# Patient Record
Sex: FEMALE | Race: WHITE | NOT HISPANIC OR LATINO | URBAN - METROPOLITAN AREA
[De-identification: names, ages, dates, MRNs, and addresses within clinical notes are randomized per-mention and may not be internally consistent; named-entity substitution may affect disease eponyms.]

---

## 2020-05-09 ENCOUNTER — INPATIENT (INPATIENT)
Facility: HOSPITAL | Age: 75
LOS: 2 days | Discharge: NURSING, REHAB OR LONG-TERM CARE | DRG: 57 | End: 2020-05-12
Attending: HOSPITALIST | Admitting: HOSPITALIST
Payer: MEDICARE

## 2020-05-09 VITALS
WEIGHT: 149.91 LBS | DIASTOLIC BLOOD PRESSURE: 88 MMHG | HEART RATE: 86 BPM | TEMPERATURE: 98 F | OXYGEN SATURATION: 98 % | HEIGHT: 66 IN | SYSTOLIC BLOOD PRESSURE: 164 MMHG | RESPIRATION RATE: 16 BRPM

## 2020-05-09 DIAGNOSIS — S22.39XA FRACTURE OF ONE RIB, UNSPECIFIED SIDE, INITIAL ENCOUNTER FOR CLOSED FRACTURE: ICD-10-CM

## 2020-05-09 DIAGNOSIS — D64.9 ANEMIA, UNSPECIFIED: ICD-10-CM

## 2020-05-09 DIAGNOSIS — Z29.9 ENCOUNTER FOR PROPHYLACTIC MEASURES, UNSPECIFIED: ICD-10-CM

## 2020-05-09 DIAGNOSIS — R53.1 WEAKNESS: ICD-10-CM

## 2020-05-09 DIAGNOSIS — G20 PARKINSON'S DISEASE: ICD-10-CM

## 2020-05-09 DIAGNOSIS — Z96.642 PRESENCE OF LEFT ARTIFICIAL HIP JOINT: Chronic | ICD-10-CM

## 2020-05-09 DIAGNOSIS — H26.9 UNSPECIFIED CATARACT: ICD-10-CM

## 2020-05-09 LAB
ALBUMIN SERPL ELPH-MCNC: 3.6 G/DL — SIGNIFICANT CHANGE UP (ref 3.3–5)
ALP SERPL-CCNC: 168 U/L — HIGH (ref 40–120)
ALT FLD-CCNC: 22 U/L — SIGNIFICANT CHANGE UP (ref 12–78)
ANION GAP SERPL CALC-SCNC: 8 MMOL/L — SIGNIFICANT CHANGE UP (ref 5–17)
APPEARANCE UR: CLEAR — SIGNIFICANT CHANGE UP
AST SERPL-CCNC: 31 U/L — SIGNIFICANT CHANGE UP (ref 15–37)
BACTERIA # UR AUTO: ABNORMAL
BASOPHILS # BLD AUTO: 0.04 K/UL — SIGNIFICANT CHANGE UP (ref 0–0.2)
BASOPHILS NFR BLD AUTO: 0.4 % — SIGNIFICANT CHANGE UP (ref 0–2)
BILIRUB SERPL-MCNC: 0.3 MG/DL — SIGNIFICANT CHANGE UP (ref 0.2–1.2)
BILIRUB UR-MCNC: NEGATIVE — SIGNIFICANT CHANGE UP
BUN SERPL-MCNC: 18 MG/DL — SIGNIFICANT CHANGE UP (ref 7–23)
CALCIUM SERPL-MCNC: 8.6 MG/DL — SIGNIFICANT CHANGE UP (ref 8.5–10.1)
CHLORIDE SERPL-SCNC: 100 MMOL/L — SIGNIFICANT CHANGE UP (ref 96–108)
CO2 SERPL-SCNC: 26 MMOL/L — SIGNIFICANT CHANGE UP (ref 22–31)
COLOR SPEC: YELLOW — SIGNIFICANT CHANGE UP
CREAT SERPL-MCNC: 0.65 MG/DL — SIGNIFICANT CHANGE UP (ref 0.5–1.3)
DIFF PNL FLD: ABNORMAL
EOSINOPHIL # BLD AUTO: 0.08 K/UL — SIGNIFICANT CHANGE UP (ref 0–0.5)
EOSINOPHIL NFR BLD AUTO: 0.9 % — SIGNIFICANT CHANGE UP (ref 0–6)
EPI CELLS # UR: SIGNIFICANT CHANGE UP
GLUCOSE SERPL-MCNC: 104 MG/DL — HIGH (ref 70–99)
GLUCOSE UR QL: NEGATIVE — SIGNIFICANT CHANGE UP
HCT VFR BLD CALC: 34.1 % — LOW (ref 34.5–45)
HGB BLD-MCNC: 11.4 G/DL — LOW (ref 11.5–15.5)
IMM GRANULOCYTES NFR BLD AUTO: 0.2 % — SIGNIFICANT CHANGE UP (ref 0–1.5)
KETONES UR-MCNC: NEGATIVE — SIGNIFICANT CHANGE UP
LEUKOCYTE ESTERASE UR-ACNC: NEGATIVE — SIGNIFICANT CHANGE UP
LYMPHOCYTES # BLD AUTO: 2.64 K/UL — SIGNIFICANT CHANGE UP (ref 1–3.3)
LYMPHOCYTES # BLD AUTO: 28.4 % — SIGNIFICANT CHANGE UP (ref 13–44)
MCHC RBC-ENTMCNC: 28 PG — SIGNIFICANT CHANGE UP (ref 27–34)
MCHC RBC-ENTMCNC: 33.4 GM/DL — SIGNIFICANT CHANGE UP (ref 32–36)
MCV RBC AUTO: 83.8 FL — SIGNIFICANT CHANGE UP (ref 80–100)
MONOCYTES # BLD AUTO: 0.81 K/UL — SIGNIFICANT CHANGE UP (ref 0–0.9)
MONOCYTES NFR BLD AUTO: 8.7 % — SIGNIFICANT CHANGE UP (ref 2–14)
NEUTROPHILS # BLD AUTO: 5.69 K/UL — SIGNIFICANT CHANGE UP (ref 1.8–7.4)
NEUTROPHILS NFR BLD AUTO: 61.4 % — SIGNIFICANT CHANGE UP (ref 43–77)
NITRITE UR-MCNC: NEGATIVE — SIGNIFICANT CHANGE UP
NRBC # BLD: 0 /100 WBCS — SIGNIFICANT CHANGE UP (ref 0–0)
PH UR: 7 — SIGNIFICANT CHANGE UP (ref 5–8)
PLATELET # BLD AUTO: 415 K/UL — HIGH (ref 150–400)
POTASSIUM SERPL-MCNC: 4.4 MMOL/L — SIGNIFICANT CHANGE UP (ref 3.5–5.3)
POTASSIUM SERPL-SCNC: 4.4 MMOL/L — SIGNIFICANT CHANGE UP (ref 3.5–5.3)
PROT SERPL-MCNC: 7.9 G/DL — SIGNIFICANT CHANGE UP (ref 6–8.3)
PROT UR-MCNC: 100
RBC # BLD: 4.07 M/UL — SIGNIFICANT CHANGE UP (ref 3.8–5.2)
RBC # FLD: 12.5 % — SIGNIFICANT CHANGE UP (ref 10.3–14.5)
RBC CASTS # UR COMP ASSIST: ABNORMAL /HPF (ref 0–4)
SODIUM SERPL-SCNC: 134 MMOL/L — LOW (ref 135–145)
SP GR SPEC: 1.01 — SIGNIFICANT CHANGE UP (ref 1.01–1.02)
UROBILINOGEN FLD QL: NEGATIVE — SIGNIFICANT CHANGE UP
WBC # BLD: 9.28 K/UL — SIGNIFICANT CHANGE UP (ref 3.8–10.5)
WBC # FLD AUTO: 9.28 K/UL — SIGNIFICANT CHANGE UP (ref 3.8–10.5)
WBC UR QL: SIGNIFICANT CHANGE UP

## 2020-05-09 PROCEDURE — 99222 1ST HOSP IP/OBS MODERATE 55: CPT | Mod: GC,AI

## 2020-05-09 PROCEDURE — 72125 CT NECK SPINE W/O DYE: CPT | Mod: 26

## 2020-05-09 PROCEDURE — 73522 X-RAY EXAM HIPS BI 3-4 VIEWS: CPT | Mod: 26

## 2020-05-09 PROCEDURE — 99285 EMERGENCY DEPT VISIT HI MDM: CPT

## 2020-05-09 PROCEDURE — 70450 CT HEAD/BRAIN W/O DYE: CPT | Mod: 26

## 2020-05-09 PROCEDURE — 74176 CT ABD & PELVIS W/O CONTRAST: CPT | Mod: 26

## 2020-05-09 PROCEDURE — 72110 X-RAY EXAM L-2 SPINE 4/>VWS: CPT | Mod: 26

## 2020-05-09 PROCEDURE — 71045 X-RAY EXAM CHEST 1 VIEW: CPT | Mod: 26

## 2020-05-09 PROCEDURE — 93010 ELECTROCARDIOGRAM REPORT: CPT

## 2020-05-09 PROCEDURE — 73562 X-RAY EXAM OF KNEE 3: CPT | Mod: 26,50

## 2020-05-09 RX ORDER — TIMOLOL 0.5 %
1 DROPS OPHTHALMIC (EYE)
Refills: 0 | Status: DISCONTINUED | OUTPATIENT
Start: 2020-05-09 | End: 2020-05-10

## 2020-05-09 RX ORDER — PREDNISOLONE SODIUM PHOSPHATE 1 %
1 DROPS OPHTHALMIC (EYE) DAILY
Refills: 0 | Status: DISCONTINUED | OUTPATIENT
Start: 2020-05-09 | End: 2020-05-12

## 2020-05-09 RX ORDER — ASCORBIC ACID 60 MG
500 TABLET,CHEWABLE ORAL DAILY
Refills: 0 | Status: DISCONTINUED | OUTPATIENT
Start: 2020-05-09 | End: 2020-05-12

## 2020-05-09 RX ORDER — CARBIDOPA AND LEVODOPA 25; 100 MG/1; MG/1
1 TABLET ORAL THREE TIMES A DAY
Refills: 0 | Status: DISCONTINUED | OUTPATIENT
Start: 2020-05-09 | End: 2020-05-12

## 2020-05-09 RX ORDER — FERROUS SULFATE 325(65) MG
325 TABLET ORAL DAILY
Refills: 0 | Status: DISCONTINUED | OUTPATIENT
Start: 2020-05-09 | End: 2020-05-12

## 2020-05-09 RX ORDER — OXYCODONE AND ACETAMINOPHEN 5; 325 MG/1; MG/1
1 TABLET ORAL ONCE
Refills: 0 | Status: DISCONTINUED | OUTPATIENT
Start: 2020-05-09 | End: 2020-05-09

## 2020-05-09 RX ORDER — ACETAMINOPHEN 500 MG
650 TABLET ORAL EVERY 4 HOURS
Refills: 0 | Status: DISCONTINUED | OUTPATIENT
Start: 2020-05-09 | End: 2020-05-12

## 2020-05-09 RX ORDER — SENNA PLUS 8.6 MG/1
2 TABLET ORAL AT BEDTIME
Refills: 0 | Status: DISCONTINUED | OUTPATIENT
Start: 2020-05-09 | End: 2020-05-12

## 2020-05-09 RX ORDER — OXYCODONE AND ACETAMINOPHEN 5; 325 MG/1; MG/1
1 TABLET ORAL EVERY 6 HOURS
Refills: 0 | Status: DISCONTINUED | OUTPATIENT
Start: 2020-05-09 | End: 2020-05-12

## 2020-05-09 RX ORDER — ACETAMINOPHEN 500 MG
1000 TABLET ORAL EVERY 6 HOURS
Refills: 0 | Status: DISCONTINUED | OUTPATIENT
Start: 2020-05-09 | End: 2020-05-12

## 2020-05-09 RX ORDER — ENOXAPARIN SODIUM 100 MG/ML
40 INJECTION SUBCUTANEOUS DAILY
Refills: 0 | Status: DISCONTINUED | OUTPATIENT
Start: 2020-05-09 | End: 2020-05-12

## 2020-05-09 RX ORDER — BRIMONIDINE TARTRATE 2 MG/MG
1 SOLUTION/ DROPS OPHTHALMIC
Refills: 0 | Status: DISCONTINUED | OUTPATIENT
Start: 2020-05-09 | End: 2020-05-10

## 2020-05-09 RX ORDER — CARBIDOPA AND LEVODOPA 25; 100 MG/1; MG/1
1 TABLET ORAL ONCE
Refills: 0 | Status: COMPLETED | OUTPATIENT
Start: 2020-05-09 | End: 2020-05-09

## 2020-05-09 RX ORDER — CALCIUM CARBONATE 500(1250)
1 TABLET ORAL DAILY
Refills: 0 | Status: DISCONTINUED | OUTPATIENT
Start: 2020-05-09 | End: 2020-05-12

## 2020-05-09 RX ADMIN — CARBIDOPA AND LEVODOPA 1 TABLET(S): 25; 100 TABLET ORAL at 20:13

## 2020-05-09 RX ADMIN — OXYCODONE AND ACETAMINOPHEN 1 TABLET(S): 5; 325 TABLET ORAL at 17:52

## 2020-05-09 NOTE — ED PROVIDER NOTE - PHYSICAL EXAMINATION
SPINE: c-spine: supple, no spinal tenderness. no midline tenderness. no paraspinal tenderness. no body step off. no deformity. no muscle spasm. FROM   Thoracic spine: no spinal tenderness. no midline tenderness. no paraspinal tenderness. no body step off. no deformity. no muscle spasm.FROM   LS-spine:no spinal tenderness. no midline tenderness. no paraspinal tenderness. no body step off. no deformity.no muscle spasm. FROM neg nexus from x 4.  + left foot drop with brace SPINE: c-spine: supple, no spinal tenderness. no midline tenderness. no paraspinal tenderness. no body step off. no deformity. no muscle spasm. FROM   Thoracic spine: no spinal tenderness. no midline tenderness. no paraspinal tenderness. no body step off. no deformity. no muscle spasm.FROM   LS-spine:no spinal tenderness. no midline tenderness. no paraspinal tenderness. no body step off. no deformity.no muscle spasm. FROM neg nexus from x 4.  + left foot drop with brace    ms r le: nontender from  ms lle:nontender from foot drop

## 2020-05-09 NOTE — H&P ADULT - ASSESSMENT
75 year old F PMH cataracts and Parkinson's BIBEMS for increased weakness and pain in lower back admitted for rib fractures and generalized weakness.

## 2020-05-09 NOTE — ED PROVIDER NOTE - CARE PLAN
Principal Discharge DX:	Rib fractures  Secondary Diagnosis:	Generalized weakness  Secondary Diagnosis:	Parkinsons  Secondary Diagnosis:	Dementia

## 2020-05-09 NOTE — ED PROVIDER NOTE - CLINICAL SUMMARY MEDICAL DECISION MAKING FREE TEXT BOX
pt s/p fall with msk pain. will do labs, ua to r/o uti, ct head to r/o ich, ct c-spine to r/o fx, x rays to r/o fx, pain control and re-eval

## 2020-05-09 NOTE — H&P ADULT - PROBLEM SELECTOR PLAN 5
-patient with history of anemia likely due to chronic disease  -patient on iron at home, will continue

## 2020-05-09 NOTE — H&P ADULT - HISTORY OF PRESENT ILLNESS
75 year old F PMH cataracts and Parkinson's BIBEMS for increased weakness and pain in lower back.  History obtained by patient and son, Roman, over the phone.  Patient had fall on 4/25 where she was trying to get onto commode and fell landing on her buttock, but denies hitting head.  Patient states that she blocked out however son states that she screamed and he heard a thump, went to check patient who was awake and son helped her get up. Patient refused to go to hospital at that time due to current pandemic however since fall, patient has been very weak and complaining of increased pain in back and buttock which prompted son to call ambulance as he was no longer able to take care of her and wanted evaluation.  Patient states she has had 2 more falls since then however son unaware.  Patient ambulates with walker since hip surgery 2 years ago however recently has been more bedbound.  She receives PT twice a week however since pandemic patient has not received PT.  Per son and patient, FULL CODE.    In the ED, T 98F, HR 86, /88, RR 16, SpO2 98% on RA.  Labs significant for H/H 11.4/34.1, Na 134, alk phos 168, CK 97, trop negative x1, UA with RBC but no blood.  Patient received sinemet x1 and percocet x1 in the ED.      EKG: NSR  CXR: no acute pathology (wet read)  CT head: no acute pathology  CT cervical spine: Diffusely heterogeneous thyroid with numerous nodules measuring up to 1.5 cm.  As outpatient thyroid ultrasound may be obtained as clinically warranted  CT renal stone hunt: No hydronephrosis or renal stones.  If there is persistent unexplained hematuria, a follow-up CT urogram may be obtained as an outpatient. Fractures of the posterior left 9th and 10th ribs with surrounding soft tissue thickening may be acute.

## 2020-05-09 NOTE — ED ADULT NURSE NOTE - NSIMPLEMENTINTERV_GEN_ALL_ED
Implemented All Fall with Harm Risk Interventions:  [place] to call system. Call bell, personal items and telephone within reach. Instruct patient to call for assistance. Room bathroom lighting operational. Non-slip footwear when patient is off stretcher. Physically safe environment: no spills, clutter or unnecessary equipment. Stretcher in lowest position, wheels locked, appropriate side rails in place. Provide visual cue, wrist band, yellow gown, etc. Monitor gait and stability. Monitor for mental status changes and reorient to person, place, and time. Review medications for side effects contributing to fall risk. Reinforce activity limits and safety measures with patient and family. Provide visual clues: red socks.

## 2020-05-09 NOTE — ED PROVIDER NOTE - OBJECTIVE STATEMENT
pt is a 76yo female with pmhx of parkinson's bib ems s/p fall. pt reports she was getting on the toilet, lost her balance and fell hitting her head and buttock without loc. pt c/o bl hip and knee pain and neck pain. pt took tylenol which provided no relief. pt with hx of left sided foot drop from hip fx. pt denies fever, vision changes, n/v, abd pain.   pt walks with walker at baseline.

## 2020-05-09 NOTE — ED PROVIDER NOTE - ATTENDING CONTRIBUTION TO CARE
Pt is a 74 yo female who presents to the ED with a cc of generalized pain s/p fall.  PMHx of Parkinson's disease, dementia, anemia, cataract left eye.  Pt is a poor historian so history is obtained from son who is her POA and chart review.  Son reports that pt has been on a progressive decline for some time.  He reports that she use to live in [place] but around 2008 she had a syncopal episode and suffered a hip fracture.  After this episode she moved to [place] to live with her son and she has not followed with neurology since then.  Son reports that he remembers on her last visit the neurologist had told him that she did have mild dementia.  He states that her PMD has just been refilling her Sinemet and that there has been no dose adjustment since.  He reports that 2 Saturdays ago pt who ambulates with a walker was attempting to get to her commode in the room when she suffered a mechanical fall.  He was able to help her up and she refused to go to the hospital at that time.  He reports that he saw no signs of injury and so he kept her at home.  Since then pt has had a rapid decline.  Each day her ambulatory status has worsened and she complains of increased total body pain worse in her lower ext.  He gave her Tylenol yesterday and now pt has become paranoid that the Tylenol has caused her paralysis.  EMS was called today because she was unable to ambulate a few steps even with help today.  Upon there arrival pt reported a second fall late last night early today but would not elaborate and son reports that he knew nothing of this.  On exam pt alert to person aware she is in a hospital but when questioned about the year she began talking about Tylenol and her concerns that it caused her to become paralyzed was unable to redirect pt after this.  NCAT, PERRL, EOMI, heart RRR, lungs CTA, abd soft NT/ND.  No pelvic TTP.  Generalized weakness noted to LE bilaterally with left foot drop noted which is chronic.  +pedal pulses bilaterally.  Pt with significant muscle stiffness noted to lower ext unable to lift off bed.  No acute deformity noted.  Moving upper ext bilaterally with slight muscle stiffness.  Pt with worsening ambulatory status and frequent falls in the setting of Parkinson's disease.  Has been non compliant with follow up with neurology.  Will likely require admission as it appears that pt is an unsafe discharge

## 2020-05-09 NOTE — H&P ADULT - NSHPPHYSICALEXAM_GEN_ALL_CORE
T(C): 36.8 (05-09-20 @ 21:15), Max: 36.8 (05-09-20 @ 21:15)  HR: 81 (05-09-20 @ 21:15) (81 - 86)  BP: 151/79 (05-09-20 @ 21:15) (151/79 - 164/88)  RR: 15 (05-09-20 @ 21:15) (15 - 16)  SpO2: 99% (05-09-20 @ 21:15) (98% - 99%)    GENERAL: patient appears well, no acute distress, appropriate, pleasant  EYES: sclera clear, no exudates  ENMT: oropharynx clear without erythema, no exudates, moist mucous membranes  LUNGS: good air entry bilaterally, clear to auscultation, symmetric breath sounds, no wheezing or rhonchi appreciated  HEART: soft S1/S2, regular rate and rhythm, no murmurs noted, no lower extremity edema  GASTROINTESTINAL: abdomen is soft but slightly firm, nontender, nondistended, normoactive bowel sounds, no palpable masses  MUSCULOSKELETAL: 5/5 muscle strength b/l UE, unable to move b/l LE much 3/5 strength, 5/5 plantar flexion b/l feet, no pain to palpation of anterior chest, no ecchymosis on chest  NEUROLOGIC: awake, alert, oriented x3, no obvious sensory deficits  PSYCHIATRIC: mood is good, affect is congruent, linear and logical thought process

## 2020-05-09 NOTE — H&P ADULT - PROBLEM SELECTOR PLAN 2
-patient with multiple falls in past few weeks, ambulates with walker at baseline however recently mostly bedbound  -falls likely due to Parkinson's  -UA with some RBC but no blood, CK negative, CT abd/pelvis negative for stone or hydronephrosis   -PT consult  -social work consult: son interested having home aide when patient discharged -patient with multiple falls in past few weeks, ambulates with walker at baseline however recently mostly bedbound  -falls likely due to Parkinson's  -UA with some RBC but no blood, CK negative, CT abd/pelvis negative for stone or hydronephrosis   -Xray hip and L-spine negative for acute fractures, follow up xray b/l knees  -PT consult  -social work consult: son interested having home aide when patient discharged

## 2020-05-09 NOTE — ED ADULT NURSE NOTE - OBJECTIVE STATEMENT
pt reports falling twice this week.  pt reports "everything hurts" no obvious injury noted. pt reports falling twice this week.  no obvious injury noted. pt states she lost her balance while getting on the toilet. pt reports "everything hurts" pt c/o bl hip and knee pain and neck pain. pt took tylenol which provided no relief. left foot drop noted with brace on from home.  pt states her foot drop is from a prior hip fx.  pt walks with walker at baseline.  pt lower extremities are extremely stiff.  pt unable to move her legs independently while in bed.  when her legs are moved, pt c/o pain.  skin intact, no ecchymosis noted.

## 2020-05-09 NOTE — H&P ADULT - NSHPREVIEWOFSYSTEMS_GEN_ALL_CORE
CONSTITUTIONAL: denies fever, chills, admits to fatigue and weakness  HEENT: denies blurred vision, sore throat  CARDIOVASCULAR: denies chest pain, chest pressure, palpitations  RESPIRATORY: denies shortness of breath, sputum production  GASTROINTESTINAL: denies nausea, vomiting, diarrhea, abdominal pain  GENITOURINARY: denies dysuria, discharge  NEUROLOGICAL: denies numbness, headache, admits to generalized weakness  MUSCULOSKELETAL: admits to L hip pain and back pain, denies chest pain  HEMATOLOGIC: denies gross bleeding, bruising  PSYCHIATRIC: denies recent changes in anxiety, depression

## 2020-05-09 NOTE — H&P ADULT - NSHPSOCIALHISTORY_GEN_ALL_CORE
Lives with son at home, ambulates with walker at baseline  Receives PT twice a week  Denies hx of smoking, EtOH, or illicit drug use

## 2020-05-09 NOTE — H&P ADULT - PROBLEM SELECTOR PLAN 1
-admit to F  -patient with fractures of the posterior left 9th and 10th ribs with surrounding soft tissue thickening may be acute on CT abd/pelvis  -pain control   -no pneumothorax on CXR, follow up official report  -Continue to monitor respiratory status, saturating well on RA on admission -admit to F  -patient with fractures of the posterior left 9th and 10th ribs with surrounding soft tissue thickening may be acute on CT abd/pelvis  -pain control   -no pneumothorax on CXR  -Continue to monitor respiratory status, saturating well on RA on admission -admit to F  -patient with fractures of the posterior left 9th and 10th ribs with surrounding soft tissue thickening may be acute on CT abd/pelvis  -pain control , tylenol & percocet prn. Lidoderm patch to chest wall  -no pneumothorax on CXR  -Continue to monitor respiratory status, saturating well on RA on admission

## 2020-05-09 NOTE — H&P ADULT - PROBLEM SELECTOR PLAN 6
-Lovenox 40mg QD due to mostly bedbound status while in hospital  -Palliative consult: patient full code at this time, son would like to speak to palliative for more information    IMPROVE VTE Individual Risk Assessment  RISK                                                                Points  [  ] Previous VTE                                                  3  [  ] Thrombophilia                                               2  [  ] Lower limb paralysis                                      2        (unable to hold up >15 seconds)    [  ] Current Cancer                                              2         (within 6 months)  [  ] Immobilization > 24 hrs                                1  [  ] ICU/CCU stay > 24 hours                              1  [X  ] Age > 60                                                      1  IMPROVE VTE Score ____1_____  IMPROVE Score 0-1: Low Risk, No VTE prophylaxis required for most patients, encourage ambulation.   IMPROVE Score 2-3: At risk, pharmacologic VTE prophylaxis is indicated for most patients (in the absence of a contraindication)  IMPROVE Score > or = 4: High Risk, pharmacologic VTE prophylaxis is indicated for most patients (in the absence of a contraindication)

## 2020-05-10 DIAGNOSIS — I10 ESSENTIAL (PRIMARY) HYPERTENSION: ICD-10-CM

## 2020-05-10 DIAGNOSIS — E04.1 NONTOXIC SINGLE THYROID NODULE: ICD-10-CM

## 2020-05-10 LAB
ANION GAP SERPL CALC-SCNC: 7 MMOL/L — SIGNIFICANT CHANGE UP (ref 5–17)
BASOPHILS # BLD AUTO: 0.03 K/UL — SIGNIFICANT CHANGE UP (ref 0–0.2)
BASOPHILS NFR BLD AUTO: 0.4 % — SIGNIFICANT CHANGE UP (ref 0–2)
BUN SERPL-MCNC: 15 MG/DL — SIGNIFICANT CHANGE UP (ref 7–23)
CALCIUM SERPL-MCNC: 8.4 MG/DL — LOW (ref 8.5–10.1)
CHLORIDE SERPL-SCNC: 102 MMOL/L — SIGNIFICANT CHANGE UP (ref 96–108)
CO2 SERPL-SCNC: 28 MMOL/L — SIGNIFICANT CHANGE UP (ref 22–31)
CREAT SERPL-MCNC: 0.64 MG/DL — SIGNIFICANT CHANGE UP (ref 0.5–1.3)
EOSINOPHIL # BLD AUTO: 0.12 K/UL — SIGNIFICANT CHANGE UP (ref 0–0.5)
EOSINOPHIL NFR BLD AUTO: 1.6 % — SIGNIFICANT CHANGE UP (ref 0–6)
GLUCOSE SERPL-MCNC: 87 MG/DL — SIGNIFICANT CHANGE UP (ref 70–99)
HCT VFR BLD CALC: 31.9 % — LOW (ref 34.5–45)
HGB BLD-MCNC: 10.5 G/DL — LOW (ref 11.5–15.5)
IMM GRANULOCYTES NFR BLD AUTO: 0.3 % — SIGNIFICANT CHANGE UP (ref 0–1.5)
LYMPHOCYTES # BLD AUTO: 2.7 K/UL — SIGNIFICANT CHANGE UP (ref 1–3.3)
LYMPHOCYTES # BLD AUTO: 35.3 % — SIGNIFICANT CHANGE UP (ref 13–44)
MCHC RBC-ENTMCNC: 27.9 PG — SIGNIFICANT CHANGE UP (ref 27–34)
MCHC RBC-ENTMCNC: 32.9 GM/DL — SIGNIFICANT CHANGE UP (ref 32–36)
MCV RBC AUTO: 84.8 FL — SIGNIFICANT CHANGE UP (ref 80–100)
MONOCYTES # BLD AUTO: 0.74 K/UL — SIGNIFICANT CHANGE UP (ref 0–0.9)
MONOCYTES NFR BLD AUTO: 9.7 % — SIGNIFICANT CHANGE UP (ref 2–14)
NEUTROPHILS # BLD AUTO: 4.04 K/UL — SIGNIFICANT CHANGE UP (ref 1.8–7.4)
NEUTROPHILS NFR BLD AUTO: 52.7 % — SIGNIFICANT CHANGE UP (ref 43–77)
NRBC # BLD: 0 /100 WBCS — SIGNIFICANT CHANGE UP (ref 0–0)
PLATELET # BLD AUTO: 404 K/UL — HIGH (ref 150–400)
POTASSIUM SERPL-MCNC: 3.9 MMOL/L — SIGNIFICANT CHANGE UP (ref 3.5–5.3)
POTASSIUM SERPL-SCNC: 3.9 MMOL/L — SIGNIFICANT CHANGE UP (ref 3.5–5.3)
RBC # BLD: 3.76 M/UL — LOW (ref 3.8–5.2)
RBC # FLD: 12.5 % — SIGNIFICANT CHANGE UP (ref 10.3–14.5)
SARS-COV-2 RNA SPEC QL NAA+PROBE: SIGNIFICANT CHANGE UP
SODIUM SERPL-SCNC: 137 MMOL/L — SIGNIFICANT CHANGE UP (ref 135–145)
WBC # BLD: 7.65 K/UL — SIGNIFICANT CHANGE UP (ref 3.8–10.5)
WBC # FLD AUTO: 7.65 K/UL — SIGNIFICANT CHANGE UP (ref 3.8–10.5)

## 2020-05-10 PROCEDURE — 99233 SBSQ HOSP IP/OBS HIGH 50: CPT

## 2020-05-10 RX ORDER — LIDOCAINE 4 G/100G
1 CREAM TOPICAL DAILY
Refills: 0 | Status: DISCONTINUED | OUTPATIENT
Start: 2020-05-10 | End: 2020-05-12

## 2020-05-10 RX ORDER — BRIMONIDINE TARTRATE, TIMOLOL MALEATE 2; 5 MG/ML; MG/ML
1 SOLUTION/ DROPS OPHTHALMIC EVERY 12 HOURS
Refills: 0 | Status: DISCONTINUED | OUTPATIENT
Start: 2020-05-10 | End: 2020-05-12

## 2020-05-10 RX ORDER — AMLODIPINE BESYLATE 2.5 MG/1
10 TABLET ORAL DAILY
Refills: 0 | Status: DISCONTINUED | OUTPATIENT
Start: 2020-05-10 | End: 2020-05-12

## 2020-05-10 RX ADMIN — Medication 1 TABLET(S): at 13:19

## 2020-05-10 RX ADMIN — Medication 500 MILLIGRAM(S): at 13:19

## 2020-05-10 RX ADMIN — Medication 1 DROP(S): at 06:24

## 2020-05-10 RX ADMIN — ENOXAPARIN SODIUM 40 MILLIGRAM(S): 100 INJECTION SUBCUTANEOUS at 13:19

## 2020-05-10 RX ADMIN — CARBIDOPA AND LEVODOPA 1 TABLET(S): 25; 100 TABLET ORAL at 13:19

## 2020-05-10 RX ADMIN — BRIMONIDINE TARTRATE 1 DROP(S): 2 SOLUTION/ DROPS OPHTHALMIC at 06:24

## 2020-05-10 RX ADMIN — Medication 1 DROP(S): at 13:19

## 2020-05-10 RX ADMIN — Medication 325 MILLIGRAM(S): at 13:19

## 2020-05-10 RX ADMIN — AMLODIPINE BESYLATE 10 MILLIGRAM(S): 2.5 TABLET ORAL at 13:18

## 2020-05-10 RX ADMIN — CARBIDOPA AND LEVODOPA 1 TABLET(S): 25; 100 TABLET ORAL at 21:45

## 2020-05-10 RX ADMIN — CARBIDOPA AND LEVODOPA 1 TABLET(S): 25; 100 TABLET ORAL at 06:24

## 2020-05-10 NOTE — PROGRESS NOTE ADULT - SUBJECTIVE AND OBJECTIVE BOX
This progress note was completed in part by resident acting as telephonic scribe during COVID-19 crisis. Physical exam was completed by attending physician, and findings below are those of the attending physician, and have been reviewed in detail.    Patient is a 75y old  Female who presents with a chief complaint of rib fractures and generalized weakness (09 May 2020 22:19)      ----  INTERVAL HPI/OVERNIGHT EVENTS: Pt seen and evaluated at the bedside. No acute overnight events occurred.     ----  PAST MEDICAL & SURGICAL HISTORY:  Dementia  Anemia  Cataract  Parkinsons  History of left hip replacement      FAMILY HISTORY:  FH: type 2 diabetes      Allergies    tetracycline (Unknown)    Intolerances        ----  REVIEW OF SYSTEMS:  CONSTITUTIONAL: denies fever, chills, admits to fatigue and weakness  HEENT: denies blurred vision, sore throat  CARDIOVASCULAR: denies chest pain, chest pressure, palpitations  RESPIRATORY: denies shortness of breath, sputum production  GASTROINTESTINAL: denies nausea, vomiting, diarrhea, abdominal pain  GENITOURINARY: denies dysuria, discharge  NEUROLOGICAL: denies numbness, headache, admits to generalized weakness  MUSCULOSKELETAL: admits to L hip pain and back pain, denies chest pain  	HEMATOLOGIC: denies gross bleeding, bruising  PSYCHIATRIC: denies recent changes in anxiety, depression    ----  PHYSICAL EXAM:  GENERAL: patient appears well, no acute distress, appropriate, pleasant  EYES: sclera clear, no exudates  ENMT: oropharynx clear without erythema, no exudates, moist mucous membranes  LUNGS: good air entry bilaterally, clear to auscultation, symmetric breath sounds, no wheezing or rhonchi appreciated  HEART: soft S1/S2, regular rate and rhythm  GASTROINTESTINAL: abdomen is soft, nontender, nondistended, normoactive bowel sounds, no palpable masses  MUSCULOSKELETAL: 5/5 muscle strength b/l UE, b/l LE 3/5 strength, 5/5 plantar flexion b/l feet, no pain to palpation of anterior chest, no ecchymosis on chest  NEUROLOGIC: awake, alert, oriented x3, no obvious sensory deficits  PSYCHIATRIC: mood is good, affect is congruent, linear and logical thought process     T(C): 36.8 (05-10-20 @ 09:05), Max: 37 (05-10-20 @ 08:13)  HR: 68 (05-10-20 @ 09:05) (68 - 86)  BP: 174/81 (05-10-20 @ 09:05) (150/78 - 174/81)  RR: 16 (05-10-20 @ 09:05) (15 - 16)  SpO2: 98% (05-10-20 @ 09:05) (98% - 99%)  Wt(kg): --    ----  I&O's Summary    09 May 2020 07:01  -  10 May 2020 07:00  --------------------------------------------------------  IN: 0 mL / OUT: 800 mL / NET: -800 mL        LABS:                        10.5   7.65  )-----------( 404      ( 10 May 2020 04:54 )             31.9     05-10    137  |  102  |  15  ----------------------------<  87  3.9   |  28  |  0.64    Ca    8.4<L>      10 May 2020 04:54    TPro  7.9  /  Alb  3.6  /  TBili  0.3  /  DBili  x   /  AST  31  /  ALT  22  /  AlkPhos  168<H>  09      Urinalysis Basic - ( 09 May 2020 17:55 )    Color: Yellow / Appearance: Clear / S.010 / pH: x  Gluc: x / Ketone: Negative  / Bili: Negative / Urobili: Negative   Blood: x / Protein: 100 / Nitrite: Negative   Leuk Esterase: Negative / RBC: 25-50 /HPF / WBC 0-2   Sq Epi: x / Non Sq Epi: Occasional / Bacteria: Few      CAPILLARY BLOOD GLUCOSE

## 2020-05-10 NOTE — DISCHARGE NOTE PROVIDER - CARE PROVIDER_API CALL
Kellie Atkinson (DO)  Family Medicine  [address] [place] [address]  Phone: [phone]  Fax: [phone]  Follow Up Time: Kellie Atkinson (DO)  Family Medicine  [address] [place] [address]  Phone: [phone]  Fax: [phone]  Follow Up Time:     Perlman, Craig D (DO)  [address], [address]  [place] [address]  Phone: [phone]  Fax: [phone]  Follow Up Time:

## 2020-05-10 NOTE — PHYSICAL THERAPY INITIAL EVALUATION ADULT - ADDITIONAL COMMENTS
Patient live in a private home on main level. Patient states she has been receiving HCPT 2x/wk since 2014. Patient ambulates short distances with RW. Son assists as needed.

## 2020-05-10 NOTE — DISCHARGE NOTE PROVIDER - NSDCMRMEDTOKEN_GEN_ALL_CORE_FT
B-Complex 50 oral tablet: 1 tab(s) orally once a day  Caltrate 600 mg oral tablet: 1 tab(s) orally once a day  Centrum oral tablet: 1 tab(s) orally once a day  Combigan 0.2%-0.5% ophthalmic solution: 1 drop(s) to each affected eye every 12 hours  ferrous gluconate 240 mg (27 mg elemental iron) oral tablet: 1 tab(s) orally once a day  Ocuvite oral tablet: 1 tab(s) orally once a day  prednisoLONE acetate 1% ophthalmic suspension: 1 drop(s) to each affected eye once a day  Sinemet 25 mg-100 mg oral tablet: 1 tab(s) orally 3 times a day  Vitamin C 500 mg oral tablet: 1 tab(s) orally once a day acetaminophen 325 mg oral tablet: 2 tab(s) orally every 4 hours, As needed, Mild Pain (1 - 3)  acetaminophen 500 mg oral tablet: 2 tab(s) orally every 6 hours, As needed, Moderate Pain (4 - 6)  amLODIPine 10 mg oral tablet: 1 tab(s) orally once a day  B-Complex 50 oral tablet: 1 tab(s) orally once a day  Caltrate 600 mg oral tablet: 1 tab(s) orally once a day  carbidopa-levodopa 25 mg-100 mg oral tablet: 1 tab(s) orally 3 times a day  Centrum oral tablet: 1 tab(s) orally once a day  Combigan 0.2%-0.5% ophthalmic solution: 1 drop(s) to each affected eye every 12 hours  enoxaparin: 40 milligram(s) subcutaneous once a day  ferrous gluconate 240 mg (27 mg elemental iron) oral tablet: 1 tab(s) orally once a day  lidocaine 5% topical film: Apply topically to affected area once a day, As Needed  Ocuvite oral tablet: 1 tab(s) orally once a day  prednisoLONE acetate 1% ophthalmic suspension: 1 drop(s) to each affected eye once a day  QUEtiapine: 12.5 milligram(s) orally once a day at 20:00  senna oral tablet: 2 tab(s) orally once a day (at bedtime), As needed, Constipation  Vitamin C 500 mg oral tablet: 1 tab(s) orally once a day acetaminophen 325 mg oral tablet: 2 tab(s) orally every 4 hours, As needed, Mild Pain (1 - 3)  acetaminophen 500 mg oral tablet: 2 tab(s) orally every 6 hours, As needed, Moderate Pain (4 - 6)  amLODIPine 10 mg oral tablet: 1 tab(s) orally once a day  B-Complex 50 oral tablet: 1 tab(s) orally once a day  Caltrate 600 mg oral tablet: 1 tab(s) orally once a day  carbidopa-levodopa 25 mg-100 mg oral tablet: 1 tab(s) orally 3 times a day  Centrum oral tablet: 1 tab(s) orally once a day  Combigan 0.2%-0.5% ophthalmic solution: 1 drop(s) to each affected eye every 12 hours  enoxaparin: 40 milligram(s) subcutaneous once a day  ferrous gluconate 240 mg (27 mg elemental iron) oral tablet: 1 tab(s) orally once a day  lidocaine 5% topical film: Apply topically to affected area once a day, As Needed  neomycin/polymyxin B/bacitracin 3.5 mg-10,000 units-400 units/g ophthalmic ointment: 1 application to each affected eye every 8 hours for 5 days thru 5/17  Ocuvite oral tablet: 1 tab(s) orally once a day  prednisoLONE acetate 1% ophthalmic suspension: 1 drop(s) to each affected eye once a day  QUEtiapine: 12.5 milligram(s) orally once a day at 20:00  senna oral tablet: 2 tab(s) orally once a day (at bedtime), As needed, Constipation  Vitamin C 500 mg oral tablet: 1 tab(s) orally once a day

## 2020-05-10 NOTE — DISCHARGE NOTE PROVIDER - CARE PROVIDERS DIRECT ADDRESSES
[email or web address],[email or web address]_Unknown

## 2020-05-10 NOTE — PHYSICAL THERAPY INITIAL EVALUATION ADULT - RANGE OF MOTION EXAMINATION, REHAB EVAL
bilateral lower extremity ROM was WFL (within functional limits)/L foot drop. wears AFO w/sneakers (at bedside)/bilateral upper extremity ROM was WFL (within functional limits)/deficits as listed below

## 2020-05-10 NOTE — PROGRESS NOTE ADULT - PROBLEM SELECTOR PLAN 3
- no reported hx of HTN, not on any home meds  - noted elevated BP in AM, possibly due to undiagnosed HTN and discomfort  - added amlodipine daily with hold parameters

## 2020-05-10 NOTE — PROGRESS NOTE ADULT - PROBLEM SELECTOR PLAN 4
- Incidental finding on CT showing diffusely heterogeneous thyroid with numerous nodules measuring up to 1.5 cm  - no reported hx of thyroid disease, asymptomatic  - continue to monitor, will need outpatient follow up   - f/u Thyroid panel in AM

## 2020-05-10 NOTE — PROGRESS NOTE ADULT - PROBLEM SELECTOR PLAN 2
-patient with multiple falls in past few weeks, ambulates with walker at baseline however recently mostly bedbound  -falls likely due to Parkinson's and deconditioning   -UA with some RBC but no blood, CK negative, CT abd/pelvis negative for stone or hydronephrosis   -Xray hip and L-spine negative for acute fractures  -b/l Knee xray showed diffuse osteopenia.  No evidence of fracture, dislocation or suprapatellar joint effusion in either knee. Mild osteoarthrosis bilaterally.  -PT consult  -social work consult: son interested having home aide when patient discharged

## 2020-05-10 NOTE — PROGRESS NOTE ADULT - PROBLEM SELECTOR PLAN 1
-patient with fractures of the posterior left 9th and 10th ribs with surrounding soft tissue thickening may be acute on CT abd/pelvis  -pain control , tylenol & percocet prn. Lidoderm patch to chest wall  -no pneumothorax on CXR  -Continue to monitor respiratory status, saturating well on RA on admission

## 2020-05-10 NOTE — DISCHARGE NOTE PROVIDER - NSDCCPCAREPLAN_GEN_ALL_CORE_FT
PRINCIPAL DISCHARGE DIAGNOSIS  Diagnosis: Rib fractures  Assessment and Plan of Treatment: You were admitted for a fall and found to have rib fractures. You were seen by a neurologist while admitted. You were also evaluated by PT and recommended to be discharged to [facility] for continued physical therapy and rehab.      SECONDARY DISCHARGE DIAGNOSES  Diagnosis: Parkinsons  Assessment and Plan of Treatment: Continue current medications    Diagnosis: HTN (hypertension)  Assessment and Plan of Treatment: Continue Amlodipine 10mg with hold parameters    Diagnosis: Thyroid nodule  Assessment and Plan of Treatment: You were found to have a diffusely heterogeneous thyroid with numerous nodules measuring up to 1.5 cm on imaging. T4 and totat T3 were normal. Please follow up with your PMD or endocrinologist for further management. PRINCIPAL DISCHARGE DIAGNOSIS  Diagnosis: Rib fractures  Assessment and Plan of Treatment: You were admitted for a fall and found to have rib fractures. You were seen by a neurologist while admitted. You were also evaluated by PT and recommended to be discharged to [facility] for continued physical therapy and rehab.      SECONDARY DISCHARGE DIAGNOSES  Diagnosis: Conjunctivitis  Assessment and Plan of Treatment: Continue neomycin/polymyxin B/bacitracin for 5 day. Consider further therapy or evaluation if symptoms persist or worsen    Diagnosis: Parkinsons  Assessment and Plan of Treatment: Continue current medications    Diagnosis: HTN (hypertension)  Assessment and Plan of Treatment: Continue Amlodipine 10mg with hold parameters    Diagnosis: Thyroid nodule  Assessment and Plan of Treatment: You were found to have a diffusely heterogeneous thyroid with numerous nodules measuring up to 1.5 cm on imaging. T4 and totat T3 were normal. Please follow up with your PMD or endocrinologist for further management. If you do not have an endocrinologist, you may follow up with Dr. Perlman PRINCIPAL DISCHARGE DIAGNOSIS  Diagnosis: Rib fractures  Assessment and Plan of Treatment: You were admitted for a fall and found to have rib fractures. You were seen by a neurologist while admitted. You were also evaluated by PT and recommended to be discharged to [facility] for continued physical therapy and rehab.      SECONDARY DISCHARGE DIAGNOSES  Diagnosis: Conjunctivitis  Assessment and Plan of Treatment: Continue neomycin/polymyxin B/bacitracin for 5 day. Please follow up with an opthalmologist once discharged    Diagnosis: Parkinsons  Assessment and Plan of Treatment: Continue current medications    Diagnosis: HTN (hypertension)  Assessment and Plan of Treatment: Continue Amlodipine 10mg with hold parameters    Diagnosis: Thyroid nodule  Assessment and Plan of Treatment: You were found to have a diffusely heterogeneous thyroid with numerous nodules measuring up to 1.5 cm on imaging. T4 and totat T3 were normal. Please follow up with your PMD or endocrinologist for further management. If you do not have an endocrinologist, you may follow up with Dr. Perlman

## 2020-05-10 NOTE — DISCHARGE NOTE PROVIDER - HOSPITAL COURSE
FROM ADMISSION H+P:     HPI:    75 year old F PMH cataracts and Parkinson's BIBEMS for increased weakness and pain in lower back.  History obtained by patient and son, Roman, over the phone.  Patient had fall on 4/25 where she was trying to get onto commode and fell landing on her buttock, but denies hitting head.  Patient states that she blocked out however son states that she screamed and he heard a thump, went to check patient who was awake and son helped her get up. Patient refused to go to hospital at that time due to current pandemic however since fall, patient has been very weak and complaining of increased pain in back and buttock which prompted son to call ambulance as he was no longer able to take care of her and wanted evaluation.  Patient states she has had 2 more falls since then however son unaware.  Patient ambulates with walker since hip surgery 2 years ago however recently has been more bedbound.  She receives PT twice a week however since pandemic patient has not received PT.  Per son and patient, FULL CODE.        In the ED, T 98F, HR 86, /88, RR 16, SpO2 98% on RA.  Labs significant for H/H 11.4/34.1, Na 134, alk phos 168, CK 97, trop negative x1, UA with RBC but no blood.  Patient received sinemet x1 and percocet x1 in the ED.          EKG: NSR    CXR: no acute pathology (wet read)    CT head: no acute pathology    CT cervical spine: Diffusely heterogeneous thyroid with numerous nodules measuring up to 1.5 cm.  As outpatient thyroid ultrasound may be obtained as clinically warranted    CT renal stone hunt: No hydronephrosis or renal stones.  If there is persistent unexplained hematuria, a follow-up CT urogram may be obtained as an outpatient. Fractures of the posterior left 9th and 10th ribs with surrounding soft tissue thickening may be acute. (09 May 2020 22:19)            ---    HOSPITAL COURSE: Patient was admitted to medicine. Seen by neurology (Aguilar) and..... Pt was also evaluated by PT and recommended to be discharged to [facility] home with PT. For thyroid nodule, a thyroid panel was checked... Patient will require further outpatient endocrinology workup and management. Patient showed gradual improvement over hospitalization and is medically optimized for discharge with close outpatient PMD follow up.        ---            FINAL DISCHARGE DIAGNOSIS LIST:    Please see last daily progress note for final discharge diagnoses        --- FROM ADMISSION H+P:     HPI:    75 year old F PMH cataracts and Parkinson's BIBEMS for increased weakness and pain in lower back.  History obtained by patient and son, Roman, over the phone.  Patient had fall on 4/25 where she was trying to get onto commode and fell landing on her buttock, but denies hitting head.  Patient states that she blocked out however son states that she screamed and he heard a thump, went to check patient who was awake and son helped her get up. Patient refused to go to hospital at that time due to current pandemic however since fall, patient has been very weak and complaining of increased pain in back and buttock which prompted son to call ambulance as he was no longer able to take care of her and wanted evaluation.  Patient states she has had 2 more falls since then however son unaware.  Patient ambulates with walker since hip surgery 2 years ago however recently has been more bedbound.  She receives PT twice a week however since pandemic patient has not received PT.  Per son and patient, FULL CODE.        In the ED, T 98F, HR 86, /88, RR 16, SpO2 98% on RA.  Labs significant for H/H 11.4/34.1, Na 134, alk phos 168, CK 97, trop negative x1, UA with RBC but no blood.  Patient received sinemet x1 and percocet x1 in the ED.          EKG: NSR    CXR: no acute pathology (wet read)    CT head: no acute pathology    CT cervical spine: Diffusely heterogeneous thyroid with numerous nodules measuring up to 1.5 cm.  As outpatient thyroid ultrasound may be obtained as clinically warranted    CT renal stone hunt: No hydronephrosis or renal stones.  If there is persistent unexplained hematuria, a follow-up CT urogram may be obtained as an outpatient. Fractures of the posterior left 9th and 10th ribs with surrounding soft tissue thickening may be acute. (09 May 2020 22:19)            ---    HOSPITAL COURSE: Patient was admitted to medicine. Seen by neurology (Aguilar) for episode of fall, possibly syncopal event, questionable this could be secondary to vasovagal event, the patient was using the commode versus possibly from Parkinson's, mechanical fall. Patient was continued on home parkinsons medications. Pt was also evaluated by PT and recommended to be discharged to [facility]. Family in agreement with disposition. For thyroid nodule, a thyroid panel was checked which is WNL. Patient will require further outpatient endocrinology workup and management. Patient showed gradual improvement over hospitalization and is medically optimized for discharge with close outpatient PMD follow up.        ---            FINAL DISCHARGE DIAGNOSIS LIST:    Please see last daily progress note for final discharge diagnoses        --- FROM ADMISSION H+P:     HPI:    75 year old F PMH cataracts and Parkinson's BIBEMS for increased weakness and pain in lower back.  History obtained by patient and son, Roman, over the phone.  Patient had fall on 4/25 where she was trying to get onto commode and fell landing on her buttock, but denies hitting head.  Patient states that she blocked out however son states that she screamed and he heard a thump, went to check patient who was awake and son helped her get up. Patient refused to go to hospital at that time due to current pandemic however since fall, patient has been very weak and complaining of increased pain in back and buttock which prompted son to call ambulance as he was no longer able to take care of her and wanted evaluation.  Patient states she has had 2 more falls since then however son unaware.  Patient ambulates with walker since hip surgery 2 years ago however recently has been more bedbound.  She receives PT twice a week however since pandemic patient has not received PT.  Per son and patient, FULL CODE.        In the ED, T 98F, HR 86, /88, RR 16, SpO2 98% on RA.  Labs significant for H/H 11.4/34.1, Na 134, alk phos 168, CK 97, trop negative x1, UA with RBC but no blood.  Patient received sinemet x1 and percocet x1 in the ED.          EKG: NSR    CXR: no acute pathology (wet read)    CT head: no acute pathology    CT cervical spine: Diffusely heterogeneous thyroid with numerous nodules measuring up to 1.5 cm.  As outpatient thyroid ultrasound may be obtained as clinically warranted    CT renal stone hunt: No hydronephrosis or renal stones.  If there is persistent unexplained hematuria, a follow-up CT urogram may be obtained as an outpatient. Fractures of the posterior left 9th and 10th ribs with surrounding soft tissue thickening may be acute. (09 May 2020 22:19)            ---    HOSPITAL COURSE: Patient was admitted to medicine. Seen by neurology (Aguilar) for episode of fall, possibly syncopal event, questionable this could be secondary to vasovagal event, the patient was using the commode versus possibly from Parkinson's, mechanical fall. Patient was continued on home parkinsons medications. Pt was also evaluated by PT and recommended to be discharged to [facility]. Family in agreement with disposition. For thyroid nodule, a thyroid panel was checked which is WNL. Patient will require further outpatient endocrinology workup and management. Of note, patient had increase in wbc count on 5/12 from 8->13 with procal of .06. Pt was c/o eye crusting and was started on tx for conjuctivitis. Patient showed gradual improvement over hospitalization and is medically optimized for discharge with close outpatient PMD follow up.         ---            FINAL DISCHARGE DIAGNOSIS LIST:    Please see last daily progress note for final discharge diagnoses        ---

## 2020-05-10 NOTE — ED ADULT NURSE REASSESSMENT NOTE - NS ED NURSE REASSESS COMMENT FT1
spoke with son Roman Almeida.  son states pt has not been to her neurologist in about five years.  he says her dr has been calling in refills for her sinemet over the phone and has not evaluated or assessed her, therefor her medications have not been titrated accordingly.  son also reports her neurologist giving him the diagnosis of early dementia but no medications have ever been prescribed. son states his mother has been steadily declining since.  admitting resident at bedside for pt's H&P.  resident given the information for her son to obtain her history.  pt is poor historian.
Report received, care assumed. Patient sleeping comfortably. no signs of pain or discomfort. PIV intact. Plan of care discussed with patient. Comfort and safety maintained. Will continue to monitor.

## 2020-05-10 NOTE — DISCHARGE NOTE PROVIDER - NSDCFUADDINST_GEN_ALL_CORE_FT
- Please make an appointment for follow up with your primary doctor on discharge  -If you do not have a PMD , you may follow up with Dr. Atkinson  - Patient or caretaker is responsible for making all appointments  - Please return to the ED if you develop worsening symptoms or fever - Please make an appointment for follow up with your primary doctor on discharge  -If you do not have a PMD , you may follow up with Dr. Atkinson. If you do not have an endocrinologist, you may follow up with Dr. Perlman  - Patient or caretaker is responsible for making all appointments  - Please return to the ED if you develop worsening symptoms or fever

## 2020-05-11 LAB
ALBUMIN SERPL ELPH-MCNC: 3 G/DL — LOW (ref 3.3–5)
ALP SERPL-CCNC: 159 U/L — HIGH (ref 40–120)
ALT FLD-CCNC: 25 U/L — SIGNIFICANT CHANGE UP (ref 12–78)
ANION GAP SERPL CALC-SCNC: 8 MMOL/L — SIGNIFICANT CHANGE UP (ref 5–17)
AST SERPL-CCNC: 38 U/L — HIGH (ref 15–37)
BILIRUB SERPL-MCNC: 0.5 MG/DL — SIGNIFICANT CHANGE UP (ref 0.2–1.2)
BUN SERPL-MCNC: 20 MG/DL — SIGNIFICANT CHANGE UP (ref 7–23)
CALCIUM SERPL-MCNC: 8.4 MG/DL — LOW (ref 8.5–10.1)
CHLORIDE SERPL-SCNC: 100 MMOL/L — SIGNIFICANT CHANGE UP (ref 96–108)
CO2 SERPL-SCNC: 26 MMOL/L — SIGNIFICANT CHANGE UP (ref 22–31)
CREAT SERPL-MCNC: 0.66 MG/DL — SIGNIFICANT CHANGE UP (ref 0.5–1.3)
GLUCOSE SERPL-MCNC: 88 MG/DL — SIGNIFICANT CHANGE UP (ref 70–99)
HCT VFR BLD CALC: 33.1 % — LOW (ref 34.5–45)
HGB BLD-MCNC: 11.2 G/DL — LOW (ref 11.5–15.5)
MCHC RBC-ENTMCNC: 28.5 PG — SIGNIFICANT CHANGE UP (ref 27–34)
MCHC RBC-ENTMCNC: 33.8 GM/DL — SIGNIFICANT CHANGE UP (ref 32–36)
MCV RBC AUTO: 84.2 FL — SIGNIFICANT CHANGE UP (ref 80–100)
NRBC # BLD: 0 /100 WBCS — SIGNIFICANT CHANGE UP (ref 0–0)
PLATELET # BLD AUTO: 432 K/UL — HIGH (ref 150–400)
POTASSIUM SERPL-MCNC: 3.9 MMOL/L — SIGNIFICANT CHANGE UP (ref 3.5–5.3)
POTASSIUM SERPL-SCNC: 3.9 MMOL/L — SIGNIFICANT CHANGE UP (ref 3.5–5.3)
PROT SERPL-MCNC: 7 G/DL — SIGNIFICANT CHANGE UP (ref 6–8.3)
RBC # BLD: 3.93 M/UL — SIGNIFICANT CHANGE UP (ref 3.8–5.2)
RBC # FLD: 12.1 % — SIGNIFICANT CHANGE UP (ref 10.3–14.5)
SODIUM SERPL-SCNC: 134 MMOL/L — LOW (ref 135–145)
T3 SERPL-MCNC: 90 NG/DL — SIGNIFICANT CHANGE UP (ref 80–200)
T4 AB SER-ACNC: 8 UG/DL — SIGNIFICANT CHANGE UP (ref 4.6–12)
TSH SERPL-MCNC: 2.44 UIU/ML — SIGNIFICANT CHANGE UP (ref 0.36–3.74)
WBC # BLD: 8.81 K/UL — SIGNIFICANT CHANGE UP (ref 3.8–10.5)
WBC # FLD AUTO: 8.81 K/UL — SIGNIFICANT CHANGE UP (ref 3.8–10.5)

## 2020-05-11 PROCEDURE — 99233 SBSQ HOSP IP/OBS HIGH 50: CPT

## 2020-05-11 RX ORDER — ENOXAPARIN SODIUM 100 MG/ML
40 INJECTION SUBCUTANEOUS
Qty: 0 | Refills: 0 | DISCHARGE
Start: 2020-05-11

## 2020-05-11 RX ORDER — SENNA PLUS 8.6 MG/1
2 TABLET ORAL
Qty: 0 | Refills: 0 | DISCHARGE
Start: 2020-05-11

## 2020-05-11 RX ORDER — CARBIDOPA AND LEVODOPA 25; 100 MG/1; MG/1
1 TABLET ORAL
Qty: 0 | Refills: 0 | DISCHARGE

## 2020-05-11 RX ORDER — ACETAMINOPHEN 500 MG
2 TABLET ORAL
Qty: 0 | Refills: 0 | DISCHARGE
Start: 2020-05-11

## 2020-05-11 RX ORDER — QUETIAPINE FUMARATE 200 MG/1
12.5 TABLET, FILM COATED ORAL
Refills: 0 | Status: DISCONTINUED | OUTPATIENT
Start: 2020-05-11 | End: 2020-05-12

## 2020-05-11 RX ORDER — QUETIAPINE FUMARATE 200 MG/1
12.5 TABLET, FILM COATED ORAL
Qty: 0 | Refills: 0 | DISCHARGE
Start: 2020-05-11

## 2020-05-11 RX ORDER — AMLODIPINE BESYLATE 2.5 MG/1
1 TABLET ORAL
Qty: 0 | Refills: 0 | DISCHARGE
Start: 2020-05-11

## 2020-05-11 RX ORDER — CARBIDOPA AND LEVODOPA 25; 100 MG/1; MG/1
1 TABLET ORAL
Qty: 0 | Refills: 0 | DISCHARGE
Start: 2020-05-11

## 2020-05-11 RX ORDER — LIDOCAINE 4 G/100G
1 CREAM TOPICAL
Qty: 0 | Refills: 0 | DISCHARGE
Start: 2020-05-11

## 2020-05-11 RX ADMIN — AMLODIPINE BESYLATE 10 MILLIGRAM(S): 2.5 TABLET ORAL at 05:51

## 2020-05-11 RX ADMIN — Medication 500 MILLIGRAM(S): at 12:14

## 2020-05-11 RX ADMIN — LIDOCAINE 1 PATCH: 4 CREAM TOPICAL at 19:46

## 2020-05-11 RX ADMIN — ENOXAPARIN SODIUM 40 MILLIGRAM(S): 100 INJECTION SUBCUTANEOUS at 12:15

## 2020-05-11 RX ADMIN — BRIMONIDINE TARTRATE, TIMOLOL MALEATE 1 DROP(S): 2; 5 SOLUTION/ DROPS OPHTHALMIC at 18:35

## 2020-05-11 RX ADMIN — BRIMONIDINE TARTRATE, TIMOLOL MALEATE 1 DROP(S): 2; 5 SOLUTION/ DROPS OPHTHALMIC at 05:51

## 2020-05-11 RX ADMIN — Medication 1 DROP(S): at 12:16

## 2020-05-11 RX ADMIN — CARBIDOPA AND LEVODOPA 1 TABLET(S): 25; 100 TABLET ORAL at 05:51

## 2020-05-11 RX ADMIN — Medication 1 TABLET(S): at 12:15

## 2020-05-11 RX ADMIN — Medication 325 MILLIGRAM(S): at 12:15

## 2020-05-11 RX ADMIN — QUETIAPINE FUMARATE 12.5 MILLIGRAM(S): 200 TABLET, FILM COATED ORAL at 22:35

## 2020-05-11 RX ADMIN — Medication 1 TABLET(S): at 12:14

## 2020-05-11 RX ADMIN — LIDOCAINE 1 PATCH: 4 CREAM TOPICAL at 12:16

## 2020-05-11 RX ADMIN — CARBIDOPA AND LEVODOPA 1 TABLET(S): 25; 100 TABLET ORAL at 18:35

## 2020-05-11 RX ADMIN — CARBIDOPA AND LEVODOPA 1 TABLET(S): 25; 100 TABLET ORAL at 12:30

## 2020-05-11 NOTE — PROGRESS NOTE ADULT - SUBJECTIVE AND OBJECTIVE BOX
Patient is a 75y old  Female who presents with a chief complaint of rib fractures and generalized weakness (10 May 2020 22:23)    INTERVAL HPI/OVERNIGHT EVENTS: Patient seen and examined at bedside. No overnight events    T(C): 37.1 (20 @ 05:31), Max: 37.1 (20 @ 05:31)  HR: 80 (20 @ 05:31) (68 - 83)  BP: 122/66 (20 @ 05:31) (113/- - 174/81)  RR: 17 (20 @ 05:31) (16 - 17)  SpO2: 98% (20 @ 05:31) (97% - 99%)  Wt(kg): --  I&O's Summary    10 May 2020 07:01  -  11 May 2020 07:00  --------------------------------------------------------  IN: 140 mL / OUT: 200 mL / NET: -60 mL      REVIEW OF SYSTEMS:  CONSTITUTIONAL: denies fever, chills, admits to fatigue and weakness  HEENT: denies blurred vision, sore throat  CARDIOVASCULAR: denies chest pain, chest pressure, palpitations  RESPIRATORY: denies shortness of breath, sputum production  GASTROINTESTINAL: denies nausea, vomiting, diarrhea, abdominal pain  GENITOURINARY: denies dysuria, discharge  NEUROLOGICAL: denies numbness, headache, admits to generalized weakness  MUSCULOSKELETAL: admits to L hip pain and back pain, denies chest pain  HEMATOLOGIC: denies gross bleeding, bruising    ----  PHYSICAL EXAM:  GENERAL: patient appears well, no acute distress, appropriate, pleasant  EYES: sclera clear, no exudates  ENMT: oropharynx clear without erythema, no exudates, moist mucous membranes  LUNGS: good air entry bilaterally, clear to auscultation, symmetric breath sounds, no wheezing or rhonchi appreciated  HEART: soft S1/S2, regular rate and rhythm  GASTROINTESTINAL: abdomen is soft, nontender, nondistended, normoactive bowel sounds, no palpable masses  MUSCULOSKELETAL: 5/5 muscle strength b/l UE, b/l LE 3/5 strength, 5/5 plantar flexion b/l feet, no pain to palpation of anterior chest, no ecchymosis on chest  NEUROLOGIC: awake, alert, oriented x3, no obvious sensory deficits  PSYCHIATRIC: mood is good, affect is congruent, linear and logical thought process     MEDICATIONS  (STANDING):  amLODIPine   Tablet 10 milliGRAM(s) Oral daily  ascorbic acid 500 milliGRAM(s) Oral daily  brimonidine 0.2%/ timolol 0.5% Ophthalmic Solution 1 Drop(s) Both EYES every 12 hours  calcium carbonate    500 mG (Tums) Chewable 1 Tablet(s) Chew daily  carbidopa/levodopa  25/100 1 Tablet(s) Oral three times a day  enoxaparin Injectable 40 milliGRAM(s) SubCutaneous daily  ferrous    sulfate 325 milliGRAM(s) Oral daily  lidocaine   Patch 1 Patch Transdermal daily  multivitamin 1 Tablet(s) Oral daily  prednisoLONE acetate 1% Suspension 1 Drop(s) Left EYE daily    MEDICATIONS  (PRN):  acetaminophen   Tablet .. 650 milliGRAM(s) Oral every 4 hours PRN Mild Pain (1 - 3)  acetaminophen   Tablet .. 1000 milliGRAM(s) Oral every 6 hours PRN Moderate Pain (4 - 6)  oxycodone    5 mG/acetaminophen 325 mG 1 Tablet(s) Oral every 6 hours PRN Severe Pain (7 - 10)  senna 2 Tablet(s) Oral at bedtime PRN Constipation      LABS:                        11.2   8.81  )-----------( 432      ( 11 May 2020 06:46 )             33.1     05-11    134<L>  |  100  |  20  ----------------------------<  88  3.9   |  26  |  0.66    Ca    8.4<L>      11 May 2020 06:46    TPro  7.0  /  Alb  3.0<L>  /  TBili  0.5  /  DBili  x   /  AST  38<H>  /  ALT  25  /  AlkPhos  159<H>        Urinalysis Basic - ( 09 May 2020 17:55 )    Color: Yellow / Appearance: Clear / S.010 / pH: x  Gluc: x / Ketone: Negative  / Bili: Negative / Urobili: Negative   Blood: x / Protein: 100 / Nitrite: Negative   Leuk Esterase: Negative / RBC: 25-50 /HPF / WBC 0-2   Sq Epi: x / Non Sq Epi: Occasional / Bacteria: Few      CAPILLARY BLOOD GLUCOSE        RADIOLOGY & ADDITIONAL TESTS:  No new tests Patient is a 75y old  Female who presents with a chief complaint of rib fractures and generalized weakness (10 May 2020 22:23)    INTERVAL HPI/OVERNIGHT EVENTS: Patient seen and examined at bedside. No overnight events. Denies new symptoms, complaints.     T(C): 37.1 (20 @ 05:31), Max: 37.1 (20 @ 05:31)  HR: 80 (20 @ 05:31) (68 - 83)  BP: 122/66 (20 @ 05:31) (113/- - 174/81)  RR: 17 (20 @ 05:31) (16 - 17)  SpO2: 98% (20 @ 05:31) (97% - 99%)  Wt(kg): --  I&O's Summary    10 May 2020 07:01  -  11 May 2020 07:00  --------------------------------------------------------  IN: 140 mL / OUT: 200 mL / NET: -60 mL      REVIEW OF SYSTEMS:  CONSTITUTIONAL: denies fever, chills, admits to fatigue and weakness  HEENT: denies blurred vision, sore throat  CARDIOVASCULAR: denies chest pain, chest pressure, palpitations  RESPIRATORY: denies shortness of breath, sputum production  GASTROINTESTINAL: denies nausea, vomiting, diarrhea, abdominal pain  GENITOURINARY: denies dysuria, discharge  NEUROLOGICAL: denies numbness, headache, admits to generalized weakness  MUSCULOSKELETAL: admits to L hip pain and back pain, denies chest pain  HEMATOLOGIC: denies gross bleeding, bruising    ----  PHYSICAL EXAM:  GENERAL: patient appears well, no acute distress, appropriate, confused   EYES: sclera clear, no exudates  ENMT: oropharynx clear without erythema, no exudates, moist mucous membranes  LUNGS: good air entry bilaterally, clear to auscultation, symmetric breath sounds, no wheezing or rhonchi appreciated  HEART: soft S1/S2, regular rate and rhythm  GASTROINTESTINAL: abdomen is soft, nontender, nondistended, normoactive bowel sounds, no palpable masses  MUSCULOSKELETAL:  muscle strength b/l UE, b/l LE 3/5 strength, 5/5 plantar flexion b/l feet, no pain to palpation of anterior chest, no ecchymosis on chest  NEUROLOGIC: awake, alert,  no obvious sensory deficits  PSYCHIATRIC: mood is good, affect is congruent, linear and logical thought process     MEDICATIONS  (STANDING):  amLODIPine   Tablet 10 milliGRAM(s) Oral daily  ascorbic acid 500 milliGRAM(s) Oral daily  brimonidine 0.2%/ timolol 0.5% Ophthalmic Solution 1 Drop(s) Both EYES every 12 hours  calcium carbonate    500 mG (Tums) Chewable 1 Tablet(s) Chew daily  carbidopa/levodopa  25/100 1 Tablet(s) Oral three times a day  enoxaparin Injectable 40 milliGRAM(s) SubCutaneous daily  ferrous    sulfate 325 milliGRAM(s) Oral daily  lidocaine   Patch 1 Patch Transdermal daily  multivitamin 1 Tablet(s) Oral daily  prednisoLONE acetate 1% Suspension 1 Drop(s) Left EYE daily    MEDICATIONS  (PRN):  acetaminophen   Tablet .. 650 milliGRAM(s) Oral every 4 hours PRN Mild Pain (1 - 3)  acetaminophen   Tablet .. 1000 milliGRAM(s) Oral every 6 hours PRN Moderate Pain (4 - 6)  oxycodone    5 mG/acetaminophen 325 mG 1 Tablet(s) Oral every 6 hours PRN Severe Pain (7 - 10)  senna 2 Tablet(s) Oral at bedtime PRN Constipation      LABS:                        11.2   8.81  )-----------( 432      ( 11 May 2020 06:46 )             33.1     05-11    134<L>  |  100  |  20  ----------------------------<  88  3.9   |  26  |  0.66    Ca    8.4<L>      11 May 2020 06:46    TPro  7.0  /  Alb  3.0<L>  /  TBili  0.5  /  DBili  x   /  AST  38<H>  /  ALT  25  /  AlkPhos  159<H>        Urinalysis Basic - ( 09 May 2020 17:55 )    Color: Yellow / Appearance: Clear / S.010 / pH: x  Gluc: x / Ketone: Negative  / Bili: Negative / Urobili: Negative   Blood: x / Protein: 100 / Nitrite: Negative   Leuk Esterase: Negative / RBC: 25-50 /HPF / WBC 0-2   Sq Epi: x / Non Sq Epi: Occasional / Bacteria: Few      CAPILLARY BLOOD GLUCOSE        RADIOLOGY & ADDITIONAL TESTS:  No new tests

## 2020-05-11 NOTE — PROGRESS NOTE ADULT - ATTENDING COMMENTS
I personally conducted a physical examination of the patient. I personally gathered the patient's history. I edited the above listed findings which were prepared by the listed resident physician. I personally discussed the plan of care with the patient. The questions and concerns were addressed to the best of my ability. The patient is in agreement with the listed treatment plan.
I personally conducted a physical examination of the patient. I personally gathered the patient's history. I edited the above listed findings which were prepared by the listed resident physician. Will need [facility]LEN. Meds reviewed

## 2020-05-11 NOTE — PROGRESS NOTE ADULT - PROBLEM SELECTOR PLAN 4
- Incidental finding on CT showing diffusely heterogeneous thyroid with numerous nodules measuring up to 1.5 cm  - no reported hx of thyroid disease, asymptomatic  - continue to monitor, will need outpatient follow up   - f/u Thyroid panel

## 2020-05-11 NOTE — GOALS OF CARE CONVERSATION - ADVANCED CARE PLANNING - CONVERSATION DETAILS
spoke to pt Roman ponce on phone: pt has hcp, pt son Roman is hcp, reviewed events leading to hospitalization, pt medical conditions: pt is a full code: reviewed cpr process, questions answered  son will make decisions as circumstances arise for pt. pt remains full code at this time.

## 2020-05-11 NOTE — PROGRESS NOTE ADULT - PROBLEM SELECTOR PLAN 1
-patient with fractures of the posterior left 9th and 10th ribs with surrounding soft tissue thickening may be acute on CT abd/pelvis  -pain control , tylenol & percocet prn. Lidoderm patch to chest wall  -no pneumothorax on CXR  -Continue to monitor respiratory status, saturating well on RA on admission -patient with fractures of the posterior left 9th and 10th ribs with surrounding soft tissue thickening may be acute on CT abd/pelvis  -pain control , tylenol & percocet prn. Lidoderm patch to chest wall, but avoid narcotics if possible.   -no pneumothorax on CXR  -Continue to monitor respiratory status, saturating well on RA on admission

## 2020-05-11 NOTE — PROGRESS NOTE ADULT - PROBLEM SELECTOR PLAN 2
-patient with multiple falls in past few weeks, ambulates with walker at baseline however recently mostly bedbound  -falls likely due to Parkinson's and deconditioning   -UA with some RBC but no blood, CK negative, CT abd/pelvis negative for stone or hydronephrosis   -Xray hip and L-spine negative for acute fractures  -b/l Knee xray showed diffuse osteopenia.  No evidence of fracture, dislocation or suprapatellar joint effusion in either knee. Mild osteoarthrosis bilaterally.  -PT reccs appreciated home PT vs [facility]  -social work consult: son interested having home aide when patient discharged

## 2020-05-11 NOTE — PROGRESS NOTE ADULT - SUBJECTIVE AND OBJECTIVE BOX
Neurology follow up note    JAYJAY NAJERA75yFemale      Interval History:    Patient feels ok no new complaints.    MEDICATIONS    acetaminophen   Tablet .. 650 milliGRAM(s) Oral every 4 hours PRN  acetaminophen   Tablet .. 1000 milliGRAM(s) Oral every 6 hours PRN  amLODIPine   Tablet 10 milliGRAM(s) Oral daily  ascorbic acid 500 milliGRAM(s) Oral daily  brimonidine 0.2%/ timolol 0.5% Ophthalmic Solution 1 Drop(s) Both EYES every 12 hours  calcium carbonate    500 mG (Tums) Chewable 1 Tablet(s) Chew daily  carbidopa/levodopa  25/100 1 Tablet(s) Oral three times a day  enoxaparin Injectable 40 milliGRAM(s) SubCutaneous daily  ferrous    sulfate 325 milliGRAM(s) Oral daily  lidocaine   Patch 1 Patch Transdermal daily  multivitamin 1 Tablet(s) Oral daily  oxycodone    5 mG/acetaminophen 325 mG 1 Tablet(s) Oral every 6 hours PRN  prednisoLONE acetate 1% Suspension 1 Drop(s) Left EYE daily  senna 2 Tablet(s) Oral at bedtime PRN      Allergies    tetracycline (Unknown)    Intolerances            Vital Signs Last 24 Hrs  T(C): 37.1 (11 May 2020 05:31), Max: 37.1 (11 May 2020 05:31)  T(F): 98.7 (11 May 2020 05:31), Max: 98.7 (11 May 2020 05:31)  HR: 80 (11 May 2020 05:31) (74 - 83)  BP: 122/66 (11 May 2020 05:31) (113/- - 166/79)  BP(mean): --  RR: 17 (11 May 2020 05:31) (16 - 17)  SpO2: 98% (11 May 2020 05:31) (97% - 99%)      REVIEW OF SYSTEMS:  Slightly limited secondary to the patient is forgetful.  Constitutional:  The patient denies fever, chills, or night sweats.  Head:  No headaches.  Eyes:  No double vision or blurry vision.  Ears:  No ringing in her ears.  Neck:  No neck pain.  Respiratory:  No shortness of breath.  Cardiovascular:  No chest pain.  Abdomen:  No nausea, vomiting, or abdominal pain.  Extremities/Neurological:  No numbness.  No tingling.  Musculoskeletal:  Positive joint pain of her knees, but does have arthritis.  Genitourinary:  No burning upon urination.  Psychiatric:  No underlying anxiety or depression.  General:  Positive history of back pain and left hip pain, which is off and on.    PHYSICAL EXAMINATION:  HEENT:  Head:  Normocephalic, atraumatic.  Eyes:  No scleral icterus.  Ears:  Hearing bilaterally was intact.  NECK:  Supple.  RESPIRATORY:  Good air entry bilaterally.  CARDIOVASCULAR:  S1 and S2 heard.  ABDOMEN:  Soft and nontender.  EXTREMITIES:  No clubbing or cyanosis were noted.      NEUROLOGIC:  The patient awake and alert.  Location [facility], 2020, month was April.  Recall was 1/3 within 3 minutes.  Extraocular movements were intact.  Pupils equal, round, and reactive bilaterally 3 mm to 2 mm.  Speech was fluent.  Smile symmetric.  Motor:  Bilateral uppers were 4/5.  Bilateral lowers were 3-/5.  The patient had significantly increased tone in bilateral lower extremities.  The patient does have a decreased range of motion of her left foot and history of footdrop from previous trauma.  Positive resting tremors were noted, left hand greater than right.  Positive cogwheel rigidity was noted.  Both upper extremities, positive bradykinesia was noted.  Upon palpation of the spine, the patient did complain of subtle pain in her lower back.              LABS:  CBC Full  -  ( 11 May 2020 06:46 )  WBC Count : 8.81 K/uL  RBC Count : 3.93 M/uL  Hemoglobin : 11.2 g/dL  Hematocrit : 33.1 %  Platelet Count - Automated : 432 K/uL  Mean Cell Volume : 84.2 fl  Mean Cell Hemoglobin : 28.5 pg  Mean Cell Hemoglobin Concentration : 33.8 gm/dL  Auto Neutrophil # : x  Auto Lymphocyte # : x  Auto Monocyte # : x  Auto Eosinophil # : x  Auto Basophil # : x  Auto Neutrophil % : x  Auto Lymphocyte % : x  Auto Monocyte % : x  Auto Eosinophil % : x  Auto Basophil % : x    Urinalysis Basic - ( 09 May 2020 17:55 )    Color: Yellow / Appearance: Clear / S.010 / pH: x  Gluc: x / Ketone: Negative  / Bili: Negative / Urobili: Negative   Blood: x / Protein: 100 / Nitrite: Negative   Leuk Esterase: Negative / RBC: 25-50 /HPF / WBC 0-2   Sq Epi: x / Non Sq Epi: Occasional / Bacteria: Few      05-11    134<L>  |  100  |  20  ----------------------------<  88  3.9   |  26  |  0.66    Ca    8.4<L>      11 May 2020 06:46    TPro  7.0  /  Alb  3.0<L>  /  TBili  0.5  /  DBili  x   /  AST  38<H>  /  ALT  25  /  AlkPhos  159<H>  05-11    Hemoglobin A1C:     LIVER FUNCTIONS - ( 11 May 2020 06:46 )  Alb: 3.0 g/dL / Pro: 7.0 g/dL / ALK PHOS: 159 U/L / ALT: 25 U/L / AST: 38 U/L / GGT: x           Vitamin B12         RADIOLOGY    ANALYSIS AND PLAN:  This is a 75-year-old with episodes of fall, possible loss of consciousness, history of Parkinson's, and change in mental status.  1.	For episode of fall, possibly syncopal event, questionable this could be secondary to vasovagal event, the patient was using the commode versus possibly from Parkinson's, mechanical fall.  2.	We would recommend telemetry evaluation to rule out underlying arrhythmia.  3.	Fall precautions.  4.	For history of Parkinson's disease, we would recommend to continue the patient on her Sinemet.  We will make adjustment as needed.  5.	For episode of fall, questionable mechanical versus syncope.  6.	For history of dementia supportive treatment   7.	Physical therapy evaluation.  8.	The patient's functional status was assessed for dementia.  9.	I spoke with son, the caregiver, in regard to safety concerns.  10.	The patient was evaluated for psychiatric symptoms.  11.	The patient was evaluated for cognitive impairments.  12.	Advance directives were discussed with son.  13.	sundowning will try low dose Seroquel   14.	Son's name is Roman, telephone number is [phone].  15.	Greater than 45 minutes of time was spent with the patient, plan of care, reviewing data, speaking to family and multidisciplinary healthcare team.

## 2020-05-12 VITALS
HEART RATE: 102 BPM | TEMPERATURE: 98 F | SYSTOLIC BLOOD PRESSURE: 145 MMHG | OXYGEN SATURATION: 98 % | RESPIRATION RATE: 18 BRPM | DIASTOLIC BLOOD PRESSURE: 74 MMHG

## 2020-05-12 LAB
ANION GAP SERPL CALC-SCNC: 10 MMOL/L — SIGNIFICANT CHANGE UP (ref 5–17)
BUN SERPL-MCNC: 28 MG/DL — HIGH (ref 7–23)
CALCIUM SERPL-MCNC: 8.3 MG/DL — LOW (ref 8.5–10.1)
CHLORIDE SERPL-SCNC: 98 MMOL/L — SIGNIFICANT CHANGE UP (ref 96–108)
CO2 SERPL-SCNC: 26 MMOL/L — SIGNIFICANT CHANGE UP (ref 22–31)
CREAT SERPL-MCNC: 0.71 MG/DL — SIGNIFICANT CHANGE UP (ref 0.5–1.3)
GLUCOSE SERPL-MCNC: 112 MG/DL — HIGH (ref 70–99)
HCT VFR BLD CALC: 34.8 % — SIGNIFICANT CHANGE UP (ref 34.5–45)
HGB BLD-MCNC: 11.6 G/DL — SIGNIFICANT CHANGE UP (ref 11.5–15.5)
MCHC RBC-ENTMCNC: 27.8 PG — SIGNIFICANT CHANGE UP (ref 27–34)
MCHC RBC-ENTMCNC: 33.3 GM/DL — SIGNIFICANT CHANGE UP (ref 32–36)
MCV RBC AUTO: 83.5 FL — SIGNIFICANT CHANGE UP (ref 80–100)
NRBC # BLD: 0 /100 WBCS — SIGNIFICANT CHANGE UP (ref 0–0)
PLATELET # BLD AUTO: 441 K/UL — HIGH (ref 150–400)
POTASSIUM SERPL-MCNC: 4.2 MMOL/L — SIGNIFICANT CHANGE UP (ref 3.5–5.3)
POTASSIUM SERPL-SCNC: 4.2 MMOL/L — SIGNIFICANT CHANGE UP (ref 3.5–5.3)
RBC # BLD: 4.17 M/UL — SIGNIFICANT CHANGE UP (ref 3.8–5.2)
RBC # FLD: 12.1 % — SIGNIFICANT CHANGE UP (ref 10.3–14.5)
SODIUM SERPL-SCNC: 134 MMOL/L — LOW (ref 135–145)
WBC # BLD: 13.1 K/UL — HIGH (ref 3.8–10.5)
WBC # FLD AUTO: 13.1 K/UL — HIGH (ref 3.8–10.5)

## 2020-05-12 PROCEDURE — 99239 HOSP IP/OBS DSCHRG MGMT >30: CPT

## 2020-05-12 PROCEDURE — 80048 BASIC METABOLIC PNL TOTAL CA: CPT

## 2020-05-12 PROCEDURE — 70450 CT HEAD/BRAIN W/O DYE: CPT

## 2020-05-12 PROCEDURE — 81001 URINALYSIS AUTO W/SCOPE: CPT

## 2020-05-12 PROCEDURE — 71045 X-RAY EXAM CHEST 1 VIEW: CPT

## 2020-05-12 PROCEDURE — 74176 CT ABD & PELVIS W/O CONTRAST: CPT

## 2020-05-12 PROCEDURE — 80053 COMPREHEN METABOLIC PANEL: CPT

## 2020-05-12 PROCEDURE — 84484 ASSAY OF TROPONIN QUANT: CPT

## 2020-05-12 PROCEDURE — 99285 EMERGENCY DEPT VISIT HI MDM: CPT

## 2020-05-12 PROCEDURE — 87635 SARS-COV-2 COVID-19 AMP PRB: CPT

## 2020-05-12 PROCEDURE — 97116 GAIT TRAINING THERAPY: CPT

## 2020-05-12 PROCEDURE — 84145 PROCALCITONIN (PCT): CPT

## 2020-05-12 PROCEDURE — 85027 COMPLETE CBC AUTOMATED: CPT

## 2020-05-12 PROCEDURE — 82550 ASSAY OF CK (CPK): CPT

## 2020-05-12 PROCEDURE — 72110 X-RAY EXAM L-2 SPINE 4/>VWS: CPT

## 2020-05-12 PROCEDURE — 93005 ELECTROCARDIOGRAM TRACING: CPT

## 2020-05-12 PROCEDURE — 36415 COLL VENOUS BLD VENIPUNCTURE: CPT

## 2020-05-12 PROCEDURE — 84436 ASSAY OF TOTAL THYROXINE: CPT

## 2020-05-12 PROCEDURE — 97163 PT EVAL HIGH COMPLEX 45 MIN: CPT

## 2020-05-12 PROCEDURE — 97110 THERAPEUTIC EXERCISES: CPT

## 2020-05-12 PROCEDURE — 73562 X-RAY EXAM OF KNEE 3: CPT

## 2020-05-12 PROCEDURE — 84480 ASSAY TRIIODOTHYRONINE (T3): CPT

## 2020-05-12 PROCEDURE — 73522 X-RAY EXAM HIPS BI 3-4 VIEWS: CPT

## 2020-05-12 PROCEDURE — 72125 CT NECK SPINE W/O DYE: CPT

## 2020-05-12 PROCEDURE — 97530 THERAPEUTIC ACTIVITIES: CPT

## 2020-05-12 PROCEDURE — 84443 ASSAY THYROID STIM HORMONE: CPT

## 2020-05-12 RX ADMIN — CARBIDOPA AND LEVODOPA 1 TABLET(S): 25; 100 TABLET ORAL at 06:01

## 2020-05-12 RX ADMIN — AMLODIPINE BESYLATE 10 MILLIGRAM(S): 2.5 TABLET ORAL at 06:01

## 2020-05-12 RX ADMIN — Medication 1 DROP(S): at 06:03

## 2020-05-12 RX ADMIN — LIDOCAINE 1 PATCH: 4 CREAM TOPICAL at 01:19

## 2020-05-12 NOTE — PROGRESS NOTE ADULT - REASON FOR ADMISSION
rib fractures and generalized weakness

## 2020-05-12 NOTE — PROGRESS NOTE ADULT - PROBLEM SELECTOR PLAN 4
- Incidental finding on CT showing diffusely heterogeneous thyroid with numerous nodules measuring up to 1.5 cm  - no reported hx of thyroid disease, asymptomatic  - continue to monitor, will need outpatient follow up   - Thyroid panel wnl.

## 2020-05-12 NOTE — PROGRESS NOTE ADULT - PROBLEM SELECTOR PLAN 1
-patient with fractures of the posterior left 9th and 10th ribs with surrounding soft tissue thickening may be acute on CT abd/pelvis  -pain control , tylenol & percocet prn. Lidoderm patch to chest wall, but avoid narcotics if possible.   -no pneumothorax on CXR  -Continue to monitor respiratory status, saturating well on RA on admission

## 2020-05-12 NOTE — DISCHARGE NOTE NURSING/CASE MANAGEMENT/SOCIAL WORK - PATIENT PORTAL LINK FT
You can access the FollowMyHealth Patient Portal offered by [facility] by registering at the following website: [email or web address]. By joining [employer]’s FollowMyHealth portal, you will also be able to view your health information using other applications (apps) compatible with our system.

## 2020-05-12 NOTE — PROGRESS NOTE ADULT - SUBJECTIVE AND OBJECTIVE BOX
Patient is a 75y old  Female who presents with a chief complaint of rib fractures and generalized weakness (11 May 2020 12:50)    INTERVAL HPI/OVERNIGHT EVENTS:  Pt seen and evaluated at the bedside. No acute overnight events occurred.     T(C): 36.4 (05-12-20 @ 05:21), Max: 37.1 (05-11-20 @ 20:24)  HR: 82 (05-12-20 @ 05:21) (73 - 82)  BP: 130/73 (05-12-20 @ 05:21) (112/65 - 130/73)  RR: 18 (05-12-20 @ 05:21) (17 - 18)  SpO2: 92% (05-12-20 @ 05:21) (92% - 98%)  Wt(kg): --  I&O's Summary    10 May 2020 07:01  -  11 May 2020 07:00  --------------------------------------------------------  IN: 140 mL / OUT: 200 mL / NET: -60 mL    11 May 2020 07:01  -  12 May 2020 06:45  --------------------------------------------------------  IN: 0 mL / OUT: 600 mL / NET: -600 mL        REVIEW OF SYSTEMS:  CONSTITUTIONAL: denies fever, chills, admits to fatigue and weakness  HEENT: denies blurred vision, sore throat  CARDIOVASCULAR: denies chest pain, chest pressure, palpitations  RESPIRATORY: denies shortness of breath, sputum production  GASTROINTESTINAL: denies nausea, vomiting, diarrhea, abdominal pain  GENITOURINARY: denies dysuria, discharge  NEUROLOGICAL: denies numbness, headache, admits to generalized weakness  MUSCULOSKELETAL: admits to L hip pain and back pain, denies chest pain  HEMATOLOGIC: denies gross bleeding, bruising    ----  PHYSICAL EXAM:  GENERAL: patient appears well, no acute distress, appropriate, confused   EYES: sclera clear, no exudates  ENMT: oropharynx clear without erythema, no exudates, moist mucous membranes  LUNGS: good air entry bilaterally, clear to auscultation, symmetric breath sounds, no wheezing or rhonchi appreciated  HEART: soft S1/S2, regular rate and rhythm  GASTROINTESTINAL: abdomen is soft, nontender, nondistended, normoactive bowel sounds, no palpable masses  MUSCULOSKELETAL: 5/5 muscle strength b/l UE, b/l LE 3/5 strength, 5/5 plantar flexion b/l feet, no pain to palpation of anterior chest, no ecchymosis on chest  NEUROLOGIC: awake, alert,  no obvious sensory deficits  PSYCHIATRIC: mood is good, affect is congruent, linear and logical thought process     MEDICATIONS  (STANDING):  amLODIPine   Tablet 10 milliGRAM(s) Oral daily  ascorbic acid 500 milliGRAM(s) Oral daily  brimonidine 0.2%/ timolol 0.5% Ophthalmic Solution 1 Drop(s) Both EYES every 12 hours  calcium carbonate    500 mG (Tums) Chewable 1 Tablet(s) Chew daily  carbidopa/levodopa  25/100 1 Tablet(s) Oral three times a day  enoxaparin Injectable 40 milliGRAM(s) SubCutaneous daily  ferrous    sulfate 325 milliGRAM(s) Oral daily  lidocaine   Patch 1 Patch Transdermal daily  multivitamin 1 Tablet(s) Oral daily  prednisoLONE acetate 1% Suspension 1 Drop(s) Left EYE daily  QUEtiapine 12.5 milliGRAM(s) Oral <User Schedule>    MEDICATIONS  (PRN):  acetaminophen   Tablet .. 650 milliGRAM(s) Oral every 4 hours PRN Mild Pain (1 - 3)  acetaminophen   Tablet .. 1000 milliGRAM(s) Oral every 6 hours PRN Moderate Pain (4 - 6)  oxycodone    5 mG/acetaminophen 325 mG 1 Tablet(s) Oral every 6 hours PRN Severe Pain (7 - 10)  senna 2 Tablet(s) Oral at bedtime PRN Constipation      LABS:                        11.6   13.10 )-----------( 441      ( 12 May 2020 06:23 )             34.8     05-12    134<L>  |  98  |  28<H>  ----------------------------<  112<H>  4.2   |  26  |  0.71    Ca    8.3<L>      12 May 2020 06:23    TPro  7.0  /  Alb  3.0<L>  /  TBili  0.5  /  DBili  x   /  AST  38<H>  /  ALT  25  /  AlkPhos  159<H>  05-11        RADIOLOGY & ADDITIONAL TESTS: No new tests

## 2020-05-12 NOTE — PROGRESS NOTE ADULT - PROBLEM SELECTOR PLAN 2
-patient with multiple falls in past few weeks, ambulates with walker at baseline however recently mostly bedbound  -falls likely due to Parkinson's and deconditioning   -UA with some RBC but no blood, CK negative, CT abd/pelvis negative for stone or hydronephrosis   -Xray hip and L-spine negative for acute fractures  -b/l Knee xray showed diffuse osteopenia.  No evidence of fracture, dislocation or suprapatellar joint effusion in either knee. Mild osteoarthrosis bilaterally.  -PT reccs appreciated- MARLEN. Discussed with son in detail who is in agreement with disposition. -patient with multiple falls in past few weeks, ambulates with walker at baseline however recently mostly bedbound  -falls likely due to Parkinson's and deconditioning   -UA with some RBC but no blood, CK negative, CT abd/pelvis negative for stone or hydronephrosis   -Xray hip and L-spine negative for acute fractures  -b/l Knee xray showed diffuse osteopenia.  No evidence of fracture, dislocation or suprapatellar joint effusion in either knee. Mild osteoarthrosis bilaterally.  -PT reccs appreciated- MARLEN. SW discussed with son in detail who is in agreement with disposition.

## 2020-05-12 NOTE — PROGRESS NOTE ADULT - PROBLEM SELECTOR PLAN 8
-Lovenox 40mg QD due to mostly bedbound status while in hospital  -Palliative consult: reccs appreciated    IMPROVE VTE Individual Risk Assessment  RISK                                                                Points  [  ] Previous VTE                                                  3  [  ] Thrombophilia                                               2  [  ] Lower limb paralysis                                      2        (unable to hold up >15 seconds)    [  ] Current Cancer                                              2         (within 6 months)  [  ] Immobilization > 24 hrs                                1  [  ] ICU/CCU stay > 24 hours                              1  [X  ] Age > 60                                                      1  IMPROVE VTE Score ____1_____  IMPROVE Score 0-1: Low Risk, No VTE prophylaxis required for most patients, encourage ambulation.   IMPROVE Score 2-3: At risk, pharmacologic VTE prophylaxis is indicated for most patients (in the absence of a contraindication)  IMPROVE Score > or = 4: High Risk, pharmacologic VTE prophylaxis is indicated for most patients (in the absence of a contraindication)

## 2020-05-12 NOTE — PROGRESS NOTE ADULT - SUBJECTIVE AND OBJECTIVE BOX
Neurology follow up note    JAYJAY NAJERA75yFemale      Interval History:    Patient feels ok no new complaints.    MEDICATIONS    acetaminophen   Tablet .. 650 milliGRAM(s) Oral every 4 hours PRN  acetaminophen   Tablet .. 1000 milliGRAM(s) Oral every 6 hours PRN  amLODIPine   Tablet 10 milliGRAM(s) Oral daily  ascorbic acid 500 milliGRAM(s) Oral daily  brimonidine 0.2%/ timolol 0.5% Ophthalmic Solution 1 Drop(s) Both EYES every 12 hours  calcium carbonate    500 mG (Tums) Chewable 1 Tablet(s) Chew daily  carbidopa/levodopa  25/100 1 Tablet(s) Oral three times a day  enoxaparin Injectable 40 milliGRAM(s) SubCutaneous daily  ferrous    sulfate 325 milliGRAM(s) Oral daily  lidocaine   Patch 1 Patch Transdermal daily  multivitamin 1 Tablet(s) Oral daily  neomycin/bacitracin/polymyxin Ointment 1 Application(s) Both EYES every 8 hours  oxycodone    5 mG/acetaminophen 325 mG 1 Tablet(s) Oral every 6 hours PRN  prednisoLONE acetate 1% Suspension 1 Drop(s) Left EYE daily  QUEtiapine 12.5 milliGRAM(s) Oral <User Schedule>  senna 2 Tablet(s) Oral at bedtime PRN      Allergies    tetracycline (Unknown)    Intolerances            Vital Signs Last 24 Hrs  T(C): 36.4 (12 May 2020 05:21), Max: 37.1 (11 May 2020 20:24)  T(F): 97.6 (12 May 2020 05:21), Max: 98.8 (11 May 2020 20:24)  HR: 82 (12 May 2020 05:21) (73 - 82)  BP: 130/73 (12 May 2020 05:21) (112/65 - 130/73)  BP(mean): --  RR: 18 (12 May 2020 05:21) (17 - 18)  SpO2: 92% (12 May 2020 05:21) (92% - 98%)    REVIEW OF SYSTEMS:  Slightly limited secondary to the patient is forgetful.  Constitutional:  The patient denies fever, chills, or night sweats.  Head:  No headaches.  Eyes:  No double vision or blurry vision.  Ears:  No ringing in her ears.  Neck:  No neck pain.  Respiratory:  No shortness of breath.  Cardiovascular:  No chest pain.  Abdomen:  No nausea, vomiting, or abdominal pain.  Extremities/Neurological:  No numbness.  No tingling.  Musculoskeletal:  Positive joint pain of her knees, but does have arthritis.  Genitourinary:  No burning upon urination.  Psychiatric:  No underlying anxiety or depression.  General:  Positive history of back pain and left hip pain, which is off and on.    PHYSICAL EXAMINATION:  HEENT:  Head:  Normocephalic, atraumatic.  Eyes:  No scleral icterus.  Ears:  Hearing bilaterally was intact.  NECK:  Supple.  RESPIRATORY:  Good air entry bilaterally.  CARDIOVASCULAR:  S1 and S2 heard.  ABDOMEN:  Soft and nontender.  EXTREMITIES:  No clubbing or cyanosis were noted.      NEUROLOGIC:  The patient awake and alert.  [facility] [place], year 2020, month was April.  Recall was 1/3 within 3 minutes.  Extraocular movements were intact.  Pupils equal, round, and reactive bilaterally 3 mm to 2 mm.  Speech was fluent.  Smile symmetric.  Motor:  Bilateral uppers were 4/5.  Bilateral lowers were 3-/5.  The patient had significantly increased tone in bilateral lower extremities.  The patient does have a decreased range of motion of her left foot and history of footdrop from previous trauma.  Positive resting tremors were noted, left hand greater than right.  Positive cogwheel rigidity was noted.  Both upper extremities, positive bradykinesia was noted.  Upon palpation of the spine, the patient did complain of subtle pain in her lower back.                   LABS:  CBC Full  -  ( 12 May 2020 06:23 )  WBC Count : 13.10 K/uL  RBC Count : 4.17 M/uL  Hemoglobin : 11.6 g/dL  Hematocrit : 34.8 %  Platelet Count - Automated : 441 K/uL  Mean Cell Volume : 83.5 fl  Mean Cell Hemoglobin : 27.8 pg  Mean Cell Hemoglobin Concentration : 33.3 gm/dL  Auto Neutrophil # : x  Auto Lymphocyte # : x  Auto Monocyte # : x  Auto Eosinophil # : x  Auto Basophil # : x  Auto Neutrophil % : x  Auto Lymphocyte % : x  Auto Monocyte % : x  Auto Eosinophil % : x  Auto Basophil % : x      05-12    134<L>  |  98  |  28<H>  ----------------------------<  112<H>  4.2   |  26  |  0.71    Ca    8.3<L>      12 May 2020 06:23    TPro  7.0  /  Alb  3.0<L>  /  TBili  0.5  /  DBili  x   /  AST  38<H>  /  ALT  25  /  AlkPhos  159<H>  05-11    Hemoglobin A1C:     LIVER FUNCTIONS - ( 11 May 2020 06:46 )  Alb: 3.0 g/dL / Pro: 7.0 g/dL / ALK PHOS: 159 U/L / ALT: 25 U/L / AST: 38 U/L / GGT: x           Vitamin B12         RADIOLOGY    ANALYSIS AND PLAN:  This is a 75-year-old with episodes of fall, possible loss of consciousness, history of Parkinson's, and change in mental status.  1.	For episode of fall, possibly syncopal event, questionable this could be secondary to vasovagal event, the patient was using the commode versus possibly from Parkinson's, mechanical fall.  2.	We would recommend telemetry evaluation to rule out underlying arrhythmia.  3.	Fall precautions.  4.	For history of Parkinson's disease, we would recommend to continue the patient on her Sinemet.  We will make adjustment as needed.  5.	For episode of fall, questionable mechanical versus syncope.  6.	For history of dementia supportive treatment   7.	Physical therapy evaluation.  8.	The patient's functional status was assessed for dementia.  9.	I spoke with son, the caregiver, in regard to safety concerns.  10.	The patient was evaluated for psychiatric symptoms.  11.	The patient was evaluated for cognitive impairments.  12.	Advance directives were discussed with son.  13.	sundowning will try low dose Seroquel   14.	Son's name is Roman, telephone number is [phone]. 5/12/2020  15.	Greater than 40 minutes of time was spent with the patient, plan of care, reviewing data, speaking to family and multidisciplinary healthcare team.

## 2020-08-20 RX ORDER — QUETIAPINE FUMARATE 200 MG/1
0.5 TABLET, FILM COATED ORAL
Qty: 30 | Refills: 0
Start: 2020-08-20 | End: 2020-09-18

## 2020-08-20 RX ORDER — CARBIDOPA AND LEVODOPA 25; 100 MG/1; MG/1
1 TABLET ORAL
Qty: 90 | Refills: 0
Start: 2020-08-20 | End: 2020-09-18

## 2020-08-20 RX ORDER — AMLODIPINE BESYLATE 2.5 MG/1
1 TABLET ORAL
Qty: 30 | Refills: 0
Start: 2020-08-20 | End: 2020-09-18

## 2020-08-20 RX ORDER — BRIMONIDINE TARTRATE, TIMOLOL MALEATE 2; 5 MG/ML; MG/ML
1 SOLUTION/ DROPS OPHTHALMIC
Qty: 0 | Refills: 0 | DISCHARGE

## 2020-08-20 RX ORDER — CARBIDOPA AND LEVODOPA 25; 100 MG/1; MG/1
1 TABLET ORAL
Qty: 0 | Refills: 0 | DISCHARGE
Start: 2020-08-20 | End: 2020-09-18

## 2020-08-20 RX ORDER — BRIMONIDINE TARTRATE, TIMOLOL MALEATE 2; 5 MG/ML; MG/ML
1 SOLUTION/ DROPS OPHTHALMIC
Qty: 1 | Refills: 0
Start: 2020-08-20 | End: 2020-09-18

## 2020-08-20 RX ORDER — LIDOCAINE 4 G/100G
1 CREAM TOPICAL
Qty: 1 | Refills: 0
Start: 2020-08-20 | End: 2020-09-18

## 2020-08-20 RX ORDER — BRIMONIDINE TARTRATE, TIMOLOL MALEATE 2; 5 MG/ML; MG/ML
1 SOLUTION/ DROPS OPHTHALMIC
Qty: 0 | Refills: 0 | DISCHARGE
Start: 2020-08-20 | End: 2020-09-18

## 2020-08-25 ENCOUNTER — INPATIENT (INPATIENT)
Facility: HOSPITAL | Age: 75
LOS: 13 days | Discharge: HOME | DRG: 393 | End: 2020-09-08
Attending: FAMILY MEDICINE | Admitting: FAMILY MEDICINE
Payer: MEDICARE

## 2020-08-25 VITALS
DIASTOLIC BLOOD PRESSURE: 70 MMHG | HEART RATE: 94 BPM | WEIGHT: 119.93 LBS | SYSTOLIC BLOOD PRESSURE: 132 MMHG | RESPIRATION RATE: 18 BRPM | HEIGHT: 67 IN | OXYGEN SATURATION: 97 % | TEMPERATURE: 99 F
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
## 2020-08-25 DIAGNOSIS — Z96.642 PRESENCE OF LEFT ARTIFICIAL HIP JOINT: Chronic | ICD-10-CM

## 2020-08-25 DIAGNOSIS — G20 PARKINSON'S DISEASE: ICD-10-CM

## 2020-08-25 DIAGNOSIS — S01.01XA LACERATION WITHOUT FOREIGN BODY OF SCALP, INITIAL ENCOUNTER: ICD-10-CM

## 2020-08-25 DIAGNOSIS — S32.591A OTHER SPECIFIED FRACTURE OF RIGHT PUBIS, INITIAL ENCOUNTER FOR CLOSED FRACTURE: ICD-10-CM

## 2020-08-25 PROBLEM — F03.90 UNSPECIFIED DEMENTIA WITHOUT BEHAVIORAL DISTURBANCE: Chronic | Status: ACTIVE | Noted: 2020-05-09

## 2020-08-25 PROBLEM — H26.9 UNSPECIFIED CATARACT: Chronic | Status: ACTIVE | Noted: 2020-05-09

## 2020-08-25 PROBLEM — D64.9 ANEMIA, UNSPECIFIED: Chronic | Status: ACTIVE | Noted: 2020-05-09

## 2020-08-25 LAB
ALBUMIN SERPL ELPH-MCNC: 3.6 G/DL — SIGNIFICANT CHANGE UP (ref 3.3–5)
ALP SERPL-CCNC: 83 U/L — SIGNIFICANT CHANGE UP (ref 40–120)
ALT FLD-CCNC: 35 U/L — SIGNIFICANT CHANGE UP (ref 12–78)
ANION GAP SERPL CALC-SCNC: 5 MMOL/L — SIGNIFICANT CHANGE UP (ref 5–17)
AST SERPL-CCNC: 34 U/L — SIGNIFICANT CHANGE UP (ref 15–37)
BASOPHILS # BLD AUTO: 0.02 K/UL — SIGNIFICANT CHANGE UP (ref 0–0.2)
BASOPHILS NFR BLD AUTO: 0.2 % — SIGNIFICANT CHANGE UP (ref 0–2)
BILIRUB SERPL-MCNC: 0.2 MG/DL — SIGNIFICANT CHANGE UP (ref 0.2–1.2)
BUN SERPL-MCNC: 25 MG/DL — HIGH (ref 7–23)
CALCIUM SERPL-MCNC: 8.5 MG/DL — SIGNIFICANT CHANGE UP (ref 8.5–10.1)
CHLORIDE SERPL-SCNC: 110 MMOL/L — HIGH (ref 96–108)
CO2 SERPL-SCNC: 24 MMOL/L — SIGNIFICANT CHANGE UP (ref 22–31)
CREAT SERPL-MCNC: 0.74 MG/DL — SIGNIFICANT CHANGE UP (ref 0.5–1.3)
EOSINOPHIL # BLD AUTO: 0.03 K/UL — SIGNIFICANT CHANGE UP (ref 0–0.5)
EOSINOPHIL NFR BLD AUTO: 0.2 % — SIGNIFICANT CHANGE UP (ref 0–6)
GLUCOSE SERPL-MCNC: 114 MG/DL — HIGH (ref 70–99)
HCT VFR BLD CALC: 31.7 % — LOW (ref 34.5–45)
HGB BLD-MCNC: 10.2 G/DL — LOW (ref 11.5–15.5)
IMM GRANULOCYTES NFR BLD AUTO: 1.1 % — SIGNIFICANT CHANGE UP (ref 0–1.5)
LYMPHOCYTES # BLD AUTO: 16.6 % — SIGNIFICANT CHANGE UP (ref 13–44)
LYMPHOCYTES # BLD AUTO: 2.02 K/UL — SIGNIFICANT CHANGE UP (ref 1–3.3)
MCHC RBC-ENTMCNC: 27.2 PG — SIGNIFICANT CHANGE UP (ref 27–34)
MCHC RBC-ENTMCNC: 32.2 GM/DL — SIGNIFICANT CHANGE UP (ref 32–36)
MCV RBC AUTO: 84.5 FL — SIGNIFICANT CHANGE UP (ref 80–100)
MONOCYTES # BLD AUTO: 0.55 K/UL — SIGNIFICANT CHANGE UP (ref 0–0.9)
MONOCYTES NFR BLD AUTO: 4.5 % — SIGNIFICANT CHANGE UP (ref 2–14)
NEUTROPHILS # BLD AUTO: 9.43 K/UL — HIGH (ref 1.8–7.4)
NEUTROPHILS NFR BLD AUTO: 77.4 % — HIGH (ref 43–77)
NRBC # BLD: 0 /100 WBCS — SIGNIFICANT CHANGE UP (ref 0–0)
PLATELET # BLD AUTO: 364 K/UL — SIGNIFICANT CHANGE UP (ref 150–400)
POTASSIUM SERPL-MCNC: 4.6 MMOL/L — SIGNIFICANT CHANGE UP (ref 3.5–5.3)
POTASSIUM SERPL-SCNC: 4.6 MMOL/L — SIGNIFICANT CHANGE UP (ref 3.5–5.3)
PROT SERPL-MCNC: 7.5 G/DL — SIGNIFICANT CHANGE UP (ref 6–8.3)
RBC # BLD: 3.75 M/UL — LOW (ref 3.8–5.2)
RBC # FLD: 13.6 % — SIGNIFICANT CHANGE UP (ref 10.3–14.5)
SODIUM SERPL-SCNC: 139 MMOL/L — SIGNIFICANT CHANGE UP (ref 135–145)
WBC # BLD: 12.19 K/UL — HIGH (ref 3.8–10.5)
WBC # FLD AUTO: 12.19 K/UL — HIGH (ref 3.8–10.5)

## 2020-08-25 PROCEDURE — 72170 X-RAY EXAM OF PELVIS: CPT | Mod: 26

## 2020-08-25 PROCEDURE — 99284 EMERGENCY DEPT VISIT MOD MDM: CPT

## 2020-08-25 PROCEDURE — 71250 CT THORAX DX C-: CPT | Mod: 26

## 2020-08-25 PROCEDURE — 72125 CT NECK SPINE W/O DYE: CPT | Mod: 26

## 2020-08-25 PROCEDURE — 93010 ELECTROCARDIOGRAM REPORT: CPT

## 2020-08-25 PROCEDURE — 70450 CT HEAD/BRAIN W/O DYE: CPT | Mod: 26

## 2020-08-25 RX ORDER — CARBIDOPA AND LEVODOPA 25; 100 MG/1; MG/1
1 TABLET ORAL ONCE
Refills: 0 | Status: COMPLETED | OUTPATIENT
Start: 2020-08-25 | End: 2020-08-25

## 2020-08-25 RX ORDER — QUETIAPINE FUMARATE 200 MG/1
25 TABLET, FILM COATED ORAL
Refills: 0 | Status: DISCONTINUED | OUTPATIENT
Start: 2020-08-25 | End: 2020-09-08

## 2020-08-25 RX ORDER — TRAMADOL HYDROCHLORIDE 50 MG/1
25 TABLET ORAL EVERY 8 HOURS
Refills: 0 | Status: DISCONTINUED | OUTPATIENT
Start: 2020-08-25 | End: 2020-08-26

## 2020-08-25 RX ORDER — QUETIAPINE FUMARATE 200 MG/1
25 TABLET, FILM COATED ORAL ONCE
Refills: 0 | Status: COMPLETED | OUTPATIENT
Start: 2020-08-25 | End: 2020-08-25

## 2020-08-25 RX ORDER — FERROUS GLUCONATE 100 %
1 POWDER (GRAM) MISCELLANEOUS
Qty: 0 | Refills: 0 | DISCHARGE

## 2020-08-25 RX ORDER — ACETAMINOPHEN 500 MG
650 TABLET ORAL ONCE
Refills: 0 | Status: COMPLETED | OUTPATIENT
Start: 2020-08-25 | End: 2020-08-25

## 2020-08-25 RX ORDER — PREDNISOLONE SODIUM PHOSPHATE 1 %
1 DROPS OPHTHALMIC (EYE) DAILY
Refills: 0 | Status: DISCONTINUED | OUTPATIENT
Start: 2020-08-25 | End: 2020-09-08

## 2020-08-25 RX ORDER — ACETAMINOPHEN 500 MG
650 TABLET ORAL EVERY 6 HOURS
Refills: 0 | Status: DISCONTINUED | OUTPATIENT
Start: 2020-08-25 | End: 2020-08-26

## 2020-08-25 RX ORDER — CARBIDOPA AND LEVODOPA 25; 100 MG/1; MG/1
1 TABLET ORAL THREE TIMES A DAY
Refills: 0 | Status: DISCONTINUED | OUTPATIENT
Start: 2020-08-25 | End: 2020-08-26

## 2020-08-25 RX ORDER — TRAMADOL HYDROCHLORIDE 50 MG/1
12.5 TABLET ORAL EVERY 6 HOURS
Refills: 0 | Status: DISCONTINUED | OUTPATIENT
Start: 2020-08-25 | End: 2020-08-25

## 2020-08-25 RX ADMIN — CARBIDOPA AND LEVODOPA 1 TABLET(S): 25; 100 TABLET ORAL at 19:15

## 2020-08-25 RX ADMIN — QUETIAPINE FUMARATE 25 MILLIGRAM(S): 200 TABLET, FILM COATED ORAL at 19:28

## 2020-08-25 NOTE — PHYSICAL THERAPY INITIAL EVALUATION ADULT - MANUAL MUSCLE TESTING RESULTS, REHAB EVAL
bilateral upper extremity at least 3+/5 throughout, bilateral lower extremity not assessed due to pain.

## 2020-08-25 NOTE — PHYSICAL THERAPY INITIAL EVALUATION ADULT - GENERAL OBSERVATIONS, REHAB EVAL
Patient received semi junior on stretch in ED,  Patient alert, confused, anxious and uncooperative w/ physical therapy evaluation. Patient requiring much encouragement from therapist and staff.

## 2020-08-25 NOTE — H&P ADULT - NSHPPOACENTRALVENOUSCATHETER_GEN_ALL_CORE
Patient's wife, Belle, is calling to obtain a referral to see Dr. Marilin Underwood at the [place] office on 4-24-19 for a New patient appointment for Diabetes     Please contact Belle at [phone]   no

## 2020-08-25 NOTE — H&P ADULT - RS GEN PE MLT RESP DETAILS PC
airway patent/good air movement/breath sounds equal/clear to auscultation bilaterally/no rales/no chest wall tenderness/no rhonchi

## 2020-08-25 NOTE — PHYSICAL THERAPY INITIAL EVALUATION ADULT - CRITERIA FOR SKILLED THERAPEUTIC INTERVENTIONS
therapy frequency/anticipated equipment needs at discharge/anticipated discharge recommendation/impairments found/rehab potential/functional limitations in following categories/predicted duration of therapy intervention

## 2020-08-25 NOTE — PHYSICAL THERAPY INITIAL EVALUATION ADULT - PERTINENT HX OF CURRENT PROBLEM, REHAB EVAL
4 yo F PMHx Parkinson's disease, early dementia presents to ED c/o right lower rib pain s/p slip and fall just prior to arrival. States she was trying to grab a piece of coffee cake from the cabinet, admits that she is not supposed to be doing things in the kitchen, slipped backwards, fell onto her right side and hit back of her head. Denies LOC. Son Roman present, helped her up. Pt denies HA, dizziness, neck pain, chest pain, SOB, abd pain, back pain, numbness/tingling.

## 2020-08-25 NOTE — ED ADULT NURSE NOTE - OBJECTIVE STATEMENT
Pt to ED via ambulance after fall at home.  Pt A&Ox4, but asks repetitive questions and is very anxious. Pt to ED via ambulance after fall at home.  Pt A&Ox4, but asks repetitive questions and is very anxious. Pt states she was reaching for something in the kitchen cabinets and lost balance and fell, striking head on refrigerator.  Pt denies LOC, states she called out for son right away.  Pt sustained laceration to back of head.  Pt c/o pain to right front rib area.

## 2020-08-25 NOTE — PHYSICAL THERAPY INITIAL EVALUATION ADULT - ADDITIONAL COMMENTS
Patient live in a private home on main level.  Patient ambulates short distances with RW. Son assists as needed.  Patient owns a rolling walker , wheelchair and commode.  Patient wears left AFO.  No stairs to negotiate

## 2020-08-25 NOTE — ED PROVIDER NOTE - CLINICAL SUMMARY MEDICAL DECISION MAKING FREE TEXT BOX
74 yo F p/w occipital scalp laceration and right lower rib pain--> lac repair, CT head/neck/chest r/o bleed r/o fx, analgesia, plan for DC home with son Roman

## 2020-08-25 NOTE — PHYSICAL THERAPY INITIAL EVALUATION ADULT - DISCHARGE DISPOSITION, PT EVAL
To be determined following completed evaluation.  Given current state patient may require rehab placement.

## 2020-08-25 NOTE — ED PROVIDER NOTE - OBJECTIVE STATEMENT
76 yo F PMHx Parkinson's disease, early dementia presents to ED c/o right lower rib pain s/p slip and fall just prior to arrival. States she was trying to grab a piece of coffee cake from the cabinet, admits that she is not supposed to be doing things in the kitchen, slipped backwards, fell onto her right side and hit back of her head. Denies LOC. Son Roman present, helped her up. Pt denies HA, dizziness, neck pain, chest pain, SOB, abd pain, back pain, numbness/tingling. 74 yo F PMHx Parkinson's disease, early dementia presents to ED c/o right lower rib pain s/p slip and fall just prior to arrival. States she was trying to grab a piece of coffee cake from the cabinet, admits that she is not supposed to be doing things in the kitchen, slipped backwards, fell onto her right side and hit back of her head. Denies LOC. Son Roman present, helped her up. Pt denies HA, dizziness, neck pain, chest pain, SOB, abd pain, back pain, numbness/tingling.  PCP- Chao

## 2020-08-25 NOTE — PHYSICAL THERAPY INITIAL EVALUATION ADULT - RANGE OF MOTION EXAMINATION, REHAB EVAL
deficits as listed below/bilateral upper extremity ROM was WFL (within functional limits)/unable to assess bilateral lower extremity due to patient yelling in pain when movement attempted.

## 2020-08-25 NOTE — ED PROVIDER NOTE - CARE PLAN
Principal Discharge DX:	Scalp laceration, initial encounter  Secondary Diagnosis:	Fall, initial encounter Principal Discharge DX:	Scalp laceration, initial encounter  Secondary Diagnosis:	Fall, initial encounter  Secondary Diagnosis:	Pubic ramus fracture, right, closed, initial encounter

## 2020-08-25 NOTE — ED PROVIDER NOTE - ATTENDING CONTRIBUTION TO CARE
I have personally performed a face to face diagnostic evaluation on this patient.  I have reviewed the PA note and agree with the history, exam, and plan of care, except as noted.  History and Exam by me shows  right lower rib from mechanical fall today.  pt is a poor historian and in nad.  alert, nc/at,  antonia bl.  chest nt, lungs cta, heart s1s2, rrr, no murmur.  lab sig for elevated bun and cts of head, neck  and chest were unremarkable. I have personally performed a face to face diagnostic evaluation on this patient.  I have reviewed the PA note and agree with the history, exam, and plan of care, except as noted.  History and Exam by me shows  right lower rib from mechanical fall today.  pt is a poor historian and in nad.  alert, nc/at,  antonia bl.  chest nt, lungs cta, heart s1s2, rrr, no murmur.  lab sig for elevated bun and cts of head, neck  and chest were unremarkable.  xr pelvis- right pubic ramus fx

## 2020-08-26 LAB
OB PNL STL: POSITIVE
SARS-COV-2 IGG SERPL QL IA: NEGATIVE — SIGNIFICANT CHANGE UP
SARS-COV-2 IGM SERPL IA-ACNC: 0.08 INDEX — SIGNIFICANT CHANGE UP
SARS-COV-2 RNA SPEC QL NAA+PROBE: SIGNIFICANT CHANGE UP

## 2020-08-26 RX ORDER — HEPARIN SODIUM 5000 [USP'U]/ML
5000 INJECTION INTRAVENOUS; SUBCUTANEOUS EVERY 12 HOURS
Refills: 0 | Status: DISCONTINUED | OUTPATIENT
Start: 2020-08-26 | End: 2020-08-27

## 2020-08-26 RX ORDER — MULTIVIT-MIN/FERROUS GLUCONATE 9 MG/15 ML
1 LIQUID (ML) ORAL DAILY
Refills: 0 | Status: DISCONTINUED | OUTPATIENT
Start: 2020-08-26 | End: 2020-09-08

## 2020-08-26 RX ORDER — ASCORBIC ACID 60 MG
500 TABLET,CHEWABLE ORAL DAILY
Refills: 0 | Status: DISCONTINUED | OUTPATIENT
Start: 2020-08-26 | End: 2020-09-08

## 2020-08-26 RX ORDER — FERROUS SULFATE 325(65) MG
325 TABLET ORAL DAILY
Refills: 0 | Status: DISCONTINUED | OUTPATIENT
Start: 2020-08-26 | End: 2020-09-08

## 2020-08-26 RX ORDER — AMLODIPINE BESYLATE 2.5 MG/1
10 TABLET ORAL DAILY
Refills: 0 | Status: DISCONTINUED | OUTPATIENT
Start: 2020-08-26 | End: 2020-08-26

## 2020-08-26 RX ORDER — LIDOCAINE 4 G/100G
1 CREAM TOPICAL EVERY 24 HOURS
Refills: 0 | Status: DISCONTINUED | OUTPATIENT
Start: 2020-08-26 | End: 2020-09-08

## 2020-08-26 RX ORDER — ACETAMINOPHEN 500 MG
650 TABLET ORAL EVERY 6 HOURS
Refills: 0 | Status: DISCONTINUED | OUTPATIENT
Start: 2020-08-26 | End: 2020-09-08

## 2020-08-26 RX ORDER — TRAMADOL HYDROCHLORIDE 50 MG/1
25 TABLET ORAL EVERY 6 HOURS
Refills: 0 | Status: DISCONTINUED | OUTPATIENT
Start: 2020-08-26 | End: 2020-08-26

## 2020-08-26 RX ORDER — CALCIUM CARBONATE 500(1250)
1 TABLET ORAL DAILY
Refills: 0 | Status: DISCONTINUED | OUTPATIENT
Start: 2020-08-26 | End: 2020-08-26

## 2020-08-26 RX ORDER — QUETIAPINE FUMARATE 200 MG/1
1 TABLET, FILM COATED ORAL
Qty: 0 | Refills: 0 | DISCHARGE

## 2020-08-26 RX ORDER — AMLODIPINE BESYLATE 2.5 MG/1
10 TABLET ORAL DAILY
Refills: 0 | Status: DISCONTINUED | OUTPATIENT
Start: 2020-08-26 | End: 2020-09-08

## 2020-08-26 RX ORDER — CARBIDOPA AND LEVODOPA 25; 100 MG/1; MG/1
1 TABLET ORAL
Refills: 0 | Status: DISCONTINUED | OUTPATIENT
Start: 2020-08-26 | End: 2020-09-08

## 2020-08-26 RX ADMIN — QUETIAPINE FUMARATE 25 MILLIGRAM(S): 200 TABLET, FILM COATED ORAL at 17:20

## 2020-08-26 RX ADMIN — Medication 325 MILLIGRAM(S): at 11:35

## 2020-08-26 RX ADMIN — Medication 1 TABLET(S): at 11:35

## 2020-08-26 RX ADMIN — Medication 1 DROP(S): at 11:37

## 2020-08-26 RX ADMIN — AMLODIPINE BESYLATE 10 MILLIGRAM(S): 2.5 TABLET ORAL at 13:38

## 2020-08-26 RX ADMIN — Medication 500 MILLIGRAM(S): at 11:36

## 2020-08-26 RX ADMIN — QUETIAPINE FUMARATE 25 MILLIGRAM(S): 200 TABLET, FILM COATED ORAL at 05:02

## 2020-08-26 RX ADMIN — Medication 1 TABLET(S): at 11:36

## 2020-08-26 RX ADMIN — CARBIDOPA AND LEVODOPA 1 TABLET(S): 25; 100 TABLET ORAL at 11:36

## 2020-08-26 RX ADMIN — CARBIDOPA AND LEVODOPA 1 TABLET(S): 25; 100 TABLET ORAL at 05:01

## 2020-08-26 RX ADMIN — HEPARIN SODIUM 5000 UNIT(S): 5000 INJECTION INTRAVENOUS; SUBCUTANEOUS at 17:20

## 2020-08-26 RX ADMIN — CARBIDOPA AND LEVODOPA 1 TABLET(S): 25; 100 TABLET ORAL at 17:20

## 2020-08-26 NOTE — CONSULT NOTE ADULT - SUBJECTIVE AND OBJECTIVE BOX
Patient is a 76 yo F with a history of Parkinson's disease, early dementia who presented with right flank pain s/p mechanical fall. Patient is a poor historian but reports she was reaching in her cabinet, slipped backwards and fell onto her right side. Patient with incidental age indeterminate pubic rami fracture on plain films. Patient denies history of prior falls. Denies pain elsewhere. Patient admits to head strike without LOC. Denies dizziness/vision changes/chest pain.     MEDICATIONS  (STANDING):  amLODIPine   Tablet 10 milliGRAM(s) Oral daily  ascorbic acid 500 milliGRAM(s) Oral daily  calcium carbonate 1250 mG  + Vitamin D (OsCal 500 + D) 1 Tablet(s) Oral daily  carbidopa/levodopa  25/100 1 Tablet(s) Oral <User Schedule>  ferrous    sulfate 325 milliGRAM(s) Oral daily  heparin   Injectable 5000 Unit(s) SubCutaneous every 12 hours  lidocaine   Patch 1 Patch Transdermal every 24 hours  multivitamin/minerals 1 Tablet(s) Oral daily  prednisoLONE acetate 1% Suspension 1 Drop(s) Both EYES daily  QUEtiapine 25 milliGRAM(s) Oral two times a day    MEDICATIONS  (PRN):  acetaminophen   Tablet .. 650 milliGRAM(s) Oral every 6 hours PRN Mild Pain (1 - 3)  traMADol 25 milliGRAM(s) Oral every 6 hours PRN Moderate Pain (4 - 6)    Vital Signs Last 24 Hrs  T(C): 37 (26 Aug 2020 12:16), Max: 37.1 (26 Aug 2020 00:35)  T(F): 98.6 (26 Aug 2020 12:16), Max: 98.8 (26 Aug 2020 00:35)  HR: 85 (26 Aug 2020 12:16) (85 - 98)  BP: 117/64 (26 Aug 2020 12:16) (117/64 - 156/76)  BP(mean): 102 (25 Aug 2020 23:29) (102 - 102)  RR: 16 (26 Aug 2020 12:16) (15 - 16)  SpO2: 95% (26 Aug 2020 12:16) (95% - 98%)    Physical Exam:  LE:  - skin intact, no open injuries or obvious deformities around the pelvis  - no TTP to the pubic bones or with pelvic compression  - no midline tenderness to palpation of the lumbar spine/sacrum  - + EHL, TA, FHL, GSC, Quads/Ham, HF LLE, patient with chronic right foot drop, + FHL, GSC, Quads/Ham, HF RLE  - SILT  - Calves non tender to palpation b/l  - + pulses    Secondary Survey:   Tenderness to palpation over right lower rib cage. No additional TTP over bony prominences, SILT, palpable pulses, full/painless A/PROM, compartments soft. No TTP over spinous processes or paraspinal muscles at C/T/L spine. No palpable step off. No other injuries or complaints.                          10.2   12.19 )-----------( 364      ( 25 Aug 2020 17:44 )             31.7   08-25    139  |  110<H>  |  25<H>  ----------------------------<  114<H>  4.6   |  24  |  0.74    Ca    8.5      25 Aug 2020 17:44    TPro  7.5  /  Alb  3.6  /  TBili  0.2  /  DBili  x   /  AST  34  /  ALT  35  /  AlkPhos  83  08-25      Imaging:    XR Pelvis: age indeterminate R inf pelvic rami fracture, nondisplaced  CT Cspine/Head: Neg Patient is a 74 yo F with a history of Parkinson's disease, early dementia who presented with right flank pain s/p mechanical fall. Patient is a poor historian but reports she was reaching in her cabinet, slipped backwards and fell onto her right side. Patient with age indeterminate pubic rami fracture on plain films in the ED. Patient denies history of prior falls. Denies pain elsewhere. Patient admits to head strike without LOC. Denies dizziness/vision changes/chest pain.     MEDICATIONS  (STANDING):  amLODIPine   Tablet 10 milliGRAM(s) Oral daily  ascorbic acid 500 milliGRAM(s) Oral daily  calcium carbonate 1250 mG  + Vitamin D (OsCal 500 + D) 1 Tablet(s) Oral daily  carbidopa/levodopa  25/100 1 Tablet(s) Oral <User Schedule>  ferrous    sulfate 325 milliGRAM(s) Oral daily  heparin   Injectable 5000 Unit(s) SubCutaneous every 12 hours  lidocaine   Patch 1 Patch Transdermal every 24 hours  multivitamin/minerals 1 Tablet(s) Oral daily  prednisoLONE acetate 1% Suspension 1 Drop(s) Both EYES daily  QUEtiapine 25 milliGRAM(s) Oral two times a day    MEDICATIONS  (PRN):  acetaminophen   Tablet .. 650 milliGRAM(s) Oral every 6 hours PRN Mild Pain (1 - 3)  traMADol 25 milliGRAM(s) Oral every 6 hours PRN Moderate Pain (4 - 6)    Vital Signs Last 24 Hrs  T(C): 37 (26 Aug 2020 12:16), Max: 37.1 (26 Aug 2020 00:35)  T(F): 98.6 (26 Aug 2020 12:16), Max: 98.8 (26 Aug 2020 00:35)  HR: 85 (26 Aug 2020 12:16) (85 - 98)  BP: 117/64 (26 Aug 2020 12:16) (117/64 - 156/76)  BP(mean): 102 (25 Aug 2020 23:29) (102 - 102)  RR: 16 (26 Aug 2020 12:16) (15 - 16)  SpO2: 95% (26 Aug 2020 12:16) (95% - 98%)    Physical Exam:  LE:  - skin intact, no open injuries or obvious deformities around the pelvis  - no TTP to the pubic bones or with pelvic compression  - no midline tenderness to palpation of the lumbar spine/sacrum  - + EHL, TA, FHL, GSC, Quads/Ham, HF LLE, patient with chronic right foot drop, + FHL, GSC, Quads/Ham, HF RLE  - SILT  - Calves non tender to palpation b/l  - + pulses    Secondary Survey:   Tenderness to palpation over right lower rib cage. No additional TTP over bony prominences, SILT, palpable pulses, full/painless A/PROM, compartments soft. No TTP over spinous processes or paraspinal muscles at C/T/L spine. No palpable step off. No other injuries or complaints.                          10.2   12.19 )-----------( 364      ( 25 Aug 2020 17:44 )             31.7   08-25    139  |  110<H>  |  25<H>  ----------------------------<  114<H>  4.6   |  24  |  0.74    Ca    8.5      25 Aug 2020 17:44    TPro  7.5  /  Alb  3.6  /  TBili  0.2  /  DBili  x   /  AST  34  /  ALT  35  /  AlkPhos  83  08-25      Imaging:    XR Pelvis: age indeterminate R inf pelvic rami fracture, nondisplaced  CT Cspine/Head: Neg

## 2020-08-26 NOTE — OCCUPATIONAL THERAPY INITIAL EVALUATION ADULT - ANTICIPATED DISCHARGE DISPOSITION, OT EVAL
Rec MARLEN; per SW pt out of rehab days. Pt will require 24/7 supervision/assist upon d/c home, HCOT

## 2020-08-26 NOTE — OCCUPATIONAL THERAPY INITIAL EVALUATION ADULT - ADDITIONAL COMMENTS
Pt lives with her son in a private home, few steps to enter then stays on main level. Pt reported PTA she used RW for functional mobility short distances, otherwise used w/c. Pt is a questionable historian but reports she has been independent with ADLs since recent d/c from rehab. Pt has AFO for LLE.

## 2020-08-26 NOTE — OCCUPATIONAL THERAPY INITIAL EVALUATION ADULT - LEVEL OF INDEPENDENCE: BED TO CHAIR, REHAB EVAL
side steps along bed modAx2 with RW, LLE AFO; pt returned to supine to use bedpan due to decreased safety for commode transfer/unable to perform

## 2020-08-26 NOTE — OCCUPATIONAL THERAPY INITIAL EVALUATION ADULT - PERTINENT HX OF CURRENT PROBLEM, REHAB EVAL
74 yo F PMHx Parkinson's disease, early dementia p/w Rt lower rib pain s/p fall. Accidental fall, no dizziness/ chest pain/ loss of consciousness. XR pelvis: right pubic ramus fx.

## 2020-08-26 NOTE — PROGRESS NOTE ADULT - SUBJECTIVE AND OBJECTIVE BOX
Patient is a 75y old  Female who presents with a chief complaint of fall    INTERVAL /OVERNIGHT EVENTS: pain tolerable, waiting for PT    MEDICATIONS  (STANDING):  amLODIPine   Tablet 10 milliGRAM(s) Oral daily  ascorbic acid 500 milliGRAM(s) Oral daily  calcium carbonate 1250 mG  + Vitamin D (OsCal 500 + D) 1 Tablet(s) Oral daily  carbidopa/levodopa  25/100 1 Tablet(s) Oral <User Schedule>  ferrous    sulfate 325 milliGRAM(s) Oral daily  heparin   Injectable 5000 Unit(s) SubCutaneous every 12 hours  lidocaine   Patch 1 Patch Transdermal every 24 hours  multivitamin/minerals 1 Tablet(s) Oral daily  prednisoLONE acetate 1% Suspension 1 Drop(s) Both EYES daily  QUEtiapine 25 milliGRAM(s) Oral two times a day    MEDICATIONS  (PRN):  acetaminophen   Tablet .. 650 milliGRAM(s) Oral every 6 hours PRN Mild Pain (1 - 3)  traMADol 25 milliGRAM(s) Oral every 6 hours PRN Moderate Pain (4 - 6)      Allergies    tetracycline (Unknown)    Intolerances        REVIEW OF SYSTEMS:  CONSTITUTIONAL: No fever, weight loss, or fatigue  EYES: No eye pain, visual disturbances, or discharge  ENMT:  No difficulty hearing, tinnitus, vertigo; No sinus or throat pain  NECK: No pain or stiffness  RESPIRATORY: No cough, wheezing, chills or hemoptysis; No shortness of breath  CARDIOVASCULAR: No chest pain, palpitations, dizziness, or leg swelling  GASTROINTESTINAL: No abdominal or epigastric pain. No nausea, vomiting, or hematemesis; No diarrhea or constipation. No melena or hematochezia.  GENITOURINARY: No dysuria, frequency, hematuria, or incontinence  NEUROLOGICAL: No headaches, memory loss, loss of strength, numbness, or tremors  SKIN: No itching, burning, rashes, or lesions   LYMPH NODES: No enlarged glands  ENDOCRINE: No heat or cold intolerance; No hair loss; No polydipsia or polyuria  MUSCULOSKELETAL: + joint pain or swelling; No muscle, back, or extremity pain  PSYCHIATRIC: No depression, anxiety, mood swings, or difficulty sleeping  HEME/LYMPH: No easy bruising, or bleeding gums  ALLERGY AND IMMUNOLOGIC: No hives or eczema    Vital Signs Last 24 Hrs  T(C): 37 (26 Aug 2020 12:16), Max: 37.1 (26 Aug 2020 00:35)  T(F): 98.6 (26 Aug 2020 12:16), Max: 98.8 (26 Aug 2020 00:35)  HR: 85 (26 Aug 2020 12:16) (85 - 98)  BP: 117/64 (26 Aug 2020 12:16) (117/64 - 156/76)  BP(mean): 102 (25 Aug 2020 23:29) (102 - 102)  RR: 16 (26 Aug 2020 12:16) (15 - 16)  SpO2: 95% (26 Aug 2020 12:16) (95% - 98%)    PHYSICAL EXAM:  GENERAL: NAD, well-groomed, well-developed  HEAD:  Atraumatic, Normocephalic  EYES: EOMI, PERRLA, conjunctiva and sclera clear  ENMT: No tonsillar erythema, exudates, or enlargement; Moist mucous membranes, Good dentition, No lesions  NECK: Supple, No JVD, Normal thyroid  NERVOUS SYSTEM:  Alert & Oriented X3, Good concentration; Motor Strength 5/5 B/L upper and lower extremities; DTRs 2+ intact and symmetric  CHEST/LUNG: Clear to auscultation bilaterally; No rales, rhonchi, wheezing, or rubs  HEART: Regular rate and rhythm; No murmurs, rubs, or gallops  ABDOMEN: Soft, Nontender, Nondistended; Bowel sounds present  EXTREMITIES:  2+ Peripheral Pulses, No clubbing, cyanosis, or edema  LYMPH: No lymphadenopathy noted  SKIN: No rashes or lesions    LABS:                        10.2   12.19 )-----------( 364      ( 25 Aug 2020 17:44 )             31.7     25 Aug 2020 17:44    139    |  110    |  25     ----------------------------<  114    4.6     |  24     |  0.74     Ca    8.5        25 Aug 2020 17:44    TPro  7.5    /  Alb  3.6    /  TBili  0.2    /  DBili  x      /  AST  34     /  ALT  35     /  AlkPhos  83     25 Aug 2020 17:44        CAPILLARY BLOOD GLUCOSE          RADIOLOGY & ADDITIONAL TESTS:    Notes Reviewed:  [x ] YES  [ ] NO    Care Discussed with Consultants/Other Providers [x ] YES  [ ] NO

## 2020-08-26 NOTE — OCCUPATIONAL THERAPY INITIAL EVALUATION ADULT - RANGE OF MOTION EXAMINATION, UPPER EXTREMITY
bilateral UE Active ROM was WFL  (within functional limits)/shoulders approx 0-90 flexion AROM. Pt is right hand dominant. Limited FMC for participation in ADLs

## 2020-08-26 NOTE — CONSULT NOTE ADULT - SUBJECTIVE AND OBJECTIVE BOX
HPI:  74 yo F PMHx Parkinson's disease, early dementia p/w Rt lower ib pain s/p fall. Accidental fall, no dizziness/ chest pain/ loss of consciousness.   Patient states that she was trying to grab a piece of coffee cake from the cabinet, slipped backwards, fell onto her right side and hit back of her head. Denies chest pain/ palpitations/ shortness of breath.    Son Roman present, helped her up.     In the ed patient had x ray of pelvis show sRt pubic fracture  CT head no acute bleed  CT C spine no fracture (25 Aug 2020 23:29)      PAST MEDICAL & SURGICAL HISTORY:  Dementia  Anemia  Cataract  Parkinsons  History of left hip replacement       SOCIAL HISTORY:                                     Admitted from:  HOME  [facility]       FAMILY HISTORY:  FH: type 2 diabetes      MEDICATIONS  (STANDING):  amLODIPine   Tablet 10 milliGRAM(s) Oral daily  ascorbic acid 500 milliGRAM(s) Oral daily  calcium carbonate 1250 mG  + Vitamin D (OsCal 500 + D) 1 Tablet(s) Oral daily  carbidopa/levodopa  25/100 1 Tablet(s) Oral <User Schedule>  ferrous    sulfate 325 milliGRAM(s) Oral daily  heparin   Injectable 5000 Unit(s) SubCutaneous every 12 hours  lidocaine   Patch 1 Patch Transdermal every 24 hours  multivitamin/minerals 1 Tablet(s) Oral daily  prednisoLONE acetate 1% Suspension 1 Drop(s) Both EYES daily  QUEtiapine 25 milliGRAM(s) Oral two times a day    MEDICATIONS  (PRN):  acetaminophen   Tablet .. 650 milliGRAM(s) Oral every 6 hours PRN Mild Pain (1 - 3)  traMADol 25 milliGRAM(s) Oral every 6 hours PRN Moderate Pain (4 - 6)      Allergies    tetracycline (Unknown)    Intolerances          ADVANCE DIRECTIVES:    DNR                     MOLST    Living Will      Surrogate/HCP/Guardian:          Phone#:      Baseline ADLs (prior to admission):  Independent [ ] moderately [ ] fully   Dependent   [ ] moderately [ ]fully    Palliative Performance Status Version 2:           Review of Systems: Reviewed                     Negative:                     Positive:  Unable to obtain due to poor mentation   All others negative    Dyspnea:   Nausea/Vomiting:   Anxiety:    Depression:   Fatigue:   Loss of appetite:     Pain:             Character-            Duration-            Factors-            Frequency-            Location-            Severity-    Vital Signs Last 24 Hrs  T(C): 37 (26 Aug 2020 12:16), Max: 37.1 (26 Aug 2020 00:35)  T(F): 98.6 (26 Aug 2020 12:16), Max: 98.8 (26 Aug 2020 00:35)  HR: 85 (26 Aug 2020 12:16) (85 - 98)  BP: 117/64 (26 Aug 2020 12:16) (117/64 - 156/76)  BP(mean): 102 (25 Aug 2020 23:29) (102 - 102)  RR: 16 (26 Aug 2020 12:16) (15 - 18)  SpO2: 95% (26 Aug 2020 12:16) (95% - 98%)      General: alert  oriented x ____ lethargic agitated  cachexia  nonverbal  coma   ill appearing                   HEENT: NC/AT                   Lungs: CTA B/L  tachypnea/labored breathing  excessive secretions  +wheezing    +ronchi    +crackles                    CV: S1S2 RRR     GI:  soft,  NT,  BS+   distended  tender  no BS PEG/NG/OG tube                   MSK: weakness  edema ambulatory  bedbound/wheelchair bound                        LABS:                        10.2   12.19 )-----------( 364      ( 25 Aug 2020 17:44 )             31.7     08-25    139  |  110<H>  |  25<H>  ----------------------------<  114<H>  4.6   |  24  |  0.74    Ca    8.5      25 Aug 2020 17:44    TPro  7.5  /  Alb  3.6  /  TBili  0.2  /  DBili  x   /  AST  34  /  ALT  35  /  AlkPhos  83  08-25        I&O's Summary      RADIOLOGY & ADDITIONAL STUDIES:      PSYCHOSOCIAL-SPIRITUAL ASSESSMENT:    ___Reviewed     ___Care  plan unchanged     ___Care plan adjusted as below    GOALS OF CARE DISCUSSION  	___Palliative care info/counseling provided	    ___Family meeting  	___Advanced Directives addressed	    ___See previous Palliative Medicine Note    AGENCY CHOICE DISCUSSED:   ___HOSPICE     ___OTHER:              > 50% OF THE TIME SPENT IN COUNSELING AND COORDINATING CARE 	   Start:               End:  	       Minutes:      PROLONGED SERVICE             FACE TO FACE:    PT            PT & FAMILY	   Start:               End:  	       Minutes:      Advance Care Planning Time: HPI:  74 yo F PMHx Parkinson's disease, early dementia p/w Rt lower ib pain s/p fall. Accidental fall, no dizziness/ chest pain/ loss of consciousness.   Patient states that she was trying to grab a piece of coffee cake from the cabinet, slipped backwards, fell onto her right side and hit back of her head. Denies chest pain/ palpitations/ shortness of breath.    Son Roman present, helped her up.     In the ed patient had x ray of pelvis show sRt pubic fracture  CT head no acute bleed  CT C spine no fracture (25 Aug 2020 23:29)      PAST MEDICAL & SURGICAL HISTORY:  Dementia  Anemia  Cataract  Parkinsons  History of left hip replacement       SOCIAL HISTORY:                                     Admitted from:  HOME       FAMILY HISTORY:  FH: type 2 diabetes      MEDICATIONS  (STANDING):  amLODIPine   Tablet 10 milliGRAM(s) Oral daily  ascorbic acid 500 milliGRAM(s) Oral daily  calcium carbonate 1250 mG  + Vitamin D (OsCal 500 + D) 1 Tablet(s) Oral daily  carbidopa/levodopa  25/100 1 Tablet(s) Oral <User Schedule>  ferrous    sulfate 325 milliGRAM(s) Oral daily  heparin   Injectable 5000 Unit(s) SubCutaneous every 12 hours  lidocaine   Patch 1 Patch Transdermal every 24 hours  multivitamin/minerals 1 Tablet(s) Oral daily  prednisoLONE acetate 1% Suspension 1 Drop(s) Both EYES daily  QUEtiapine 25 milliGRAM(s) Oral two times a day    MEDICATIONS  (PRN):  acetaminophen   Tablet .. 650 milliGRAM(s) Oral every 6 hours PRN Mild Pain (1 - 3)  traMADol 25 milliGRAM(s) Oral every 6 hours PRN Moderate Pain (4 - 6)      Allergies    tetracycline (Unknown)    Intolerances          ADVANCE DIRECTIVES:    Living Will      Surrogate/HCP/Guardian: rosario Wilson       Phone#:      Baseline ADLs (prior to admission):  Independent [ ] moderately [ ] fully   Dependent   [x ] moderately [ ]fully    Palliative Performance Status Version 2:       PPS Level -- 60%  Ambulation -- Reduced, with walker  Activity & Evidence of Disease -- Unable hobby/house work; Significant disease  Self-Care -- Occasional assistance necessary  Intake -- Normal   Conscious Level -- Full or Confusion at times      Review of Systems: Reviewed                     All others negative    Dyspnea: N  Nausea/Vomiting: N  Anxiety:  N  Depression: N  Fatigue: N  Loss of appetite: N    Pain: NONE            Character-            Duration-            Factors-            Frequency-            Location-            Severity-    Vital Signs Last 24 Hrs  T(C): 37 (26 Aug 2020 12:16), Max: 37.1 (26 Aug 2020 00:35)  T(F): 98.6 (26 Aug 2020 12:16), Max: 98.8 (26 Aug 2020 00:35)  HR: 85 (26 Aug 2020 12:16) (85 - 98)  BP: 117/64 (26 Aug 2020 12:16) (117/64 - 156/76)  BP(mean): 102 (25 Aug 2020 23:29) (102 - 102)  RR: 16 (26 Aug 2020 12:16) (15 - 18)  SpO2: 95% (26 Aug 2020 12:16) (95% - 98%)      General: alert  oriented x __2__                    HEENT: NC/AT                   Lungs: CTA B/L                      CV: S1S2 RRR     GI:  soft,  NT,  BS+                     MSK: ambulatory with walker and assistance                        LABS:                        10.2   12.19 )-----------( 364      ( 25 Aug 2020 17:44 )             31.7     08-25    139  |  110<H>  |  25<H>  ----------------------------<  114<H>  4.6   |  24  |  0.74    Ca    8.5      25 Aug 2020 17:44    TPro  7.5  /  Alb  3.6  /  TBili  0.2  /  DBili  x   /  AST  34  /  ALT  35  /  AlkPhos  83  08-25        I&O's Summary      RADIOLOGY & ADDITIONAL STUDIES:      PSYCHOSOCIAL-SPIRITUAL ASSESSMENT:    _x__Reviewed     _x__Care  plan unchanged     ___Care plan adjusted as below    GOALS OF CARE DISCUSSION  	_x__Palliative care info/counseling provided	    _x__Family meeting  	_x__Advanced Directives addressed	    ___See previous Palliative Medicine Note    AGENCY CHOICE DISCUSSED:   ___HOSPICE     ___OTHER:              > 50% OF THE TIME SPENT IN COUNSELING AND COORDINATING CARE 	   Start:               End:  	       Minutes:  85      PROLONGED SERVICE             FACE TO FACE:    PT            PT & FAMILY	   Start:               End:  	       Minutes:      Advance Care Planning Time:

## 2020-08-26 NOTE — PATIENT PROFILE ADULT - FALL HARM RISK CONCLUSION
EMERGENCY DEPARTMENT HISTORY AND PHYSICAL EXAM      Date: 6/1/2018  Patient Name: Juan Carlos March    History of Presenting Illness     Chief Complaint   Patient presents with    Syncope     syncope at visiting a new group home with his brother; bgl [phone]; this happened about 6 months ago. History Provided By: Patient and Patient's Brother    HPI: Juan Carlos March, 46 y.o. male with PMHx significant for Brain abscess, TBI, DM, Depression presents ambulatory to the ED with cc of syncopal event. Patient and brother with viewing new nursing home today and patient had a witnessed syncopal event. Per brother at bedside the patient walked up long flight of stairs, turned the corner on the floor and prior to getting through doorway his \"eyes went out\" and he grabbed the door frame, fell to knees then passed out. Patient was caught by brother/staff, had some reported shaking of the left arm but no \"big seizure shaking\" patient opened eyes within several seconds and was confused for approximately 30-60 seconds before he was back to baseline. Had not complaints then and does not have any on initial evaluation here in the ER. EMS arrived on scene noted glucose of 300 and brought Mr. Last Del Rosario here for evaluation. He is asymptomatic here,feels at baseline. 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 Patient denying recent fevers, chills, nausea, vomiting, cough, or flu like symptoms. Patient denies neck stiffness, photophobia, headache, numbness or tingling of extremities, cp, sob, palpitations, or hemoptysis.     Chief Complaint: syncope  Duration: 1 Hours  Timing:  N/A  Location: N/A  Quality: N/A  Severity: N/A  Modifying Factors: N/A  Associated Symptoms: denies any other associated signs or symptoms    There are no other complaints, changes, or physical findings at this time. PCP: Sanchez Hayes NP    Current Outpatient Prescriptions   Medication Sig Dispense Refill    terbinafine HCl (LAMISIL) 250 mg tablet Take 250 mg by mouth daily.  atorvastatin (LIPITOR) 10 mg tablet Take 10 mg by mouth nightly.  metFORMIN (GLUCOPHAGE) 850 mg tablet Take 850 mg by mouth three (3) times daily (with meals).  sertraline (ZOLOFT) 100 mg tablet Take 100 mg by mouth daily.  losartan (COZAAR) 25 mg tablet Take 25 mg by mouth daily.  mirtazapine (REMERON) 15 mg tablet Take 15 mg by mouth nightly.  ergocalciferol (VITAMIN D2) 50,000 unit capsule Take 50,000 Units by mouth every seven (7) days.  insulin glargine (LANTUS SOLOSTAR U-100 INSULIN) 100 unit/mL (3 mL) inpn 40 Units by SubCUTAneous route daily. Every morning      insulin aspart U-100 (NOVOLOG U-100 INSULIN ASPART) 100 unit/mL injection by SubCUTAneous route Before breakfast, lunch, and dinner. Sliding scale at meals      divalproex ER (DEPAKOTE ER) 500 mg ER tablet Take 1,500 mg by mouth nightly. Past History     Past Medical History:  No past medical history on file. Past Surgical History:  No past surgical history on file. Family History:  No family history on file. Social History:  Social History   Substance Use Topics    Smoking status: Not on file    Smokeless tobacco: Not on file    Alcohol use Not on file       Allergies: Allergies   Allergen Reactions    Penicillins Rash         Review of Systems   Review of Systems   Constitutional: Negative for activity change, chills and fever. HENT: Negative for congestion and sore throat. Eyes: Negative for pain and redness. Respiratory: Negative for cough, chest tightness and shortness of breath. Cardiovascular: Negative for chest pain and palpitations. Gastrointestinal: Negative for abdominal pain, diarrhea, nausea and vomiting. Genitourinary: Negative for dysuria, frequency and urgency. Musculoskeletal: Negative for back pain and neck pain. Skin: Negative for rash. Neurological: Positive for syncope. Negative for light-headedness and headaches. Psychiatric/Behavioral: Negative for confusion. All other systems reviewed and are negative. Physical Exam   Physical Exam   Constitutional: He is oriented to person, place, and time. He appears well-developed and well-nourished. HENT:   Head: Normocephalic and atraumatic. Nose: Nose normal.   Mouth/Throat: Oropharynx is clear and moist. No oropharyngeal exudate. No midline C, T, or L spine tenderness   Eyes: Conjunctivae are normal. Pupils are equal, round, and reactive to light. No scleral icterus. Neck: Normal range of motion. Neck supple. No tracheal deviation present. No thyromegaly present. Cardiovascular: Normal rate, regular rhythm and intact distal pulses. Pulmonary/Chest: Effort normal and breath sounds normal. No stridor. No respiratory distress. He has no wheezes. He has no rales. He exhibits no tenderness. Abdominal: Soft. Bowel sounds are normal. He exhibits no distension. There is no tenderness. There is no rebound and no guarding. Musculoskeletal: Normal range of motion. He exhibits no edema, tenderness or deformity. Neurological: He is alert and oriented to person, place, and time. No cranial nerve deficit. He exhibits normal muscle tone. Decorticate posturing in the RUE and decreased strength in RL and RU expremity - these are all chronic from prior brain surgery. Patient has baseline sensation in UEs and LEs bilaterally during examination. Skin: Skin is warm and dry. No rash noted. No erythema. Psychiatric: He has a normal mood and affect. His behavior is normal.   Nursing note and vitals reviewed.       Diagnostic Study Results     Labs -     Recent Results (from the past 12 hour(s))   EKG, 12 LEAD, INITIAL    Collection Time: 06/01/18 12:31 PM   Result Value Ref Range    Ventricular Rate 84 BPM    Atrial Rate 84 BPM    P-R Interval 118 ms    QRS Duration 88 ms    Q-T Interval 368 ms    QTC Calculation (Bezet) 434 ms    Calculated P Axis 29 degrees    Calculated R Axis 30 degrees    Calculated T Axis 22 degrees    Diagnosis       ** Poor data quality, interpretation may be adversely affected  Normal sinus rhythm  Normal ECG  No previous ECGs available     CBC W/O DIFF    Collection Time: 06/01/18 12:43 PM   Result Value Ref Range    WBC 3.7 (L) 4.1 - 11.1 K/uL    RBC 3.67 (L) 4.10 - 5.70 M/uL    HGB 11.0 (L) 12.1 - 17.0 g/dL    HCT 34.4 (L) 36.6 - 50.3 %    MCV 93.7 80.0 - 99.0 FL    MCH 30.0 26.0 - 34.0 PG    MCHC 32.0 30.0 - 36.5 g/dL    RDW 14.6 (H) 11.5 - 14.5 %    PLATELET 703 630 - 987 K/uL    MPV 10.8 8.9 - 12.9 FL    NRBC 0.0 0  WBC    ABSOLUTE NRBC 0.00 0.00 - 0.47 K/uL   METABOLIC PANEL, BASIC    Collection Time: 06/01/18 12:43 PM   Result Value Ref Range    Sodium 139 136 - 145 mmol/L    Potassium 4.3 3.5 - 5.1 mmol/L    Chloride 105 97 - 108 mmol/L    CO2 23 21 - 32 mmol/L    Anion gap 11 5 - 15 mmol/L    Glucose 261 (H) 65 - 100 mg/dL    BUN 21 (H) 6 - 20 MG/DL    Creatinine 1.25 0.70 - 1.30 MG/DL    BUN/Creatinine ratio 17 12 - 20      GFR est AA >60 >60 ml/min/1.73m2    GFR est non-AA >60 >60 ml/min/1.73m2    Calcium 8.7 8.5 - 10.1 MG/DL   TROPONIN I    Collection Time: 06/01/18 12:43 PM   Result Value Ref Range    Troponin-I, Qt. <0.04 <0.05 ng/mL   GLUCOSE, POC    Collection Time: 06/01/18  2:41 PM   Result Value Ref Range    Glucose (POC) 186 (H) 65 - 100 mg/dL    Performed by Avani Mancilla        Radiologic Studies -   CT HEAD WO CONT   Final Result        CT Results  (Last 48 hours)               06/01/18 1358  CT HEAD WO CONT Final result    Impression:  IMPRESSION:   1. No acute process. 2. Chronic disease as described.        Narrative:  INDICATION: Syncope/fainting       EXAM: CT HEAD without contrast.    CT dose reduction was achieved through use of a standardized protocol tailored   for this examination and automatic exposure control for dose modulation. COMPARISON: None. FINDINGS: Unenhanced CT Head is performed. There is prior left craniectomy and prior right craniotomy. There is large encephalomalacia of the posterior left cerebral hemisphere and a   smaller focus in the right frontal lobe. No acute process is seen. No acute infarct is apparent. There is no mass, bleed   or extra-axial fluid collection. Left cerebral loss results in 5 mm of chronic   appearing right to left midline shift. Mild nonobstructive appearing   ventriculomegaly is apparent. CXR Results  (Last 48 hours)    None            Medical Decision Making   I am the first provider for this patient. I reviewed the vital signs, available nursing notes, past medical history, past surgical history, family history and social history. Vital Signs-Reviewed the patient's vital signs. Patient Vitals for the past 12 hrs:   Temp Pulse Resp BP SpO2   06/01/18 1430 - 78 12 142/74 96 %   06/01/18 1422 - 83 16 153/81 99 %   06/01/18 1416 - 80 16 138/78 99 %   06/01/18 1415 - 78 - 138/78 -   06/01/18 1330 - 83 13 130/70 94 %   06/01/18 1245 - 85 13 115/66 96 %   06/01/18 1223 98 °F (36.7 °C) 89 16 117/58 95 %       Pulse Oximetry Analysis - 96% on RA    Cardiac Monitor:   Rate: 80s bpm  Rhythm: Normal Sinus Rhythm      EKG interpretation: (Preliminary)  Rhythm: normal sinus rhythm; and regular . Rate (approx.): 84; Axis: left axis deviation; AZ interval: normal; QRS interval: normal ; ST/T wave: normal; Other findings: S1Q3T3, otherwise normal.  Written by Fina Liriano    Records Reviewed: N/A    Provider Notes (Medical Decision Making): This is a 46year old male with the above history and physical exam findings who presents to the ER after a syncopal event.   On arrival patient's vital signs are all WNL other than mildly decreased SBP. Patient has absolutely no complaints upon initial evaluation; states he feels at baseline and would like to go home. As noted above patient did undergo exertion just prior to syncopal event but had no tongue biting or evidence of it on exam, did not urinate himself, and per the brother at bedside was back to mental baseline within 1 minute; all of which points towards syncopal episode vs. Seizure like activity. However, given that the patient has prominent neurosurgical hx with prior atypical seizures per history will gain CT head to evaluate for any acute abnormality. He has no fevers, no chills, no signs of infection, will gain CBC as well. If any signs concerning for abscess arise will further pursue with CT head with contrast.  Will gain ACS work up and evaluate for hyperglycemia/dka given reported glucose of ~300 per EMS. DDX: DKA, ACS, ICH, PE, arrhythmia, vasovagal, hyperglycemia, dehydration. ED Course:   Initial assessment performed. The patients presenting problems have been discussed, and they are in agreement with the care plan formulated and outlined with them. I have encouraged them to ask questions as they arise throughout their visit. 12:35PM  EKG with S1Q3T3 without any tachycardia, T wave inversions on anterior leads, or signs of right sided heart strain. Patient completely asymptomatic still and continues to deny SOB, CP, pleurisy, cough, or lower extremity swelling. Do not feel this is EKG indicative of PE; will not pursue dimer or CT PE at this time. This was discussed with patient and he is agreeable to the above plan. 2:30PM  Discussed labs and imaging results with patient, feels comfortable at this time. Will gain one more POC glucose and discharge so long as Glucose within a reasonable range. SBP now 140s after IV fluids and patient able to stand without any dizziness or symptoms.   Discussed specific return precautions and follow with TBI clinic as well as PCP. Disposition:  Discharge    PLAN:  1. Discharge Medication List as of 6/1/2018  2:42 PM        2. Follow-up Information     Follow up With Details Comments Contact Info    Marjorie Grier NP Schedule an appointment as soon as possible for a visit in 3 days As needed [address] [place]  [phone]      [facility] EMERGENCY DEPT Go in 1 day As needed, If symptoms worsen [address] [place] [address]  [phone]        Return to ED if worse     Diagnosis     Clinical Impression:   1. Syncope and collapse    2.  Hyperglycemia Fall with Harm Risk

## 2020-08-27 LAB
HCT VFR BLD CALC: 30.4 % — LOW (ref 34.5–45)
HGB BLD-MCNC: 9.8 G/DL — LOW (ref 11.5–15.5)
MCHC RBC-ENTMCNC: 27.5 PG — SIGNIFICANT CHANGE UP (ref 27–34)
MCHC RBC-ENTMCNC: 32.2 GM/DL — SIGNIFICANT CHANGE UP (ref 32–36)
MCV RBC AUTO: 85.4 FL — SIGNIFICANT CHANGE UP (ref 80–100)
NRBC # BLD: 0 /100 WBCS — SIGNIFICANT CHANGE UP (ref 0–0)
PLATELET # BLD AUTO: 297 K/UL — SIGNIFICANT CHANGE UP (ref 150–400)
RBC # BLD: 3.56 M/UL — LOW (ref 3.8–5.2)
RBC # FLD: 13.3 % — SIGNIFICANT CHANGE UP (ref 10.3–14.5)
WBC # BLD: 9.34 K/UL — SIGNIFICANT CHANGE UP (ref 3.8–10.5)
WBC # FLD AUTO: 9.34 K/UL — SIGNIFICANT CHANGE UP (ref 3.8–10.5)

## 2020-08-27 RX ORDER — ACETAMINOPHEN 500 MG
2 TABLET ORAL
Qty: 0 | Refills: 0 | DISCHARGE
Start: 2020-08-27

## 2020-08-27 RX ORDER — HEPARIN SODIUM 5000 [USP'U]/ML
5000 INJECTION INTRAVENOUS; SUBCUTANEOUS EVERY 8 HOURS
Refills: 0 | Status: DISCONTINUED | OUTPATIENT
Start: 2020-08-27 | End: 2020-09-08

## 2020-08-27 RX ORDER — LIDOCAINE 4 G/100G
1 CREAM TOPICAL
Qty: 30 | Refills: 0
Start: 2020-08-27 | End: 2020-09-25

## 2020-08-27 RX ORDER — MULTIVIT-MIN/FERROUS GLUCONATE 9 MG/15 ML
1 LIQUID (ML) ORAL
Qty: 0 | Refills: 0 | DISCHARGE

## 2020-08-27 RX ORDER — PANTOPRAZOLE SODIUM 20 MG/1
40 TABLET, DELAYED RELEASE ORAL
Refills: 0 | Status: DISCONTINUED | OUTPATIENT
Start: 2020-08-27 | End: 2020-09-08

## 2020-08-27 RX ADMIN — Medication 1 TABLET(S): at 11:37

## 2020-08-27 RX ADMIN — QUETIAPINE FUMARATE 25 MILLIGRAM(S): 200 TABLET, FILM COATED ORAL at 05:11

## 2020-08-27 RX ADMIN — HEPARIN SODIUM 5000 UNIT(S): 5000 INJECTION INTRAVENOUS; SUBCUTANEOUS at 05:11

## 2020-08-27 RX ADMIN — CARBIDOPA AND LEVODOPA 1 TABLET(S): 25; 100 TABLET ORAL at 11:37

## 2020-08-27 RX ADMIN — Medication 500 MILLIGRAM(S): at 11:37

## 2020-08-27 RX ADMIN — QUETIAPINE FUMARATE 25 MILLIGRAM(S): 200 TABLET, FILM COATED ORAL at 17:30

## 2020-08-27 RX ADMIN — Medication 325 MILLIGRAM(S): at 11:37

## 2020-08-27 RX ADMIN — CARBIDOPA AND LEVODOPA 1 TABLET(S): 25; 100 TABLET ORAL at 08:45

## 2020-08-27 RX ADMIN — HEPARIN SODIUM 5000 UNIT(S): 5000 INJECTION INTRAVENOUS; SUBCUTANEOUS at 13:30

## 2020-08-27 RX ADMIN — CARBIDOPA AND LEVODOPA 1 TABLET(S): 25; 100 TABLET ORAL at 17:30

## 2020-08-27 RX ADMIN — AMLODIPINE BESYLATE 10 MILLIGRAM(S): 2.5 TABLET ORAL at 05:11

## 2020-08-27 RX ADMIN — HEPARIN SODIUM 5000 UNIT(S): 5000 INJECTION INTRAVENOUS; SUBCUTANEOUS at 21:36

## 2020-08-27 RX ADMIN — Medication 1 DROP(S): at 11:38

## 2020-08-27 NOTE — DISCHARGE NOTE PROVIDER - HOSPITAL COURSE
admitted for fall with pubic fracture    non surgical option    anemia - stable H and H    no MARLEN per social work    DC after PT and ortho clearance admitted for fall with pubic fracture    non surgical option    anemia - stable H and H    no MARLEN per social work    DC after PT and ortho clearance    later developed febrile episodes    found to have UTI    underwent EGD with colonoscopy    found to have rectal ulcer

## 2020-08-27 NOTE — DISCHARGE NOTE NURSING/CASE MANAGEMENT/SOCIAL WORK - PATIENT PORTAL LINK FT
You can access the FollowMyHealth Patient Portal offered by [facility] by registering at the following website: [email or web address]. By joining [facility]’s FollowMyHealth portal, you will also be able to view your health information using other applications (apps) compatible with our system.

## 2020-08-27 NOTE — DISCHARGE NOTE PROVIDER - NSDCHHNEEDSERVICE_GEN_ALL_CORE
Rehabilitation services/Observation and assessment/Teaching and training/Medication teaching and assessment

## 2020-08-27 NOTE — PROGRESS NOTE ADULT - SUBJECTIVE AND OBJECTIVE BOX
Patient is a 75y old  Female who presents with a chief complaint of fall (27 Aug 2020 14:04)      INTERVAL /OVERNIGHT EVENTS: alert awake confused    MEDICATIONS  (STANDING):  amLODIPine   Tablet 10 milliGRAM(s) Oral daily  ascorbic acid 500 milliGRAM(s) Oral daily  calcium carbonate 1250 mG  + Vitamin D (OsCal 500 + D) 1 Tablet(s) Oral daily  carbidopa/levodopa  25/100 1 Tablet(s) Oral <User Schedule>  ferrous    sulfate 325 milliGRAM(s) Oral daily  heparin   Injectable 5000 Unit(s) SubCutaneous every 8 hours  lidocaine   Patch 1 Patch Transdermal every 24 hours  multivitamin/minerals 1 Tablet(s) Oral daily  pantoprazole    Tablet 40 milliGRAM(s) Oral before breakfast  prednisoLONE acetate 1% Suspension 1 Drop(s) Both EYES daily  QUEtiapine 25 milliGRAM(s) Oral two times a day    MEDICATIONS  (PRN):  acetaminophen   Tablet .. 650 milliGRAM(s) Oral every 6 hours PRN Mild Pain (1 - 3)  traMADol 25 milliGRAM(s) Oral every 6 hours PRN Moderate Pain (4 - 6)      Allergies    tetracycline (Unknown)    Intolerances        REVIEW OF SYSTEMS:  CONSTITUTIONAL: No fever, weight loss, or fatigue  EYES: No eye pain, visual disturbances, or discharge  ENMT:  No difficulty hearing, tinnitus, vertigo; No sinus or throat pain  NECK: No pain or stiffness  RESPIRATORY: No cough, wheezing, chills or hemoptysis; No shortness of breath  CARDIOVASCULAR: No chest pain, palpitations, dizziness, or leg swelling  GASTROINTESTINAL: No abdominal or epigastric pain. No nausea, vomiting, or hematemesis; No diarrhea or constipation. No melena or hematochezia.  GENITOURINARY: No dysuria, frequency, hematuria, or incontinence  NEUROLOGICAL: No headaches, memory loss, loss of strength, numbness, or tremors  SKIN: No itching, burning, rashes, or lesions   LYMPH NODES: No enlarged glands  ENDOCRINE: No heat or cold intolerance; No hair loss; No polydipsia or polyuria  MUSCULOSKELETAL: No joint pain or swelling; No muscle, back, or extremity pain  PSYCHIATRIC: No depression, anxiety, mood swings, or difficulty sleeping  HEME/LYMPH: No easy bruising, or bleeding gums  ALLERGY AND IMMUNOLOGIC: No hives or eczema    Vital Signs Last 24 Hrs  T(C): 36.8 (27 Aug 2020 13:23), Max: 37.6 (27 Aug 2020 04:44)  T(F): 98.2 (27 Aug 2020 13:23), Max: 99.6 (27 Aug 2020 04:44)  HR: 83 (27 Aug 2020 13:23) (83 - 88)  BP: 117/68 (27 Aug 2020 13:23) (111/56 - 117/68)  BP(mean): --  RR: 18 (27 Aug 2020 13:23) (16 - 18)  SpO2: 93% (27 Aug 2020 13:23) (93% - 98%)    PHYSICAL EXAM:  GENERAL: NAD, well-groomed, well-developed  HEAD:  Atraumatic, Normocephalic  EYES: EOMI, PERRLA, conjunctiva and sclera clear  ENMT: No tonsillar erythema, exudates, or enlargement; Moist mucous membranes, Good dentition, No lesions  NECK: Supple, No JVD, Normal thyroid  NERVOUS SYSTEM:  Alert & Oriented X3, Good concentration; Motor Strength 5/5 B/L upper and lower extremities; DTRs 2+ intact and symmetric  CHEST/LUNG: Clear to auscultation bilaterally; No rales, rhonchi, wheezing, or rubs  HEART: Regular rate and rhythm; No murmurs, rubs, or gallops  ABDOMEN: Soft, Nontender, Nondistended; Bowel sounds present  EXTREMITIES:  2+ Peripheral Pulses, No clubbing, cyanosis, or edema  LYMPH: No lymphadenopathy noted  SKIN: No rashes or lesions    LABS:                        9.8    9.34  )-----------( 297      ( 27 Aug 2020 11:06 )             30.4       Ca    8.5        25 Aug 2020 17:44          CAPILLARY BLOOD GLUCOSE          RADIOLOGY & ADDITIONAL TESTS:    Notes Reviewed:  [x ] YES  [ ] NO    Care Discussed with Consultants/Other Providers [x ] YES  [ ] NO

## 2020-08-27 NOTE — DISCHARGE NOTE PROVIDER - NSDCMRMEDTOKEN_GEN_ALL_CORE_FT
acetaminophen 325 mg oral tablet: 2 tab(s) orally every 6 hours, As needed, Mild Pain (1 - 3)  amLODIPine 10 mg oral tablet: 1 tab(s) orally once a day  B-Complex 50 oral tablet: 1 tab(s) orally once a day  Caltrate 600 mg oral tablet: 1 tab(s) orally once a day  carbidopa-levodopa 25 mg-100 mg oral tablet: 1 tab(s) orally 3 times a day  Combigan 0.2%-0.5% ophthalmic solution: 1 drop(s) to each affected eye every 12 hours  ferrous sulfate 325 mg (65 mg elemental iron) oral delayed release tablet: 1 tab(s) orally once a day  lidocaine 5% topical film: Apply topically to affected area once a day   Ocuvite oral tablet: 1 tab(s) orally once a day  prednisoLONE acetate 1% ophthalmic suspension: 1 drop(s) to each affected eye once a day  QUEtiapine 25 mg oral tablet: 1 tab(s) orally 2 times a day  Vitamin C 500 mg oral tablet: 1 tab(s) orally once a day acetaminophen 325 mg oral tablet: 2 tab(s) orally every 6 hours, As needed, Mild Pain (1 - 3)  amLODIPine 10 mg oral tablet: 1 tab(s) orally once a day  B-Complex 50 oral tablet: 1 tab(s) orally once a day  Caltrate 600 mg oral tablet: 1 tab(s) orally once a day  carbidopa-levodopa 25 mg-100 mg oral tablet: 1 tab(s) orally 3 times a day  cefpodoxime 200 mg oral tablet: 1 tab(s) orally every 12 hours  Combigan 0.2%-0.5% ophthalmic solution: 1 drop(s) to each affected eye every 12 hours  ferrous sulfate 325 mg (65 mg elemental iron) oral delayed release tablet: 1 tab(s) orally once a day  hydrocortisone 25 mg rectal suppository: 1 suppository(ies) rectal 2 times a day  lactobacillus acidophilus oral capsule: orally once a day  lidocaine 5% topical film: Apply topically to affected area once a day   mesalamine 1000 mg rectal suppository: 1 suppository(ies) rectal once a day (at bedtime)  Ocuvite oral tablet: 1 tab(s) orally once a day  pantoprazole 40 mg oral delayed release tablet: 1 tab(s) orally once a day (before a meal)  prednisoLONE acetate 1% ophthalmic suspension: 1 drop(s) to each affected eye once a day  QUEtiapine 25 mg oral tablet: 1 tab(s) orally 2 times a day  Vitamin C 500 mg oral tablet: 1 tab(s) orally once a day

## 2020-08-27 NOTE — DISCHARGE NOTE PROVIDER - PROVIDER TOKENS
PROVIDER:[TOKEN:[[id number]]],PROVIDER:[TOKEN:[[id number]]],PROVIDER:[TOKEN:[[id number]]] PROVIDER:[TOKEN:[[id number]]],PROVIDER:[TOKEN:[[id number]]],PROVIDER:[TOKEN:[[id number]]],FREE:[LAST:[gagandeep],FIRST:[susan],PHONE:[(   )    -],FAX:[(   )    -],ADDRESS:[hematology]]

## 2020-08-27 NOTE — PROGRESS NOTE ADULT - ASSESSMENT
8.26.2020 This is a 75 F with PMH of Parkinson's disease and mild cognitive impairment who comes s/p fall and pubic rami fracture. I spoke to son, Roman Wilson. Patient was d/c'd from [facility] ([place] last Thursday, 8.20.2020, after residing there for about 3 months. He reports that patient was doing well when she got home -- she was able to ambulate short distances in the house with a walker. She needs moderate help with her ADLs. He wants to take patient home with home care when she is medically ready. He is going to hire a HHA for assistance with patient's ADLs. He says that he is the HCP. Patient does not have any advanced directives such as DNR/DNI. At this point, he wants everything done. Will follow.    8.27.2020 Patient remains a full code. Patient to go home with home care in AM. Son is planning to hire a HHA.

## 2020-08-27 NOTE — DISCHARGE NOTE PROVIDER - NSDCCPCAREPLAN_GEN_ALL_CORE_FT
PRINCIPAL DISCHARGE DIAGNOSIS  Diagnosis: Scalp laceration, initial encounter  Assessment and Plan of Treatment: follow up with PMD Dr. CHENG      SECONDARY DISCHARGE DIAGNOSES  Diagnosis: Pubic ramus fracture, right, closed, initial encounter  Assessment and Plan of Treatment:     Diagnosis: Fall, initial encounter  Assessment and Plan of Treatment:

## 2020-08-27 NOTE — PROGRESS NOTE ADULT - SUBJECTIVE AND OBJECTIVE BOX
Neurology follow up note    JAYJAY NAJERA75yFemale      Interval History:    Patient feels ok no new complaints.    MEDICATIONS    acetaminophen   Tablet .. 650 milliGRAM(s) Oral every 6 hours PRN  amLODIPine   Tablet 10 milliGRAM(s) Oral daily  ascorbic acid 500 milliGRAM(s) Oral daily  calcium carbonate 1250 mG  + Vitamin D (OsCal 500 + D) 1 Tablet(s) Oral daily  carbidopa/levodopa  25/100 1 Tablet(s) Oral <User Schedule>  ferrous    sulfate 325 milliGRAM(s) Oral daily  heparin   Injectable 5000 Unit(s) SubCutaneous every 12 hours  lidocaine   Patch 1 Patch Transdermal every 24 hours  multivitamin/minerals 1 Tablet(s) Oral daily  prednisoLONE acetate 1% Suspension 1 Drop(s) Both EYES daily  QUEtiapine 25 milliGRAM(s) Oral two times a day  traMADol 25 milliGRAM(s) Oral every 6 hours PRN      Allergies    tetracycline (Unknown)    Intolerances            Vital Signs Last 24 Hrs  T(C): 37.6 (27 Aug 2020 04:44), Max: 37.6 (27 Aug 2020 04:44)  T(F): 99.6 (27 Aug 2020 04:44), Max: 99.6 (27 Aug 2020 04:44)  HR: 88 (27 Aug 2020 04:44) (85 - 88)  BP: 111/56 (27 Aug 2020 04:44) (111/56 - 117/64)  BP(mean): --  RR: 18 (27 Aug 2020 04:44) (16 - 18)  SpO2: 98% (27 Aug 2020 04:44) (95% - 98%)      REVIEW OF SYSTEMS:  Constitutional:  No fever, chills, or night sweats.  Head:  Positive headache located in the occipital region in the area of head trauma.  Eyes:  No double vision or blurry vision.  Ears:  No ringing in the ears.  Neck:  No neck pain.  Respiratory:  No shortness of breath.  Cardiovascular:  No chest pain.  Abdomen:  No nausea, vomiting, or abdominal pain.  Extremities/Neurological:  No numbness or tingling.  Musculoskeletal:  Occasion joint pain.  Genitourinary:  No burning upon urination.  Psychiatric:  No underlying anxiety or depression.    PHYSICAL EXAMINATION: HEENT:  Head:  Normocephalic.  Eyes:  No scleral icterus.  Ears:  Hearing bilaterally intact.  NECK:  Supple.  RESPIRATORY:  Good air entry bilaterally.  CARDIOVASCULAR:  S1 and S2 heard.  ABDOMEN:  Soft and nontender.  EXTREMITIES:  No clubbing or cyanosis were noted.      NEUROLOGIC:  The patient is awake and alert.  Location was hospital.  Year was 2020.  Recall was 1/3 within three minutes, able to name simple objects.  Extraocular movements were intact.  Pupils were equal, round, and reactive bilaterally, 3 mm to 2 mm.  Speech was fluent.  Smile was symmetric.  Motor:  Bilateral upper were 4/5.  Bilateral lower were 3-/5.  Sensory:  Bilateral upper and lower intact to light touch.  The patient has increased significant tone in bilateral lower extremities, does have a decreased range of motion of her left foot, has a history of foot drop from previous trauma.  Positive resting tremors were noted, left hand greater than right.  Positive cogwheel rigidity was noted.  Positive bradykinesia was noted.            LABS:  CBC Full  -  ( 25 Aug 2020 17:44 )  WBC Count : 12.19 K/uL  RBC Count : 3.75 M/uL  Hemoglobin : 10.2 g/dL  Hematocrit : 31.7 %  Platelet Count - Automated : 364 K/uL  Mean Cell Volume : 84.5 fl  Mean Cell Hemoglobin : 27.2 pg  Mean Cell Hemoglobin Concentration : 32.2 gm/dL  Auto Neutrophil # : 9.43 K/uL  Auto Lymphocyte # : 2.02 K/uL  Auto Monocyte # : 0.55 K/uL  Auto Eosinophil # : 0.03 K/uL  Auto Basophil # : 0.02 K/uL  Auto Neutrophil % : 77.4 %  Auto Lymphocyte % : 16.6 %  Auto Monocyte % : 4.5 %  Auto Eosinophil % : 0.2 %  Auto Basophil % : 0.2 %      08-25    139  |  110<H>  |  25<H>  ----------------------------<  114<H>  4.6   |  24  |  0.74    Ca    8.5      25 Aug 2020 17:44    TPro  7.5  /  Alb  3.6  /  TBili  0.2  /  DBili  x   /  AST  34  /  ALT  35  /  AlkPhos  83  08-25    Hemoglobin A1C:     LIVER FUNCTIONS - ( 25 Aug 2020 17:44 )  Alb: 3.6 g/dL / Pro: 7.5 g/dL / ALK PHOS: 83 U/L / ALT: 35 U/L / AST: 34 U/L / GGT: x           Vitamin B12         RADIOLOGY      ANALYSIS AND PLAN:  A 75-year-old with an episode of fall, head trauma, and mild cognitive impairment.  1.	For episode of fall, this appears to be most likely mechanical in nature.  There are no clear new signs, even though examination of the right lower extremity is limited to suggest new cerebrovascular accident has ensued.  2.	I Would recommend fall precautions.  3.	Physical Therapy evaluation if possible.  4.	For episode of head trauma, I would recommend neuro checks as needed.  5.	For history of Parkinson's disease, continue the patient on Sinemet.  6.	For mild cognitive impairment versus subtle dementia, I would recommend supportive therapy.  7.	Spoke with the son, Roman, his telephone number is [phone] 8/27/2020  8.	AMS suspect change in location   9.	limit dopamine blocking agents if possible   10.	Greater than 45 minutes of time was spent with the patient, plan of care, reviewing data, and speaking to the family and multidisciplinary healthcare team.    Neurologic standpoint only cleared for discharge planning     Thank you for the courtesy of this consultation.

## 2020-08-27 NOTE — PROGRESS NOTE ADULT - SUBJECTIVE AND OBJECTIVE BOX
Patient is a 75y old  Female who presents with a chief complaint of gi bleed (27 Aug 2020 14:04)      INTERVAL HPI/OVERNIGHT EVENTS: Patient seen and examined. NAD. No complaints.    Vital Signs Last 24 Hrs  T(C): 36.8 (27 Aug 2020 13:23), Max: 37.6 (27 Aug 2020 04:44)  T(F): 98.2 (27 Aug 2020 13:23), Max: 99.6 (27 Aug 2020 04:44)  HR: 83 (27 Aug 2020 13:23) (83 - 88)  BP: 117/68 (27 Aug 2020 13:23) (111/56 - 117/68)  BP(mean): --  RR: 18 (27 Aug 2020 13:23) (16 - 18)  SpO2: 93% (27 Aug 2020 13:23) (93% - 98%)    08-25    139  |  110<H>  |  25<H>  ----------------------------<  114<H>  4.6   |  24  |  0.74    Ca    8.5      25 Aug 2020 17:44    TPro  7.5  /  Alb  3.6  /  TBili  0.2  /  DBili  x   /  AST  34  /  ALT  35  /  AlkPhos  83  08-25                          9.8    9.34  )-----------( 297      ( 27 Aug 2020 11:06 )             30.4       CAPILLARY BLOOD GLUCOSE                  acetaminophen   Tablet .. 650 milliGRAM(s) Oral every 6 hours PRN  amLODIPine   Tablet 10 milliGRAM(s) Oral daily  ascorbic acid 500 milliGRAM(s) Oral daily  calcium carbonate 1250 mG  + Vitamin D (OsCal 500 + D) 1 Tablet(s) Oral daily  carbidopa/levodopa  25/100 1 Tablet(s) Oral <User Schedule>  ferrous    sulfate 325 milliGRAM(s) Oral daily  heparin   Injectable 5000 Unit(s) SubCutaneous every 8 hours  lidocaine   Patch 1 Patch Transdermal every 24 hours  multivitamin/minerals 1 Tablet(s) Oral daily  pantoprazole    Tablet 40 milliGRAM(s) Oral before breakfast  prednisoLONE acetate 1% Suspension 1 Drop(s) Both EYES daily  QUEtiapine 25 milliGRAM(s) Oral two times a day  traMADol 25 milliGRAM(s) Oral every 6 hours PRN              REVIEW OF SYSTEMS:  CONSTITUTIONAL: No fever, no weight loss, or no fatigue  NECK: No pain, no stiffness  RESPIRATORY: No cough, no wheezing, no chills, no hemoptysis, No shortness of breath  CARDIOVASCULAR: No chest pain, no palpitations, no dizziness, no leg swelling  GASTROINTESTINAL: No abdominal pain. No nausea, no vomiting, no hematemesis; No diarrhea, no constipation. No melena, no hematochezia.  GENITOURINARY: No dysuria, no frequency, no hematuria, no incontinence  NEUROLOGICAL: No headaches, no loss of strength, no numbness, no tremors  SKIN: No itching, no burning  MUSCULOSKELETAL: No joint pain, no swelling; No muscle, no back, no extremity pain  PSYCHIATRIC: No depression, no mood swings,   HEME/LYMPH: No easy bruising, no bleeding gums  ALLERY AND IMMUNOLOGIC: No hives       Consultant(s) Notes Reviewed:  [X] YES  [ ] NO    PHYSICAL EXAM:  GENERAL: NAD  HEAD:  Atraumatic, Normocephalic  EYES: EOMI, PERRLA, conjunctiva and sclera clear  ENMT: No tonsillar erythema, exudates, or enlargement; Moist mucous membranes  NECK: Supple, No JVD  NERVOUS SYSTEM:  Awake & alert  CHEST/LUNG: Clear to auscultation bilaterally; No rales, rhonchi, wheezing,  HEART: Regular rate and rhythm  ABDOMEN: Soft, Nontender, Nondistended; Bowel sounds present  EXTREMITIES:  No clubbing, cyanosis, or edema  LYMPH: No lymphadenopathy noted  SKIN: No rashes      Advanced care planning discussed with patient/family [X] YES   [ ] NO    Advanced care planning discussed with patient/family. Patient's health status was discussed. All appropriate changes have been made regarding patient's end-of-life care. Advanced care planning forms reviewed/discussed/completed.  20 minutes spent.

## 2020-08-27 NOTE — DISCHARGE NOTE PROVIDER - CARE PROVIDER_API CALL
Rolan Guidry  FAMILY MEDICINE  [address] [place] [address]  Phone: [phone]  Fax: [phone]  Follow Up Time:     Melquiades Campbell ()  Internal Medicine  [address] [place] [address]  Phone: [phone]  Fax: [phone]  Follow Up Time:     Raúl Woodruff  NEUROLOGY  [address] [place] [address]  Phone: [phone]  Fax: [phone]  Follow Up Time: Rolan Guidry  FAMILY MEDICINE  [address] [place] [address]  Phone: [phone]  Fax: [phone]  Follow Up Time:     Melquiades Campbell (DO)  Internal Medicine  [address] [place] [address]  Phone: [phone]  Fax: [phone]  Follow Up Time:     Raúl Woodruff  NEUROLOGY  [address] [place] [address]  Phone: [phone]  Fax: [phone]  Follow Up Time:     susan donis  hematology  Phone: (   )    -  Fax: (   )    -  Follow Up Time:

## 2020-08-27 NOTE — CONSULT NOTE ADULT - SUBJECTIVE AND OBJECTIVE BOX
Chief Complaint:  Patient is a 75y old  Female who presents with a chief complaint of fall with pubic fracture here with drop in hgb patient has had a colonosocpy done in the past  76 yo F PMHx Parkinson's disease, early dementia p/w Rt lower rib pain s/p fall. Accidental fall, no dizziness/ chest pain/ loss of consciousness.   Patient states that she was trying to grab a piece of coffee cake from the cabinet, slipped backwards, fell onto her right side and hit back of her head. Denies chest pain/ palpitations/ shortness of breath.    Son Roman present, helped her up.     In the ed patient had x ray of pelvis show sRt pubic fracture  CT head no acute bleed  CT C spine no fracture      Review of Systems:  Other Review of Systems: All other review of systems negative, except as noted in HPI	      Allergies:  tetracycline (Unknown)      Medications:  acetaminophen   Tablet .. 650 milliGRAM(s) Oral every 6 hours PRN  amLODIPine   Tablet 10 milliGRAM(s) Oral daily  ascorbic acid 500 milliGRAM(s) Oral daily  calcium carbonate 1250 mG  + Vitamin D (OsCal 500 + D) 1 Tablet(s) Oral daily  carbidopa/levodopa  25/100 1 Tablet(s) Oral <User Schedule>  ferrous    sulfate 325 milliGRAM(s) Oral daily  heparin   Injectable 5000 Unit(s) SubCutaneous every 8 hours  lidocaine   Patch 1 Patch Transdermal every 24 hours  multivitamin/minerals 1 Tablet(s) Oral daily  prednisoLONE acetate 1% Suspension 1 Drop(s) Both EYES daily  QUEtiapine 25 milliGRAM(s) Oral two times a day  traMADol 25 milliGRAM(s) Oral every 6 hours PRN      PMHX/PSHX:  Dementia  Anemia  Cataract  Parkinsons  History of left hip replacement  No significant past surgical history      Family history:  FH: type 2 diabetes      Social History:  no etoh no cigs no ivda    ROS:     General:  No wt loss, fevers, chills, night sweats, fatigue,   Eyes:  Good vision, no reported pain  ENT:  No sore throat, pain, runny nose, dysphagia  CV:  No pain, palpitations, hypo/hypertension  Resp:  No dyspnea, cough, tachypnea, wheezing  GI:  No pain, No nausea, No vomiting, No diarrhea, No constipation, No weight loss, No fever, No pruritis, No rectal bleeding, No tarry stools, No dysphagia,  :  No pain, bleeding, incontinence, nocturia  Muscle:  No pain, weakness  Neuro:  No weakness, tingling, memory problems  Psych:  No fatigue, insomnia, mood problems, depression  Endocrine:  No polyuria, polydipsia, cold/heat intolerance  Heme:  No petechiae, ecchymosis, easy bruisability  Skin:  No rash, tattoos, scars, edema      PHYSICAL EXAM:   Vital Signs:  Vital Signs Last 24 Hrs  T(C): 36.8 (27 Aug 2020 13:23), Max: 37.6 (27 Aug 2020 04:44)  T(F): 98.2 (27 Aug 2020 13:23), Max: 99.6 (27 Aug 2020 04:44)  HR: 83 (27 Aug 2020 13:23) (83 - 88)  BP: 117/68 (27 Aug 2020 13:23) (111/56 - 117/68)  BP(mean): --  RR: 18 (27 Aug 2020 13:23) (16 - 18)  SpO2: 93% (27 Aug 2020 13:23) (93% - 98%)  Daily     Daily     GENERAL:  Appears stated age, well-groomed, well-nourished, no distress  HEENT:  NC/AT,  conjunctivae clear and pink, no thyromegaly, nodules, adenopathy, no JVD, sclera -anicteric  CHEST:  Full & symmetric excursion, no increased effort, breath sounds clear  HEART:  Regular rhythm, S1, S2, no murmur/rub/S3/S4, no abdominal bruit, no edema  ABDOMEN:  Soft, non-tender, non-distended, normoactive bowel sounds,  no masses ,no hepato-splenomegaly, no signs of chronic liver disease  EXTEREMITIES:  no cyanosis,clubbing or edema  SKIN:  No rash/erythema/ecchymoses/petechiae/wounds/abscess/warm/dry  NEURO:  Alert, oriented, no asterixis, no tremor, no encephalopathy    LABS:                        9.8    9.34  )-----------( 297      ( 27 Aug 2020 11:06 )             30.4     08-25    139  |  110<H>  |  25<H>  ----------------------------<  114<H>  4.6   |  24  |  0.74    Ca    8.5      25 Aug 2020 17:44    TPro  7.5  /  Alb  3.6  /  TBili  0.2  /  DBili  x   /  AST  34  /  ALT  35  /  AlkPhos  83  08-25    LIVER FUNCTIONS - ( 25 Aug 2020 17:44 )  Alb: 3.6 g/dL / Pro: 7.5 g/dL / ALK PHOS: 83 U/L / ALT: 35 U/L / AST: 34 U/L / GGT: x                   Imaging:

## 2020-08-28 DIAGNOSIS — D64.9 ANEMIA, UNSPECIFIED: ICD-10-CM

## 2020-08-28 LAB
HCT VFR BLD CALC: 27.6 % — LOW (ref 34.5–45)
HGB BLD-MCNC: 8.9 G/DL — LOW (ref 11.5–15.5)
MCHC RBC-ENTMCNC: 27.2 PG — SIGNIFICANT CHANGE UP (ref 27–34)
MCHC RBC-ENTMCNC: 32.2 GM/DL — SIGNIFICANT CHANGE UP (ref 32–36)
MCV RBC AUTO: 84.4 FL — SIGNIFICANT CHANGE UP (ref 80–100)
NRBC # BLD: 0 /100 WBCS — SIGNIFICANT CHANGE UP (ref 0–0)
PLATELET # BLD AUTO: 282 K/UL — SIGNIFICANT CHANGE UP (ref 150–400)
RBC # BLD: 3.27 M/UL — LOW (ref 3.8–5.2)
RBC # FLD: 13.4 % — SIGNIFICANT CHANGE UP (ref 10.3–14.5)
WBC # BLD: 11.04 K/UL — HIGH (ref 3.8–10.5)
WBC # FLD AUTO: 11.04 K/UL — HIGH (ref 3.8–10.5)

## 2020-08-28 RX ORDER — HYDROCORTISONE 1 %
1 OINTMENT (GRAM) TOPICAL
Refills: 0 | Status: DISCONTINUED | OUTPATIENT
Start: 2020-08-28 | End: 2020-09-08

## 2020-08-28 RX ORDER — SOD SULF/SODIUM/NAHCO3/KCL/PEG
1 SOLUTION, RECONSTITUTED, ORAL ORAL EVERY 4 HOURS
Refills: 0 | Status: DISCONTINUED | OUTPATIENT
Start: 2020-08-30 | End: 2020-08-30

## 2020-08-28 RX ORDER — OXYCODONE HYDROCHLORIDE 5 MG/1
5 TABLET ORAL
Refills: 0 | Status: DISCONTINUED | OUTPATIENT
Start: 2020-08-28 | End: 2020-08-28

## 2020-08-28 RX ADMIN — CARBIDOPA AND LEVODOPA 1 TABLET(S): 25; 100 TABLET ORAL at 11:36

## 2020-08-28 RX ADMIN — HEPARIN SODIUM 5000 UNIT(S): 5000 INJECTION INTRAVENOUS; SUBCUTANEOUS at 13:37

## 2020-08-28 RX ADMIN — PANTOPRAZOLE SODIUM 40 MILLIGRAM(S): 20 TABLET, DELAYED RELEASE ORAL at 06:17

## 2020-08-28 RX ADMIN — CARBIDOPA AND LEVODOPA 1 TABLET(S): 25; 100 TABLET ORAL at 17:44

## 2020-08-28 RX ADMIN — Medication 500 MILLIGRAM(S): at 11:36

## 2020-08-28 RX ADMIN — CARBIDOPA AND LEVODOPA 1 TABLET(S): 25; 100 TABLET ORAL at 09:20

## 2020-08-28 RX ADMIN — Medication 1 TABLET(S): at 11:36

## 2020-08-28 RX ADMIN — HEPARIN SODIUM 5000 UNIT(S): 5000 INJECTION INTRAVENOUS; SUBCUTANEOUS at 06:17

## 2020-08-28 RX ADMIN — QUETIAPINE FUMARATE 25 MILLIGRAM(S): 200 TABLET, FILM COATED ORAL at 06:17

## 2020-08-28 RX ADMIN — Medication 325 MILLIGRAM(S): at 11:36

## 2020-08-28 RX ADMIN — HEPARIN SODIUM 5000 UNIT(S): 5000 INJECTION INTRAVENOUS; SUBCUTANEOUS at 21:41

## 2020-08-28 RX ADMIN — Medication 1 DROP(S): at 11:36

## 2020-08-28 RX ADMIN — QUETIAPINE FUMARATE 25 MILLIGRAM(S): 200 TABLET, FILM COATED ORAL at 17:45

## 2020-08-28 NOTE — PROGRESS NOTE ADULT - ASSESSMENT
anemia  gi bleed    plan  daily cbc   transfuse prn   consider hematology eval  check iron studies   ppi once a day  check stool occult blood  further recommendations pending above  ugib vs lgib plan for  upper gastrointestinal endoscopy and colonoscopy on monday   jaiden her son is agreeable  monitor for bleeding  start hydrocortisone suppository

## 2020-08-28 NOTE — PROGRESS NOTE ADULT - SUBJECTIVE AND OBJECTIVE BOX
Patient is a 75y old  Female who presents with a chief complaint of gi bleed (27 Aug 2020 14:04)      INTERVAL HPI/OVERNIGHT EVENTS: Patient seen and examined. NAD. No complaints.    Vital Signs Last 24 Hrs  T(C): 36.7 (28 Aug 2020 05:26), Max: 36.8 (27 Aug 2020 13:23)  T(F): 98 (28 Aug 2020 05:26), Max: 98.2 (27 Aug 2020 13:23)  HR: 85 (28 Aug 2020 06:14) (83 - 98)  BP: 107/58 (28 Aug 2020 06:14) (97/59 - 117/68)  BP(mean): --  RR: 18 (28 Aug 2020 05:26) (18 - 18)  SpO2: 95% (28 Aug 2020 05:26) (93% - 95%)                                8.9    11.04 )-----------( 282      ( 28 Aug 2020 09:20 )             27.6       CAPILLARY BLOOD GLUCOSE                  acetaminophen   Tablet .. 650 milliGRAM(s) Oral every 6 hours PRN  amLODIPine   Tablet 10 milliGRAM(s) Oral daily  ascorbic acid 500 milliGRAM(s) Oral daily  calcium carbonate 1250 mG  + Vitamin D (OsCal 500 + D) 1 Tablet(s) Oral daily  carbidopa/levodopa  25/100 1 Tablet(s) Oral <User Schedule>  ferrous    sulfate 325 milliGRAM(s) Oral daily  heparin   Injectable 5000 Unit(s) SubCutaneous every 8 hours  hydrocortisone hemorrhoidal Suppository 1 Suppository(s) Rectal two times a day  lidocaine   Patch 1 Patch Transdermal every 24 hours  multivitamin/minerals 1 Tablet(s) Oral daily  oxyCODONE    IR 5 milliGRAM(s) Oral four times a day PRN  pantoprazole    Tablet 40 milliGRAM(s) Oral before breakfast  prednisoLONE acetate 1% Suspension 1 Drop(s) Both EYES daily  QUEtiapine 25 milliGRAM(s) Oral two times a day  traMADol 25 milliGRAM(s) Oral every 6 hours PRN            REVIEW OF SYSTEMS:  CONSTITUTIONAL: No fever, no weight loss, or no fatigue  NECK: No pain, no stiffness  RESPIRATORY: No cough, no wheezing, no chills, no hemoptysis, No shortness of breath  CARDIOVASCULAR: No chest pain, no palpitations, no dizziness, no leg swelling  GASTROINTESTINAL: No abdominal pain. No nausea, no vomiting, no hematemesis; No diarrhea, no constipation. No melena, no hematochezia.  GENITOURINARY: No dysuria, no frequency, no hematuria, no incontinence  NEUROLOGICAL: No headaches, no loss of strength, no numbness, no tremors  SKIN: No itching, no burning  MUSCULOSKELETAL: No joint pain, no swelling; No muscle, no back, no extremity pain  PSYCHIATRIC: No depression, no mood swings,   HEME/LYMPH: No easy bruising, no bleeding gums  ALLERY AND IMMUNOLOGIC: No hives       Consultant(s) Notes Reviewed:  [X] YES  [ ] NO    PHYSICAL EXAM:  GENERAL: NAD  HEAD:  Atraumatic, Normocephalic  EYES: EOMI, PERRLA, conjunctiva and sclera clear  ENMT: No tonsillar erythema, exudates, or enlargement; Moist mucous membranes  NECK: Supple, No JVD  NERVOUS SYSTEM:  Awake & alert  CHEST/LUNG: Clear to auscultation bilaterally; No rales, rhonchi, wheezing,  HEART: Regular rate and rhythm  ABDOMEN: Soft, Nontender, Nondistended; Bowel sounds present  EXTREMITIES:  No clubbing, cyanosis, or edema  LYMPH: No lymphadenopathy noted  SKIN: No rashes      Advanced care planning discussed with patient/family [X] YES   [ ] NO    Advanced care planning discussed with patient/family. Patient's health status was discussed. All appropriate changes have been made regarding patient's end-of-life care. Advanced care planning forms reviewed/discussed/completed.  20 minutes spent.

## 2020-08-28 NOTE — DIETITIAN INITIAL EVALUATION ADULT. - OTHER INFO
75 year old female status post fall scalp laceration GI bleed PMH parkinsons dementia and anemia. patient sleeping soundly. per CNA patient picked at food with poor PO intake this morning  patient recently discharged from [facility] regular thick liquids per discharge papers . patient to go home with HHA. 2x BM overnight. RD unable to reach son on telephone 2 attempts. no MOLST full code. patient now with diet advanced to full fluids no bleeding per GI note.   Patient seen with moderate muscle mass loss temple and severe clavicle loss as well as mild to moderate orbital and buccal fat loss.(observed ) unable to obtain history of PO intake or weight history at this time. unable to complete nutrition focused physical exam with patient sleeping.

## 2020-08-28 NOTE — CHART NOTE - NSCHARTNOTEFT_GEN_A_CORE
Upon Nutritional Assessment by the Registered Dietitian your patient was determined to meet criteria / has evidence of the following diagnosis/diagnoses:          [ ]  Mild Protein Calorie Malnutrition        [x ]  Moderate Protein Calorie Malnutrition        [ ] Severe Protein Calorie Malnutrition        [ ] Unspecified Protein Calorie Malnutrition        [x ] Underweight / BMI <19        [ ] Morbid Obesity / BMI > 40      Findings as based on:  •  Comprehensive nutrition assessment and consultation  •  Calorie counts (nutrient intake analysis)  •  Food acceptance and intake status from observations by staff  •  Follow up  •  Patient education  •  Intervention secondary to interdisciplinary rounds  •   concerns    patient with severe clavicle muscle mass loss and moderate temple loss as well as mild to moderate buccal and orbital fat loss.   Treatment:    The following diet has been recommended:  recommend continue full fluid diet advance to soft diet . will provide mighty shakes TID. consider speech evaluation possible thick liquids PTA from [facility] recently discharged.     PROVIDER Section:     By signing this assessment you are acknowledging and agree with the diagnosis/diagnoses assigned by the Registered Dietitian    Comments:

## 2020-08-28 NOTE — PROGRESS NOTE ADULT - SUBJECTIVE AND OBJECTIVE BOX
INTERVAL HPI/OVERNIGHT EVENTS:  No new overnight event.  No N/V/D.  Tolerating diet. no bleeding frequent bm    Allergies    tetracycline (Unknown)    Intolerances    General:  No wt loss, fevers, chills, night sweats, fatigue,   Eyes:  Good vision, no reported pain  ENT:  No sore throat, pain, runny nose, dysphagia  CV:  No pain, palpitations, hypo/hypertension  Resp:  No dyspnea, cough, tachypnea, wheezing  GI:  No pain, No nausea, No vomiting, No diarrhea, No constipation, No weight loss, No fever, No pruritis, No rectal bleeding, No tarry stools, No dysphagia,  :  No pain, bleeding, incontinence, nocturia  Muscle:  No pain, weakness  Neuro:  No weakness, tingling, memory problems  Psych:  No fatigue, insomnia, mood problems, depression  Endocrine:  No polyuria, polydipsia, cold/heat intolerance  Heme:  No petechiae, ecchymosis, easy bruisability  Skin:  No rash, tattoos, scars, edema      PHYSICAL EXAM:   Vital Signs:  Vital Signs Last 24 Hrs  T(C): 36.7 (28 Aug 2020 05:26), Max: 36.8 (27 Aug 2020 13:23)  T(F): 98 (28 Aug 2020 05:26), Max: 98.2 (27 Aug 2020 13:23)  HR: 85 (28 Aug 2020 06:14) (83 - 98)  BP: 107/58 (28 Aug 2020 06:14) (97/59 - 117/68)  BP(mean): --  RR: 18 (28 Aug 2020 05:26) (18 - 18)  SpO2: 95% (28 Aug 2020 05:26) (93% - 95%)  Daily     Daily I&O's Summary      GENERAL:  Appears stated age, well-groomed, well-nourished, no distress  HEENT:  NC/AT,  conjunctivae clear and pink, no thyromegaly, nodules, adenopathy, no JVD, sclera -anicteric  CHEST:  Full & symmetric excursion, no increased effort, breath sounds clear  HEART:  Regular rhythm, S1, S2, no murmur/rub/S3/S4, no abdominal bruit, no edema  ABDOMEN:  Soft, non-tender, non-distended, normoactive bowel sounds,  no masses ,no hepato-splenomegaly, no signs of chronic liver disease  EXTEREMITIES:  no cyanosis,clubbing or edema  SKIN:  No rash/erythema/ecchymoses/petechiae/wounds/abscess/warm/dry  NEURO:  Alert, oriented, no asterixis, no tremor, no encephalopathy      LABS:                        8.9    11.04 )-----------( 282      ( 28 Aug 2020 09:20 )             27.6               amylase   lipase  RADIOLOGY & ADDITIONAL TESTS:

## 2020-08-28 NOTE — PROGRESS NOTE ADULT - ASSESSMENT
8.26.2020 This is a 75 F with PMH of Parkinson's disease and mild cognitive impairment who comes s/p fall and pubic rami fracture. I spoke to son, Roman Wilson. Patient was d/c'd from [facility] last Thursday, 8.20.2020, after residing there for about 3 months. He reports that patient was doing well when she got home -- she was able to ambulate short distances in the house with a walker. She needs moderate help with her ADLs. He wants to take patient home with home care when she is medically ready. He is going to hire a HHA for assistance with patient's ADLs. He says that he is the HCP. Patient does not have any advanced directives such as DNR/DNI. At this point, he wants everything done. Will follow.    8.27.2020 Patient remains a full code. Patient to go home with home care in AM. Son is planning to hire a HHA.    8.28.2020 Seen by psych. Patient has no medical decision making capacity. D/C planning home with home care and HHA.

## 2020-08-28 NOTE — DIETITIAN INITIAL EVALUATION ADULT. - ADD RECOMMEND
1. consider speech evaluation ? nectar thick PTA 2. advance to soft diet as appropriate 3. follow weights, labs , PO intake 4. will provide health shake  kcals and 6 gms protein each

## 2020-08-28 NOTE — PROGRESS NOTE ADULT - SUBJECTIVE AND OBJECTIVE BOX
Neurology follow up note    JAYJAY NAJERA75yFemale      Interval History:    Patient feels ok no new complaints.    MEDICATIONS    acetaminophen   Tablet .. 650 milliGRAM(s) Oral every 6 hours PRN  amLODIPine   Tablet 10 milliGRAM(s) Oral daily  ascorbic acid 500 milliGRAM(s) Oral daily  calcium carbonate 1250 mG  + Vitamin D (OsCal 500 + D) 1 Tablet(s) Oral daily  carbidopa/levodopa  25/100 1 Tablet(s) Oral <User Schedule>  ferrous    sulfate 325 milliGRAM(s) Oral daily  heparin   Injectable 5000 Unit(s) SubCutaneous every 8 hours  lidocaine   Patch 1 Patch Transdermal every 24 hours  multivitamin/minerals 1 Tablet(s) Oral daily  oxyCODONE    IR 5 milliGRAM(s) Oral four times a day PRN  pantoprazole    Tablet 40 milliGRAM(s) Oral before breakfast  prednisoLONE acetate 1% Suspension 1 Drop(s) Both EYES daily  QUEtiapine 25 milliGRAM(s) Oral two times a day  traMADol 25 milliGRAM(s) Oral every 6 hours PRN      Allergies    tetracycline (Unknown)    Intolerances            Vital Signs Last 24 Hrs  T(C): 36.7 (28 Aug 2020 05:26), Max: 36.8 (27 Aug 2020 13:23)  T(F): 98 (28 Aug 2020 05:26), Max: 98.2 (27 Aug 2020 13:23)  HR: 85 (28 Aug 2020 06:14) (83 - 98)  BP: 107/58 (28 Aug 2020 06:14) (97/59 - 117/68)  BP(mean): --  RR: 18 (28 Aug 2020 05:26) (18 - 18)  SpO2: 95% (28 Aug 2020 05:26) (93% - 95%)        REVIEW OF SYSTEMS:  Constitutional:  No fever, chills, or night sweats.  Head:  Positive headache located in the occipital region in the area of head trauma.  Eyes:  No double vision or blurry vision.  Ears:  No ringing in the ears.  Neck:  No neck pain.  Respiratory:  No shortness of breath.  Cardiovascular:  No chest pain.  Abdomen:  No nausea, vomiting, or abdominal pain.  Extremities/Neurological:  No numbness or tingling.  Musculoskeletal:  Occasion joint pain.  Genitourinary:  No burning upon urination.  Psychiatric:  No underlying anxiety or depression.    PHYSICAL EXAMINATION: HEENT:  Head:  Normocephalic.  Eyes:  No scleral icterus.  Ears:  Hearing bilaterally intact.  NECK:  Supple.  RESPIRATORY:  Good air entry bilaterally.  CARDIOVASCULAR:  S1 and S2 heard.  ABDOMEN:  Soft and nontender.  EXTREMITIES:  No clubbing or cyanosis were noted.      NEUROLOGIC:  The patient is awake and alert.  Location was hospital.  Year was 2020.  Recall was 1/3 within three minutes, able to name simple objects.  positive  confusion today   Extraocular movements were intact.  Pupils were equal, round, and reactive bilaterally, 3 mm to 2 mm.  Speech was fluent.  Smile was symmetric.  Motor:  Bilateral upper were 4/5.  Bilateral lower were 3-/5.  Sensory:  Bilateral upper and lower intact to light touch.  The patient has increased significant tone in bilateral lower extremities, does have a decreased range of motion of her left foot, has a history of foot drop from previous trauma.  Positive resting tremors were noted, left hand greater than right.  Positive cogwheel rigidity was noted.  Positive bradykinesia was noted.               LABS:  CBC Full  -  ( 28 Aug 2020 09:20 )  WBC Count : 11.04 K/uL  RBC Count : 3.27 M/uL  Hemoglobin : 8.9 g/dL  Hematocrit : 27.6 %  Platelet Count - Automated : 282 K/uL  Mean Cell Volume : 84.4 fl  Mean Cell Hemoglobin : 27.2 pg  Mean Cell Hemoglobin Concentration : 32.2 gm/dL  Auto Neutrophil # : x  Auto Lymphocyte # : x  Auto Monocyte # : x  Auto Eosinophil # : x  Auto Basophil # : x  Auto Neutrophil % : x  Auto Lymphocyte % : x  Auto Monocyte % : x  Auto Eosinophil % : x  Auto Basophil % : x            Hemoglobin A1C:       Vitamin B12         RADIOLOGY      ANALYSIS AND PLAN:  A 75-year-old with an episode of fall, head trauma, and mild cognitive impairment.  1.	For episode of fall, this appears to be most likely mechanical in nature.  There are no clear new signs, even though examination of the right lower extremity is limited to suggest new cerebrovascular accident has ensued.  2.	I Would recommend fall precautions.  3.	Physical Therapy evaluation if possible.  4.	For episode of head trauma, I would recommend neuro checks as needed.  5.	For history of Parkinson's disease, continue the patient on Sinemet.  6.	For mild cognitive impairment versus subtle dementia, I would recommend supportive therapy.  7.	Spoke with the son, Roman, his telephone number is [phone] 8/28/2020  8.	AMS suspect change in location more prominent today   9.	limit dopamine blocking agents if possible   10.	Greater than 40 minutes of time was spent with the patient, plan of care, reviewing data, and speaking to the family and multidisciplinary healthcare team.    Neurologic standpoint only cleared for discharge planning

## 2020-08-28 NOTE — PROGRESS NOTE ADULT - SUBJECTIVE AND OBJECTIVE BOX
Patient is a 75y old  Female who presents with a chief complaint of gi bleed  anemia (28 Aug 2020 11:13)      INTERVAL /OVERNIGHT EVENTS: alert awake    MEDICATIONS  (STANDING):  amLODIPine   Tablet 10 milliGRAM(s) Oral daily  ascorbic acid 500 milliGRAM(s) Oral daily  calcium carbonate 1250 mG  + Vitamin D (OsCal 500 + D) 1 Tablet(s) Oral daily  carbidopa/levodopa  25/100 1 Tablet(s) Oral <User Schedule>  ferrous    sulfate 325 milliGRAM(s) Oral daily  heparin   Injectable 5000 Unit(s) SubCutaneous every 8 hours  hydrocortisone hemorrhoidal Suppository 1 Suppository(s) Rectal two times a day  lidocaine   Patch 1 Patch Transdermal every 24 hours  multivitamin/minerals 1 Tablet(s) Oral daily  pantoprazole    Tablet 40 milliGRAM(s) Oral before breakfast  prednisoLONE acetate 1% Suspension 1 Drop(s) Both EYES daily  QUEtiapine 25 milliGRAM(s) Oral two times a day    MEDICATIONS  (PRN):  acetaminophen   Tablet .. 650 milliGRAM(s) Oral every 6 hours PRN Mild Pain (1 - 3)  oxyCODONE    IR 5 milliGRAM(s) Oral four times a day PRN Severe Pain (7 - 10)  traMADol 25 milliGRAM(s) Oral every 6 hours PRN Moderate Pain (4 - 6)      Allergies    tetracycline (Unknown)    Intolerances        REVIEW OF SYSTEMS:  CONSTITUTIONAL: No fever, weight loss, or fatigue  EYES: No eye pain, visual disturbances, or discharge  ENMT:  No difficulty hearing, tinnitus, vertigo; No sinus or throat pain  NECK: No pain or stiffness  RESPIRATORY: No cough, wheezing, chills or hemoptysis; No shortness of breath  CARDIOVASCULAR: No chest pain, palpitations, dizziness, or leg swelling  GASTROINTESTINAL: No abdominal or epigastric pain. No nausea, vomiting, or hematemesis; No diarrhea or constipation. No melena or hematochezia.  GENITOURINARY: No dysuria, frequency, hematuria, or incontinence  NEUROLOGICAL: No headaches, memory loss, loss of strength, numbness, or tremors  SKIN: No itching, burning, rashes, or lesions   LYMPH NODES: No enlarged glands  ENDOCRINE: No heat or cold intolerance; No hair loss; No polydipsia or polyuria  MUSCULOSKELETAL: No joint pain or swelling; No muscle, back, or extremity pain  PSYCHIATRIC: No depression, anxiety, mood swings, or difficulty sleeping  HEME/LYMPH: No easy bruising, or bleeding gums  ALLERGY AND IMMUNOLOGIC: No hives or eczema    Vital Signs Last 24 Hrs  T(C): 37.6 (28 Aug 2020 14:13), Max: 37.6 (28 Aug 2020 14:13)  T(F): 99.7 (28 Aug 2020 14:13), Max: 99.7 (28 Aug 2020 14:13)  HR: 93 (28 Aug 2020 14:13) (85 - 98)  BP: 102/59 (28 Aug 2020 14:13) (97/59 - 109/62)  BP(mean): --  RR: 18 (28 Aug 2020 14:13) (18 - 18)  SpO2: 95% (28 Aug 2020 14:13) (93% - 95%)    PHYSICAL EXAM:  GENERAL: NAD, well-groomed, well-developed  HEAD:  Atraumatic, Normocephalic  EYES: EOMI, PERRLA, conjunctiva and sclera clear  ENMT: No tonsillar erythema, exudates, or enlargement; Moist mucous membranes, Good dentition, No lesions  NECK: Supple, No JVD, Normal thyroid  NERVOUS SYSTEM:  Alert & Oriented X3, Good concentration; Motor Strength 5/5 B/L upper and lower extremities; DTRs 2+ intact and symmetric  CHEST/LUNG: Clear to auscultation bilaterally; No rales, rhonchi, wheezing, or rubs  HEART: Regular rate and rhythm; No murmurs, rubs, or gallops  ABDOMEN: Soft, Nontender, Nondistended; Bowel sounds present  EXTREMITIES:  2+ Peripheral Pulses, No clubbing, cyanosis, or edema  LYMPH: No lymphadenopathy noted  SKIN: No rashes or lesions    LABS:                        8.9    11.04 )-----------( 282      ( 28 Aug 2020 09:20 )             27.6               CAPILLARY BLOOD GLUCOSE          RADIOLOGY & ADDITIONAL TESTS:    Notes Reviewed:  [x ] YES  [ ] NO    Care Discussed with Consultants/Other Providers [x ] YES  [ ] NO

## 2020-08-29 DIAGNOSIS — G20 PARKINSON'S DISEASE: ICD-10-CM

## 2020-08-29 LAB
ANION GAP SERPL CALC-SCNC: 9 MMOL/L — SIGNIFICANT CHANGE UP (ref 5–17)
APPEARANCE UR: ABNORMAL
BASOPHILS # BLD AUTO: 0 K/UL — SIGNIFICANT CHANGE UP (ref 0–0.2)
BASOPHILS NFR BLD AUTO: 0 % — SIGNIFICANT CHANGE UP (ref 0–2)
BILIRUB UR-MCNC: NEGATIVE — SIGNIFICANT CHANGE UP
BUN SERPL-MCNC: 39 MG/DL — HIGH (ref 7–23)
CALCIUM SERPL-MCNC: 7.4 MG/DL — LOW (ref 8.5–10.1)
CHLORIDE SERPL-SCNC: 109 MMOL/L — HIGH (ref 96–108)
CO2 SERPL-SCNC: 21 MMOL/L — LOW (ref 22–31)
COLOR SPEC: YELLOW — SIGNIFICANT CHANGE UP
CREAT SERPL-MCNC: 0.77 MG/DL — SIGNIFICANT CHANGE UP (ref 0.5–1.3)
DIFF PNL FLD: ABNORMAL
EOSINOPHIL # BLD AUTO: 0.09 K/UL — SIGNIFICANT CHANGE UP (ref 0–0.5)
EOSINOPHIL NFR BLD AUTO: 1 % — SIGNIFICANT CHANGE UP (ref 0–6)
FERRITIN SERPL-MCNC: 127 NG/ML — SIGNIFICANT CHANGE UP (ref 15–150)
FOLATE SERPL-MCNC: >20 NG/ML — SIGNIFICANT CHANGE UP
GLUCOSE SERPL-MCNC: 93 MG/DL — SIGNIFICANT CHANGE UP (ref 70–99)
GLUCOSE UR QL: NEGATIVE — SIGNIFICANT CHANGE UP
HAPTOGLOB SERPL-MCNC: 419 MG/DL — HIGH (ref 34–200)
HCT VFR BLD CALC: 27.6 % — LOW (ref 34.5–45)
HGB BLD-MCNC: 9 G/DL — LOW (ref 11.5–15.5)
IRON SATN MFR SERPL: 12 UG/DL — LOW (ref 30–160)
IRON SATN MFR SERPL: 4 % — LOW (ref 14–50)
KETONES UR-MCNC: ABNORMAL
LACTATE SERPL-SCNC: 0.7 MMOL/L — SIGNIFICANT CHANGE UP (ref 0.7–2)
LEUKOCYTE ESTERASE UR-ACNC: ABNORMAL
LYMPHOCYTES # BLD AUTO: 1.79 K/UL — SIGNIFICANT CHANGE UP (ref 1–3.3)
LYMPHOCYTES # BLD AUTO: 19 % — SIGNIFICANT CHANGE UP (ref 13–44)
MCHC RBC-ENTMCNC: 27.4 PG — SIGNIFICANT CHANGE UP (ref 27–34)
MCHC RBC-ENTMCNC: 32.6 GM/DL — SIGNIFICANT CHANGE UP (ref 32–36)
MCV RBC AUTO: 83.9 FL — SIGNIFICANT CHANGE UP (ref 80–100)
MONOCYTES # BLD AUTO: 1.51 K/UL — HIGH (ref 0–0.9)
MONOCYTES NFR BLD AUTO: 16 % — HIGH (ref 2–14)
NEUTROPHILS # BLD AUTO: 6.04 K/UL — SIGNIFICANT CHANGE UP (ref 1.8–7.4)
NEUTROPHILS NFR BLD AUTO: 46 % — SIGNIFICANT CHANGE UP (ref 43–77)
NITRITE UR-MCNC: POSITIVE
NRBC # BLD: SIGNIFICANT CHANGE UP /100 WBCS (ref 0–0)
PH UR: 5 — SIGNIFICANT CHANGE UP (ref 5–8)
PLATELET # BLD AUTO: 288 K/UL — SIGNIFICANT CHANGE UP (ref 150–400)
POTASSIUM SERPL-MCNC: 3.8 MMOL/L — SIGNIFICANT CHANGE UP (ref 3.5–5.3)
POTASSIUM SERPL-SCNC: 3.8 MMOL/L — SIGNIFICANT CHANGE UP (ref 3.5–5.3)
PROCALCITONIN SERPL-MCNC: <0.05 — SIGNIFICANT CHANGE UP (ref 0–0.04)
PROT UR-MCNC: 30 MG/DL
RBC # BLD: 3.29 M/UL — LOW (ref 3.8–5.2)
RBC # BLD: 3.56 M/UL — LOW (ref 3.8–5.2)
RBC # FLD: 13.4 % — SIGNIFICANT CHANGE UP (ref 10.3–14.5)
RETICS #: 34.5 K/UL — SIGNIFICANT CHANGE UP (ref 25–125)
RETICS/RBC NFR: 1 % — SIGNIFICANT CHANGE UP (ref 0.5–2.5)
SODIUM SERPL-SCNC: 139 MMOL/L — SIGNIFICANT CHANGE UP (ref 135–145)
SP GR SPEC: 1.01 — SIGNIFICANT CHANGE UP (ref 1.01–1.02)
TIBC SERPL-MCNC: 263 UG/DL — SIGNIFICANT CHANGE UP (ref 220–430)
UIBC SERPL-MCNC: 251 UG/DL — SIGNIFICANT CHANGE UP (ref 110–370)
UROBILINOGEN FLD QL: NEGATIVE — SIGNIFICANT CHANGE UP
VIT B12 SERPL-MCNC: 485 PG/ML — SIGNIFICANT CHANGE UP (ref 232–1245)
WBC # BLD: 9.44 K/UL — SIGNIFICANT CHANGE UP (ref 3.8–10.5)
WBC # FLD AUTO: 9.44 K/UL — SIGNIFICANT CHANGE UP (ref 3.8–10.5)

## 2020-08-29 PROCEDURE — 99232 SBSQ HOSP IP/OBS MODERATE 35: CPT

## 2020-08-29 PROCEDURE — 99222 1ST HOSP IP/OBS MODERATE 55: CPT

## 2020-08-29 PROCEDURE — 71045 X-RAY EXAM CHEST 1 VIEW: CPT | Mod: 26

## 2020-08-29 RX ORDER — CEFTRIAXONE 500 MG/1
1000 INJECTION, POWDER, FOR SOLUTION INTRAMUSCULAR; INTRAVENOUS EVERY 24 HOURS
Refills: 0 | Status: DISCONTINUED | OUTPATIENT
Start: 2020-08-29 | End: 2020-08-31

## 2020-08-29 RX ORDER — ACETAMINOPHEN 500 MG
650 TABLET ORAL EVERY 6 HOURS
Refills: 0 | Status: DISCONTINUED | OUTPATIENT
Start: 2020-08-29 | End: 2020-09-08

## 2020-08-29 RX ADMIN — CARBIDOPA AND LEVODOPA 1 TABLET(S): 25; 100 TABLET ORAL at 11:29

## 2020-08-29 RX ADMIN — HEPARIN SODIUM 5000 UNIT(S): 5000 INJECTION INTRAVENOUS; SUBCUTANEOUS at 21:08

## 2020-08-29 RX ADMIN — HEPARIN SODIUM 5000 UNIT(S): 5000 INJECTION INTRAVENOUS; SUBCUTANEOUS at 13:56

## 2020-08-29 RX ADMIN — Medication 650 MILLIGRAM(S): at 14:35

## 2020-08-29 RX ADMIN — QUETIAPINE FUMARATE 25 MILLIGRAM(S): 200 TABLET, FILM COATED ORAL at 05:24

## 2020-08-29 RX ADMIN — Medication 650 MILLIGRAM(S): at 15:20

## 2020-08-29 RX ADMIN — CEFTRIAXONE 100 MILLIGRAM(S): 500 INJECTION, POWDER, FOR SOLUTION INTRAMUSCULAR; INTRAVENOUS at 21:04

## 2020-08-29 RX ADMIN — HEPARIN SODIUM 5000 UNIT(S): 5000 INJECTION INTRAVENOUS; SUBCUTANEOUS at 05:24

## 2020-08-29 RX ADMIN — Medication 650 MILLIGRAM(S): at 05:51

## 2020-08-29 RX ADMIN — Medication 325 MILLIGRAM(S): at 11:29

## 2020-08-29 RX ADMIN — Medication 1 SUPPOSITORY(S): at 17:22

## 2020-08-29 RX ADMIN — QUETIAPINE FUMARATE 25 MILLIGRAM(S): 200 TABLET, FILM COATED ORAL at 17:22

## 2020-08-29 RX ADMIN — Medication 1 DROP(S): at 11:30

## 2020-08-29 RX ADMIN — PANTOPRAZOLE SODIUM 40 MILLIGRAM(S): 20 TABLET, DELAYED RELEASE ORAL at 05:24

## 2020-08-29 RX ADMIN — Medication 1 SUPPOSITORY(S): at 05:24

## 2020-08-29 RX ADMIN — Medication 650 MILLIGRAM(S): at 06:41

## 2020-08-29 RX ADMIN — Medication 500 MILLIGRAM(S): at 11:29

## 2020-08-29 RX ADMIN — CARBIDOPA AND LEVODOPA 1 TABLET(S): 25; 100 TABLET ORAL at 17:22

## 2020-08-29 RX ADMIN — Medication 1 TABLET(S): at 11:29

## 2020-08-29 RX ADMIN — CARBIDOPA AND LEVODOPA 1 TABLET(S): 25; 100 TABLET ORAL at 08:49

## 2020-08-29 NOTE — PROGRESS NOTE ADULT - SUBJECTIVE AND OBJECTIVE BOX
Neurology Follow up note    JAYJAY NAJERA75yFemale    HPI:  74 yo F PMHx Parkinson's disease, early dementia p/w Rt lower rib pain s/p fall. Accidental fall, no dizziness/ chest pain/ loss of consciousness.   Patient states that she was trying to grab a piece of coffee cake from the cabinet, slipped backwards, fell onto her right side and hit back of her head. Denies chest pain/ palpitations/ shortness of breath.    Son Roman present, helped her up.     In the ed patient had x ray of pelvis show sRt pubic fracture  CT head no acute bleed  CT C spine no fracture (25 Aug 2020 23:29)      Interval History -calmer    Patient is seen, chart was reviewed and case was discussed with the treatment team.  Pt is not in any distress.   Lying on bed comfortably.   No events reported overnight.   .    Vital Signs Last 24 Hrs  T(C): 38 (29 Aug 2020 14:44), Max: 38.7 (29 Aug 2020 04:58)  T(F): 100.4 (29 Aug 2020 14:44), Max: 101.7 (29 Aug 2020 04:58)  HR: 99 (29 Aug 2020 14:44) (91 - 104)  BP: 113/68 (29 Aug 2020 14:44) (101/63 - 113/68)  BP(mean): --  RR: 17 (29 Aug 2020 14:44) (17 - 17)  SpO2: 94% (29 Aug 2020 14:44) (92% - 94%)        REVIEW OF SYSTEMS:    Constitutional: No fever, weight loss or fatigue  Eyes: No eye pain, visual disturbances, or discharge  ENT:  No difficulty hearing, tinnitus, vertigo;   Neck: No pain or stiffness  Respiratory: No cough, wheezing, chills or hemoptysis  Cardiovascular: No chest pain, palpitations, shortness of breath, dizziness or leg swelling  Gastrointestinal: No abdominal pain, vomiting  Genitourinary: No dysuria, frequency, hematuria   Neurological: No headaches,   Psychiatric: No depression, anxiety, mood swings   Musculoskeletal: No joint pain or swelling;  Skin: No itching, burning, rashes or lesions   Lymph Nodes: No enlarged glands  Endocrine: No heat or cold intolerance; No hair loss,  Allergy and Immunologic: No hives or eczema    On Neurological Examination:    Mental Status - Pt is alert, awake, Follows commands     Speech -  Normal.     Cranial Nerves - Pupils 3 mm equal and reactive to light, extraocular eye movements intact. Pt has no visual field deficit.  Pt has no  facial asymmetry. Facial sensation is intact.Tongue - is in midline.    Muscle tone - cogwheel rigidity    Motor Exam - 4/5      Sensory Exam - Pi Pt withdraws all extremities equally on stimulation. No asymmetry seen.     coordination:    Finger to nose: normal      Deep tendon Reflexes - 2 plus all over.       Neck Supple -  Yes.     MEDICATIONS    acetaminophen   Tablet .. 650 milliGRAM(s) Oral every 6 hours PRN  acetaminophen   Tablet .. 650 milliGRAM(s) Oral every 6 hours PRN  amLODIPine   Tablet 10 milliGRAM(s) Oral daily  ascorbic acid 500 milliGRAM(s) Oral daily  calcium carbonate 1250 mG  + Vitamin D (OsCal 500 + D) 1 Tablet(s) Oral daily  carbidopa/levodopa  25/100 1 Tablet(s) Oral <User Schedule>  ferrous    sulfate 325 milliGRAM(s) Oral daily  heparin   Injectable 5000 Unit(s) SubCutaneous every 8 hours  hydrocortisone hemorrhoidal Suppository 1 Suppository(s) Rectal two times a day  lidocaine   Patch 1 Patch Transdermal every 24 hours  multivitamin/minerals 1 Tablet(s) Oral daily  oxyCODONE    IR 5 milliGRAM(s) Oral four times a day PRN  pantoprazole    Tablet 40 milliGRAM(s) Oral before breakfast  prednisoLONE acetate 1% Suspension 1 Drop(s) Both EYES daily  QUEtiapine 25 milliGRAM(s) Oral two times a day  traMADol 25 milliGRAM(s) Oral every 6 hours PRN      Allergies    tetracycline (Unknown)    Intolerances        LABS:  CBC Full  -  ( 29 Aug 2020 14:43 )  WBC Count : x  RBC Count : 3.56 M/uL  Hemoglobin : x  Hematocrit : x  Platelet Count - Automated : x  Mean Cell Volume : x  Mean Cell Hemoglobin : x  Mean Cell Hemoglobin Concentration : x  Auto Neutrophil # : x  Auto Lymphocyte # : x        08-29    139  |  109<H>  |  39<H>  ----------------------------<  93  3.8   |  21<L>  |  0.77    Ca    7.4<L>      29 Aug 2020 07:22      Hemoglobin A1C:     Vitamin B12     RADIOLOGY    ASSESSMENT AND PLAN:      seen for ams  PD    STARTED ON SEROQUEL  CONTINUE SINEMET  Physical therapy evaluation.  OOB to chair/ambulation with assistance only.  Pain is accessed and addressed.  Would continue to follow.

## 2020-08-29 NOTE — BEHAVIORAL HEALTH ASSESSMENT NOTE - SUMMARY
76 yo WWF, domiciled, no prior psychiatric hospitalizations, history of dementia, PMHx Parkinson's disease, p/w Rt lower rib pain s/p fall. Accidental fall, no dizziness/ chest pain/ loss of consciousness. Patient states that she was trying to grab a piece of coffee cake from the cabinet, slipped backwards, fell onto her right side and hit back of her head. Denies chest pain/ palpitations/ shortness of breath. Son Roman present, helped her up.   Patient is lucid, but has limited understanding of her medical condition. There is no evidence of acute psychosis, erasmo or depression.    IMP: Dementia    REC: Patient has no capacity to make medical decisions

## 2020-08-29 NOTE — CONSULT NOTE ADULT - ASSESSMENT
75 year old woman with HTN, Parkinson's disease, dementia, anemia, for EGD/colonoscopy.    - Optimized for the OR from a cardiac point of view with no evidence of active ischemic heart disease, decompensated heart failure, severe obstructive valvular disease, or uncontrolled arrhythmia.  - Continue amlodipine for BP control  - Monitor and replete lytes  - To follow closely with you
A/P: 75 year old female with age indeterminate right inferior pubic rami fracture with history of falls/Parkinsons dementia  - No acute surgical intervention needed  - Pain control  - PT/OT  - WBAT LE  - DVT prophylaxis per primary team  - Medical Management per primary team  - Orthopedic stable for discharge  - Discussed and reviewed case with attending physician Dr. Joe who agrees with above plan.
anemia  gi bleed    plan  monitor cbc   ppi once a day   may need  upper gastrointestinal endoscopy/colonoscopy if drop in hgb occurs  to follow
8.26.2020 This is a 75 F with PMH of Parkinson's disease and mild cognitive impairment who comes s/p fall and pubic rami fracture. I spoke to son, Roman Wilson. Patient was d/c'd from [facility] last Thursday, 8.20.2020, after residing there for about 3 months. He reports that patient was doing well when she got home -- she was able to ambulate short distances in the house with a walker. She needs moderate help with her ADLs. He wants to take patient home with home care when she is medically ready. He is going to hire a [employer] for assistance with patient's ADLs. He says that he is the HCP. Patient does not have any advanced directives such as DNR/DNI. At this point, he wants everything done. Will follow.

## 2020-08-29 NOTE — PROGRESS NOTE ADULT - SUBJECTIVE AND OBJECTIVE BOX
Patient is a 75y old  Female who presents with a chief complaint of gi bleed (27 Aug 2020 14:04)      INTERVAL HPI/OVERNIGHT EVENTS: Patient seen and examined. NAD. No complaints.    Vital Signs Last 24 Hrs  T(C): 36.7 (29 Aug 2020 08:00), Max: 38.7 (29 Aug 2020 04:58)  T(F): 98.1 (29 Aug 2020 08:00), Max: 101.7 (29 Aug 2020 04:58)  HR: 91 (29 Aug 2020 04:58) (91 - 104)  BP: 101/63 (29 Aug 2020 04:58) (101/63 - 106/63)  BP(mean): --  RR: 17 (29 Aug 2020 04:58) (17 - 18)  SpO2: 92% (29 Aug 2020 04:58) (92% - 95%)    08-29    139  |  109<H>  |  39<H>  ----------------------------<  93  3.8   |  21<L>  |  0.77    Ca    7.4<L>      29 Aug 2020 07:22                            9.0    9.44  )-----------( 288      ( 29 Aug 2020 07:22 )             27.6       CAPILLARY BLOOD GLUCOSE                  acetaminophen   Tablet .. 650 milliGRAM(s) Oral every 6 hours PRN  acetaminophen   Tablet .. 650 milliGRAM(s) Oral every 6 hours PRN  amLODIPine   Tablet 10 milliGRAM(s) Oral daily  ascorbic acid 500 milliGRAM(s) Oral daily  calcium carbonate 1250 mG  + Vitamin D (OsCal 500 + D) 1 Tablet(s) Oral daily  carbidopa/levodopa  25/100 1 Tablet(s) Oral <User Schedule>  ferrous    sulfate 325 milliGRAM(s) Oral daily  heparin   Injectable 5000 Unit(s) SubCutaneous every 8 hours  hydrocortisone hemorrhoidal Suppository 1 Suppository(s) Rectal two times a day  lidocaine   Patch 1 Patch Transdermal every 24 hours  multivitamin/minerals 1 Tablet(s) Oral daily  oxyCODONE    IR 5 milliGRAM(s) Oral four times a day PRN  pantoprazole    Tablet 40 milliGRAM(s) Oral before breakfast  prednisoLONE acetate 1% Suspension 1 Drop(s) Both EYES daily  QUEtiapine 25 milliGRAM(s) Oral two times a day  traMADol 25 milliGRAM(s) Oral every 6 hours PRN            REVIEW OF SYSTEMS:  CONSTITUTIONAL: No fever, no weight loss, or no fatigue  NECK: No pain, no stiffness  RESPIRATORY: No cough, no wheezing, no chills, no hemoptysis, No shortness of breath  CARDIOVASCULAR: No chest pain, no palpitations, no dizziness, no leg swelling  GASTROINTESTINAL: No abdominal pain. No nausea, no vomiting, no hematemesis; No diarrhea, no constipation. No melena, no hematochezia.  GENITOURINARY: No dysuria, no frequency, no hematuria, no incontinence  NEUROLOGICAL: No headaches, no loss of strength, no numbness, no tremors  SKIN: No itching, no burning  MUSCULOSKELETAL: No joint pain, no swelling; No muscle, no back, no extremity pain  PSYCHIATRIC: No depression, no mood swings,   HEME/LYMPH: No easy bruising, no bleeding gums  ALLERY AND IMMUNOLOGIC: No hives       Consultant(s) Notes Reviewed:  [X] YES  [ ] NO    PHYSICAL EXAM:  GENERAL: NAD  HEAD:  Atraumatic, Normocephalic  EYES: EOMI, PERRLA, conjunctiva and sclera clear  ENMT: No tonsillar erythema, exudates, or enlargement; Moist mucous membranes  NECK: Supple, No JVD  NERVOUS SYSTEM:  Awake & alert  CHEST/LUNG: Clear to auscultation bilaterally; No rales, rhonchi, wheezing,  HEART: Regular rate and rhythm  ABDOMEN: Soft, Nontender, Nondistended; Bowel sounds present  EXTREMITIES:  No clubbing, cyanosis, or edema  LYMPH: No lymphadenopathy noted  SKIN: No rashes      Advanced care planning discussed with patient/family [X] YES   [ ] NO    Advanced care planning discussed with patient/family. Patient's health status was discussed. All appropriate changes have been made regarding patient's end-of-life care. Advanced care planning forms reviewed/discussed/completed.  20 minutes spent.

## 2020-08-29 NOTE — BEHAVIORAL HEALTH ASSESSMENT NOTE - NSBHCHARTREVIEWIMAGING_PSY_A_CORE FT
< from: CT Cervical Spine No Cont (08.25.20 @ 16:11) >    IMPRESSION:    No evidence of skull fracture, intracranial hemorrhage, mass lesion or mass effect. There is no acute lobar infarction.    Maintenance of the usual cervical lordosis without fracture or malalignment.                TOM GUERRA M.D., ATTENDING RADIOLOGIST  This document has been electronically signed. Aug 25 2020  4:20PM        < end of copied text >

## 2020-08-29 NOTE — PROGRESS NOTE ADULT - SUBJECTIVE AND OBJECTIVE BOX
INTERVAL HPI/OVERNIGHT EVENTS:  seen and examined febrile overnight  confused No new overnight event.   h/h stable   Denies  N/V/D.           MEDICATIONS  (STANDING):  amLODIPine   Tablet 10 milliGRAM(s) Oral daily  ascorbic acid 500 milliGRAM(s) Oral daily  calcium carbonate 1250 mG  + Vitamin D (OsCal 500 + D) 1 Tablet(s) Oral daily  carbidopa/levodopa  25/100 1 Tablet(s) Oral <User Schedule>  ferrous    sulfate 325 milliGRAM(s) Oral daily  heparin   Injectable 5000 Unit(s) SubCutaneous every 8 hours  hydrocortisone hemorrhoidal Suppository 1 Suppository(s) Rectal two times a day  lidocaine   Patch 1 Patch Transdermal every 24 hours  multivitamin/minerals 1 Tablet(s) Oral daily  pantoprazole    Tablet 40 milliGRAM(s) Oral before breakfast  prednisoLONE acetate 1% Suspension 1 Drop(s) Both EYES daily  QUEtiapine 25 milliGRAM(s) Oral two times a day    MEDICATIONS  (PRN):  acetaminophen   Tablet .. 650 milliGRAM(s) Oral every 6 hours PRN Mild Pain (1 - 3)  acetaminophen   Tablet .. 650 milliGRAM(s) Oral every 6 hours PRN Temp greater or equal to 38C (100.4F)  oxyCODONE    IR 5 milliGRAM(s) Oral four times a day PRN Severe Pain (7 - 10)  traMADol 25 milliGRAM(s) Oral every 6 hours PRN Moderate Pain (4 - 6)      Allergies    tetracycline (Unknown)    Intolerances    Allergies    tetracycline (Unknown)    Intolerances    General:  No wt loss, fevers, chills, night sweats, fatigue,   Eyes:  Good vision, no reported pain  ENT:  No sore throat, pain, runny nose, dysphagia  CV:  No pain, palpitations, hypo/hypertension  Resp:  No dyspnea, cough, tachypnea, wheezing  GI:  No pain, No nausea, No vomiting, No diarrhea, No constipation, No weight loss, No fever, No pruritis, No rectal bleeding, No tarry stools, No dysphagia,  :  No pain, bleeding, incontinence, nocturia  Muscle:  No pain, weakness  Neuro:  No weakness, tingling, memory problems  Psych:  No fatigue, insomnia, mood problems, depression  Endocrine:  No polyuria, polydipsia, cold/heat intolerance  Heme:  No petechiae, ecchymosis, easy bruisability  Skin:  No rash, tattoos, scars, edema      PHYSICAL EXAM:     Vital Signs Last 24 Hrs  T(C): 36.7 (29 Aug 2020 08:00), Max: 38.7 (29 Aug 2020 04:58)  T(F): 98.1 (29 Aug 2020 08:00), Max: 101.7 (29 Aug 2020 04:58)  HR: 91 (29 Aug 2020 04:58) (91 - 104)  BP: 101/63 (29 Aug 2020 04:58) (101/63 - 106/63)  BP(mean): --  RR: 17 (29 Aug 2020 04:58) (17 - 18)  SpO2: 92% (29 Aug 2020 04:58) (92% - 95%)      GENERAL:  Appears stated age, well-groomed, well-nourished, no distress  HEENT:  NC/AT,  conjunctivae clear and pink, no thyromegaly, nodules, adenopathy, no JVD, sclera -anicteric  CHEST:  Full & symmetric excursion, no increased effort, breath sounds clear  HEART:  Regular rhythm, S1, S2, no murmur/rub/S3/S4, no abdominal bruit, no edema  ABDOMEN:  Soft, non-tender, non-distended, normoactive bowel sounds,  no masses ,no hepato-splenomegaly, no signs of chronic liver disease  EXTREMITIES  no cyanosis,clubbing or edema  SKIN:  No rash/erythema/ecchymoses/petechiae/wounds/abscess/warm/dry  NEURO:  Alert, oriented, no asterixis, no tremor, no encephalopathy      LABS:                        9.0    9.44  )-----------( 288      ( 29 Aug 2020 07:22 )             27.6     08-29    139  |  109<H>  |  39<H>  ----------------------------<  93  3.8   |  21<L>  |  0.77    Ca    7.4<L>      29 Aug 2020 07:22      RADIOLOGY & ADDITIONAL TESTS:

## 2020-08-29 NOTE — CONSULT NOTE ADULT - SUBJECTIVE AND OBJECTIVE BOX
HPI:  76 yo F PMHx Parkinson's disease, early dementia admitted on 8/25/2020 for Rt lower rib pain s/p fall found to have age indeterminate right inferior pubic rami fracture. Last night she had fever to 101.7. Pt in NAD. No cough SOB abd pain. No n/v/d CP. Pt is a poor historian.    Infectious Disease consult was called to evaluate pt due to fever.      Past Medical & Surgical Hx:  PAST MEDICAL & SURGICAL HISTORY:  Dementia  Anemia  Cataract  Parkinsons  History of left hip replacement    Social History--  EtOH: denies   Tobacco: denies   Drug Use: denies     FAMILY HISTORY:  FH: type 2 diabetes      Allergies  tetracycline (Unknown)    Intolerances  NONE      Home Medications:  acetaminophen 325 mg oral tablet: 2 tab(s) orally every 6 hours, As needed, Mild Pain (1 - 3) (27 Aug 2020 12:13)  B-Complex 50 oral tablet: 1 tab(s) orally once a day (26 Aug 2020 12:17)  Caltrate 600 mg oral tablet: 1 tab(s) orally once a day (26 Aug 2020 12:17)  ferrous sulfate 325 mg (65 mg elemental iron) oral delayed release tablet: 1 tab(s) orally once a day (26 Aug 2020 12:17)  Ocuvite oral tablet: 1 tab(s) orally once a day (26 Aug 2020 12:17)  prednisoLONE acetate 1% ophthalmic suspension: 1 drop(s) to each affected eye once a day (26 Aug 2020 12:17)  QUEtiapine 25 mg oral tablet: 1 tab(s) orally 2 times a day (26 Aug 2020 12:17)  Vitamin C 500 mg oral tablet: 1 tab(s) orally once a day (26 Aug 2020 12:17)      Current Inpatient Medications :    ANTIBIOTICS:       OTHER RELEVANT MEDICATIONS :  acetaminophen   Tablet .. 650 milliGRAM(s) Oral every 6 hours PRN  acetaminophen   Tablet .. 650 milliGRAM(s) Oral every 6 hours PRN  amLODIPine   Tablet 10 milliGRAM(s) Oral daily  ascorbic acid 500 milliGRAM(s) Oral daily  calcium carbonate 1250 mG  + Vitamin D (OsCal 500 + D) 1 Tablet(s) Oral daily  carbidopa/levodopa  25/100 1 Tablet(s) Oral <User Schedule>  ferrous    sulfate 325 milliGRAM(s) Oral daily  heparin   Injectable 5000 Unit(s) SubCutaneous every 8 hours  hydrocortisone hemorrhoidal Suppository 1 Suppository(s) Rectal two times a day  lidocaine   Patch 1 Patch Transdermal every 24 hours  multivitamin/minerals 1 Tablet(s) Oral daily  oxyCODONE    IR 5 milliGRAM(s) Oral four times a day PRN  pantoprazole    Tablet 40 milliGRAM(s) Oral before breakfast  prednisoLONE acetate 1% Suspension 1 Drop(s) Both EYES daily  QUEtiapine 25 milliGRAM(s) Oral two times a day  traMADol 25 milliGRAM(s) Oral every 6 hours PRN      ROS:  Unable to obtain due to : poor historian      Physical Exam:  Vital Signs Last 24 Hrs  T(C): 37.2 (29 Aug 2020 15:20), Max: 38.7 (29 Aug 2020 04:58)  T(F): 98.9 (29 Aug 2020 15:20), Max: 101.7 (29 Aug 2020 04:58)  HR: 99 (29 Aug 2020 14:44) (91 - 104)  BP: 113/68 (29 Aug 2020 14:44) (101/63 - 113/68)  RR: 17 (29 Aug 2020 14:44) (17 - 17)  SpO2: 94% (29 Aug 2020 14:44) (92% - 94%)      General: no acute distress  Eyes: sclera anicteric, pupils equal and reactive to light  ENMT: buccal mucosa moist, pharynx not injected  Neck: supple, trachea midline  Lungs: clear, no wheeze/rhonchi  Cardiovascular: regular rate and rhythm, S1 S2  Abdomen: soft, nontender, Suprapubic fullness bowel sounds normal  Neurological:  alert and oriented x2, Cranial Nerves II-XII grossly intact  Skin:no increased ecchymosis/petechiae/purpura  Lymph Nodes: no palpable cervical/supraclavicular lymph nodes enlargements  Extremities: no cyanosis/clubbing/edema    Labs:                         9.0    9.44  )-----------( 288      ( 29 Aug 2020 07:22 )             27.6   Manual Differential (08.29.20 @ 07:22)    Elliptocytes: Slight    Red Cell Morphology: Non specific    Platelet Morphology: Normal    Manual Smear Verification: Performed    Band Neutrophils %: 18.0 %    Nucleated RBC: 0      08-29    139  |  109<H>  |  39<H>  ----------------------------<  93  3.8   |  21<L>  |  0.77    Ca    7.4<L>      29 Aug 2020 07:22    RECENT CULTURES:  pending    RADIOLOGY & ADDITIONAL STUDIES:    EXAM:  XR CHEST PORTABLE URGENT 1V                            PROCEDURE DATE:  08/29/2020          INTERPRETATION:  Fever.    AP chest. Prior 5/9/2020.    No change heart mediastinum. Biapical pleural thickening. No consolidation or effusion. Degenerative change bilateral shoulders.    Impression: No active infiltrates.      EXAM:  CT CHEST                            PROCEDURE DATE:  08/25/2020          INTERPRETATION:  CLINICAL INFORMATION: Fall right rib pain    COMPARISON: None.    PROCEDURE:  CT of the Chest was performed without intravenous contrast.  Sagittal andcoronal reformats were performed.    FINDINGS:    LUNGS AND AIRWAYS: Patent central airways.  Biapical scarring.  PLEURA: No pleural effusion.  MEDIASTINUM AND TY: No lymphadenopathy.  VESSELS: Nonaneurysmal  HEART: Heart size is normal. Coronary artery calcification. Decrease cardiac chamber blood pool attenuation suggests an anemic state.. No pericardial effusion.  CHEST WALL AND LOWER NECK: Within normal limits.  VISUALIZED UPPER ABDOMEN: Contracted gallbladder with calcified stones. A 2.1 cm hypodensity right hepatic lobe cannot be definitively characterized on this noncontrast study probably a cyst.  BONES: No acute    IMPRESSION:  No acute findings on this noncontrast study.      EXAM:  PELVIS AP ONLY                            PROCEDURE DATE:  08/25/2020          INTERPRETATION:  Pelvis    HISTORY: Fracture    Comparison: 5/9/2020    Frontal view of the pelvis shows a fracture of the right inferior pubic ramus which may not be acute. Left hip replacement is noted.    IMPRESSION: Right pubic fracture of indeterminate age.      Assessment :   76 yo F PMHx Parkinson's disease, early dementia admitted on 8/25/2020 for Rt lower rib pain s/p fall found to have age indeterminate right inferior pubic rami fracture. Febrile overnight. Tmax 101.7. CXR and CT chest clear. WBC wnl but noted to have bandemia of 18%. Rule out infectious process ?UTI    Plan :   Start Rocephin   Fu blood cultures  Get UA C&S  Rule out retention of urine as with some suprapubic fullness  Trend temps and cbc  Fu cultures  Asp precautions    D/w Dr Crabtree    Continue with present regime .  Approptiate use of antibiotics and adverse effects reviewed.      > 45 minutes spent in direct patient care reviewing  the notes, lab data/ imaging , discussion with multidisciplinary team. All questions were addressed and answered to the best of my capacity .    Thank you for allowing me to participate in the care of your patient .      Daniel Navarro MD  Infectious Disease  [phone]

## 2020-08-29 NOTE — PROGRESS NOTE ADULT - SUBJECTIVE AND OBJECTIVE BOX
Patient is a 75y old  Female who presents with a chief complaint of gi bleed  anemia (29 Aug 2020 08:55)      INTERVAL /OVERNIGHT EVENTS: ? septic    MEDICATIONS  (STANDING):  amLODIPine   Tablet 10 milliGRAM(s) Oral daily  ascorbic acid 500 milliGRAM(s) Oral daily  calcium carbonate 1250 mG  + Vitamin D (OsCal 500 + D) 1 Tablet(s) Oral daily  carbidopa/levodopa  25/100 1 Tablet(s) Oral <User Schedule>  ferrous    sulfate 325 milliGRAM(s) Oral daily  heparin   Injectable 5000 Unit(s) SubCutaneous every 8 hours  hydrocortisone hemorrhoidal Suppository 1 Suppository(s) Rectal two times a day  lidocaine   Patch 1 Patch Transdermal every 24 hours  multivitamin/minerals 1 Tablet(s) Oral daily  pantoprazole    Tablet 40 milliGRAM(s) Oral before breakfast  prednisoLONE acetate 1% Suspension 1 Drop(s) Both EYES daily  QUEtiapine 25 milliGRAM(s) Oral two times a day    MEDICATIONS  (PRN):  acetaminophen   Tablet .. 650 milliGRAM(s) Oral every 6 hours PRN Mild Pain (1 - 3)  acetaminophen   Tablet .. 650 milliGRAM(s) Oral every 6 hours PRN Temp greater or equal to 38C (100.4F)  oxyCODONE    IR 5 milliGRAM(s) Oral four times a day PRN Severe Pain (7 - 10)  traMADol 25 milliGRAM(s) Oral every 6 hours PRN Moderate Pain (4 - 6)      Allergies    tetracycline (Unknown)    Intolerances        REVIEW OF SYSTEMS:  CONSTITUTIONAL: No fever, weight loss, or fatigue  EYES: No eye pain, visual disturbances, or discharge  ENMT:  No difficulty hearing, tinnitus, vertigo; No sinus or throat pain  NECK: No pain or stiffness  RESPIRATORY: No cough, wheezing, chills or hemoptysis; No shortness of breath  CARDIOVASCULAR: No chest pain, palpitations, dizziness, or leg swelling  GASTROINTESTINAL: No abdominal or epigastric pain. No nausea, vomiting, or hematemesis; No diarrhea or constipation. No melena or hematochezia.  GENITOURINARY: No dysuria, frequency, hematuria, or incontinence  NEUROLOGICAL: No headaches, memory loss, loss of strength, numbness, or tremors  SKIN: No itching, burning, rashes, or lesions   LYMPH NODES: No enlarged glands  ENDOCRINE: No heat or cold intolerance; No hair loss; No polydipsia or polyuria  MUSCULOSKELETAL: No joint pain or swelling; No muscle, back, or extremity pain  PSYCHIATRIC: No depression, anxiety, mood swings, or difficulty sleeping  HEME/LYMPH: No easy bruising, or bleeding gums  ALLERGY AND IMMUNOLOGIC: No hives or eczema    Vital Signs Last 24 Hrs  T(C): 38 (29 Aug 2020 14:44), Max: 38.7 (29 Aug 2020 04:58)  T(F): 100.4 (29 Aug 2020 14:44), Max: 101.7 (29 Aug 2020 04:58)  HR: 99 (29 Aug 2020 14:44) (91 - 104)  BP: 113/68 (29 Aug 2020 14:44) (101/63 - 113/68)  BP(mean): --  RR: 17 (29 Aug 2020 14:44) (17 - 17)  SpO2: 94% (29 Aug 2020 14:44) (92% - 94%)    PHYSICAL EXAM:  GENERAL: NAD, well-groomed, well-developed  HEAD:  Atraumatic, Normocephalic  EYES: EOMI, PERRLA, conjunctiva and sclera clear  ENMT: No tonsillar erythema, exudates, or enlargement; Moist mucous membranes, Good dentition, No lesions  NECK: Supple, No JVD, Normal thyroid  NERVOUS SYSTEM:  Alert & awake; Motor Strength 5/5 B/L upper and lower extremities; DTRs 2+ intact and symmetric  CHEST/LUNG: Clear to auscultation bilaterally; No rales, rhonchi, wheezing, or rubs  HEART: Regular rate and rhythm; No murmurs, rubs, or gallops  ABDOMEN: Soft, Nontender, Nondistended; Bowel sounds present  EXTREMITIES:  2+ Peripheral Pulses, No clubbing, cyanosis, or edema  LYMPH: No lymphadenopathy noted  SKIN: No rashes or lesions    LABS:                        9.0    9.44  )-----------( 288      ( 29 Aug 2020 07:22 )             27.6     29 Aug 2020 07:22    139    |  109    |  39     ----------------------------<  93     3.8     |  21     |  0.77     Ca    7.4        29 Aug 2020 07:22          CAPILLARY BLOOD GLUCOSE          RADIOLOGY & ADDITIONAL TESTS:    Notes Reviewed:  [x ] YES  [ ] NO    Care Discussed with Consultants/Other Providers [x ] YES  [ ] NO

## 2020-08-29 NOTE — CONSULT NOTE ADULT - SUBJECTIVE AND OBJECTIVE BOX
[facility] - Eugene York, Cliff, Denise, Delmar, Jose Angel Salcedo  Office Number: [phone]    Initial Consult Note    CHIEF COMPLAINT: Patient is a 75y old  Female who presents with a chief complaint of gi bleed  anemia       HPI:  74 yo F PMHx Parkinson's disease, early dementia p/w Rt lower rib pain s/p fall. Accidental fall, no dizziness/ chest pain/ loss of consciousness.   Patient states that she was trying to grab a piece of coffee cake from the cabinet, slipped backwards, fell onto her right side and hit back of her head. Denies chest pain/ palpitations/ shortness of breath.    Son Roman present, helped her up.     In the ed patient had x ray of pelvis show sRt pubic fracture  CT head no acute bleed  CT C spine no fracture    She is found to have anemia, and is scheduled for a EGD and colonoscopy on Monday.  She does not have a great functional capacity.  She denies chest pains, dyspnea, dizziness, syncope, PND, orthopnea, or LE swelling.  She is incapable of making her own decisions.        PAST MEDICAL & SURGICAL HISTORY:  Dementia  Anemia  Cataract  Parkinsons  History of left hip replacement      SOCIAL HISTORY:  No tobacco, ethanol, or drug abuse.    FAMILY HISTORY:  FH: type 2 diabetes    No family history of acute MI or sudden cardiac death.    MEDICATIONS  (STANDING):  amLODIPine   Tablet 10 milliGRAM(s) Oral daily  ascorbic acid 500 milliGRAM(s) Oral daily  calcium carbonate 1250 mG  + Vitamin D (OsCal 500 + D) 1 Tablet(s) Oral daily  carbidopa/levodopa  25/100 1 Tablet(s) Oral <User Schedule>  ferrous    sulfate 325 milliGRAM(s) Oral daily  heparin   Injectable 5000 Unit(s) SubCutaneous every 8 hours  hydrocortisone hemorrhoidal Suppository 1 Suppository(s) Rectal two times a day  lidocaine   Patch 1 Patch Transdermal every 24 hours  multivitamin/minerals 1 Tablet(s) Oral daily  pantoprazole    Tablet 40 milliGRAM(s) Oral before breakfast  prednisoLONE acetate 1% Suspension 1 Drop(s) Both EYES daily  QUEtiapine 25 milliGRAM(s) Oral two times a day    MEDICATIONS  (PRN):  acetaminophen   Tablet .. 650 milliGRAM(s) Oral every 6 hours PRN Mild Pain (1 - 3)  acetaminophen   Tablet .. 650 milliGRAM(s) Oral every 6 hours PRN Temp greater or equal to 38C (100.4F)  oxyCODONE    IR 5 milliGRAM(s) Oral four times a day PRN Severe Pain (7 - 10)  traMADol 25 milliGRAM(s) Oral every 6 hours PRN Moderate Pain (4 - 6)      Allergies    tetracycline (Unknown)    Intolerances        REVIEW OF SYSTEMS:       All other review of systems is negative unless indicated above    VITAL SIGNS:   Vital Signs Last 24 Hrs  T(C): 36.7 (29 Aug 2020 08:00), Max: 38.7 (29 Aug 2020 04:58)  T(F): 98.1 (29 Aug 2020 08:00), Max: 101.7 (29 Aug 2020 04:58)  HR: 91 (29 Aug 2020 04:58) (91 - 104)  BP: 101/63 (29 Aug 2020 04:58) (101/63 - 106/63)  BP(mean): --  RR: 17 (29 Aug 2020 04:58) (17 - 18)  SpO2: 92% (29 Aug 2020 04:58) (92% - 95%)    I&O's Summary      On Exam:    Constitutional: NAD, alert and oriented x 3  Lungs:  Non-labored, breath sounds are clear bilaterally, No wheezing, rales or rhonchi  Cardiovascular: RRR.  S1 and S2 positive.  No murmurs, rubs, gallops or clicks  Gastrointestinal: Bowel Sounds present, soft, nontender.   Lymph: No peripheral edema. No cervical lymphadenopathy.  Neurological: Alert, no focal deficits  Skin: No rashes or ulcers   Psych:  Mood & affect appropriate.    LABS: All Labs Reviewed:                        9.0    9.44  )-----------( 288      ( 29 Aug 2020 07:22 )             27.6                         8.9    11.04 )-----------( 282      ( 28 Aug 2020 09:20 )             27.6                         9.8    9.34  )-----------( 297      ( 27 Aug 2020 11:06 )             30.4     29 Aug 2020 07:22    139    |  109    |  39     ----------------------------<  93     3.8     |  21     |  0.77     Ca    7.4        29 Aug 2020 07:22            Blood Culture:         RADIOLOGY:    EKG:  NSR with nonspecific T wave changes

## 2020-08-29 NOTE — BEHAVIORAL HEALTH ASSESSMENT NOTE - NSBHCHARTREVIEWINVESTIGATE_PSY_A_CORE FT
< from: 12 Lead ECG (08.25.20 @ 19:31) >    Ventricular Rate 102 BPM    Atrial Rate 102 BPM    P-R Interval 118 ms    QRS Duration 88 ms    Q-T Interval 352 ms    QTC Calculation(Bezet) 458 ms    P Axis 36 degrees    R Axis 24 degrees    T Axis 39 degrees    Diagnosis Line Sinus tachycardia  Nonspecific ST abnormality  Abnormal ECG  When compared with ECG of 09-MAY-2020 19:08,  No significant change was found  Confirmed by Stephon Walker MD ([id number]) on 8/26/2020 12:21:19 PM    < end of copied text >

## 2020-08-29 NOTE — PROGRESS NOTE ADULT - ASSESSMENT
8.26.2020 This is a 75 F with PMH of Parkinson's disease and mild cognitive impairment who comes s/p fall and pubic rami fracture. I spoke to son, Roman Wilson. Patient was d/c'd from [facility] last Thursday, 8.20.2020, after residing there for about 3 months. He reports that patient was doing well when she got home -- she was able to ambulate short distances in the house with a walker. She needs moderate help with her ADLs. He wants to take patient home with home care when she is medically ready. He is going to hire a HHA for assistance with patient's ADLs. He says that he is the HCP. Patient does not have any advanced directives such as DNR/DNI. At this point, he wants everything done. Will follow.    8.27.2020 Patient remains a full code. Patient to go home with home care in AM. Son is planning to hire a HHA.    8.28.2020 Seen by psych. Patient has no medical decision making capacity. D/C planning home with home care and HHA.    8.29.2020 Febrile overnight. For EGD/colon on Monday for GIB.

## 2020-08-29 NOTE — CHART NOTE - NSCHARTNOTEFT_GEN_A_CORE
Called by RN for new fever, Tmax 101.7F. Patient seen and examined at bedside. Patient is awake, alert, confused, but denies any acute complaints.   On exam, patient is in NAD, tachycardic, has decreased bs at bases, abdomen soft, non-tender, no LE edema  Plan  Patient with a new fever Tmax 101.7F  Will give Tylenol x1 for fevers  Check blood, urine cultures, urinalysis, CXR Called by RN for new fever, Tmax 101.7F. Patient seen and examined at bedside. Patient is awake, alert, confused, but denies any acute complaints.   On exam, patient is in NAD, tachycardic, has decreased bs at bases, abdomen soft, non-tender, no LE edema  Plan  Patient with a new fever Tmax 101.7F  Will give Tylenol x1 for fevers  Check CBC w diff, bmp, lactate, blood, urine cultures, urinalysis, CXR

## 2020-08-29 NOTE — BEHAVIORAL HEALTH ASSESSMENT NOTE - NSBHCHARTREVIEWVS_PSY_A_CORE FT
Vital Signs Last 24 Hrs  T(C): 38.4 (29 Aug 2020 06:40), Max: 38.7 (29 Aug 2020 04:58)  T(F): 101.2 (29 Aug 2020 06:40), Max: 101.7 (29 Aug 2020 04:58)  HR: 91 (29 Aug 2020 04:58) (91 - 104)  BP: 101/63 (29 Aug 2020 04:58) (101/63 - 106/63)  BP(mean): --  RR: 17 (29 Aug 2020 04:58) (17 - 18)  SpO2: 92% (29 Aug 2020 04:58) (92% - 95%)

## 2020-08-29 NOTE — BEHAVIORAL HEALTH ASSESSMENT NOTE - HPI (INCLUDE ILLNESS QUALITY, SEVERITY, DURATION, TIMING, CONTEXT, MODIFYING FACTORS, ASSOCIATED SIGNS AND SYMPTOMS)
Patient seen, evaluated and chart reviewed. Patient is a 74 yo WWF, domiciled, no prior psychiatric hospitalizations, history of dementia, PMHx Parkinson's disease, p/w Rt lower rib pain s/p fall. Accidental fall, no dizziness/ chest pain/ loss of consciousness. Patient states that she was trying to grab a piece of coffee cake from the cabinet, slipped backwards, fell onto her right side and hit back of her head. Denies chest pain/ palpitations/ shortness of breath. Son Roman present, helped her up.   Patient is lucid, but has limited understanding of her medical condition. There is no evidence of acute psychosis, erasmo or depression.

## 2020-08-29 NOTE — BEHAVIORAL HEALTH ASSESSMENT NOTE - NSBHCHARTREVIEWLAB_PSY_A_CORE FT
9.0    x     )-----------( 288      ( 29 Aug 2020 07:22 )             27.6   08-29    139  |  109<H>  |  39<H>  ----------------------------<  93  3.8   |  21<L>  |  0.77    Ca    7.4<L>      29 Aug 2020 07:22

## 2020-08-29 NOTE — CONSULT NOTE ADULT - SUBJECTIVE AND OBJECTIVE BOX
Hematology/Oncology consult     Patient is seen and examined  notes/labs reviewed  pt family is at bedside and d/w family.  consult dictated,  Job #    contact #  son/daughter----  phone #    IMPRESSION:      RECOMMENDATION:              ,thanks for courtsey of this consult,will follow this pt with you for hem/onc issue while pt is in hospital. Hematology/Oncology consult     Patient is seen and examined  notes/labs reviewed  consult dictated,  Job #    contact #  son----Roman Wilson  phone # [phone],, called and discussed with son    only son  moved from [facility] to [place] with son in 2018  s/p fall in  in [place] --left hip fx, s/p surgery in --moved with son, seen by pcp in 2020, h/o fall about 2020--was at [facility]--was sent to rehab--[facility] nursing and rehab--came home on 2020, fall at home and admitted with pubis fracture, seen by orthopedics and is on medical management, guaiac positive stool, seen by gi and for possible egd/colon for monday, per pt son may had colonoscopy over 18 years ago, hb was at around 11--11.5, per son pt with always anemia and iron deficiency, son declines ct a/p  pmhx--parkinson, htn,anemia as per son, dementia  pshx--left hip surgery , s/p oral surgery/oral implant  fmhx---no h/o malignancy/leukemia, father  age 84, mother  age 79 (diabetes),  since 45 years  shx--never smoked, declines etoh/drug use, retired teacher (music)    IMPRESSION:  75/ F with PMH of Parkinson's disease and mild cognitive impairment, ?dementia per son, ch anemia per son and was on iron outpatient in past, htn in  who comes s/p fall around 20 and noted pubic rami fracture,  seen by orthopedics and is on medical management, guaiac positive stool, seen by gi and for possible egd/colon for monday, per pt son may had colonoscopy over 18 years ago, hb was at around 11--11.5, per son pt with always anemia and iron deficiency, son declines ct a/p. As I spoke to son, Roman Wilson on phone patient with h/o admission at [facility] in around 2020 after fall at home and was told rib fracture, had hb around 11-11.5. Patient was d/c'd from [facility] Thursday, 2020, after residing there for about 3 months. He reports that patient was doing well when she got home -- she was able to ambulate short distances in the house with a walker.   Anemia--likely multifactorial, h/o ch anemia/iron def as per son, seen by gi and for possible egd/colon on monday    RECOMMENDATION:  will request anemia work up  monitor h/h--transfuse prbc as needed for symptomatic anemia  son declines radio scan, plan to have endoscopic exam(definitive exam)  d/w son on phone and in agreement of above    Dr Crabtree ,thanks for courtesy of this consult, will follow this pt with you for hem/onc issue while pt is in hospital. Hematology/Oncology consult     Patient is seen and examined  notes/labs reviewed  consult dictated,  Job # [id number]    contact #  son----Roman Wilson  phone # [phone],, called and discussed with son    only son  moved from [facility] to [place] with son in 2018  s/p fall in 2018 in [place] --left hip fx, s/p surgery in [place]--moved with son in , seen by outside pcp in 2020--afterwards did not see pcp for corona pandemic, h/o fall at home about 2020--was at [facility]--was sent to rehab--[facility] nursing and rehab--came home on 2020, fall at home around 2020 and admitted with pubis fracture, seen by orthopedics and is on medical management, noted anemia--guaiac positive stool, seen by gi and for possible egd/colon for monday, per pt son may had colonoscopy over 18 years ago, hb was at around 11--11.5, now hb around 9, per son pt with always anemia and iron deficiency, son declines ct a/p/radio scan  pmhx--parkinson, htn, anemia as per son, dementia  pshx--left hip surgery , s/p oral surgery/oral/dental implant  fmhx---no h/o malignancy/leukemia, father  age 84, mother  age 79 (diabetes),  long time ago(45 years)  shx--never smoked, declines etoh/drug use, retired teacher (music)    Complete Blood Count in AM (20 @ 06:23)    Nucleated RBC: 0 /100 WBCs    WBC Count: 13.10 K/uL    RBC Count: 4.17 M/uL    Hemoglobin: 11.6 g/dL    Hematocrit: 34.8 %    Mean Cell Volume: 83.5 fl    Mean Cell Hemoglobin: 27.8 pg    Mean Cell Hemoglobin Conc: 33.3 gm/dL    Red Cell Distrib Width: 12.1 %    Platelet Count - Automated: 441 K/uL    < from: CT Chest No Cont (20 @ 16:11) >      < end of copied text >    < from: CT Renal Stone Hunt (20 @ 19:59) >      < end of copied text >      IMPRESSION:  75/ F with PMH of Parkinson's disease and mild cognitive impairment, ?dementia per son, ch anemia per son and was on iron outpatient in past, htn in  who comes s/p fall around 20 and noted pubic rami fracture,  seen by orthopedics and is on medical management, guaiac positive stool, seen by gi and for possible egd/colon for monday, per pt son may had colonoscopy over 18 years ago, hb was at around 11--11.5, per son pt with always anemia and iron deficiency, pt had ct renal stone hunt in 2020--hepatic cyst, son declines repeat ct a/p at this time until endoscopy exam. As I spoke to son, Roman Wilson on phone patient with h/o admission at [facility] in around 2020 after fall at home and was told rib fracture, had hb around 11-11.5. Patient was d/c'd from [facility] Thursday, 2020, after residing there for about 3 months. He reports that patient was doing well when she got home -- she was able to ambulate short distances in the house with a walker.   Anemia--likely multifactorial, h/o ch anemia/iron def as per son, seen by gi and for possible egd/colon on monday    RECOMMENDATION:  will request anemia work up--iron/tibe/ferritin/b12/folate/retic/haptoglobin  monitor h/h--transfuse prbc as needed for symptomatic anemia  smear reviewed  son declines radio scan, plan to have endoscopic exam first  fever/bandemia--treatment per pcp and team, consider id evaluation  gi/dvt prophylaxis--heparin s/c  d/w son on phone and in agreement of above    Dr Crabtree ,thanks for courtesy of this consult, will follow this pt with you for hem/onc issue while pt is in hospital.

## 2020-08-30 DIAGNOSIS — R50.9 FEVER, UNSPECIFIED: ICD-10-CM

## 2020-08-30 LAB
APTT BLD: 28.8 SEC — SIGNIFICANT CHANGE UP (ref 27.5–35.5)
HCT VFR BLD CALC: 26.4 % — LOW (ref 34.5–45)
HGB BLD-MCNC: 8.7 G/DL — LOW (ref 11.5–15.5)
INR BLD: 1.2 RATIO — HIGH (ref 0.88–1.16)
MCHC RBC-ENTMCNC: 27.4 PG — SIGNIFICANT CHANGE UP (ref 27–34)
MCHC RBC-ENTMCNC: 33 GM/DL — SIGNIFICANT CHANGE UP (ref 32–36)
MCV RBC AUTO: 83.3 FL — SIGNIFICANT CHANGE UP (ref 80–100)
NRBC # BLD: 0 /100 WBCS — SIGNIFICANT CHANGE UP (ref 0–0)
PLATELET # BLD AUTO: 297 K/UL — SIGNIFICANT CHANGE UP (ref 150–400)
PROTHROM AB SERPL-ACNC: 13.9 SEC — HIGH (ref 10.6–13.6)
RBC # BLD: 3.17 M/UL — LOW (ref 3.8–5.2)
RBC # FLD: 13.2 % — SIGNIFICANT CHANGE UP (ref 10.3–14.5)
WBC # BLD: 10.76 K/UL — HIGH (ref 3.8–10.5)
WBC # FLD AUTO: 10.76 K/UL — HIGH (ref 3.8–10.5)

## 2020-08-30 PROCEDURE — 99232 SBSQ HOSP IP/OBS MODERATE 35: CPT

## 2020-08-30 RX ORDER — IRON SUCROSE 20 MG/ML
100 INJECTION, SOLUTION INTRAVENOUS EVERY 24 HOURS
Refills: 0 | Status: COMPLETED | OUTPATIENT
Start: 2020-08-30 | End: 2020-09-01

## 2020-08-30 RX ADMIN — Medication 650 MILLIGRAM(S): at 05:03

## 2020-08-30 RX ADMIN — PANTOPRAZOLE SODIUM 40 MILLIGRAM(S): 20 TABLET, DELAYED RELEASE ORAL at 05:03

## 2020-08-30 RX ADMIN — Medication 1 TABLET(S): at 11:26

## 2020-08-30 RX ADMIN — QUETIAPINE FUMARATE 25 MILLIGRAM(S): 200 TABLET, FILM COATED ORAL at 05:03

## 2020-08-30 RX ADMIN — Medication 1 SUPPOSITORY(S): at 17:03

## 2020-08-30 RX ADMIN — CEFTRIAXONE 100 MILLIGRAM(S): 500 INJECTION, POWDER, FOR SOLUTION INTRAMUSCULAR; INTRAVENOUS at 22:04

## 2020-08-30 RX ADMIN — CARBIDOPA AND LEVODOPA 1 TABLET(S): 25; 100 TABLET ORAL at 17:02

## 2020-08-30 RX ADMIN — HEPARIN SODIUM 5000 UNIT(S): 5000 INJECTION INTRAVENOUS; SUBCUTANEOUS at 05:03

## 2020-08-30 RX ADMIN — CARBIDOPA AND LEVODOPA 1 TABLET(S): 25; 100 TABLET ORAL at 11:26

## 2020-08-30 RX ADMIN — Medication 650 MILLIGRAM(S): at 06:00

## 2020-08-30 RX ADMIN — HEPARIN SODIUM 5000 UNIT(S): 5000 INJECTION INTRAVENOUS; SUBCUTANEOUS at 13:31

## 2020-08-30 RX ADMIN — HEPARIN SODIUM 5000 UNIT(S): 5000 INJECTION INTRAVENOUS; SUBCUTANEOUS at 22:05

## 2020-08-30 RX ADMIN — Medication 1 DROP(S): at 11:27

## 2020-08-30 RX ADMIN — IRON SUCROSE 100 MILLIGRAM(S): 20 INJECTION, SOLUTION INTRAVENOUS at 11:26

## 2020-08-30 RX ADMIN — CARBIDOPA AND LEVODOPA 1 TABLET(S): 25; 100 TABLET ORAL at 08:45

## 2020-08-30 RX ADMIN — Medication 500 MILLIGRAM(S): at 11:26

## 2020-08-30 RX ADMIN — Medication 1 SUPPOSITORY(S): at 05:03

## 2020-08-30 RX ADMIN — Medication 1 TABLET(S): at 11:27

## 2020-08-30 RX ADMIN — QUETIAPINE FUMARATE 25 MILLIGRAM(S): 200 TABLET, FILM COATED ORAL at 17:02

## 2020-08-30 RX ADMIN — Medication 325 MILLIGRAM(S): at 11:26

## 2020-08-30 NOTE — PROGRESS NOTE ADULT - SUBJECTIVE AND OBJECTIVE BOX
Patient is a 75y old  Female who presents with a chief complaint of gi bleed  anemia (30 Aug 2020 09:36)       Pt is seen and examined  pt is awake and lying in bed/out of bed to chair  pt seems comfortable and denies any complaints at this time    HPI:  76 yo F PMHx Parkinson's disease, early dementia p/w Rt lower rib pain s/p fall. Accidental fall, no dizziness/ chest pain/ loss of consciousness.   Patient states that she was trying to grab a piece of coffee cake from the cabinet, slipped backwards, fell onto her right side and hit back of her head. Denies chest pain/ palpitations/ shortness of breath.    Son Roman present, helped her up.     In the ed patient had x ray of pelvis show sRt pubic fracture  CT head no acute bleed  CT C spine no fracture (25 Aug 2020 23:29)         ROS:  Negative except for:    MEDICATIONS  (STANDING):  amLODIPine   Tablet 10 milliGRAM(s) Oral daily  ascorbic acid 500 milliGRAM(s) Oral daily  calcium carbonate 1250 mG  + Vitamin D (OsCal 500 + D) 1 Tablet(s) Oral daily  carbidopa/levodopa  25/100 1 Tablet(s) Oral <User Schedule>  cefTRIAXone   IVPB 1000 milliGRAM(s) IV Intermittent every 24 hours  ferrous    sulfate 325 milliGRAM(s) Oral daily  heparin   Injectable 5000 Unit(s) SubCutaneous every 8 hours  hydrocortisone hemorrhoidal Suppository 1 Suppository(s) Rectal two times a day  lidocaine   Patch 1 Patch Transdermal every 24 hours  multivitamin/minerals 1 Tablet(s) Oral daily  pantoprazole    Tablet 40 milliGRAM(s) Oral before breakfast  prednisoLONE acetate 1% Suspension 1 Drop(s) Both EYES daily  QUEtiapine 25 milliGRAM(s) Oral two times a day    MEDICATIONS  (PRN):  acetaminophen   Tablet .. 650 milliGRAM(s) Oral every 6 hours PRN Mild Pain (1 - 3)  acetaminophen   Tablet .. 650 milliGRAM(s) Oral every 6 hours PRN Temp greater or equal to 38C (100.4F)  oxyCODONE    IR 5 milliGRAM(s) Oral four times a day PRN Severe Pain (7 - 10)  traMADol 25 milliGRAM(s) Oral every 6 hours PRN Moderate Pain (4 - 6)      Allergies    tetracycline (Unknown)    Intolerances        Vital Signs Last 24 Hrs  T(C): 37.4 (30 Aug 2020 06:00), Max: 38.8 (30 Aug 2020 04:28)  T(F): 99.4 (30 Aug 2020 06:00), Max: 101.8 (30 Aug 2020 04:28)  HR: 86 (30 Aug 2020 04:28) (86 - 99)  BP: 96/58 (30 Aug 2020 04:28) (96/58 - 133/73)  BP(mean): --  RR: 19 (30 Aug 2020 04:28) (17 - 19)  SpO2: 94% (30 Aug 2020 04:28) (93% - 94%)    PHYSICAL EXAM  General: adult in NAD  HEENT: clear oropharynx, anicteric sclera, pink conjunctiva  Neck: supple  CV: normal S1/S2 with no murmur rubs or gallops  Lungs: positive air movement b/l ant lungs,clear to auscultation, no wheezes, no rales  Abdomen: soft non-tender non-distended, no hepatosplenomegaly  Ext: no clubbing cyanosis or edema  Skin: no rashes and no petechiae  Neuro: alert and oriented X 4, no focal deficits  LABS:                          8.7    10.76 )-----------( 297      ( 30 Aug 2020 08:34 )             26.4         Mean Cell Volume : 83.3 fl  Mean Cell Hemoglobin : 27.4 pg  Mean Cell Hemoglobin Concentration : 33.0 gm/dL  Auto Neutrophil # : x  Auto Lymphocyte # : x  Auto Monocyte # : x  Auto Eosinophil # : x  Auto Basophil # : x  Auto Neutrophil % : x  Auto Lymphocyte % : x  Auto Monocyte % : x  Auto Eosinophil % : x  Auto Basophil % : x    Serial CBC's  08-30 @ 08:34  Hct-26.4 / Hgb-8.7 / Plat-297 / RBC-3.17 / WBC-10.76          Serial CBC's  08-29 @ 14:43  Hct--- / Hgb--- / Plat--- / RBC-3.56 / WBC---          Serial CBC's  08-29 @ 07:22  Hct-27.6 / Hgb-9.0 / Plat-288 / RBC-3.29 / WBC-9.44          Serial CBC's  08-28 @ 09:20  Hct-27.6 / Hgb-8.9 / Plat-282 / RBC-3.27 / WBC-11.04          Serial CBC's  08-27 @ 11:06  Hct-30.4 / Hgb-9.8 / Plat-297 / RBC-3.56 / WBC-9.34            08-29    139  |  109<H>  |  39<H>  ----------------------------<  93  3.8   |  21<L>  |  0.77    Ca    7.4<L>      29 Aug 2020 07:22            Ferritin, Serum: 127 ng/mL (08-29-20 @ 21:58)  Vitamin B12, Serum: 485 pg/mL (08-29-20 @ 21:58)  Folate, Serum: >20.0 ng/mL (08-29-20 @ 21:58)  Iron - Total Binding Capacity.: 263 ug/dL (08-29-20 @ 21:57)  Reticulocyte Percent: 1.0 % (08-29-20 @ 14:43)                BLOOD SMEAR INTERPRETATION:       RADIOLOGY & ADDITIONAL STUDIES: Patient is a 75y old  Female who presents with a chief complaint of gi bleed  anemia (30 Aug 2020 09:36)       Pt is seen and examined  pt is awake and lying in bed  pt seems comfortable and denies any complaints at this time    HPI:  76 yo F PMHx Parkinson's disease, early dementia p/w Rt lower rib pain s/p fall. Accidental fall, no dizziness/ chest pain/ loss of consciousness.   Patient states that she was trying to grab a piece of coffee cake from the cabinet, slipped backwards, fell onto her right side and hit back of her head. Denies chest pain/ palpitations/ shortness of breath.    Son Roman present, helped her up.     In the ed patient had x ray of pelvis show sRt pubic fracture  CT head no acute bleed  CT C spine no fracture (25 Aug 2020 23:29)         ROS:  as per hpi    MEDICATIONS  (STANDING):  amLODIPine   Tablet 10 milliGRAM(s) Oral daily  ascorbic acid 500 milliGRAM(s) Oral daily  calcium carbonate 1250 mG  + Vitamin D (OsCal 500 + D) 1 Tablet(s) Oral daily  carbidopa/levodopa  25/100 1 Tablet(s) Oral <User Schedule>  cefTRIAXone   IVPB 1000 milliGRAM(s) IV Intermittent every 24 hours  ferrous    sulfate 325 milliGRAM(s) Oral daily  heparin   Injectable 5000 Unit(s) SubCutaneous every 8 hours  hydrocortisone hemorrhoidal Suppository 1 Suppository(s) Rectal two times a day  lidocaine   Patch 1 Patch Transdermal every 24 hours  multivitamin/minerals 1 Tablet(s) Oral daily  pantoprazole    Tablet 40 milliGRAM(s) Oral before breakfast  prednisoLONE acetate 1% Suspension 1 Drop(s) Both EYES daily  QUEtiapine 25 milliGRAM(s) Oral two times a day    MEDICATIONS  (PRN):  acetaminophen   Tablet .. 650 milliGRAM(s) Oral every 6 hours PRN Mild Pain (1 - 3)  acetaminophen   Tablet .. 650 milliGRAM(s) Oral every 6 hours PRN Temp greater or equal to 38C (100.4F)  oxyCODONE    IR 5 milliGRAM(s) Oral four times a day PRN Severe Pain (7 - 10)  traMADol 25 milliGRAM(s) Oral every 6 hours PRN Moderate Pain (4 - 6)      Allergies    tetracycline (Unknown)    Intolerances        Vital Signs Last 24 Hrs  T(C): 37.4 (30 Aug 2020 06:00), Max: 38.8 (30 Aug 2020 04:28)  T(F): 99.4 (30 Aug 2020 06:00), Max: 101.8 (30 Aug 2020 04:28)  HR: 86 (30 Aug 2020 04:28) (86 - 99)  BP: 96/58 (30 Aug 2020 04:28) (96/58 - 133/73)  BP(mean): --  RR: 19 (30 Aug 2020 04:28) (17 - 19)  SpO2: 94% (30 Aug 2020 04:28) (93% - 94%)    PHYSICAL EXAM  General: adult in NAD  HEENT: clear oropharynx, anicteric sclera, pink conjunctiva  Neck: supple  CV: normal S1/S2 with no murmur rubs or gallops  Lungs: positive air movement b/l ant lungs,clear to auscultation, no wheezes, no rales  Abdomen: soft non-tender non-distended, no hepatosplenomegaly  Ext: no clubbing cyanosis or edema  Skin: no rashes and no petechiae  Neuro: alert and oriented X 4, no focal deficits  LABS:                          8.7    10.76 )-----------( 297      ( 30 Aug 2020 08:34 )             26.4         Mean Cell Volume : 83.3 fl  Mean Cell Hemoglobin : 27.4 pg  Mean Cell Hemoglobin Concentration : 33.0 gm/dL  Auto Neutrophil # : x  Auto Lymphocyte # : x  Auto Monocyte # : x  Auto Eosinophil # : x  Auto Basophil # : x  Auto Neutrophil % : x  Auto Lymphocyte % : x  Auto Monocyte % : x  Auto Eosinophil % : x  Auto Basophil % : x    Serial CBC's  08-30 @ 08:34  Hct-26.4 / Hgb-8.7 / Plat-297 / RBC-3.17 / WBC-10.76          Serial CBC's  08-29 @ 14:43  Hct--- / Hgb--- / Plat--- / RBC-3.56 / WBC---          Serial CBC's  08-29 @ 07:22  Hct-27.6 / Hgb-9.0 / Plat-288 / RBC-3.29 / WBC-9.44          Serial CBC's  08-28 @ 09:20  Hct-27.6 / Hgb-8.9 / Plat-282 / RBC-3.27 / WBC-11.04          Serial CBC's  08-27 @ 11:06  Hct-30.4 / Hgb-9.8 / Plat-297 / RBC-3.56 / WBC-9.34            08-29    139  |  109<H>  |  39<H>  ----------------------------<  93  3.8   |  21<L>  |  0.77    Ca    7.4<L>      29 Aug 2020 07:22            Ferritin, Serum: 127 ng/mL (08-29-20 @ 21:58)  Vitamin B12, Serum: 485 pg/mL (08-29-20 @ 21:58)  Folate, Serum: >20.0 ng/mL (08-29-20 @ 21:58)  Iron - Total Binding Capacity.: 263 ug/dL (08-29-20 @ 21:57)  Reticulocyte Percent: 1.0 % (08-29-20 @ 14:43)

## 2020-08-30 NOTE — PROGRESS NOTE ADULT - SUBJECTIVE AND OBJECTIVE BOX
Patient is a 75y old  Female who presents with a chief complaint of gi bleed  anemia (30 Aug 2020 09:36)      INTERVAL /OVERNIGHT EVENTS: not feeling well    MEDICATIONS  (STANDING):  amLODIPine   Tablet 10 milliGRAM(s) Oral daily  ascorbic acid 500 milliGRAM(s) Oral daily  calcium carbonate 1250 mG  + Vitamin D (OsCal 500 + D) 1 Tablet(s) Oral daily  carbidopa/levodopa  25/100 1 Tablet(s) Oral <User Schedule>  cefTRIAXone   IVPB 1000 milliGRAM(s) IV Intermittent every 24 hours  ferrous    sulfate 325 milliGRAM(s) Oral daily  heparin   Injectable 5000 Unit(s) SubCutaneous every 8 hours  hydrocortisone hemorrhoidal Suppository 1 Suppository(s) Rectal two times a day  iron sucrose Injectable 100 milliGRAM(s) IV Push every 24 hours  lidocaine   Patch 1 Patch Transdermal every 24 hours  multivitamin/minerals 1 Tablet(s) Oral daily  pantoprazole    Tablet 40 milliGRAM(s) Oral before breakfast  prednisoLONE acetate 1% Suspension 1 Drop(s) Both EYES daily  QUEtiapine 25 milliGRAM(s) Oral two times a day    MEDICATIONS  (PRN):  acetaminophen   Tablet .. 650 milliGRAM(s) Oral every 6 hours PRN Mild Pain (1 - 3)  acetaminophen   Tablet .. 650 milliGRAM(s) Oral every 6 hours PRN Temp greater or equal to 38C (100.4F)  oxyCODONE    IR 5 milliGRAM(s) Oral four times a day PRN Severe Pain (7 - 10)  traMADol 25 milliGRAM(s) Oral every 6 hours PRN Moderate Pain (4 - 6)      Allergies    tetracycline (Unknown)    Intolerances        REVIEW OF SYSTEMS: denies    Vital Signs Last 24 Hrs  T(C): 37.4 (30 Aug 2020 13:57), Max: 38.8 (30 Aug 2020 04:28)  T(F): 99.4 (30 Aug 2020 13:57), Max: 101.8 (30 Aug 2020 04:28)  HR: 89 (30 Aug 2020 13:57) (86 - 98)  BP: 91/56 (30 Aug 2020 13:57) (91/56 - 133/73)  BP(mean): --  RR: 19 (30 Aug 2020 13:57) (19 - 19)  SpO2: 96% (30 Aug 2020 13:57) (93% - 96%)    PHYSICAL EXAM:  GENERAL: NAD, well-groomed, well-developed  HEAD:  Atraumatic, Normocephalic  EYES: EOMI, PERRLA, conjunctiva and sclera clear  ENMT: No tonsillar erythema, exudates, or enlargement; Moist mucous membranes, Good dentition, No lesions  NECK: Supple, No JVD, Normal thyroid  NERVOUS SYSTEM:  Alert & awake; Motor Strength 5/5 B/L upper and lower extremities; DTRs 2+ intact and symmetric  CHEST/LUNG: Clear to auscultation bilaterally; No rales, rhonchi, wheezing, or rubs  HEART: Regular rate and rhythm; No murmurs, rubs, or gallops  ABDOMEN: Soft, Nontender, Nondistended; Bowel sounds present  EXTREMITIES:  2+ Peripheral Pulses, No clubbing, cyanosis, or edema  LYMPH: No lymphadenopathy noted  SKIN: No rashes or lesions    LABS:                        8.7    10.76 )-----------( 297      ( 30 Aug 2020 08:34 )             26.4       Ca    7.4        29 Aug 2020 07:22      PT/INR - ( 30 Aug 2020 11:52 )   PT: 13.9 sec;   INR: 1.20 ratio         PTT - ( 30 Aug 2020 11:52 )  PTT:28.8 sec  Urinalysis Basic - ( 29 Aug 2020 19:02 )    Color: Yellow / Appearance: Slightly Turbid / S.015 / pH: x  Gluc: x / Ketone: Trace  / Bili: Negative / Urobili: Negative   Blood: x / Protein: 30 mg/dL / Nitrite: Positive   Leuk Esterase: Moderate / RBC: 3-5 /HPF / WBC >50   Sq Epi: x / Non Sq Epi: Occasional / Bacteria: Moderate      CAPILLARY BLOOD GLUCOSE          RADIOLOGY & ADDITIONAL TESTS:    Notes Reviewed:  [x ] YES  [ ] NO    Care Discussed with Consultants/Other Providers [x ] YES  [ ] NO

## 2020-08-30 NOTE — PROGRESS NOTE ADULT - ASSESSMENT
contact #  son----Roman Wilson  phone # [phone],, called and discussed with son  IMPRESSION:  75/ F with PMH of Parkinson's disease and mild cognitive impairment, ?dementia per son, ch anemia per son and was on iron outpatient in past, htn in 2020 who comes s/p fall around 8/25/20 and noted pubic rami fracture,  seen by orthopedics and is on medical management, guaiac positive stool, seen by gi and for possible egd/colon for monday, per pt son may had colonoscopy over 18 years ago, hb was at around 11--11.5, per son pt with always anemia and iron deficiency, pt had ct renal stone hunt in 5/2020--hepatic cyst, son declines repeat ct a/p at this time until endoscopy exam. As I spoke to son, Roman Wilson on phone patient with h/o admission at [place] in around 5/2020 after fall at home and was told rib fracture, had hb around 11-11.5. Patient was d/c'd from [facility] Thursday, 8.20.2020, after residing there for about 3 months. He reports that patient was doing well when she got home -- she was able to ambulate short distances in the house with a walker.   Anemia--likely multifactorial, h/o ch anemia/iron def as per son, iron profile this admission--low iron/transferrin saturation, seen by gi and for possible egd/colon on monday    RECOMMENDATION:  Anemia--likely multifactorial, guaiac pos stool, h/o ch anemia/iron def/multifactorial  iron profile --low iron/transferrin saturation,will start iv iron 100 mg a day for 3 days as looking for a rapid response  seen by gi and for possible egd/colon on monday, final decision gi work up as per pt/family/gi to exclude gi pathology/malignancy  guaiac pos stool/liver cyst on earlier ct--son declines ct a/p, radio scan--final decision/work up per gi  no evidence of hemolysis  monitor h/h--transfuse prbc as needed for symptomatic anemia  smear reviewed  fever/bandemia--seen by id , work i\up/management per id  gi/dvt prophylaxis--heparin s/c  d/w son on phone and in agreement of above

## 2020-08-30 NOTE — PROGRESS NOTE ADULT - SUBJECTIVE AND OBJECTIVE BOX
Patient is a 75y old  Female who presents with a chief complaint of gi bleed (27 Aug 2020 14:04)      INTERVAL HPI/OVERNIGHT EVENTS: Patient seen and examined. NAD. No complaints.    Vital Signs Last 24 Hrs  T(C): 37.4 (30 Aug 2020 06:00), Max: 38.8 (30 Aug 2020 04:28)  T(F): 99.4 (30 Aug 2020 06:00), Max: 101.8 (30 Aug 2020 04:28)  HR: 86 (30 Aug 2020 04:28) (86 - 99)  BP: 96/58 (30 Aug 2020 04:28) (96/58 - 133/73)  BP(mean): --  RR: 19 (30 Aug 2020 04:28) (17 - 19)  SpO2: 94% (30 Aug 2020 04:28) (93% - 94%)    08    139  |  109<H>  |  39<H>  ----------------------------<  93  3.8   |  21<L>  |  0.77    Ca    7.4<L>      29 Aug 2020 07:22                            8.7    10.76 )-----------( 297      ( 30 Aug 2020 08:34 )             26.4       CAPILLARY BLOOD GLUCOSE        Urinalysis Basic - ( 29 Aug 2020 19:02 )    Color: Yellow / Appearance: Slightly Turbid / S.015 / pH: x  Gluc: x / Ketone: Trace  / Bili: Negative / Urobili: Negative   Blood: x / Protein: 30 mg/dL / Nitrite: Positive   Leuk Esterase: Moderate / RBC: 3-5 /HPF / WBC >50   Sq Epi: x / Non Sq Epi: Occasional / Bacteria: Moderate              acetaminophen   Tablet .. 650 milliGRAM(s) Oral every 6 hours PRN  acetaminophen   Tablet .. 650 milliGRAM(s) Oral every 6 hours PRN  amLODIPine   Tablet 10 milliGRAM(s) Oral daily  ascorbic acid 500 milliGRAM(s) Oral daily  calcium carbonate 1250 mG  + Vitamin D (OsCal 500 + D) 1 Tablet(s) Oral daily  carbidopa/levodopa  25/100 1 Tablet(s) Oral <User Schedule>  cefTRIAXone   IVPB 1000 milliGRAM(s) IV Intermittent every 24 hours  ferrous    sulfate 325 milliGRAM(s) Oral daily  heparin   Injectable 5000 Unit(s) SubCutaneous every 8 hours  hydrocortisone hemorrhoidal Suppository 1 Suppository(s) Rectal two times a day  iron sucrose Injectable 100 milliGRAM(s) IV Push every 24 hours  lidocaine   Patch 1 Patch Transdermal every 24 hours  multivitamin/minerals 1 Tablet(s) Oral daily  oxyCODONE    IR 5 milliGRAM(s) Oral four times a day PRN  pantoprazole    Tablet 40 milliGRAM(s) Oral before breakfast  prednisoLONE acetate 1% Suspension 1 Drop(s) Both EYES daily  QUEtiapine 25 milliGRAM(s) Oral two times a day  traMADol 25 milliGRAM(s) Oral every 6 hours PRN            REVIEW OF SYSTEMS:  CONSTITUTIONAL: No fever, no weight loss, or no fatigue  NECK: No pain, no stiffness  RESPIRATORY: No cough, no wheezing, no chills, no hemoptysis, No shortness of breath  CARDIOVASCULAR: No chest pain, no palpitations, no dizziness, no leg swelling  GASTROINTESTINAL: No abdominal pain. No nausea, no vomiting, no hematemesis; No diarrhea, no constipation. No melena, no hematochezia.  GENITOURINARY: No dysuria, no frequency, no hematuria, no incontinence  NEUROLOGICAL: No headaches, no loss of strength, no numbness, no tremors  SKIN: No itching, no burning  MUSCULOSKELETAL: No joint pain, no swelling; No muscle, no back, no extremity pain  PSYCHIATRIC: No depression, no mood swings,   HEME/LYMPH: No easy bruising, no bleeding gums  ALLERY AND IMMUNOLOGIC: No hives       Consultant(s) Notes Reviewed:  [X] YES  [ ] NO    PHYSICAL EXAM:  GENERAL: NAD  HEAD:  Atraumatic, Normocephalic  EYES: EOMI, PERRLA, conjunctiva and sclera clear  ENMT: No tonsillar erythema, exudates, or enlargement; Moist mucous membranes  NECK: Supple, No JVD  NERVOUS SYSTEM:  Awake & alert  CHEST/LUNG: Clear to auscultation bilaterally; No rales, rhonchi, wheezing,  HEART: Regular rate and rhythm  ABDOMEN: Soft, Nontender, Nondistended; Bowel sounds present  EXTREMITIES:  No clubbing, cyanosis, or edema  LYMPH: No lymphadenopathy noted  SKIN: No rashes      Advanced care planning discussed with patient/family [X] YES   [ ] NO    Advanced care planning discussed with patient/family. Patient's health status was discussed. All appropriate changes have been made regarding patient's end-of-life care. Advanced care planning forms reviewed/discussed/completed.  20 minutes spent.

## 2020-08-30 NOTE — PROGRESS NOTE ADULT - ASSESSMENT
8.26.2020 This is a 75 F with PMH of Parkinson's disease and mild cognitive impairment who comes s/p fall and pubic rami fracture. I spoke to son, Roman Wilson. Patient was d/c'd from [facility] last Thursday, 8.20.2020, after residing there for about 3 months. He reports that patient was doing well when she got home -- she was able to ambulate short distances in the house with a walker. She needs moderate help with her ADLs. He wants to take patient home with home care when she is medically ready. He is going to hire a HHA for assistance with patient's ADLs. He says that he is the HCP. Patient does not have any advanced directives such as DNR/DNI. At this point, he wants everything done. Will follow.    8.27.2020 Patient remains a full code. Patient to go home with home care in AM. Son is planning to hire a HHA.    8.28.2020 Seen by psych. Patient has no medical decision making capacity. D/C planning home with home care and HHA.    8.29.2020 Febrile overnight. For EGD/colon on Monday for GIB.    8.30.2020 Still febrile. Started on iv abx for possible UTI. Cultures pending. EGD/colonscopy on hold.

## 2020-08-30 NOTE — PROGRESS NOTE ADULT - SUBJECTIVE AND OBJECTIVE BOX
Neurology Follow up note    JAYJAY NAJERA75yFemale    HPI:  76 yo F PMHx Parkinson's disease, early dementia p/w Rt lower rib pain s/p fall. Accidental fall, no dizziness/ chest pain/ loss of consciousness.   Patient states that she was trying to grab a piece of coffee cake from the cabinet, slipped backwards, fell onto her right side and hit back of her head. Denies chest pain/ palpitations/ shortness of breath.    Son Roman present, helped her up.     In the ed patient had x ray of pelvis show sRt pubic fracture  CT head no acute bleed  CT C spine no fracture (25 Aug 2020 23:29)      Interval History -no new events    Patient is seen, chart was reviewed and case was discussed with the treatment team.  Pt is not in any distress.   Lying on bed comfortably.     .    Vital Signs Last 24 Hrs  T(C): 37.4 (30 Aug 2020 13:57), Max: 38.8 (30 Aug 2020 04:28)  T(F): 99.4 (30 Aug 2020 13:57), Max: 101.8 (30 Aug 2020 04:28)  HR: 89 (30 Aug 2020 13:57) (86 - 98)  BP: 91/56 (30 Aug 2020 13:57) (91/56 - 133/73)  BP(mean): --  RR: 19 (30 Aug 2020 13:57) (19 - 19)  SpO2: 96% (30 Aug 2020 13:57) (93% - 96%)        REVIEW OF SYSTEMS:    Constitutional: No fever, weight loss or fatigue  Eyes: No eye pain, visual disturbances, or discharge  ENT:  No difficulty hearing, tinnitus, vertigo;   Neck: No pain or stiffness  Respiratory: No cough, wheezing, chills or hemoptysis  Cardiovascular: No chest pain, palpitations, shortness of breath, dizziness or leg swelling  Gastrointestinal: No abdominal pain, vomiting  Genitourinary: No dysuria, frequency, hematuria   Neurological: No headaches,   Psychiatric: No depression, anxiety, mood swings   Musculoskeletal: No joint pain or swelling;  Skin: No itching, burning, rashes or lesions   Lymph Nodes: No enlarged glands  Endocrine: No heat or cold intolerance; No hair loss,  Allergy and Immunologic: No hives or eczema    On Neurological Examination:    Mental Status - Pt is alert, awake, Follows commands     Speech -  Normal.     Cranial Nerves - Pupils 3 mm equal and reactive to light, extraocular eye movements intact. Pt has no visual field deficit.  Pt has no  facial asymmetry. Facial sensation is intact.Tongue - is in midline.    Muscle tone - cogwheel rigidity    Motor Exam - 4/5      Sensory Exam - Pi Pt withdraws all extremities equally on stimulation. No asymmetry seen.     coordination:    Finger to nose: normal      Deep tendon Reflexes - 2 plus all over.       Neck Supple -  Yes.     MEDICATIONS    acetaminophen   Tablet .. 650 milliGRAM(s) Oral every 6 hours PRN  acetaminophen   Tablet .. 650 milliGRAM(s) Oral every 6 hours PRN  amLODIPine   Tablet 10 milliGRAM(s) Oral daily  ascorbic acid 500 milliGRAM(s) Oral daily  calcium carbonate 1250 mG  + Vitamin D (OsCal 500 + D) 1 Tablet(s) Oral daily  carbidopa/levodopa  25/100 1 Tablet(s) Oral <User Schedule>  ferrous    sulfate 325 milliGRAM(s) Oral daily  heparin   Injectable 5000 Unit(s) SubCutaneous every 8 hours  hydrocortisone hemorrhoidal Suppository 1 Suppository(s) Rectal two times a day  lidocaine   Patch 1 Patch Transdermal every 24 hours  multivitamin/minerals 1 Tablet(s) Oral daily  oxyCODONE    IR 5 milliGRAM(s) Oral four times a day PRN  pantoprazole    Tablet 40 milliGRAM(s) Oral before breakfast  prednisoLONE acetate 1% Suspension 1 Drop(s) Both EYES daily  QUEtiapine 25 milliGRAM(s) Oral two times a day  traMADol 25 milliGRAM(s) Oral every 6 hours PRN      Allergies    tetracycline (Unknown)    Intolerances              08-29    139  |  109<H>  |  39<H>  ----------------------------<  93  3.8   |  21<L>  |  0.77    Ca    7.4<L>      29 Aug 2020 07:22          Hemoglobin A1C:     Vitamin B12     RADIOLOGY    ASSESSMENT AND PLAN:      seen for ams cw metabolic encephalopathy  PD    STARTED ON SEROQUEL  CONTINUE SINEMET  Physical therapy evaluation.  OOB to chair/ambulation with assistance only.  Pain is accessed and addressed.  Would continue to follow.

## 2020-08-30 NOTE — PROGRESS NOTE ADULT - ASSESSMENT
anemia  gi bleed    plan  daily cbc   transfuse prn   consider hematology eval  check iron studies   ppi once a day  check stool occult blood  further recommendations pending above  EGD/ colonoscopy on hold in setting of fever/ UTI   jaiden her son is agreeable  monitor for bleeding  start hydrocortisone suppository  care as per primary team

## 2020-08-30 NOTE — PROGRESS NOTE ADULT - SUBJECTIVE AND OBJECTIVE BOX
JAYJAY NAJERA is a 75yFemale , patient examined and chart reviewed.     INTERVAL HPI/ OVERNIGHT EVENTS:   Still febrile Tmax 101.8 this am. NAD.    PAST MEDICAL & SURGICAL HISTORY:  Dementia  Anemia  Cataract  Parkinsons  History of left hip replacement      For details regarding the patient's social history, family history, and other miscellaneous elements, please refer the initial infectious diseases consultation and/or the admitting history and physical examination for this admission.    ROS: UTO sec pt's condition    ALLERGIES:  tetracycline (Unknown)      Current inpatient medications :    ANTIBIOTICS/RELEVANT:  cefTRIAXone   IVPB 1000 milliGRAM(s) IV Intermittent every 24 hours      acetaminophen   Tablet .. 650 milliGRAM(s) Oral every 6 hours PRN  acetaminophen   Tablet .. 650 milliGRAM(s) Oral every 6 hours PRN  amLODIPine   Tablet 10 milliGRAM(s) Oral daily  ascorbic acid 500 milliGRAM(s) Oral daily  calcium carbonate 1250 mG  + Vitamin D (OsCal 500 + D) 1 Tablet(s) Oral daily  carbidopa/levodopa  25/100 1 Tablet(s) Oral <User Schedule>  ferrous    sulfate 325 milliGRAM(s) Oral daily  heparin   Injectable 5000 Unit(s) SubCutaneous every 8 hours  hydrocortisone hemorrhoidal Suppository 1 Suppository(s) Rectal two times a day  iron sucrose Injectable 100 milliGRAM(s) IV Push every 24 hours  lidocaine   Patch 1 Patch Transdermal every 24 hours  multivitamin/minerals 1 Tablet(s) Oral daily  oxyCODONE    IR 5 milliGRAM(s) Oral four times a day PRN  pantoprazole    Tablet 40 milliGRAM(s) Oral before breakfast  prednisoLONE acetate 1% Suspension 1 Drop(s) Both EYES daily  QUEtiapine 25 milliGRAM(s) Oral two times a day  traMADol 25 milliGRAM(s) Oral every 6 hours PRN      Objective:     @ 07:01  -   @ 07:00  --------------------------------------------------------  IN: 50 mL / OUT: 200 mL / NET: -150 mL      T(C): 37.4 (20 @ 13:57), Max: 38.8 (20 @ 04:28)  HR: 89 (20 @ 13:57) (86 - 98)  BP: 91/56 (20 @ 13:57) (91/56 - 133/73)  RR: 19 (20 @ 13:57) (19 - 19)  SpO2: 96% (20 @ 13:57) (93% - 96%)    Physical Exam:  General: no acute distress  Eyes: sclera anicteric, pupils equal and reactive to light  ENMT: buccal mucosa moist, pharynx not injected  Neck: supple, trachea midline  Lungs: clear, no wheeze/rhonchi  Cardiovascular: regular rate and rhythm, S1 S2  Abdomen: soft, nontender, no organomegaly present, bowel sounds normal  Neurological: alert and oriented x1, Cranial Nerves II-XII grossly intact  Skin: no increased ecchymosis/petechiae/purpura  Lymph Nodes: no palpable cervical/supraclavicular lymph nodes enlargements  Extremities: no cyanosis/clubbing/edema      LABS:                        8.7    10.76 )-----------( 297      ( 30 Aug 2020 08:34 )             26.4       08-    139  |  109<H>  |  39<H>  ----------------------------<  93  3.8   |  21<L>  |  0.77    Ca    7.4<L>      29 Aug 2020 07:22      PT/INR - ( 30 Aug 2020 11:52 )   PT: 13.9 sec;   INR: 1.20 ratio         PTT - ( 30 Aug 2020 11:52 )  PTT:28.8 sec  Urinalysis Basic - ( 29 Aug 2020 19:02 )    Color: Yellow / Appearance: Slightly Turbid / S.015 / pH: x  Gluc: x / Ketone: Trace  / Bili: Negative / Urobili: Negative   Blood: x / Protein: 30 mg/dL / Nitrite: Positive   Leuk Esterase: Moderate / RBC: 3-5 /HPF / WBC >50   Sq Epi: x / Non Sq Epi: Occasional / Bacteria: Moderate    MICROBIOLOGY:    Culture - Blood (collected 29 Aug 2020 11:37)  Source: .Blood Blood  Preliminary Report (30 Aug 2020 12:01):    No growth to date.    Culture - Blood (collected 29 Aug 2020 11:37)  Source: .Blood Blood  Preliminary Report (30 Aug 2020 12:01):    No growth to date.      RADIOLOGY & ADDITIONAL STUDIES:  EXAM:  XR CHEST PORTABLE URGENT 1V                            PROCEDURE DATE:  2020          INTERPRETATION:  Fever.    AP chest. Prior 2020.    No change heart mediastinum. Biapical pleural thickening. No consolidation or effusion. Degenerative change bilateral shoulders.    Impression: No active infiltrates.      EXAM:  CT CHEST                            PROCEDURE DATE:  2020          INTERPRETATION:  CLINICAL INFORMATION: Fall right rib pain    COMPARISON: None.    PROCEDURE:  CT of the Chest was performed without intravenous contrast.  Sagittal andcoronal reformats were performed.    FINDINGS:    LUNGS AND AIRWAYS: Patent central airways.  Biapical scarring.  PLEURA: No pleural effusion.  MEDIASTINUM AND TY: No lymphadenopathy.  VESSELS: Nonaneurysmal  HEART: Heart size is normal. Coronary artery calcification. Decrease cardiac chamber blood pool attenuation suggests an anemic state.. No pericardial effusion.  CHEST WALL AND LOWER NECK: Within normal limits.  VISUALIZED UPPER ABDOMEN: Contracted gallbladder with calcified stones. A 2.1 cm hypodensity right hepatic lobe cannot be definitively characterized on this noncontrast study probably a cyst.  BONES: No acute    IMPRESSION:  No acute findings on this noncontrast study.      EXAM:  PELVIS AP ONLY                            PROCEDURE DATE:  2020          INTERPRETATION:  Pelvis    HISTORY: Fracture    Comparison: 2020    Frontal view of the pelvis shows a fracture of the right inferior pubic ramus which may not be acute. Left hip replacement is noted.    IMPRESSION: Right pubic fracture of indeterminate age.      Assessment :   74 yo F PMHx Parkinson's disease, early dementia admitted on 2020 for Rt lower rib pain s/p fall found to have age indeterminate right inferior pubic rami fracture. Course in hospital complicated by fevers likely sec UTI. No urinary retention. CXR clear. Still febrile.     Plan :   Cont  Rocephin   Fu cultures  Trend temps and cbc  Asp precautions    D/w Dr Crabtree    Continue with present regiment.  Appropriate use of antibiotics and adverse effects reviewed.    > 35 minutes were spent in direct patient care reviewing notes, medications ,labs data/ imaging , discussion with multidisciplinary team.    Thank you for allowing me to participate in care of your patient .    Daniel Navarro MD  Infectious Disease  [phone]

## 2020-08-30 NOTE — PROGRESS NOTE ADULT - SUBJECTIVE AND OBJECTIVE BOX
[facility] - Eugene York, Cliff, Denise, Delmar, Jose Angel Salcedo  Office Number:  [phone]    Patient resting comfortably in bed in NAD.  Laying flat with no respiratory distress.  No complaints of chest pain, dyspnea, palpitations, PND, or orthopnea.  Fever over night.  EGD and colonoscopy on hold now.      F/U for:  HTN      MEDICATIONS  (STANDING):  amLODIPine   Tablet 10 milliGRAM(s) Oral daily  ascorbic acid 500 milliGRAM(s) Oral daily  calcium carbonate 1250 mG  + Vitamin D (OsCal 500 + D) 1 Tablet(s) Oral daily  carbidopa/levodopa  25/100 1 Tablet(s) Oral <User Schedule>  cefTRIAXone   IVPB 1000 milliGRAM(s) IV Intermittent every 24 hours  ferrous    sulfate 325 milliGRAM(s) Oral daily  heparin   Injectable 5000 Unit(s) SubCutaneous every 8 hours  hydrocortisone hemorrhoidal Suppository 1 Suppository(s) Rectal two times a day  iron sucrose Injectable 100 milliGRAM(s) IV Push every 24 hours  lidocaine   Patch 1 Patch Transdermal every 24 hours  multivitamin/minerals 1 Tablet(s) Oral daily  pantoprazole    Tablet 40 milliGRAM(s) Oral before breakfast  prednisoLONE acetate 1% Suspension 1 Drop(s) Both EYES daily  QUEtiapine 25 milliGRAM(s) Oral two times a day    MEDICATIONS  (PRN):  acetaminophen   Tablet .. 650 milliGRAM(s) Oral every 6 hours PRN Mild Pain (1 - 3)  acetaminophen   Tablet .. 650 milliGRAM(s) Oral every 6 hours PRN Temp greater or equal to 38C (100.4F)  oxyCODONE    IR 5 milliGRAM(s) Oral four times a day PRN Severe Pain (7 - 10)  traMADol 25 milliGRAM(s) Oral every 6 hours PRN Moderate Pain (4 - 6)      Allergies    tetracycline (Unknown)        Vital Signs Last 24 Hrs  T(C): 37.4 (30 Aug 2020 06:00), Max: 38.8 (30 Aug 2020 04:28)  T(F): 99.4 (30 Aug 2020 06:00), Max: 101.8 (30 Aug 2020 04:28)  HR: 86 (30 Aug 2020 04:28) (86 - 99)  BP: 96/58 (30 Aug 2020 04:28) (96/58 - 133/73)  BP(mean): --  RR: 19 (30 Aug 2020 04:28) (17 - 19)  SpO2: 94% (30 Aug 2020 04:28) (93% - 94%)    I&O's Summary    29 Aug 2020 07:01  -  30 Aug 2020 07:00  --------------------------------------------------------  IN: 50 mL / OUT: 200 mL / NET: -150 mL        ON EXAM:    Constitutional: NAD, alert and oriented x 3  Lungs:  Non-labored, breath sounds are clear bilaterally, No wheezing, rales or rhonchi  Cardiovascular: RRR.  S1 and S2 positive.  No murmurs, rubs, gallops or clicks  Gastrointestinal: Bowel Sounds present, soft, nontender.   Lymph: No peripheral edema. No cervical lymphadenopathy.  Neurological: Alert, no focal deficits  Skin: No rashes or ulcers   Psych:  Mood & affect appropriate.      LABS: All Labs Reviewed:                        8.7    10.76 )-----------( 297      ( 30 Aug 2020 08:34 )             26.4                         9.0    9.44  )-----------( 288      ( 29 Aug 2020 07:22 )             27.6                         8.9    11.04 )-----------( 282      ( 28 Aug 2020 09:20 )             27.6     29 Aug 2020 07:22    139    |  109    |  39     ----------------------------<  93     3.8     |  21     |  0.77     Ca    7.4        29 Aug 2020 07:22

## 2020-08-30 NOTE — PROGRESS NOTE ADULT - ASSESSMENT
75 year old woman with HTN, Parkinson's disease, dementia, anemia, for EGD/colonoscopy, but now on hold for fever.    - EGD and colonoscopy on hold for fever  - Fever work up and abx per primary team.  - When ready, remains optimized for the OR from a cardiac point of view with no evidence of active ischemic heart disease, decompensated heart failure, severe obstructive valvular disease, or uncontrolled arrhythmia.  - Continue amlodipine for BP control  - Monitor and replete lytes  - To follow closely with you

## 2020-08-30 NOTE — PROGRESS NOTE ADULT - SUBJECTIVE AND OBJECTIVE BOX
INTERVAL HPI/OVERNIGHT EVENTS:  seen and examined febrile overnight  No acute GI issues as per Nursing staff  U/A pos nitrates and LECs              MEDICATIONS  (STANDING):  amLODIPine   Tablet 10 milliGRAM(s) Oral daily  ascorbic acid 500 milliGRAM(s) Oral daily  calcium carbonate 1250 mG  + Vitamin D (OsCal 500 + D) 1 Tablet(s) Oral daily  carbidopa/levodopa  25/100 1 Tablet(s) Oral <User Schedule>  cefTRIAXone   IVPB 1000 milliGRAM(s) IV Intermittent every 24 hours  ferrous    sulfate 325 milliGRAM(s) Oral daily  heparin   Injectable 5000 Unit(s) SubCutaneous every 8 hours  hydrocortisone hemorrhoidal Suppository 1 Suppository(s) Rectal two times a day  lidocaine   Patch 1 Patch Transdermal every 24 hours  multivitamin/minerals 1 Tablet(s) Oral daily  pantoprazole    Tablet 40 milliGRAM(s) Oral before breakfast  prednisoLONE acetate 1% Suspension 1 Drop(s) Both EYES daily  QUEtiapine 25 milliGRAM(s) Oral two times a day    MEDICATIONS  (PRN):  acetaminophen   Tablet .. 650 milliGRAM(s) Oral every 6 hours PRN Mild Pain (1 - 3)  acetaminophen   Tablet .. 650 milliGRAM(s) Oral every 6 hours PRN Temp greater or equal to 38C (100.4F)  oxyCODONE    IR 5 milliGRAM(s) Oral four times a day PRN Severe Pain (7 - 10)  traMADol 25 milliGRAM(s) Oral every 6 hours PRN Moderate Pain (4 - 6)      Allergies    tetracycline (Unknown)    Intolerances      General:  No wt loss, fevers, chills, night sweats, fatigue,   Eyes:  Good vision, no reported pain  ENT:  No sore throat, pain, runny nose, dysphagia  CV:  No pain, palpitations, hypo/hypertension  Resp:  No dyspnea, cough, tachypnea, wheezing  GI:  No pain, No nausea, No vomiting, No diarrhea, No constipation, No weight loss, No fever, No pruritis, No rectal bleeding, No tarry stools, No dysphagia,  :  No pain, bleeding, incontinence, nocturia  Muscle:  No pain, weakness  Neuro:  No weakness, tingling, memory problems  Psych:  No fatigue, insomnia, mood problems, depression  Endocrine:  No polyuria, polydipsia, cold/heat intolerance  Heme:  No petechiae, ecchymosis, easy bruisability  Skin:  No rash, tattoos, scars, edema      PHYSICAL EXAM:     Vital Signs Last 24 Hrs  T(C): 37.4 (30 Aug 2020 06:00), Max: 38.8 (30 Aug 2020 04:28)  T(F): 99.4 (30 Aug 2020 06:00), Max: 101.8 (30 Aug 2020 04:28)  HR: 86 (30 Aug 2020 04:28) (86 - 99)  BP: 96/58 (30 Aug 2020 04:28) (96/58 - 133/73)  BP(mean): --  RR: 19 (30 Aug 2020 04:28) (17 - 19)  SpO2: 94% (30 Aug 2020 04:28) (93% - 94%)        GENERAL:  Appears stated age, well-groomed, well-nourished, no distress  HEENT:  NC/AT,  conjunctivae clear and pink, no thyromegaly, nodules, adenopathy, no JVD, sclera -anicteric  CHEST:  Full & symmetric excursion, no increased effort, breath sounds clear  HEART:  Regular rhythm, S1, S2, no murmur/rub/S3/S4, no abdominal bruit, no edema  ABDOMEN:  Soft, non-tender, non-distended, normoactive bowel sounds,  no masses ,no hepato-splenomegaly, no signs of chronic liver disease  EXTREMITIES  no cyanosis,clubbing or edema  SKIN:  No rash/erythema/ecchymoses/petechiae/wounds/abscess/warm/dry  NEURO:  Alert, oriented, no asterixis, no tremor, no encephalopathy          LABS:                        8.7    10.76 )-----------( 297      ( 30 Aug 2020 08:34 )             26.4     08-29    139  |  109<H>  |  39<H>  ----------------------------<  93  3.8   |  21<L>  |  0.77    Ca    7.4<L>      29 Aug 2020 07:22            08-29-20 @ 07:01  -  08-30-20 @ 07:00  --------------------------------------------------------  IN: 50 mL / OUT: 200 mL / NET: -150 mL        RADIOLOGY & ADDITIONAL TESTS:

## 2020-08-31 LAB
-  AMIKACIN: SIGNIFICANT CHANGE UP
-  AMOXICILLIN/CLAVULANIC ACID: SIGNIFICANT CHANGE UP
-  AMPICILLIN/SULBACTAM: SIGNIFICANT CHANGE UP
-  AMPICILLIN: SIGNIFICANT CHANGE UP
-  AZTREONAM: SIGNIFICANT CHANGE UP
-  CEFAZOLIN: SIGNIFICANT CHANGE UP
-  CEFEPIME: SIGNIFICANT CHANGE UP
-  CEFOXITIN: SIGNIFICANT CHANGE UP
-  CEFTRIAXONE: SIGNIFICANT CHANGE UP
-  CIPROFLOXACIN: SIGNIFICANT CHANGE UP
-  ERTAPENEM: SIGNIFICANT CHANGE UP
-  GENTAMICIN: SIGNIFICANT CHANGE UP
-  LEVOFLOXACIN: SIGNIFICANT CHANGE UP
-  MEROPENEM: SIGNIFICANT CHANGE UP
-  NITROFURANTOIN: SIGNIFICANT CHANGE UP
-  PIPERACILLIN/TAZOBACTAM: SIGNIFICANT CHANGE UP
-  TIGECYCLINE: SIGNIFICANT CHANGE UP
-  TOBRAMYCIN: SIGNIFICANT CHANGE UP
-  TRIMETHOPRIM/SULFAMETHOXAZOLE: SIGNIFICANT CHANGE UP
ACANTHOCYTES BLD QL SMEAR: SLIGHT — SIGNIFICANT CHANGE UP
ANISOCYTOSIS BLD QL: SLIGHT — SIGNIFICANT CHANGE UP
APTT BLD: 26.6 SEC — LOW (ref 27.5–35.5)
BASOPHILS # BLD AUTO: 0 K/UL — SIGNIFICANT CHANGE UP (ref 0–0.2)
BASOPHILS NFR BLD AUTO: 0 % — SIGNIFICANT CHANGE UP (ref 0–2)
CULTURE RESULTS: SIGNIFICANT CHANGE UP
ELLIPTOCYTES BLD QL SMEAR: SLIGHT — SIGNIFICANT CHANGE UP
EOSINOPHIL # BLD AUTO: 0.12 K/UL — SIGNIFICANT CHANGE UP (ref 0–0.5)
EOSINOPHIL NFR BLD AUTO: 1 % — SIGNIFICANT CHANGE UP (ref 0–6)
HCT VFR BLD CALC: 25.6 % — LOW (ref 34.5–45)
HGB BLD-MCNC: 8.4 G/DL — LOW (ref 11.5–15.5)
INR BLD: 1.28 RATIO — HIGH (ref 0.88–1.16)
LYMPHOCYTES # BLD AUTO: 2.6 K/UL — SIGNIFICANT CHANGE UP (ref 1–3.3)
LYMPHOCYTES # BLD AUTO: 22 % — SIGNIFICANT CHANGE UP (ref 13–44)
MANUAL SMEAR VERIFICATION: SIGNIFICANT CHANGE UP
MCHC RBC-ENTMCNC: 27.2 PG — SIGNIFICANT CHANGE UP (ref 27–34)
MCHC RBC-ENTMCNC: 32.8 GM/DL — SIGNIFICANT CHANGE UP (ref 32–36)
MCV RBC AUTO: 82.8 FL — SIGNIFICANT CHANGE UP (ref 80–100)
METHOD TYPE: SIGNIFICANT CHANGE UP
MONOCYTES # BLD AUTO: 1.78 K/UL — HIGH (ref 0–0.9)
MONOCYTES NFR BLD AUTO: 15 % — HIGH (ref 2–14)
NEUTROPHILS # BLD AUTO: 7.22 K/UL — SIGNIFICANT CHANGE UP (ref 1.8–7.4)
NEUTROPHILS NFR BLD AUTO: 51 % — SIGNIFICANT CHANGE UP (ref 43–77)
NEUTS BAND # BLD: 10 % — HIGH (ref 0–8)
NRBC # BLD: 0 — SIGNIFICANT CHANGE UP
NRBC # BLD: SIGNIFICANT CHANGE UP /100 WBCS (ref 0–0)
ORGANISM # SPEC MICROSCOPIC CNT: SIGNIFICANT CHANGE UP
ORGANISM # SPEC MICROSCOPIC CNT: SIGNIFICANT CHANGE UP
OVALOCYTES BLD QL SMEAR: SLIGHT — SIGNIFICANT CHANGE UP
PLAT MORPH BLD: NORMAL — SIGNIFICANT CHANGE UP
PLATELET # BLD AUTO: 334 K/UL — SIGNIFICANT CHANGE UP (ref 150–400)
POIKILOCYTOSIS BLD QL AUTO: SLIGHT — SIGNIFICANT CHANGE UP
PROTHROM AB SERPL-ACNC: 14.8 SEC — HIGH (ref 10.6–13.6)
RBC # BLD: 3.09 M/UL — LOW (ref 3.8–5.2)
RBC # FLD: 13.4 % — SIGNIFICANT CHANGE UP (ref 10.3–14.5)
RBC BLD AUTO: ABNORMAL
SCHISTOCYTES BLD QL AUTO: SLIGHT — SIGNIFICANT CHANGE UP
SPECIMEN SOURCE: SIGNIFICANT CHANGE UP
VARIANT LYMPHS # BLD: 1 % — SIGNIFICANT CHANGE UP (ref 0–6)
WBC # BLD: 11.84 K/UL — HIGH (ref 3.8–10.5)
WBC # FLD AUTO: 11.84 K/UL — HIGH (ref 3.8–10.5)

## 2020-08-31 PROCEDURE — 74176 CT ABD & PELVIS W/O CONTRAST: CPT | Mod: 26

## 2020-08-31 PROCEDURE — 99232 SBSQ HOSP IP/OBS MODERATE 35: CPT

## 2020-08-31 RX ORDER — ERTAPENEM SODIUM 1 G/1
1000 INJECTION, POWDER, LYOPHILIZED, FOR SOLUTION INTRAMUSCULAR; INTRAVENOUS EVERY 24 HOURS
Refills: 0 | Status: COMPLETED | OUTPATIENT
Start: 2020-08-31 | End: 2020-09-01

## 2020-08-31 RX ORDER — IOHEXOL 300 MG/ML
500 INJECTION, SOLUTION INTRAVENOUS
Refills: 0 | Status: DISCONTINUED | OUTPATIENT
Start: 2020-08-31 | End: 2020-08-31

## 2020-08-31 RX ORDER — LACTOBACILLUS ACIDOPHILUS 100MM CELL
1 CAPSULE ORAL THREE TIMES A DAY
Refills: 0 | Status: DISCONTINUED | OUTPATIENT
Start: 2020-08-31 | End: 2020-09-08

## 2020-08-31 RX ORDER — IOHEXOL 300 MG/ML
500 INJECTION, SOLUTION INTRAVENOUS
Refills: 0 | Status: COMPLETED | OUTPATIENT
Start: 2020-08-31 | End: 2020-08-31

## 2020-08-31 RX ADMIN — Medication 650 MILLIGRAM(S): at 06:06

## 2020-08-31 RX ADMIN — Medication 1 TABLET(S): at 21:37

## 2020-08-31 RX ADMIN — Medication 1 TABLET(S): at 11:46

## 2020-08-31 RX ADMIN — Medication 1 TABLET(S): at 13:27

## 2020-08-31 RX ADMIN — CARBIDOPA AND LEVODOPA 1 TABLET(S): 25; 100 TABLET ORAL at 17:42

## 2020-08-31 RX ADMIN — QUETIAPINE FUMARATE 25 MILLIGRAM(S): 200 TABLET, FILM COATED ORAL at 17:43

## 2020-08-31 RX ADMIN — Medication 325 MILLIGRAM(S): at 11:46

## 2020-08-31 RX ADMIN — HEPARIN SODIUM 5000 UNIT(S): 5000 INJECTION INTRAVENOUS; SUBCUTANEOUS at 13:27

## 2020-08-31 RX ADMIN — PANTOPRAZOLE SODIUM 40 MILLIGRAM(S): 20 TABLET, DELAYED RELEASE ORAL at 05:04

## 2020-08-31 RX ADMIN — IRON SUCROSE 100 MILLIGRAM(S): 20 INJECTION, SOLUTION INTRAVENOUS at 11:47

## 2020-08-31 RX ADMIN — HEPARIN SODIUM 5000 UNIT(S): 5000 INJECTION INTRAVENOUS; SUBCUTANEOUS at 05:04

## 2020-08-31 RX ADMIN — HEPARIN SODIUM 5000 UNIT(S): 5000 INJECTION INTRAVENOUS; SUBCUTANEOUS at 21:37

## 2020-08-31 RX ADMIN — Medication 1 DROP(S): at 11:46

## 2020-08-31 RX ADMIN — Medication 500 MILLIGRAM(S): at 11:47

## 2020-08-31 RX ADMIN — Medication 1 SUPPOSITORY(S): at 17:43

## 2020-08-31 RX ADMIN — IOHEXOL 500 MILLILITER(S): 300 INJECTION, SOLUTION INTRAVENOUS at 12:54

## 2020-08-31 RX ADMIN — QUETIAPINE FUMARATE 25 MILLIGRAM(S): 200 TABLET, FILM COATED ORAL at 05:04

## 2020-08-31 RX ADMIN — CARBIDOPA AND LEVODOPA 1 TABLET(S): 25; 100 TABLET ORAL at 11:46

## 2020-08-31 RX ADMIN — CARBIDOPA AND LEVODOPA 1 TABLET(S): 25; 100 TABLET ORAL at 08:39

## 2020-08-31 RX ADMIN — IOHEXOL 500 MILLILITER(S): 300 INJECTION, SOLUTION INTRAVENOUS at 11:47

## 2020-08-31 RX ADMIN — Medication 1 SUPPOSITORY(S): at 05:04

## 2020-08-31 RX ADMIN — LIDOCAINE 1 PATCH: 4 CREAM TOPICAL at 12:55

## 2020-08-31 RX ADMIN — LIDOCAINE 1 PATCH: 4 CREAM TOPICAL at 21:38

## 2020-08-31 RX ADMIN — ERTAPENEM SODIUM 120 MILLIGRAM(S): 1 INJECTION, POWDER, LYOPHILIZED, FOR SOLUTION INTRAMUSCULAR; INTRAVENOUS at 17:43

## 2020-08-31 NOTE — PROGRESS NOTE ADULT - SUBJECTIVE AND OBJECTIVE BOX
INTERVAL HPI/OVERNIGHT EVENTS:  pt seen and examined  lethargic in bed but arousable  per rn having +non bloody, loose bms  fever this am      MEDICATIONS  (STANDING):  amLODIPine   Tablet 10 milliGRAM(s) Oral daily  ascorbic acid 500 milliGRAM(s) Oral daily  calcium carbonate 1250 mG  + Vitamin D (OsCal 500 + D) 1 Tablet(s) Oral daily  carbidopa/levodopa  25/100 1 Tablet(s) Oral <User Schedule>  cefTRIAXone   IVPB 1000 milliGRAM(s) IV Intermittent every 24 hours  ferrous    sulfate 325 milliGRAM(s) Oral daily  heparin   Injectable 5000 Unit(s) SubCutaneous every 8 hours  hydrocortisone hemorrhoidal Suppository 1 Suppository(s) Rectal two times a day  lidocaine   Patch 1 Patch Transdermal every 24 hours  multivitamin/minerals 1 Tablet(s) Oral daily  pantoprazole    Tablet 40 milliGRAM(s) Oral before breakfast  prednisoLONE acetate 1% Suspension 1 Drop(s) Both EYES daily  QUEtiapine 25 milliGRAM(s) Oral two times a day    MEDICATIONS  (PRN):  acetaminophen   Tablet .. 650 milliGRAM(s) Oral every 6 hours PRN Mild Pain (1 - 3)  acetaminophen   Tablet .. 650 milliGRAM(s) Oral every 6 hours PRN Temp greater or equal to 38C (100.4F)  oxyCODONE    IR 5 milliGRAM(s) Oral four times a day PRN Severe Pain (7 - 10)  traMADol 25 milliGRAM(s) Oral every 6 hours PRN Moderate Pain (4 - 6)      Allergies    tetracycline (Unknown)    Intolerances      unable to obtain in entirety       PHYSICAL EXAM:     Vital Signs Last 24 Hrs  T(C): 37.4 (30 Aug 2020 06:00), Max: 38.8 (30 Aug 2020 04:28)  T(F): 99.4 (30 Aug 2020 06:00), Max: 101.8 (30 Aug 2020 04:28)  HR: 86 (30 Aug 2020 04:28) (86 - 99)  BP: 96/58 (30 Aug 2020 04:28) (96/58 - 133/73)  BP(mean): --  RR: 19 (30 Aug 2020 04:28) (17 - 19)  SpO2: 94% (30 Aug 2020 04:28) (93% - 94%)        GENERAL:  lying  in bed  HEENT:  NC/AT  ABDOMEN:  Soft mild suprapubic ttp and llq ttp mild dt  EXTREMITIES  no  edema  NEURO:  lethargic but arousable, confused          LABS:                        8.7    10.76 )-----------( 297      ( 30 Aug 2020 08:34 )             26.4     08-29    139  |  109<H>  |  39<H>  ----------------------------<  93  3.8   |  21<L>  |  0.77    Ca    7.4<L>      29 Aug 2020 07:22            08-29-20 @ 07:01  -  08-30-20 @ 07:00  --------------------------------------------------------  IN: 50 mL / OUT: 200 mL / NET: -150 mL        RADIOLOGY & ADDITIONAL TESTS:

## 2020-08-31 NOTE — PROGRESS NOTE ADULT - ASSESSMENT
anemia  gi bleed  diarrhea  uti  fever    no overt s/s gib; egd/colon on hold given fever  f/u ct ap for further eval; f/u am labs  if loose stools continue, check stool studies  monitor cbc, transfuse prn  downgraded to liquid diet  cont abx as per id  cont ppi daily, anusol bid  add probiotic tid  monitor exam/gi fxn  further recs pending above

## 2020-08-31 NOTE — PROGRESS NOTE ADULT - ASSESSMENT
75 year old woman with HTN, Parkinson's disease, dementia, anemia, for EGD/colonoscopy, but now on hold for fever.    - EGD and colonoscopy on hold for fever  - Fever work up and abx per primary team.  - When ready, remains optimized for the OR from a cardiac point of view with no evidence of active ischemic heart disease, decompensated heart failure, severe obstructive valvular disease, or uncontrolled arrhythmia.  - Continue amlodipine for BP control BP: 110/69 (08-31-20 @ 04:35) (91/56 - 112/71)  - Monitor and replete lytes  - To follow closely with you    - Monitor and replete lytes, keep K>4, Mg>2.  - All other medical needs as per primary team.  - Other cardiovascular workup will depend on clinical course.  - Will continue to follow.    Radha Louise MS FNP, [facility]  Nurse Practitioner- Cardiology   Spectra #[phone]

## 2020-08-31 NOTE — PROGRESS NOTE ADULT - ASSESSMENT
contact #  son----Roman Wilson  phone # [phone],, called and discussed with son  IMPRESSION:  75/ F with PMH of Parkinson's disease and mild cognitive impairment, ?dementia per son, ch anemia per son and was on iron outpatient in past, htn in 2020 who comes s/p fall around 8/25/20 and noted pubic rami fracture,  seen by orthopedics and is on medical management, guaiac positive stool, seen by gi and for possible egd/colon for monday, per pt son may had colonoscopy over 18 years ago, hb was at around 11--11.5, per son pt with always anemia and iron deficiency, pt had ct renal stone [place] in 5/2020--hepatic cyst, son declines repeat ct a/p at this time until endoscopy exam. As I spoke to son, Roman Wilson on phone patient with h/o admission at [facility] in around 5/2020 after fall at home and was told rib fracture, had hb around 11-11.5. Patient was d/c'd from [facility] Thursday, 8.20.2020, after residing there for about 3 months. He reports that patient was doing well when she got home -- she was able to ambulate short distances in the house with a walker.   Anemia--likely multifactorial, h/o ch anemia/iron def as per son, iron profile this admission--low iron/transferrin saturation, seen by gi and for possible egd/colon on monday  RECOMMENDATION:  Anemia--likely multifactorial, guaiac pos stool, h/o ch anemia/iron def/multifactorial  iron profile --low iron/transferrin saturation  patient is on iv iron 100 mg a day for 3 days as looking for a rapid response, day 2 (2/3) today  seen by gi , was planned for egd/colon today and postponed by gi, final decision gi work up as per pt/family/gi to exclude gi pathology/malignancy  guaiac pos stool/liver cyst on earlier ct--son declines ct a/p, radio scan--final decision/work up per gi  no evidence of hemolysis  hb is at 8.4 today, hb was at 8.7 yesterday  monitor h/h--transfuse prbc as needed for symptomatic anemia  smear reviewed  fever/bandemia/uti--seen by id , work and management per id, is on iv invanz  gi/dvt prophylaxis--heparin s/c  d/w son on phone and in agreement of above

## 2020-08-31 NOTE — PROGRESS NOTE ADULT - SUBJECTIVE AND OBJECTIVE BOX
Patient is a 75y old  Female who presents with a chief complaint of gi bleed  anemia (31 Aug 2020 09:20)      Subjective: Patient seen and examined at bedside. No acute events overnight.     PAIN: N  DYSPNEA: N	  NAUS/VOM: N	  SECRETIONS: N	  AGITATION: N    OTHER REVIEW OF SYSTEMS: + fever      T(C): 38.7 (08-31-20 @ 06:10), Max: 38.7 (08-31-20 @ 06:10)  HR: 95 (08-31-20 @ 04:35) (89 - 96)  BP: 110/69 (08-31-20 @ 04:35) (91/56 - 112/71)  RR: 19 (08-31-20 @ 04:35) (18 - 19)  SpO2: 96% (08-31-20 @ 04:35) (96% - 97%)  Wt(kg): --  GENERAL:  resting comfortably in NAD  HEENT:  NC/AT EOMI PERRL  NECK: supple no JVD  CVS:  +S1 S2 RRR  RESP: CTA B/L  GI:  soft NT/ND +BS  : no suprapubic tenderness  MUSC:  no lower extremity edema  SKIN:  Warm, moist, no rashes   LYMPH: normal     MEDS REVIEWED	          OPIATE NAÏVE (Y/N)    tetracycline (Unknown)      LABS REVIEWED:                        8.4    11.84 )-----------( 334      ( 31 Aug 2020 08:46 )             25.6             IMAGING REVIEWED    ADVANCED DIRECTIVES:     FULL CODE         PSYCHOSOCIAL-SPIRITUAL ASSESSMENT:    _x__Reviewed     _x__Care  plan unchanged     ___Care plan adjusted as below    GOALS OF CARE DISCUSSION  	_x__Palliative care info/counseling provided	    ___Family meeting  	_x__Advanced Directives addressed	    ___See previous Palliative Medicine Note    AGENCY CHOICE DISCUSSED:   ___HOSPICE   ___CALVARY  ___OTHER:              > 50% OF THE TIME SPENT IN COUNSELING AND COORDINATING CARE 	    Minutes:  50      PROLONGED SERVICE             FACE TO FACE:    PT            PT & FAMILY	       Minutes:      Advance Care Planning Time:

## 2020-08-31 NOTE — PROGRESS NOTE ADULT - PROBLEM SELECTOR PLAN 2
serial CBC  Anemia--likely multifactorial, guaiac pos stool, h/o ch anemia/iron def/multifactorial  patient is on iv iron 100 mg a day for 3 days as looking for a rapid response, day 2 (2/3) today

## 2020-08-31 NOTE — PROGRESS NOTE ADULT - SUBJECTIVE AND OBJECTIVE BOX
JAYJAY NAJERA is a 75yFemale , patient examined and chart reviewed.     INTERVAL HPI/ OVERNIGHT EVENTS:   Still febrile Tmax 101.7 this am. NAD.  In chair.    PAST MEDICAL & SURGICAL HISTORY:  Dementia  Anemia  Cataract  Parkinsons  History of left hip replacement      For details regarding the patient's social history, family history, and other miscellaneous elements, please refer the initial infectious diseases consultation and/or the admitting history and physical examination for this admission.    ROS: UTO sec pt's condition    ALLERGIES:  tetracycline (Unknown)      Current inpatient medications :    ANTIBIOTICS/RELEVANT:  cefTRIAXone   IVPB 1000 milliGRAM(s) IV Intermittent every 24 hours    MEDICATIONS  (STANDING):  amLODIPine   Tablet 10 milliGRAM(s) Oral daily  ascorbic acid 500 milliGRAM(s) Oral daily  calcium carbonate 1250 mG  + Vitamin D (OsCal 500 + D) 1 Tablet(s) Oral daily  carbidopa/levodopa  25/100 1 Tablet(s) Oral <User Schedule>  ferrous    sulfate 325 milliGRAM(s) Oral daily  heparin   Injectable 5000 Unit(s) SubCutaneous every 8 hours  hydrocortisone hemorrhoidal Suppository 1 Suppository(s) Rectal two times a day  iron sucrose Injectable 100 milliGRAM(s) IV Push every 24 hours  lactobacillus acidophilus 1 Tablet(s) Oral three times a day  lidocaine   Patch 1 Patch Transdermal every 24 hours  multivitamin/minerals 1 Tablet(s) Oral daily  pantoprazole    Tablet 40 milliGRAM(s) Oral before breakfast  prednisoLONE acetate 1% Suspension 1 Drop(s) Both EYES daily  QUEtiapine 25 milliGRAM(s) Oral two times a day    MEDICATIONS  (PRN):  acetaminophen   Tablet .. 650 milliGRAM(s) Oral every 6 hours PRN Mild Pain (1 - 3)  acetaminophen   Tablet .. 650 milliGRAM(s) Oral every 6 hours PRN Temp greater or equal to 38C (100.4F)  oxyCODONE    IR 5 milliGRAM(s) Oral four times a day PRN Severe Pain (7 - 10)  traMADol 25 milliGRAM(s) Oral every 6 hours PRN Moderate Pain (4 - 6)      Objective:  Vital Signs Last 24 Hrs  T(C): 38.7 (31 Aug 2020 06:10), Max: 38.7 (31 Aug 2020 06:10)  T(F): 101.7 (31 Aug 2020 06:10), Max: 101.7 (31 Aug 2020 06:10)  HR: 90 (31 Aug 2020 12:39) (89 - 96)  BP: 98/60 (31 Aug 2020 12:39) (91/56 - 112/71)  RR: 18 (31 Aug 2020 12:39) (18 - 19)  SpO2: 96% (31 Aug 2020 12:39) (96% - 97%)    Physical Exam:  General: no acute distress  Eyes: sclera anicteric, pupils equal and reactive to light  ENMT: buccal mucosa moist, pharynx not injected  Neck: supple, trachea midline  Lungs: clear, no wheeze/rhonchi  Cardiovascular: regular rate and rhythm, S1 S2  Abdomen: soft, nontender, no organomegaly present, bowel sounds normal  Neurological: alert and oriented x1, Cranial Nerves II-XII grossly intact  Skin: no increased ecchymosis/petechiae/purpura  Lymph Nodes: no palpable cervical/supraclavicular lymph nodes enlargements  Extremities: no cyanosis/clubbing/edema      LABS:                                 8.4    11.84 )-----------( 334      ( 31 Aug 2020 08:46 )             25.6       PT/INR - ( 30 Aug 2020 11:52 )   PT: 13.9 sec;   INR: 1.20 ratio         PTT - ( 30 Aug 2020 11:52 )  PTT:28.8 sec  Urinalysis Basic - ( 29 Aug 2020 19:02 )    Color: Yellow / Appearance: Slightly Turbid / S.015 / pH: x  Gluc: x / Ketone: Trace  / Bili: Negative / Urobili: Negative   Blood: x / Protein: 30 mg/dL / Nitrite: Positive   Leuk Esterase: Moderate / RBC: 3-5 /HPF / WBC >50   Sq Epi: x / Non Sq Epi: Occasional / Bacteria: Moderate    MICROBIOLOGY:    Culture - Urine (collected 30 Aug 2020 00:48)  Source: .Urine Clean Catch (Midstream)  Preliminary Report (30 Aug 2020 20:32):    >100,000 CFU/ml Escherichia coli    Culture - Blood (collected 29 Aug 2020 11:37)  Source: .Blood Blood  Preliminary Report (30 Aug 2020 12:01):    No growth to date.    Culture - Blood (collected 29 Aug 2020 11:37)  Source: .Blood Blood  Preliminary Report (30 Aug 2020 12:01):    No growth to date.    RADIOLOGY & ADDITIONAL STUDIES:  EXAM:  XR CHEST PORTABLE URGENT 1V                            PROCEDURE DATE:  2020          INTERPRETATION:  Fever.    AP chest. Prior 2020.    No change heart mediastinum. Biapical pleural thickening. No consolidation or effusion. Degenerative change bilateral shoulders.    Impression: No active infiltrates.      EXAM:  CT CHEST                            PROCEDURE DATE:  2020          INTERPRETATION:  CLINICAL INFORMATION: Fall right rib pain    COMPARISON: None.    PROCEDURE:  CT of the Chest was performed without intravenous contrast.  Sagittal andcoronal reformats were performed.    FINDINGS:    LUNGS AND AIRWAYS: Patent central airways.  Biapical scarring.  PLEURA: No pleural effusion.  MEDIASTINUM AND TY: No lymphadenopathy.  VESSELS: Nonaneurysmal  HEART: Heart size is normal. Coronary artery calcification. Decrease cardiac chamber blood pool attenuation suggests an anemic state.. No pericardial effusion.  CHEST WALL AND LOWER NECK: Within normal limits.  VISUALIZED UPPER ABDOMEN: Contracted gallbladder with calcified stones. A 2.1 cm hypodensity right hepatic lobe cannot be definitively characterized on this noncontrast study probably a cyst.  BONES: No acute    IMPRESSION:  No acute findings on this noncontrast study.      EXAM:  PELVIS AP ONLY                            PROCEDURE DATE:  2020          INTERPRETATION:  Pelvis    HISTORY: Fracture    Comparison: 2020    Frontal view of the pelvis shows a fracture of the right inferior pubic ramus which may not be acute. Left hip replacement is noted.    IMPRESSION: Right pubic fracture of indeterminate age.      Assessment :   74 yo F PMHx Parkinson's disease, early dementia admitted on 2020 for Rt lower rib pain s/p fall found to have age indeterminate right inferior pubic rami fracture. Course in hospital complicated by fevers likely sec UTI. No urinary retention. CXR clear. Still febrile.  Ucx with Ecoli ?ESBL. Likely sec UTI but due to persistent fever agree with CT AP.     Plan :   Change Rocephin to Invanz  Fu cultures  Trend temps and cbc  Fu CT AP  Asp precautions    D/w Dr Crabtree    Continue with present regiment.  Appropriate use of antibiotics and adverse effects reviewed.    > 35 minutes were spent in direct patient care reviewing notes, medications ,labs data/ imaging , discussion with multidisciplinary team.    Thank you for allowing me to participate in care of your patient .    Daniel Navarro MD  Infectious Disease  [phone]

## 2020-08-31 NOTE — PROGRESS NOTE ADULT - SUBJECTIVE AND OBJECTIVE BOX
Patient is a 75y old  Female who presents with a chief complaint of gi bleed  anemia (31 Aug 2020 09:20)       Pt is seen and examined  pt is awake and lying in bed/out of bed to chair  pt seems comfortable and denies any complaints at this time    HPI:  74 yo F PMHx Parkinson's disease, early dementia p/w Rt lower rib pain s/p fall. Accidental fall, no dizziness/ chest pain/ loss of consciousness.   Patient states that she was trying to grab a piece of coffee cake from the cabinet, slipped backwards, fell onto her right side and hit back of her head. Denies chest pain/ palpitations/ shortness of breath.    Son Roman present, helped her up.     In the ed patient had x ray of pelvis show sRt pubic fracture  CT head no acute bleed  CT C spine no fracture (25 Aug 2020 23:29)         ROS:  Negative except for:    MEDICATIONS  (STANDING):  amLODIPine   Tablet 10 milliGRAM(s) Oral daily  ascorbic acid 500 milliGRAM(s) Oral daily  calcium carbonate 1250 mG  + Vitamin D (OsCal 500 + D) 1 Tablet(s) Oral daily  carbidopa/levodopa  25/100 1 Tablet(s) Oral <User Schedule>  ertapenem  IVPB 1000 milliGRAM(s) IV Intermittent every 24 hours  ferrous    sulfate 325 milliGRAM(s) Oral daily  heparin   Injectable 5000 Unit(s) SubCutaneous every 8 hours  hydrocortisone hemorrhoidal Suppository 1 Suppository(s) Rectal two times a day  iron sucrose Injectable 100 milliGRAM(s) IV Push every 24 hours  lactobacillus acidophilus 1 Tablet(s) Oral three times a day  lidocaine   Patch 1 Patch Transdermal every 24 hours  multivitamin/minerals 1 Tablet(s) Oral daily  pantoprazole    Tablet 40 milliGRAM(s) Oral before breakfast  prednisoLONE acetate 1% Suspension 1 Drop(s) Both EYES daily  QUEtiapine 25 milliGRAM(s) Oral two times a day    MEDICATIONS  (PRN):  acetaminophen   Tablet .. 650 milliGRAM(s) Oral every 6 hours PRN Mild Pain (1 - 3)  acetaminophen   Tablet .. 650 milliGRAM(s) Oral every 6 hours PRN Temp greater or equal to 38C (100.4F)  oxyCODONE    IR 5 milliGRAM(s) Oral four times a day PRN Severe Pain (7 - 10)  traMADol 25 milliGRAM(s) Oral every 6 hours PRN Moderate Pain (4 - 6)      Allergies    tetracycline (Unknown)    Intolerances        Vital Signs Last 24 Hrs  T(C): 37.2 (31 Aug 2020 14:32), Max: 38.7 (31 Aug 2020 06:10)  T(F): 99 (31 Aug 2020 14:32), Max: 101.7 (31 Aug 2020 06:10)  HR: 90 (31 Aug 2020 12:39) (90 - 96)  BP: 98/60 (31 Aug 2020 12:39) (98/60 - 112/71)  BP(mean): --  RR: 18 (31 Aug 2020 12:39) (18 - 19)  SpO2: 96% (31 Aug 2020 12:39) (96% - 97%)    PHYSICAL EXAM  General: adult in NAD  HEENT: clear oropharynx, anicteric sclera, pink conjunctiva  Neck: supple  CV: normal S1/S2 with no murmur rubs or gallops  Lungs: positive air movement b/l ant lungs,clear to auscultation, no wheezes, no rales  Abdomen: soft non-tender non-distended, no hepatosplenomegaly  Ext: no clubbing cyanosis or edema  Skin: no rashes and no petechiae  Neuro: alert and oriented X 4, no focal deficits  LABS:                          8.4    11.84 )-----------( 334      ( 31 Aug 2020 08:46 )             25.6         Mean Cell Volume : 82.8 fl  Mean Cell Hemoglobin : 27.2 pg  Mean Cell Hemoglobin Concentration : 32.8 gm/dL  Auto Neutrophil # : 7.22 K/uL  Auto Lymphocyte # : 2.60 K/uL  Auto Monocyte # : 1.78 K/uL  Auto Eosinophil # : 0.12 K/uL  Auto Basophil # : 0.00 K/uL  Auto Neutrophil % : 51.0 %  Auto Lymphocyte % : 22.0 %  Auto Monocyte % : 15.0 %  Auto Eosinophil % : 1.0 %  Auto Basophil % : 0.0 %    Serial CBC's  08-31 @ 08:46  Hct-25.6 / Hgb-8.4 / Plat-334 / RBC-3.09 / WBC-11.84          Serial CBC's  08-30 @ 08:34  Hct-26.4 / Hgb-8.7 / Plat-297 / RBC-3.17 / WBC-10.76          Serial CBC's  08-29 @ 14:43  Hct--- / Hgb--- / Plat--- / RBC-3.56 / WBC---          Serial CBC's  08-29 @ 07:22  Hct-27.6 / Hgb-9.0 / Plat-288 / RBC-3.29 / WBC-9.44          Serial CBC's  08-28 @ 09:20  Hct-27.6 / Hgb-8.9 / Plat-282 / RBC-3.27 / WBC-11.04                    PT/INR - ( 31 Aug 2020 08:46 )   PT: 14.8 sec;   INR: 1.28 ratio         PTT - ( 31 Aug 2020 08:46 )  PTT:26.6 sec    Ferritin, Serum: 127 ng/mL (08-29-20 @ 21:58)  Vitamin B12, Serum: 485 pg/mL (08-29-20 @ 21:58)  Folate, Serum: >20.0 ng/mL (08-29-20 @ 21:58)  Iron - Total Binding Capacity.: 263 ug/dL (08-29-20 @ 21:57)  Reticulocyte Percent: 1.0 % (08-29-20 @ 14:43)                BLOOD SMEAR INTERPRETATION:       RADIOLOGY & ADDITIONAL STUDIES: Patient is a 75y old  Female who presents with a chief complaint of gi bleed  anemia (31 Aug 2020 09:20)       Pt is seen and examined  pt is awake and lying in bed  pt seems comfortable at this time  t max 101.7, now temp 99, id is following and antibiotics changed to invanz as per id for fever/uti, pt is for ct scan a/p as per pcp/id    HPI:  76 yo F PMHx Parkinson's disease, early dementia p/w Rt lower rib pain s/p fall. Accidental fall, no dizziness/ chest pain/ loss of consciousness.   Patient states that she was trying to grab a piece of coffee cake from the cabinet, slipped backwards, fell onto her right side and hit back of her head. Denies chest pain/ palpitations/ shortness of breath.    Son Roman present, helped her up.     In the ed patient had x ray of pelvis show sRt pubic fracture  CT head no acute bleed  CT C spine no fracture (25 Aug 2020 23:29)         ROS:  as per hpi    MEDICATIONS  (STANDING):  amLODIPine   Tablet 10 milliGRAM(s) Oral daily  ascorbic acid 500 milliGRAM(s) Oral daily  calcium carbonate 1250 mG  + Vitamin D (OsCal 500 + D) 1 Tablet(s) Oral daily  carbidopa/levodopa  25/100 1 Tablet(s) Oral <User Schedule>  ertapenem  IVPB 1000 milliGRAM(s) IV Intermittent every 24 hours  ferrous    sulfate 325 milliGRAM(s) Oral daily  heparin   Injectable 5000 Unit(s) SubCutaneous every 8 hours  hydrocortisone hemorrhoidal Suppository 1 Suppository(s) Rectal two times a day  iron sucrose Injectable 100 milliGRAM(s) IV Push every 24 hours  lactobacillus acidophilus 1 Tablet(s) Oral three times a day  lidocaine   Patch 1 Patch Transdermal every 24 hours  multivitamin/minerals 1 Tablet(s) Oral daily  pantoprazole    Tablet 40 milliGRAM(s) Oral before breakfast  prednisoLONE acetate 1% Suspension 1 Drop(s) Both EYES daily  QUEtiapine 25 milliGRAM(s) Oral two times a day    MEDICATIONS  (PRN):  acetaminophen   Tablet .. 650 milliGRAM(s) Oral every 6 hours PRN Mild Pain (1 - 3)  acetaminophen   Tablet .. 650 milliGRAM(s) Oral every 6 hours PRN Temp greater or equal to 38C (100.4F)  oxyCODONE    IR 5 milliGRAM(s) Oral four times a day PRN Severe Pain (7 - 10)  traMADol 25 milliGRAM(s) Oral every 6 hours PRN Moderate Pain (4 - 6)      Allergies    tetracycline (Unknown)    Intolerances        Vital Signs Last 24 Hrs  T(C): 37.2 (31 Aug 2020 14:32), Max: 38.7 (31 Aug 2020 06:10)  T(F): 99 (31 Aug 2020 14:32), Max: 101.7 (31 Aug 2020 06:10)  HR: 90 (31 Aug 2020 12:39) (90 - 96)  BP: 98/60 (31 Aug 2020 12:39) (98/60 - 112/71)  BP(mean): --  RR: 18 (31 Aug 2020 12:39) (18 - 19)  SpO2: 96% (31 Aug 2020 12:39) (96% - 97%)    PHYSICAL EXAM  General: adult in NAD  HEENT: clear oropharynx, anicteric sclera, pink conjunctiva  Neck: supple  CV: normal S1/S2 with no murmur rubs or gallops  Lungs: positive air movement b/l ant lungs,clear to auscultation, no wheezes, no rales  Abdomen: soft non-tender non-distended, no hepatosplenomegaly  Ext: no clubbing cyanosis or edema  Skin: no rashes and no petechiae  Neuro: alert and oriented X 4, no focal deficits  LABS:                          8.4    11.84 )-----------( 334      ( 31 Aug 2020 08:46 )             25.6         Mean Cell Volume : 82.8 fl  Mean Cell Hemoglobin : 27.2 pg  Mean Cell Hemoglobin Concentration : 32.8 gm/dL  Auto Neutrophil # : 7.22 K/uL  Auto Lymphocyte # : 2.60 K/uL  Auto Monocyte # : 1.78 K/uL  Auto Eosinophil # : 0.12 K/uL  Auto Basophil # : 0.00 K/uL  Auto Neutrophil % : 51.0 %  Auto Lymphocyte % : 22.0 %  Auto Monocyte % : 15.0 %  Auto Eosinophil % : 1.0 %  Auto Basophil % : 0.0 %    Serial CBC's  08-31 @ 08:46  Hct-25.6 / Hgb-8.4 / Plat-334 / RBC-3.09 / WBC-11.84          Serial CBC's  08-30 @ 08:34  Hct-26.4 / Hgb-8.7 / Plat-297 / RBC-3.17 / WBC-10.76          Serial CBC's  08-29 @ 14:43  Hct--- / Hgb--- / Plat--- / RBC-3.56 / WBC---          Serial CBC's  08-29 @ 07:22  Hct-27.6 / Hgb-9.0 / Plat-288 / RBC-3.29 / WBC-9.44          Serial CBC's  08-28 @ 09:20  Hct-27.6 / Hgb-8.9 / Plat-282 / RBC-3.27 / WBC-11.04                    PT/INR - ( 31 Aug 2020 08:46 )   PT: 14.8 sec;   INR: 1.28 ratio         PTT - ( 31 Aug 2020 08:46 )  PTT:26.6 sec    Ferritin, Serum: 127 ng/mL (08-29-20 @ 21:58)  Vitamin B12, Serum: 485 pg/mL (08-29-20 @ 21:58)  Folate, Serum: >20.0 ng/mL (08-29-20 @ 21:58)  Iron - Total Binding Capacity.: 263 ug/dL (08-29-20 @ 21:57)  Reticulocyte Percent: 1.0 % (08-29-20 @ 14:43)

## 2020-08-31 NOTE — PROGRESS NOTE ADULT - SUBJECTIVE AND OBJECTIVE BOX
Neurology follow up note    JAYJAY NAJERA75yFemale      Interval History:    Patient feels ok no new complaints.    MEDICATIONS    acetaminophen   Tablet .. 650 milliGRAM(s) Oral every 6 hours PRN  acetaminophen   Tablet .. 650 milliGRAM(s) Oral every 6 hours PRN  amLODIPine   Tablet 10 milliGRAM(s) Oral daily  ascorbic acid 500 milliGRAM(s) Oral daily  calcium carbonate 1250 mG  + Vitamin D (OsCal 500 + D) 1 Tablet(s) Oral daily  carbidopa/levodopa  25/100 1 Tablet(s) Oral <User Schedule>  cefTRIAXone   IVPB 1000 milliGRAM(s) IV Intermittent every 24 hours  ferrous    sulfate 325 milliGRAM(s) Oral daily  heparin   Injectable 5000 Unit(s) SubCutaneous every 8 hours  hydrocortisone hemorrhoidal Suppository 1 Suppository(s) Rectal two times a day  iohexol 300 mG (iodine)/mL Oral Solution 500 milliLiter(s) Oral every 1 hour  iron sucrose Injectable 100 milliGRAM(s) IV Push every 24 hours  lactobacillus acidophilus 1 Tablet(s) Oral three times a day  lidocaine   Patch 1 Patch Transdermal every 24 hours  multivitamin/minerals 1 Tablet(s) Oral daily  oxyCODONE    IR 5 milliGRAM(s) Oral four times a day PRN  pantoprazole    Tablet 40 milliGRAM(s) Oral before breakfast  prednisoLONE acetate 1% Suspension 1 Drop(s) Both EYES daily  QUEtiapine 25 milliGRAM(s) Oral two times a day  traMADol 25 milliGRAM(s) Oral every 6 hours PRN      Allergies    tetracycline (Unknown)    Intolerances            Vital Signs Last 24 Hrs  T(C): 38.7 (31 Aug 2020 06:10), Max: 38.7 (31 Aug 2020 06:10)  T(F): 101.7 (31 Aug 2020 06:10), Max: 101.7 (31 Aug 2020 06:10)  HR: 95 (31 Aug 2020 04:35) (89 - 96)  BP: 110/69 (31 Aug 2020 04:35) (91/56 - 112/71)  BP(mean): --  RR: 19 (31 Aug 2020 04:35) (18 - 19)  SpO2: 96% (31 Aug 2020 04:35) (96% - 97%)      REVIEW OF SYSTEMS:  Constitutional:  No fever, chills, or night sweats.  Head:  Positive headache located in the occipital region in the area of head trauma.  Eyes:  No double vision or blurry vision.  Ears:  No ringing in the ears.  Neck:  No neck pain.  Respiratory:  No shortness of breath.  Cardiovascular:  No chest pain.  Abdomen:  No nausea, vomiting, or abdominal pain.  Extremities/Neurological:  No numbness or tingling.  Musculoskeletal:  Occasion joint pain.  Genitourinary:  No burning upon urination.  Psychiatric:  No underlying anxiety or depression.    PHYSICAL EXAMINATION: HEENT:  Head:  Normocephalic.  Eyes:  No scleral icterus.  Ears:  Hearing bilaterally intact.  NECK:  Supple.  RESPIRATORY:  Good air entry bilaterally.  CARDIOVASCULAR:  S1 and S2 heard.  ABDOMEN:  Soft and nontender.  EXTREMITIES:  No clubbing or cyanosis were noted.      NEUROLOGIC:  The patient is awake and alert.  Location was hospital.  Year was .  Recall was 1/3 within three minutes, able to name simple objects.  positive  confusion today   Extraocular movements were intact.  Pupils were equal, round, and reactive bilaterally, 3 mm to 2 mm.  Speech was fluent.  Smile was symmetric.  Motor:  Bilateral upper were 4/5.  Bilateral lower were 3-/5.  Sensory:  Bilateral upper and lower intact to light touch.  The patient has increased significant tone in bilateral lower extremities, does have a decreased range of motion of her left foot, has a history of foot drop from previous trauma.  Positive resting tremors were noted, left hand greater than right.  Positive cogwheel rigidity was noted.  Positive bradykinesia was noted.                  LABS:  CBC Full  -  ( 31 Aug 2020 08:46 )  WBC Count : 11.84 K/uL  RBC Count : 3.09 M/uL  Hemoglobin : 8.4 g/dL  Hematocrit : 25.6 %  Platelet Count - Automated : 334 K/uL  Mean Cell Volume : 82.8 fl  Mean Cell Hemoglobin : 27.2 pg  Mean Cell Hemoglobin Concentration : 32.8 gm/dL  Auto Neutrophil # : 7.22 K/uL  Auto Lymphocyte # : 2.60 K/uL  Auto Monocyte # : 1.78 K/uL  Auto Eosinophil # : 0.12 K/uL  Auto Basophil # : 0.00 K/uL  Auto Neutrophil % : 51.0 %  Auto Lymphocyte % : 22.0 %  Auto Monocyte % : 15.0 %  Auto Eosinophil % : 1.0 %  Auto Basophil % : 0.0 %    Urinalysis Basic - ( 29 Aug 2020 19:02 )    Color: Yellow / Appearance: Slightly Turbid / S.015 / pH: x  Gluc: x / Ketone: Trace  / Bili: Negative / Urobili: Negative   Blood: x / Protein: 30 mg/dL / Nitrite: Positive   Leuk Esterase: Moderate / RBC: 3-5 /HPF / WBC >50   Sq Epi: x / Non Sq Epi: Occasional / Bacteria: Moderate            Hemoglobin A1C:       Vitamin B12   PT/INR - ( 31 Aug 2020 08:46 )   PT: 14.8 sec;   INR: 1.28 ratio         PTT - ( 31 Aug 2020 08:46 )  PTT:26.6 sec      RADIOLOGY    ANALYSIS AND PLAN:  A 75-year-old with an episode of fall, head trauma, and mild cognitive impairment.  1.	For episode of fall, this appears to be most likely mechanical in nature.  There are no clear new signs, even though examination of the right lower extremity is limited to suggest new cerebrovascular accident has ensued.  2.	I Would recommend fall precautions.  3.	Physical Therapy evaluation if possible.  4.	For episode of head trauma, I would recommend neuro checks as needed.  5.	For history of Parkinson's disease, continue the patient on Sinemet.  6.	For mild cognitive impairment versus subtle dementia, I would recommend supportive therapy.  7.	Spoke with the son, Roman, his telephone number is [phone]  8.	AMS suspect change in location more prominent today   9.	limit dopamine blocking agents if possible   10.	temperatures infection work up as needed   11.	Greater than 40 minutes of time was spent with the patient, plan of care, reviewing data, and speaking to the family and multidisciplinary healthcare team.    Neurologic standpoint only cleared for discharge planning

## 2020-08-31 NOTE — PROGRESS NOTE ADULT - ASSESSMENT
8.26.2020 This is a 75 F with PMH of Parkinson's disease and mild cognitive impairment who comes s/p fall and pubic rami fracture. I spoke to son, Roman Wilson. Patient was d/c'd from [facility] last Thursday, 8.20.2020, after residing there for about 3 months. He reports that patient was doing well when she got home -- she was able to ambulate short distances in the house with a walker. She needs moderate help with her ADLs. He wants to take patient home with home care when she is medically ready. He is going to hire a HHA for assistance with patient's ADLs. He says that he is the HCP. Patient does not have any advanced directives such as DNR/DNI. At this point, he wants everything done. Will follow.    8.27.2020 Patient remains a full code. Patient to go home with home care in AM. Son is planning to hire a HHA.    8.28.2020 Seen by psych. Patient has no medical decision making capacity. D/C planning home with home care and HHA.    8.29.2020 Febrile overnight. For EGD/colon on Monday for GIB.    8.30.2020 Still febrile. Started on iv abx for possible UTI. Cultures pending. EGD/colonscopy on hold.     8.31.2020 Still febrile prossibly from UTI. For CT a/p to r/o abscess. On abx. EGD/colonoscopy on hold.

## 2020-08-31 NOTE — CHART NOTE - NSCHARTNOTEFT_GEN_A_CORE
Assessment: patient seen for malnutrition follow up. per RN 25% of breakfast taken with encouragement. patient seen sleeping , per RN patient fell asleep after breakfast. Iron low on venofer. EGD/Colonoscopy now held in setting of fever. 8/31 BM     Factors impacting intake: [ ] none [ ] nausea  [ ] vomiting [ ] diarrhea [ ] constipation  [ ]chewing problems [ ] swallowing issues  [x ] other: soft loose stools    Diet Presciption: Diet, Full Liquid (08-30-20 @ 09:26)    Intake: 25% this AM    Current Weight: Weight (kg): 54.4 (08-25 @ 23:29)  % Weight Change    Pertinent Medications: MEDICATIONS  (STANDING):  amLODIPine   Tablet 10 milliGRAM(s) Oral daily  ascorbic acid 500 milliGRAM(s) Oral daily  calcium carbonate 1250 mG  + Vitamin D (OsCal 500 + D) 1 Tablet(s) Oral daily  carbidopa/levodopa  25/100 1 Tablet(s) Oral <User Schedule>  cefTRIAXone   IVPB 1000 milliGRAM(s) IV Intermittent every 24 hours  ferrous    sulfate 325 milliGRAM(s) Oral daily  heparin   Injectable 5000 Unit(s) SubCutaneous every 8 hours  hydrocortisone hemorrhoidal Suppository 1 Suppository(s) Rectal two times a day  iohexol 300 mG (iodine)/mL Oral Solution 500 milliLiter(s) Oral every 1 hour  iron sucrose Injectable 100 milliGRAM(s) IV Push every 24 hours  lactobacillus acidophilus 1 Tablet(s) Oral three times a day  lidocaine   Patch 1 Patch Transdermal every 24 hours  multivitamin/minerals 1 Tablet(s) Oral daily  pantoprazole    Tablet 40 milliGRAM(s) Oral before breakfast  prednisoLONE acetate 1% Suspension 1 Drop(s) Both EYES daily  QUEtiapine 25 milliGRAM(s) Oral two times a day    MEDICATIONS  (PRN):  acetaminophen   Tablet .. 650 milliGRAM(s) Oral every 6 hours PRN Mild Pain (1 - 3)  acetaminophen   Tablet .. 650 milliGRAM(s) Oral every 6 hours PRN Temp greater or equal to 38C (100.4F)  oxyCODONE    IR 5 milliGRAM(s) Oral four times a day PRN Severe Pain (7 - 10)  traMADol 25 milliGRAM(s) Oral every 6 hours PRN Moderate Pain (4 - 6)    Pertinent Labs: 08-29 Na139 mmol/L Glu 93 mg/dL K+ 3.8 mmol/L Cr  0.77 mg/dL BUN 39 mg/dL<H> 08-25 Alb 3.6 g/dL        Skin: no pressure injury     Estimated Needs:   [x ] no change since previous assessment  [ ] recalculated:     Previous Nutrition Diagnosis:   [ ] Inadequate Energy Intake [ ]Inadequate Oral Intake [ ] Excessive Energy Intake   [ ] Underweight [ ] Increased Nutrient Needs [ ] Overweight/Obesity   [ ] Altered GI Function [ ] Unintended Weight Loss [ ] Food & Nutrition Related Knowledge Deficit [x ] Malnutrition     Nutrition Diagnosis is [x ] ongoing  [ ] resolved [ ] not applicable     New Nutrition Diagnosis: [x ] not applicable       Interventions:   Recommend  [ ] Change Diet To:  [ ] Nutrition Supplement  [ ] Nutrition Support  [x ] Other: continue diet as ordered. will continue health shake TID , recommend possibly add ensure clear TID     Monitoring and Evaluation:   [x ] PO intake [ x ] Tolerance to diet prescription [ x ] weights [ x ] labs[ x ] follow up per protocol  [ ] other:

## 2020-08-31 NOTE — PROGRESS NOTE ADULT - SUBJECTIVE AND OBJECTIVE BOX
[facility] -- Eugene York Grossman, [place], Matias Jacobsen Savella, Goodger: Office # [phone]    Follow Up:  HTN     Subjective/Observations: Patient seen and examined. Patient awake and alert, resting comfortably in bed. No complaints of chest pain, SOB, LE edema, cough. No signs of orthopnea or PND. EGD and colonoscopy on hold now.      REVIEW OF SYSTEMS: All review of systems is negative for eye, ENT, GI, , allergic, dermatologic, musculoskeletal and neurologic except as described above    PAST MEDICAL & SURGICAL HISTORY:  Dementia  Anemia  Anemia  Cataract  Parkinsons  History of left hip replacement    MEDICATIONS  (STANDING):  amLODIPine   Tablet 10 milliGRAM(s) Oral daily  ascorbic acid 500 milliGRAM(s) Oral daily  calcium carbonate 1250 mG  + Vitamin D (OsCal 500 + D) 1 Tablet(s) Oral daily  carbidopa/levodopa  25/100 1 Tablet(s) Oral <User Schedule>  cefTRIAXone   IVPB 1000 milliGRAM(s) IV Intermittent every 24 hours  ferrous    sulfate 325 milliGRAM(s) Oral daily  heparin   Injectable 5000 Unit(s) SubCutaneous every 8 hours  hydrocortisone hemorrhoidal Suppository 1 Suppository(s) Rectal two times a day  iohexol 300 mG (iodine)/mL Oral Solution 500 milliLiter(s) Oral every 1 hour  iron sucrose Injectable 100 milliGRAM(s) IV Push every 24 hours  lactobacillus acidophilus 1 Tablet(s) Oral three times a day  lidocaine   Patch 1 Patch Transdermal every 24 hours  multivitamin/minerals 1 Tablet(s) Oral daily  pantoprazole    Tablet 40 milliGRAM(s) Oral before breakfast  prednisoLONE acetate 1% Suspension 1 Drop(s) Both EYES daily  QUEtiapine 25 milliGRAM(s) Oral two times a day    MEDICATIONS  (PRN):  acetaminophen   Tablet .. 650 milliGRAM(s) Oral every 6 hours PRN Mild Pain (1 - 3)  acetaminophen   Tablet .. 650 milliGRAM(s) Oral every 6 hours PRN Temp greater or equal to 38C (100.4F)  oxyCODONE    IR 5 milliGRAM(s) Oral four times a day PRN Severe Pain (7 - 10)  traMADol 25 milliGRAM(s) Oral every 6 hours PRN Moderate Pain (4 - 6)    Allergies  tetracycline (Unknown)    Vital Signs Last 24 Hrs  T(C): 38.7 (31 Aug 2020 06:10), Max: 38.7 (31 Aug 2020 06:10)  T(F): 101.7 (31 Aug 2020 06:10), Max: 101.7 (31 Aug 2020 06:10)  HR: 95 (31 Aug 2020 04:35) (89 - 96)  BP: 110/69 (31 Aug 2020 04:35) (91/56 - 112/71)  BP(mean): --  RR: 19 (31 Aug 2020 04:35) (18 - 19)  SpO2: 96% (31 Aug 2020 04:35) (96% - 97%)    I&O's Summary    30 Aug 2020 07:01  -  31 Aug 2020 07:00  --------------------------------------------------------  IN: 50 mL / OUT: 0 mL / NET: 50 mL    TELE: Not on telemetry   PHYSICAL EXAM:  Appearance: NAD, no distress, alert, Frail   HEENT: Moist Mucous Membranes, Anicteric  Cardiovascular: Regular rate and rhythm, Normal S1 S2, No JVD, No murmurs, No rubs, gallops or clicks  Respiratory: Non-labored, Clear to auscultation, No rales, No rhonchi, No wheezing.   Gastrointestinal:  Soft, Non-tender, + BS  Neurologic: Non-focal  Skin: Warm and dry, No visible rashes or ulcers, No ecchymosis, No cyanosis  Musculoskeletal: No clubbing, No cyanosis, No joint swelling/tenderness  Psychiatry: Mood & affect appropriate  Lymph: No peripheral edema.     LABS: All Labs Reviewed:                        8.4    x     )-----------( 334      ( 31 Aug 2020 08:46 )             25.6                         8.7    10.76 )-----------( 297      ( 30 Aug 2020 08:34 )             26.4                         9.0    9.44  )-----------( 288      ( 29 Aug 2020 07:22 )             27.6     29 Aug 2020 07:22    139    |  109    |  39     ----------------------------<  93     3.8     |  21     |  0.77     Ca    7.4        29 Aug 2020 07:22  PT/INR - ( 31 Aug 2020 08:46 )   PT: 14.8 sec;   INR: 1.28 ratio    PTT - ( 31 Aug 2020 08:46 )  PTT:26.6 sec  Lactate, Blood: 0.7 mmol/L (08-29-20 @ 07:22)    12 Lead ECG:   Ventricular Rate 102 BPM  Atrial Rate 102 BPM  P-R Interval 118 ms  QRS Duration 88 ms  Q-T Interval 352 ms  QTC Calculation(Bezet) 458 ms  P Axis 36 degrees  R Axis 24 degrees  T Axis 39 degrees  Diagnosis Line Sinus tachycardia  Nonspecific ST abnormality  Abnormal ECG  When compared with ECG of 09-MAY-2020 19:08,  No significant change was found  Confirmed by Stephon Walker MD ([id number]) on 8/26/2020 12:21:19 PM (08-25-20 @ 19:31)    < from: Xray Chest 1 View- PORTABLE-Urgent (08.29.20 @ 05:44) >  No change heart mediastinum. Biapical pleural thickening. No consolidation or effusion. Degenerative change bilateral shoulders.  Impression: No active infiltrates.  < end of copied text >    < from: CT Chest No Cont (08.25.20 @ 16:11) >  FINDINGS:  LUNGS AND AIRWAYS: Patent central airways.  Biapical scarring.  PLEURA: No pleural effusion.  MEDIASTINUM AND TY: No lymphadenopathy.  VESSELS: Nonaneurysmal  HEART: Heart size is normal. Coronary artery calcification. Decrease cardiac chamber blood pool attenuation suggests an anemic state.. No pericardial effusion.  CHEST WALL AND LOWER NECK: Within normal limits.  VISUALIZED UPPER ABDOMEN: Contracted gallbladder with calcified stones. A 2.1 cm hypodensity right hepatic lobe cannot be definitively characterized on this noncontrast study probably a cyst.  BONES: No acute    IMPRESSION:  No acute findings on this noncontrast study.  < end of copied text >

## 2020-08-31 NOTE — PROGRESS NOTE ADULT - SUBJECTIVE AND OBJECTIVE BOX
Patient is a 75y old  Female who presents with a chief complaint of gi bleed  anemia (30 Aug 2020 09:36)      INTERVAL /OVERNIGHT EVENTS: No events over night         MEDICATIONS  (STANDING):  amLODIPine   Tablet 10 milliGRAM(s) Oral daily  ascorbic acid 500 milliGRAM(s) Oral daily  calcium carbonate 1250 mG  + Vitamin D (OsCal 500 + D) 1 Tablet(s) Oral daily  carbidopa/levodopa  25/100 1 Tablet(s) Oral <User Schedule>  ertapenem  IVPB 1000 milliGRAM(s) IV Intermittent every 24 hours  ferrous    sulfate 325 milliGRAM(s) Oral daily  heparin   Injectable 5000 Unit(s) SubCutaneous every 8 hours  hydrocortisone hemorrhoidal Suppository 1 Suppository(s) Rectal two times a day  iron sucrose Injectable 100 milliGRAM(s) IV Push every 24 hours  lactobacillus acidophilus 1 Tablet(s) Oral three times a day  lidocaine   Patch 1 Patch Transdermal every 24 hours  multivitamin/minerals 1 Tablet(s) Oral daily  pantoprazole    Tablet 40 milliGRAM(s) Oral before breakfast  prednisoLONE acetate 1% Suspension 1 Drop(s) Both EYES daily  QUEtiapine 25 milliGRAM(s) Oral two times a day    MEDICATIONS  (PRN):  acetaminophen   Tablet .. 650 milliGRAM(s) Oral every 6 hours PRN Mild Pain (1 - 3)  acetaminophen   Tablet .. 650 milliGRAM(s) Oral every 6 hours PRN Temp greater or equal to 38C (100.4F)  oxyCODONE    IR 5 milliGRAM(s) Oral four times a day PRN Severe Pain (7 - 10)  traMADol 25 milliGRAM(s) Oral every 6 hours PRN Moderate Pain (4 - 6)        Allergies    tetracycline (Unknown)    Intolerances        REVIEW OF SYSTEMS: denies  T(C): 36.7 (08-31-20 @ 20:03), Max: 37.2 (08-31-20 @ 14:32)  HR: 81 (08-31-20 @ 20:03) (81 - 90)  BP: 93/65 (08-31-20 @ 20:03) (93/65 - 98/60)  RR: 17 (08-31-20 @ 20:03) (17 - 18)  SpO2: 95% (08-31-20 @ 20:03) (95% - 96%)        PHYSICAL EXAM:  GENERAL: NAD, well-groomed, well-developed  HEAD:  Atraumatic, Normocephalic  EYES: EOMI, PERRLA, conjunctiva and sclera clear  NECK: Supple, No JVD, Normal thyroid  NERVOUS SYSTEM:  Alert & awake; Motor Strength 5/5 B/L upper and lower extremities; DTRs 2+ intact and symmetric  CHEST/LUNG: Clear to auscultation bilaterally; No rales, rhonchi, wheezing, or rubs  HEART: Regular rate and rhythm; No murmurs, rubs, or gallops  ABDOMEN: Soft, Nontender, Nondistended; Bowel sounds present  EXTREMITIES:  2+ Peripheral Pulses, No clubbing, cyanosis, or edema  LYMPH: No lymphadenopathy noted  SKIN: No rashes or lesions                          8.4    11.84 )-----------( 334      ( 31 Aug 2020 08:46 )             25.6             PT/INR - ( 31 Aug 2020 08:46 )   PT: 14.8 sec;   INR: 1.28 ratio         PTT - ( 31 Aug 2020 08:46 )  PTT:26.6 sec  Sq Epi: x / Non Sq Epi: Occasional / Bacteria: Moderate      CAPILLARY BLOOD GLUCOSE          RADIOLOGY & ADDITIONAL TESTS:    Notes Reviewed:  [x ] YES  [ ] NO    Care Discussed with Consultants/Other Providers [x ] YES  [ ] NO

## 2020-09-01 DIAGNOSIS — K52.9 NONINFECTIVE GASTROENTERITIS AND COLITIS, UNSPECIFIED: ICD-10-CM

## 2020-09-01 LAB
BASOPHILS # BLD AUTO: 0 K/UL — SIGNIFICANT CHANGE UP (ref 0–0.2)
BASOPHILS NFR BLD AUTO: 0 % — SIGNIFICANT CHANGE UP (ref 0–2)
CULTURE RESULTS: SIGNIFICANT CHANGE UP
EOSINOPHIL # BLD AUTO: 0.2 K/UL — SIGNIFICANT CHANGE UP (ref 0–0.5)
EOSINOPHIL NFR BLD AUTO: 2 % — SIGNIFICANT CHANGE UP (ref 0–6)
HCT VFR BLD CALC: 24.6 % — LOW (ref 34.5–45)
HGB BLD-MCNC: 8 G/DL — LOW (ref 11.5–15.5)
LYMPHOCYTES # BLD AUTO: 1.66 K/UL — SIGNIFICANT CHANGE UP (ref 1–3.3)
LYMPHOCYTES # BLD AUTO: 17 % — SIGNIFICANT CHANGE UP (ref 13–44)
MANUAL SMEAR VERIFICATION: SIGNIFICANT CHANGE UP
MCHC RBC-ENTMCNC: 27.1 PG — SIGNIFICANT CHANGE UP (ref 27–34)
MCHC RBC-ENTMCNC: 32.5 GM/DL — SIGNIFICANT CHANGE UP (ref 32–36)
MCV RBC AUTO: 83.4 FL — SIGNIFICANT CHANGE UP (ref 80–100)
MONOCYTES # BLD AUTO: 0.78 K/UL — SIGNIFICANT CHANGE UP (ref 0–0.9)
MONOCYTES NFR BLD AUTO: 8 % — SIGNIFICANT CHANGE UP (ref 2–14)
NEUTROPHILS # BLD AUTO: 7.04 K/UL — SIGNIFICANT CHANGE UP (ref 1.8–7.4)
NEUTROPHILS NFR BLD AUTO: 62 % — SIGNIFICANT CHANGE UP (ref 43–77)
NEUTS BAND # BLD: 10 % — HIGH (ref 0–8)
NRBC # BLD: 0 — SIGNIFICANT CHANGE UP
NRBC # BLD: SIGNIFICANT CHANGE UP /100 WBCS (ref 0–0)
PLAT MORPH BLD: NORMAL — SIGNIFICANT CHANGE UP
PLATELET # BLD AUTO: 387 K/UL — SIGNIFICANT CHANGE UP (ref 150–400)
RBC # BLD: 2.95 M/UL — LOW (ref 3.8–5.2)
RBC # FLD: 13.5 % — SIGNIFICANT CHANGE UP (ref 10.3–14.5)
RBC BLD AUTO: SIGNIFICANT CHANGE UP
SPECIMEN SOURCE: SIGNIFICANT CHANGE UP
VARIANT LYMPHS # BLD: 1 % — SIGNIFICANT CHANGE UP (ref 0–6)
WBC # BLD: 9.78 K/UL — SIGNIFICANT CHANGE UP (ref 3.8–10.5)
WBC # FLD AUTO: 9.78 K/UL — SIGNIFICANT CHANGE UP (ref 3.8–10.5)

## 2020-09-01 PROCEDURE — 99232 SBSQ HOSP IP/OBS MODERATE 35: CPT

## 2020-09-01 RX ORDER — CHOLESTYRAMINE 4 G/9G
4 POWDER, FOR SUSPENSION ORAL DAILY
Refills: 0 | Status: DISCONTINUED | OUTPATIENT
Start: 2020-09-01 | End: 2020-09-05

## 2020-09-01 RX ORDER — MESALAMINE 400 MG
1000 TABLET, DELAYED RELEASE (ENTERIC COATED) ORAL AT BEDTIME
Refills: 0 | Status: DISCONTINUED | OUTPATIENT
Start: 2020-09-01 | End: 2020-09-08

## 2020-09-01 RX ADMIN — HEPARIN SODIUM 5000 UNIT(S): 5000 INJECTION INTRAVENOUS; SUBCUTANEOUS at 06:00

## 2020-09-01 RX ADMIN — HEPARIN SODIUM 5000 UNIT(S): 5000 INJECTION INTRAVENOUS; SUBCUTANEOUS at 22:38

## 2020-09-01 RX ADMIN — Medication 1 TABLET(S): at 22:38

## 2020-09-01 RX ADMIN — CARBIDOPA AND LEVODOPA 1 TABLET(S): 25; 100 TABLET ORAL at 08:48

## 2020-09-01 RX ADMIN — Medication 500 MILLIGRAM(S): at 11:01

## 2020-09-01 RX ADMIN — Medication 1 TABLET(S): at 06:02

## 2020-09-01 RX ADMIN — Medication 1 SUPPOSITORY(S): at 17:20

## 2020-09-01 RX ADMIN — PANTOPRAZOLE SODIUM 40 MILLIGRAM(S): 20 TABLET, DELAYED RELEASE ORAL at 06:00

## 2020-09-01 RX ADMIN — Medication 1 SUPPOSITORY(S): at 06:00

## 2020-09-01 RX ADMIN — Medication 325 MILLIGRAM(S): at 11:01

## 2020-09-01 RX ADMIN — QUETIAPINE FUMARATE 25 MILLIGRAM(S): 200 TABLET, FILM COATED ORAL at 06:00

## 2020-09-01 RX ADMIN — LIDOCAINE 1 PATCH: 4 CREAM TOPICAL at 22:44

## 2020-09-01 RX ADMIN — CARBIDOPA AND LEVODOPA 1 TABLET(S): 25; 100 TABLET ORAL at 17:20

## 2020-09-01 RX ADMIN — ERTAPENEM SODIUM 120 MILLIGRAM(S): 1 INJECTION, POWDER, LYOPHILIZED, FOR SOLUTION INTRAMUSCULAR; INTRAVENOUS at 17:24

## 2020-09-01 RX ADMIN — CARBIDOPA AND LEVODOPA 1 TABLET(S): 25; 100 TABLET ORAL at 11:01

## 2020-09-01 RX ADMIN — Medication 1000 MILLIGRAM(S): at 22:38

## 2020-09-01 RX ADMIN — Medication 1 TABLET(S): at 11:01

## 2020-09-01 RX ADMIN — Medication 1 DROP(S): at 11:01

## 2020-09-01 RX ADMIN — QUETIAPINE FUMARATE 25 MILLIGRAM(S): 200 TABLET, FILM COATED ORAL at 17:20

## 2020-09-01 RX ADMIN — IRON SUCROSE 100 MILLIGRAM(S): 20 INJECTION, SOLUTION INTRAVENOUS at 11:01

## 2020-09-01 RX ADMIN — LIDOCAINE 1 PATCH: 4 CREAM TOPICAL at 00:08

## 2020-09-01 RX ADMIN — CHOLESTYRAMINE 4 GRAM(S): 4 POWDER, FOR SUSPENSION ORAL at 13:54

## 2020-09-01 NOTE — PROGRESS NOTE ADULT - SUBJECTIVE AND OBJECTIVE BOX
Patient is a 75y old  Female who presents with a chief complaint of gi bleed  anemia (31 Aug 2020 09:20)      Subjective: Patient seen and examined at bedside. No acute events overnight.     PAIN: N  DYSPNEA: N	  NAUS/VOM: N	  SECRETIONS: N	  AGITATION: N    OTHER REVIEW OF SYSTEMS: negative    Vital Signs Last 24 Hrs  T(C): 37.1 (01 Sep 2020 05:36), Max: 37.2 (31 Aug 2020 14:32)  T(F): 98.8 (01 Sep 2020 05:36), Max: 99 (31 Aug 2020 14:32)  HR: 76 (01 Sep 2020 12:14) (76 - 81)  BP: 104/59 (01 Sep 2020 12:14) (92/50 - 109/62)  BP(mean): --  RR: 18 (01 Sep 2020 12:14) (17 - 18)  SpO2: 95% (01 Sep 2020 12:14) (94% - 95%)                                8.0    9.78  )-----------( 387      ( 01 Sep 2020 09:31 )             24.6     PT/INR - ( 31 Aug 2020 08:46 )   PT: 14.8 sec;   INR: 1.28 ratio         PTT - ( 31 Aug 2020 08:46 )  PTT:26.6 sec  CAPILLARY BLOOD GLUCOSE                  acetaminophen   Tablet .. 650 milliGRAM(s) Oral every 6 hours PRN  acetaminophen   Tablet .. 650 milliGRAM(s) Oral every 6 hours PRN  amLODIPine   Tablet 10 milliGRAM(s) Oral daily  ascorbic acid 500 milliGRAM(s) Oral daily  calcium carbonate 1250 mG  + Vitamin D (OsCal 500 + D) 1 Tablet(s) Oral daily  carbidopa/levodopa  25/100 1 Tablet(s) Oral <User Schedule>  cholestyramine Powder (Sugar-Free) 4 Gram(s) Oral daily  ertapenem  IVPB 1000 milliGRAM(s) IV Intermittent every 24 hours  ferrous    sulfate 325 milliGRAM(s) Oral daily  heparin   Injectable 5000 Unit(s) SubCutaneous every 8 hours  hydrocortisone hemorrhoidal Suppository 1 Suppository(s) Rectal two times a day  lactobacillus acidophilus 1 Tablet(s) Oral three times a day  lidocaine   Patch 1 Patch Transdermal every 24 hours  mesalamine Suppository 1000 milliGRAM(s) Rectal at bedtime  multivitamin/minerals 1 Tablet(s) Oral daily  oxyCODONE    IR 5 milliGRAM(s) Oral four times a day PRN  pantoprazole    Tablet 40 milliGRAM(s) Oral before breakfast  prednisoLONE acetate 1% Suspension 1 Drop(s) Both EYES daily  QUEtiapine 25 milliGRAM(s) Oral two times a day  traMADol 25 milliGRAM(s) Oral every 6 hours PRN      GENERAL:  resting comfortably in NAD  HEENT:  NC/AT EOMI PERRL  NECK: supple no JVD  CVS:  +S1 S2 RRR  RESP: CTA B/L  GI:  soft NT/ND +BS  : no suprapubic tenderness  MUSC:  no lower extremity edema  SKIN:  Warm, moist, no rashes   LYMPH: normal     MEDS REVIEWED	          OPIATE NAÏVE (Y/N)    tetracycline (Unknown)        ADVANCED DIRECTIVES:     FULL CODE         PSYCHOSOCIAL-SPIRITUAL ASSESSMENT:    _x__Reviewed     _x__Care  plan unchanged     ___Care plan adjusted as below    GOALS OF CARE DISCUSSION  	_x__Palliative care info/counseling provided	    ___Family meeting  	_x__Advanced Directives addressed	    ___See previous Palliative Medicine Note    AGENCY CHOICE DISCUSSED:   ___HOSPICE   ___[facility]  ___OTHER:              > 50% OF THE TIME SPENT IN COUNSELING AND COORDINATING CARE 	    Minutes:        PROLONGED SERVICE             FACE TO FACE:    PT            PT & FAMILY	       Minutes:      Advance Care Planning Time:

## 2020-09-01 NOTE — PROGRESS NOTE ADULT - ASSESSMENT
anemia  gi bleed  diarrhea  proctitis  uti      ct reviewed- showing severe proctitis; no overt s/s gib; egd/colon presently on hold  check stool studies, cont probiotic, add questran and canasa supp  cont abx; f/u further id recs  cont ppi daily, anusol bid  downgraded to liquid diet  monitor cbc, transfuse prn  monitor abdominal exam/gi fxn  to follow anemia  gi bleed  diarrhea  proctitis  uti      ct reviewed- showing severe proctitis; no overt s/s gib; egd/colon presently on hold  check stool studies, cont probiotic, add questran and canasa supp  cont abx; f/u further id recs  cont ppi daily, anusol bid  rec to advance diet as tolerated  monitor cbc, transfuse prn  monitor abdominal exam/gi fxn  to follow

## 2020-09-01 NOTE — PROGRESS NOTE ADULT - PROBLEM SELECTOR PLAN 2
serial CBC  Anemia--likely multifactorial, guaiac pos stool, h/o ch anemia/iron def/multifactorial  patient is on iv iron 100 mg a day for 3 days as looking for a rapid response, day 2 (2/3) today serial CBC  Anemia--likely multifactorial, guaiac pos stool, h/o ch anemia/iron def/multifactorial  patient is on iv iron 100 mg a day for 3 days as looking for a rapid response, day 3 today

## 2020-09-01 NOTE — PROGRESS NOTE ADULT - SUBJECTIVE AND OBJECTIVE BOX
Patient is a 75y old  Female who presents with a chief complaint of gi bleed  anemia (01 Sep 2020 10:48)       Pt is seen and examined  pt is awake and lying in bed/out of bed to chair  pt seems comfortable and denies any complaints at this time    HPI:  74 yo F PMHx Parkinson's disease, early dementia p/w Rt lower rib pain s/p fall. Accidental fall, no dizziness/ chest pain/ loss of consciousness.   Patient states that she was trying to grab a piece of coffee cake from the cabinet, slipped backwards, fell onto her right side and hit back of her head. Denies chest pain/ palpitations/ shortness of breath.    Son Roman present, helped her up.     In the ed patient had x ray of pelvis show sRt pubic fracture  CT head no acute bleed  CT C spine no fracture (25 Aug 2020 23:29)         ROS:  Negative except for:    MEDICATIONS  (STANDING):  amLODIPine   Tablet 10 milliGRAM(s) Oral daily  ascorbic acid 500 milliGRAM(s) Oral daily  calcium carbonate 1250 mG  + Vitamin D (OsCal 500 + D) 1 Tablet(s) Oral daily  carbidopa/levodopa  25/100 1 Tablet(s) Oral <User Schedule>  cholestyramine Powder (Sugar-Free) 4 Gram(s) Oral daily  ertapenem  IVPB 1000 milliGRAM(s) IV Intermittent every 24 hours  ferrous    sulfate 325 milliGRAM(s) Oral daily  heparin   Injectable 5000 Unit(s) SubCutaneous every 8 hours  hydrocortisone hemorrhoidal Suppository 1 Suppository(s) Rectal two times a day  lactobacillus acidophilus 1 Tablet(s) Oral three times a day  lidocaine   Patch 1 Patch Transdermal every 24 hours  mesalamine Suppository 1000 milliGRAM(s) Rectal at bedtime  multivitamin/minerals 1 Tablet(s) Oral daily  pantoprazole    Tablet 40 milliGRAM(s) Oral before breakfast  prednisoLONE acetate 1% Suspension 1 Drop(s) Both EYES daily  QUEtiapine 25 milliGRAM(s) Oral two times a day    MEDICATIONS  (PRN):  acetaminophen   Tablet .. 650 milliGRAM(s) Oral every 6 hours PRN Mild Pain (1 - 3)  acetaminophen   Tablet .. 650 milliGRAM(s) Oral every 6 hours PRN Temp greater or equal to 38C (100.4F)  oxyCODONE    IR 5 milliGRAM(s) Oral four times a day PRN Severe Pain (7 - 10)  traMADol 25 milliGRAM(s) Oral every 6 hours PRN Moderate Pain (4 - 6)      Allergies    tetracycline (Unknown)    Intolerances        Vital Signs Last 24 Hrs  T(C): 37.2 (01 Sep 2020 14:02), Max: 37.2 (31 Aug 2020 14:32)  T(F): 99 (01 Sep 2020 14:02), Max: 99 (31 Aug 2020 14:32)  HR: 76 (01 Sep 2020 12:14) (76 - 81)  BP: 104/59 (01 Sep 2020 12:14) (92/50 - 109/62)  BP(mean): --  RR: 18 (01 Sep 2020 12:14) (17 - 18)  SpO2: 95% (01 Sep 2020 12:14) (94% - 95%)    PHYSICAL EXAM  General: adult in NAD  HEENT: clear oropharynx, anicteric sclera, pink conjunctiva  Neck: supple  CV: normal S1/S2 with no murmur rubs or gallops  Lungs: positive air movement b/l ant lungs,clear to auscultation, no wheezes, no rales  Abdomen: soft non-tender non-distended, no hepatosplenomegaly  Ext: no clubbing cyanosis or edema  Skin: no rashes and no petechiae  Neuro: alert and oriented X 4, no focal deficits  LABS:                          8.0    9.78  )-----------( 387      ( 01 Sep 2020 09:31 )             24.6         Mean Cell Volume : 83.4 fl  Mean Cell Hemoglobin : 27.1 pg  Mean Cell Hemoglobin Concentration : 32.5 gm/dL  Auto Neutrophil # : 7.04 K/uL  Auto Lymphocyte # : 1.66 K/uL  Auto Monocyte # : 0.78 K/uL  Auto Eosinophil # : 0.20 K/uL  Auto Basophil # : 0.00 K/uL  Auto Neutrophil % : 62.0 %  Auto Lymphocyte % : 17.0 %  Auto Monocyte % : 8.0 %  Auto Eosinophil % : 2.0 %  Auto Basophil % : 0.0 %    Serial CBC's  09-01 @ 09:31  Hct-24.6 / Hgb-8.0 / Plat-387 / RBC-2.95 / WBC-9.78          Serial CBC's  08-31 @ 08:46  Hct-25.6 / Hgb-8.4 / Plat-334 / RBC-3.09 / WBC-11.84          Serial CBC's  08-30 @ 08:34  Hct-26.4 / Hgb-8.7 / Plat-297 / RBC-3.17 / WBC-10.76          Serial CBC's  08-29 @ 14:43  Hct--- / Hgb--- / Plat--- / RBC-3.56 / WBC---          Serial CBC's  08-29 @ 07:22  Hct-27.6 / Hgb-9.0 / Plat-288 / RBC-3.29 / WBC-9.44                    PT/INR - ( 31 Aug 2020 08:46 )   PT: 14.8 sec;   INR: 1.28 ratio         PTT - ( 31 Aug 2020 08:46 )  PTT:26.6 sec    Ferritin, Serum: 127 ng/mL (08-29-20 @ 21:58)  Vitamin B12, Serum: 485 pg/mL (08-29-20 @ 21:58)  Folate, Serum: >20.0 ng/mL (08-29-20 @ 21:58)  Iron - Total Binding Capacity.: 263 ug/dL (08-29-20 @ 21:57)  Reticulocyte Percent: 1.0 % (08-29-20 @ 14:43)                BLOOD SMEAR INTERPRETATION:       RADIOLOGY & ADDITIONAL STUDIES: Patient is a 75y old  Female who presents with a chief complaint of gi bleed  anemia (01 Sep 2020 10:48)       Pt is seen and examined  pt is awake and lying in bed  pt seems comfortable and denies any complaints at this time    HPI:  76 yo F PMHx Parkinson's disease, early dementia p/w Rt lower rib pain s/p fall. Accidental fall, no dizziness/ chest pain/ loss of consciousness.   Patient states that she was trying to grab a piece of coffee cake from the cabinet, slipped backwards, fell onto her right side and hit back of her head. Denies chest pain/ palpitations/ shortness of breath.    Son Roman present, helped her up.     In the ed patient had x ray of pelvis show sRt pubic fracture  CT head no acute bleed  CT C spine no fracture (25 Aug 2020 23:29)         ROS:  as per hpi    MEDICATIONS  (STANDING):  amLODIPine   Tablet 10 milliGRAM(s) Oral daily  ascorbic acid 500 milliGRAM(s) Oral daily  calcium carbonate 1250 mG  + Vitamin D (OsCal 500 + D) 1 Tablet(s) Oral daily  carbidopa/levodopa  25/100 1 Tablet(s) Oral <User Schedule>  cholestyramine Powder (Sugar-Free) 4 Gram(s) Oral daily  ertapenem  IVPB 1000 milliGRAM(s) IV Intermittent every 24 hours  ferrous    sulfate 325 milliGRAM(s) Oral daily  heparin   Injectable 5000 Unit(s) SubCutaneous every 8 hours  hydrocortisone hemorrhoidal Suppository 1 Suppository(s) Rectal two times a day  lactobacillus acidophilus 1 Tablet(s) Oral three times a day  lidocaine   Patch 1 Patch Transdermal every 24 hours  mesalamine Suppository 1000 milliGRAM(s) Rectal at bedtime  multivitamin/minerals 1 Tablet(s) Oral daily  pantoprazole    Tablet 40 milliGRAM(s) Oral before breakfast  prednisoLONE acetate 1% Suspension 1 Drop(s) Both EYES daily  QUEtiapine 25 milliGRAM(s) Oral two times a day    MEDICATIONS  (PRN):  acetaminophen   Tablet .. 650 milliGRAM(s) Oral every 6 hours PRN Mild Pain (1 - 3)  acetaminophen   Tablet .. 650 milliGRAM(s) Oral every 6 hours PRN Temp greater or equal to 38C (100.4F)  oxyCODONE    IR 5 milliGRAM(s) Oral four times a day PRN Severe Pain (7 - 10)  traMADol 25 milliGRAM(s) Oral every 6 hours PRN Moderate Pain (4 - 6)      Allergies    tetracycline (Unknown)    Intolerances        Vital Signs Last 24 Hrs  T(C): 37.2 (01 Sep 2020 14:02), Max: 37.2 (31 Aug 2020 14:32)  T(F): 99 (01 Sep 2020 14:02), Max: 99 (31 Aug 2020 14:32)  HR: 76 (01 Sep 2020 12:14) (76 - 81)  BP: 104/59 (01 Sep 2020 12:14) (92/50 - 109/62)  BP(mean): --  RR: 18 (01 Sep 2020 12:14) (17 - 18)  SpO2: 95% (01 Sep 2020 12:14) (94% - 95%)    PHYSICAL EXAM  General: adult in NAD  HEENT: clear oropharynx, anicteric sclera, pink conjunctiva  Neck: supple  CV: normal S1/S2 with no murmur rubs or gallops  Lungs: positive air movement b/l ant lungs,clear to auscultation, no wheezes, no rales  Abdomen: soft non-tender non-distended, no hepatosplenomegaly  Ext: no clubbing cyanosis or edema  Skin: no rashes and no petechiae  Neuro: alert and oriented X 4, no focal deficits  LABS:                          8.0    9.78  )-----------( 387      ( 01 Sep 2020 09:31 )             24.6         Mean Cell Volume : 83.4 fl  Mean Cell Hemoglobin : 27.1 pg  Mean Cell Hemoglobin Concentration : 32.5 gm/dL  Auto Neutrophil # : 7.04 K/uL  Auto Lymphocyte # : 1.66 K/uL  Auto Monocyte # : 0.78 K/uL  Auto Eosinophil # : 0.20 K/uL  Auto Basophil # : 0.00 K/uL  Auto Neutrophil % : 62.0 %  Auto Lymphocyte % : 17.0 %  Auto Monocyte % : 8.0 %  Auto Eosinophil % : 2.0 %  Auto Basophil % : 0.0 %    Serial CBC's  09-01 @ 09:31  Hct-24.6 / Hgb-8.0 / Plat-387 / RBC-2.95 / WBC-9.78          Serial CBC's  08-31 @ 08:46  Hct-25.6 / Hgb-8.4 / Plat-334 / RBC-3.09 / WBC-11.84          Serial CBC's  08-30 @ 08:34  Hct-26.4 / Hgb-8.7 / Plat-297 / RBC-3.17 / WBC-10.76          Serial CBC's  08-29 @ 14:43  Hct--- / Hgb--- / Plat--- / RBC-3.56 / WBC---          Serial CBC's  08-29 @ 07:22  Hct-27.6 / Hgb-9.0 / Plat-288 / RBC-3.29 / WBC-9.44                    PT/INR - ( 31 Aug 2020 08:46 )   PT: 14.8 sec;   INR: 1.28 ratio         PTT - ( 31 Aug 2020 08:46 )  PTT:26.6 sec    Ferritin, Serum: 127 ng/mL (08-29-20 @ 21:58)  Vitamin B12, Serum: 485 pg/mL (08-29-20 @ 21:58)  Folate, Serum: >20.0 ng/mL (08-29-20 @ 21:58)  Iron - Total Binding Capacity.: 263 ug/dL (08-29-20 @ 21:57)  Reticulocyte Percent: 1.0 % (08-29-20 @ 14:43)                BLOOD SMEAR INTERPRETATION: 9/1/2020--- Normal WBC count,  series and morphology left shift, no apparent blast cells or immature wbc, no schistocytes or fragmented rbc, platelets appears adequate, no clumping of platelets or giant platelets.        RADIOLOGY & ADDITIONAL STUDIES:  < from: CT Abdomen and Pelvis w/ Oral Cont (08.31.20 @ 14:40) >    EXAM:  CT ABDOMEN AND PELVIS OC                            PROCEDURE DATE:  08/31/2020          INTERPRETATION:  CT ABDOMEN AND PELVIS WITH ORAL CONTRAST    CLINICAL INFORMATION: Fever, sepsis    COMPARISON: CT abdomen pelvis 5/9/2020    PROCEDURE:  CT of the Abdomen and Pelvis was performed without intravenous contrast.  Intravenous contrast: None.  Oral contrast: positive contrast was administered.  Sagittal and coronal reformats were performed.    FINDINGS:  LOWER CHEST: Trace right effusion.    LIVER: Normal.  BILE DUCTS: Nondilated.  GALLBLADDER: Gallstones.  SPLEEN: Normal.  PANCREAS: Normal.  ADRENALS: Normal.  KIDNEYS/URETERS: No hydronephrosis or urinary tract calculi.    BLADDER: Obscured by artifact  REPRODUCTIVE ORGANS: Obscured    BOWEL: Severe rectal wall thickening without definite extraluminal air or collection.  PERITONEUM: No free air or ascites.  VESSELS:  Aortic atherosclerosis without aneurysm.  RETROPERITONEUM/LYMPH NODES: No adenopathy.  ABDOMINAL WALL: Normal.  BONES: Left hip arthroplasty. Recent right inferior pubic rami fracture. Bilateral sacral fractures and age-indeterminate rib fractures.    IMPRESSION:    Severe proctitis without definite extraluminal air or collection.                YOEL CELAYA M.D., ATTENDING RADIOLOGIST  This document has been electronically signed. Aug 31 2020  3:31PM                < end of copied text >

## 2020-09-01 NOTE — PROGRESS NOTE ADULT - ASSESSMENT
8.26.2020 This is a 75 F with PMH of Parkinson's disease and mild cognitive impairment who comes s/p fall and pubic rami fracture. I spoke to son, Roman Wilson. Patient was d/c'd from [facility] last Thursday, 8.20.2020, after residing there for about 3 months. He reports that patient was doing well when she got home -- she was able to ambulate short distances in the house with a walker. She needs moderate help with her ADLs. He wants to take patient home with home care when she is medically ready. He is going to hire a HHA for assistance with patient's ADLs. He says that he is the HCP. Patient does not have any advanced directives such as DNR/DNI. At this point, he wants everything done. Will follow.    8.27.2020 Patient remains a full code. Patient to go home with home care in AM. Son is planning to hire a HHA.    8.28.2020 Seen by psych. Patient has no medical decision making capacity. D/C planning home with home care and HHA.    8.29.2020 Febrile overnight. For EGD/colon on Monday for GIB.    8.30.2020 Still febrile. Started on iv abx for possible UTI. Cultures pending. EGD/colonscopy on hold.     8.31.2020 Still febrile prossibly from UTI. For CT a/p to r/o abscess. On abx. EGD/colonoscopy on hold.    9.1.2020 CT severe proctitis. Abx changed to iv invanz. EGD/colonoscopy on hold. Full code.

## 2020-09-01 NOTE — PROGRESS NOTE ADULT - ASSESSMENT
contact #  son----Roman Wilson  phone # [phone],, called and discussed with son  IMPRESSION:  75/ F with PMH of Parkinson's disease and mild cognitive impairment, ?dementia per son, ch anemia per son and was on iron outpatient in past, htn in 2020 who comes s/p fall around 8/25/20 and noted pubic rami fracture,  seen by orthopedics and is on medical management, guaiac positive stool, seen by gi and for possible egd/colon for monday, per pt son may had colonoscopy over 18 years ago, hb was at around 11--11.5, per son pt with always anemia and iron deficiency, pt had ct renal stone [place] in 5/2020--hepatic cyst, son declines repeat ct a/p at this time until endoscopy exam. As I spoke to son, Roman Wilson on phone patient with h/o admission at [facility] in around 5/2020 after fall at home and was told rib fracture, had hb around 11-11.5. Patient was d/c'd from [facility] Thursday, 8.20.2020, after residing there for about 3 months. He reports that patient was doing well when she got home -- she was able to ambulate short distances in the house with a walker.   Anemia--likely multifactorial, h/o ch anemia/iron def as per son, iron profile this admission--low iron/transferrin saturation, seen by gi and for possible egd/colon on monday  RECOMMENDATION:  Anemia--likely multifactorial, guaiac pos stool, h/o ch anemia/iron def/multifactorial  iron profile --low iron/transferrin saturation  patient is on iv iron 100 mg a day for 3 days as looking for a rapid response, day 3 (3/3) today  seen by gi , was planned for egd/colon today and postponed by gi, final decision gi work up as per pt/family/gi to exclude gi pathology/malignancy  guaiac pos stool/liver cyst on earlier ct--son declines ct a/p, radio scan--final decision/work up per gi  no evidence of hemolysis  hb is at 8 today, hb was at 8.4 yesterday  will add retacrit 10,000 units s/c tiw--t/t/s and d/c retacrit if hb is over 10  monitor h/h--transfuse prbc as needed for symptomatic anemia  smear again from today reviewed  fever/bandemia/uti--seen by id , work and management per id, is on iv invanz  gi/dvt prophylaxis--on heparin s/c  d/w pt at bedside, son on phone and in agreement of above

## 2020-09-01 NOTE — PROGRESS NOTE ADULT - SUBJECTIVE AND OBJECTIVE BOX
JAYJAY NAJERA is a 75yFemale , patient examined and chart reviewed.     INTERVAL HPI/ OVERNIGHT EVENTS:   No further fevers. NAD.    PAST MEDICAL & SURGICAL HISTORY:  Dementia  Anemia  Cataract  Parkinsons  History of left hip replacement      For details regarding the patient's social history, family history, and other miscellaneous elements, please refer the initial infectious diseases consultation and/or the admitting history and physical examination for this admission.    ROS: UTO sec pt's condition    ALLERGIES:  tetracycline (Unknown)    Current inpatient medications :    ANTIBIOTICS/RELEVANT:  ertapenem  IVPB 1000 milliGRAM(s) IV Intermittent every 24 hours    MEDICATIONS  (STANDING):  amLODIPine   Tablet 10 milliGRAM(s) Oral daily  ascorbic acid 500 milliGRAM(s) Oral daily  calcium carbonate 1250 mG  + Vitamin D (OsCal 500 + D) 1 Tablet(s) Oral daily  carbidopa/levodopa  25/100 1 Tablet(s) Oral <User Schedule>  cholestyramine Powder (Sugar-Free) 4 Gram(s) Oral daily  epoetin edwin-epbx (RETACRIT) Injectable 91627 Unit(s) SubCutaneous <User Schedule>  ferrous    sulfate 325 milliGRAM(s) Oral daily  heparin   Injectable 5000 Unit(s) SubCutaneous every 8 hours  hydrocortisone hemorrhoidal Suppository 1 Suppository(s) Rectal two times a day  lactobacillus acidophilus 1 Tablet(s) Oral three times a day  lidocaine   Patch 1 Patch Transdermal every 24 hours  mesalamine Suppository 1000 milliGRAM(s) Rectal at bedtime  multivitamin/minerals 1 Tablet(s) Oral daily  pantoprazole    Tablet 40 milliGRAM(s) Oral before breakfast  prednisoLONE acetate 1% Suspension 1 Drop(s) Both EYES daily  QUEtiapine 25 milliGRAM(s) Oral two times a day    MEDICATIONS  (PRN):  acetaminophen   Tablet .. 650 milliGRAM(s) Oral every 6 hours PRN Mild Pain (1 - 3)  acetaminophen   Tablet .. 650 milliGRAM(s) Oral every 6 hours PRN Temp greater or equal to 38C (100.4F)  oxyCODONE    IR 5 milliGRAM(s) Oral four times a day PRN Severe Pain (7 - 10)  traMADol 25 milliGRAM(s) Oral every 6 hours PRN Moderate Pain (4 - 6)    Objective:  Vital Signs Last 24 Hrs  T(C): 37.2 (01 Sep 2020 14:02), Max: 37.2 (01 Sep 2020 14:02)  T(F): 99 (01 Sep 2020 14:02), Max: 99 (01 Sep 2020 14:02)  HR: 76 (01 Sep 2020 12:14) (76 - 81)  BP: 104/59 (01 Sep 2020 12:14) (92/50 - 109/62)  RR: 18 (01 Sep 2020 12:14) (17 - 18)  SpO2: 95% (01 Sep 2020 12:14) (94% - 95%)    Physical Exam:  General: no acute distress  Eyes: sclera anicteric, pupils equal and reactive to light  ENMT: buccal mucosa moist, pharynx not injected  Neck: supple, trachea midline  Lungs: clear, no wheeze/rhonchi  Cardiovascular: regular rate and rhythm, S1 S2  Abdomen: soft, nontender, no organomegaly present, bowel sounds normal  Neurological: alert and oriented x2, Cranial Nerves II-XII grossly intact  Skin: no increased ecchymosis/petechiae/purpura  Lymph Nodes: no palpable cervical/supraclavicular lymph nodes enlargements  Extremities: no cyanosis/clubbing/edema      LABS:                        8.0    9.78  )-----------( 387      ( 01 Sep 2020 09:31 )             24.6          PTT - ( 30 Aug 2020 11:52 )  PTT:28.8 sec  Urinalysis Basic - ( 29 Aug 2020 19:02 )    Color: Yellow / Appearance: Slightly Turbid / S.015 / pH: x  Gluc: x / Ketone: Trace  / Bili: Negative / Urobili: Negative   Blood: x / Protein: 30 mg/dL / Nitrite: Positive   Leuk Esterase: Moderate / RBC: 3-5 /HPF / WBC >50   Sq Epi: x / Non Sq Epi: Occasional / Bacteria: Moderate    MICROBIOLOGY:    Culture - Urine (collected 30 Aug 2020 00:48)  Source: .Urine Clean Catch (Midstream)  Final Report (31 Aug 2020 16:52):    >100,000 CFU/ml Escherichia coli  Organism: Escherichia coli (31 Aug 2020 16:52)  Organism: Escherichia coli (31 Aug 2020 16:52)      -  Amikacin: S <=16      -  Amoxicillin/Clavulanic Acid: S <=8/4      -  Ampicillin: S <=8 These ampicillin results predict results for amoxicillin      -  Ampicillin/Sulbactam: S <=4/2 Enterobacter, Citrobacter, and Serratia may develop resistance during prolonged therapy (3-4 days)      -  Aztreonam: S <=4      -  Cefazolin: S <=2 (MIC_CL_COM_ENTERIC_[id number]) For uncomplicated UTI with K. pneumoniae, E. coli, or P. mirablis: LILLIANA <=16 is sensitive and LILLIANA >=32 is resistant. This also predicts results for oral agents cefaclor, cefdinir, cefpodoxime, cefprozil, cefuroxime axetil, cephalexin and locarbef for uncomplicated UTI. Note that some isolates may be susceptible to these agents while testing resistant to cefazolin.      -  Cefepime: S <=2      -  Cefoxitin: S <=8      -  Ceftriaxone: S <=1 Enterobacter, Citrobacter, and Serratia may develop resistance during prolonged therapy      -  Ciprofloxacin: S <=0.25      -  Ertapenem: S <=0.5      -  Gentamicin: S <=2      -  Levofloxacin: S <=0.5      -  Meropenem: S <=1      -  Nitrofurantoin: S <=32 Should not be used to treat pyelonephritis      -  Piperacillin/Tazobactam: S <=8      -  Tigecycline: S <=2      -  Tobramycin: S <=2      -  Trimethoprim/Sulfamethoxazole: S <=0.5/9.5      Method Type: LILLIANA    Culture - Blood (collected 29 Aug 2020 11:37)  Source: .Blood Blood  Preliminary Report (30 Aug 2020 12:01):    No growth to date.    Culture - Blood (collected 29 Aug 2020 11:37)  Source: .Blood Blood  Preliminary Report (30 Aug 2020 12:01):    No growth to date.    RADIOLOGY & ADDITIONAL STUDIES:  EXAM:  XR CHEST PORTABLE URGENT 1V                            PROCEDURE DATE:  2020          INTERPRETATION:  Fever.    AP chest. Prior 2020.    No change heart mediastinum. Biapical pleural thickening. No consolidation or effusion. Degenerative change bilateral shoulders.    Impression: No active infiltrates.      EXAM:  CT CHEST                            PROCEDURE DATE:  2020          INTERPRETATION:  CLINICAL INFORMATION: Fall right rib pain    COMPARISON: None.    PROCEDURE:  CT of the Chest was performed without intravenous contrast.  Sagittal andcoronal reformats were performed.    FINDINGS:    LUNGS AND AIRWAYS: Patent central airways.  Biapical scarring.  PLEURA: No pleural effusion.  MEDIASTINUM AND TY: No lymphadenopathy.  VESSELS: Nonaneurysmal  HEART: Heart size is normal. Coronary artery calcification. Decrease cardiac chamber blood pool attenuation suggests an anemic state.. No pericardial effusion.  CHEST WALL AND LOWER NECK: Within normal limits.  VISUALIZED UPPER ABDOMEN: Contracted gallbladder with calcified stones. A 2.1 cm hypodensity right hepatic lobe cannot be definitively characterized on this noncontrast study probably a cyst.  BONES: No acute    IMPRESSION:  No acute findings on this noncontrast study.      EXAM:  PELVIS AP ONLY                            PROCEDURE DATE:  2020          INTERPRETATION:  Pelvis    HISTORY: Fracture    Comparison: 2020    Frontal view of the pelvis shows a fracture of the right inferior pubic ramus which may not be acute. Left hip replacement is noted.    IMPRESSION: Right pubic fracture of indeterminate age.      Assessment :   74 yo F PMHx Parkinson's disease, early dementia admitted on 2020 for Rt lower rib pain s/p fall found to have age indeterminate right inferior pubic rami fracture. Course in hospital complicated by fevers likely sec UTI. No urinary retention. CXR clear. Ucx with Ecoli. CT AP with proctitis ?stercoral colitis. Fevers resolved. Overall doing well.     On Invanz  Change to po Vantin and Flagyl x 7 days in am  Trend temps and cbc  Asp precautions  Stable from ID standpoint    I will be away 2020 to 2020. Dr Stephon Waldron and Dr Monica Murphy will be covering me. Call if needed.      Continue with present regiment.  Appropriate use of antibiotics and adverse effects reviewed.    > 35 minutes were spent in direct patient care reviewing notes, medications ,labs data/ imaging , discussion with multidisciplinary team.    Thank you for allowing me to participate in care of your patient .    Daniel Navarro MD  Infectious Disease  [phone]

## 2020-09-01 NOTE — PROGRESS NOTE ADULT - SUBJECTIVE AND OBJECTIVE BOX
Patient is a 75y old  Female who presents with a chief complaint of gi bleed  anemia (30 Aug 2020 09:36)      INTERVAL /OVERNIGHT EVENTS: No events over night     MEDICATIONS  (STANDING):  amLODIPine   Tablet 10 milliGRAM(s) Oral daily  ascorbic acid 500 milliGRAM(s) Oral daily  calcium carbonate 1250 mG  + Vitamin D (OsCal 500 + D) 1 Tablet(s) Oral daily  carbidopa/levodopa  25/100 1 Tablet(s) Oral <User Schedule>  cholestyramine Powder (Sugar-Free) 4 Gram(s) Oral daily  epoetin edwin-epbx (RETACRIT) Injectable 21390 Unit(s) SubCutaneous <User Schedule>  ferrous    sulfate 325 milliGRAM(s) Oral daily  heparin   Injectable 5000 Unit(s) SubCutaneous every 8 hours  hydrocortisone hemorrhoidal Suppository 1 Suppository(s) Rectal two times a day  lactobacillus acidophilus 1 Tablet(s) Oral three times a day  lidocaine   Patch 1 Patch Transdermal every 24 hours  mesalamine Suppository 1000 milliGRAM(s) Rectal at bedtime  multivitamin/minerals 1 Tablet(s) Oral daily  pantoprazole    Tablet 40 milliGRAM(s) Oral before breakfast  prednisoLONE acetate 1% Suspension 1 Drop(s) Both EYES daily  QUEtiapine 25 milliGRAM(s) Oral two times a day    MEDICATIONS  (PRN):  acetaminophen   Tablet .. 650 milliGRAM(s) Oral every 6 hours PRN Mild Pain (1 - 3)  acetaminophen   Tablet .. 650 milliGRAM(s) Oral every 6 hours PRN Temp greater or equal to 38C (100.4F)  oxyCODONE    IR 5 milliGRAM(s) Oral four times a day PRN Severe Pain (7 - 10)  traMADol 25 milliGRAM(s) Oral every 6 hours PRN Moderate Pain (4 - 6)        Allergies    tetracycline (Unknown)    Intolerances        REVIEW OF SYSTEMS: denies      T(C): 36.7 (09-01-20 @ 20:21), Max: 37.2 (09-01-20 @ 14:02)  HR: 75 (09-01-20 @ 20:21) (75 - 78)  BP: 101/61 (09-01-20 @ 20:21) (101/61 - 109/62)  RR: 18 (09-01-20 @ 20:21) (18 - 18)  SpO2: 96% (09-01-20 @ 20:21) (94% - 96%)  PHYSICAL EXAM:  GENERAL: NAD, well-groomed, well-developed  HEAD:  Atraumatic, Normocephalic  EYES: EOMI, PERRLA, conjunctiva and sclera clear  NECK: Supple, No JVD, Normal thyroid  NERVOUS SYSTEM:  Alert & awake; Motor Strength 5/5 B/L upper and lower extremities; DTRs 2+ intact and symmetric  CHEST/LUNG: Clear to auscultation bilaterally; No rales, rhonchi, wheezing, or rubs  HEART: Regular rate and rhythm; No murmurs, rubs, or gallops  ABDOMEN: Soft, Nontender, Nondistended; Bowel sounds present  EXTREMITIES:  2+ Peripheral Pulses, No clubbing, cyanosis, or edema  LYMPH: No lymphadenopathy noted  SKIN: No rashes or lesions                                            8.0    9.78  )-----------( 387      ( 01 Sep 2020 09:31 )             24.6             PT/INR - ( 31 Aug 2020 08:46 )   PT: 14.8 sec;   INR: 1.28 ratio         PTT - ( 31 Aug 2020 08:46 )  PTT:26.6 sec  26.6 sec  Sq Epi: x / Non Sq Epi: Occasional / Bacteria: Moderate      CAPILLARY BLOOD GLUCOSE          RADIOLOGY & ADDITIONAL TESTS:    Notes Reviewed:  [x ] YES  [ ] NO    Care Discussed with Consultants/Other Providers [x ] YES  [ ] NO

## 2020-09-01 NOTE — PROGRESS NOTE ADULT - SUBJECTIVE AND OBJECTIVE BOX
INTERVAL HPI/OVERNIGHT EVENTS:  pt seen and examined  denies n/v/abd pain  per rn, pt passing frequent soft brown bms      MEDICATIONS  (STANDING):  amLODIPine   Tablet 10 milliGRAM(s) Oral daily  ascorbic acid 500 milliGRAM(s) Oral daily  calcium carbonate 1250 mG  + Vitamin D (OsCal 500 + D) 1 Tablet(s) Oral daily  carbidopa/levodopa  25/100 1 Tablet(s) Oral <User Schedule>  cefTRIAXone   IVPB 1000 milliGRAM(s) IV Intermittent every 24 hours  ferrous    sulfate 325 milliGRAM(s) Oral daily  heparin   Injectable 5000 Unit(s) SubCutaneous every 8 hours  hydrocortisone hemorrhoidal Suppository 1 Suppository(s) Rectal two times a day  lidocaine   Patch 1 Patch Transdermal every 24 hours  multivitamin/minerals 1 Tablet(s) Oral daily  pantoprazole    Tablet 40 milliGRAM(s) Oral before breakfast  prednisoLONE acetate 1% Suspension 1 Drop(s) Both EYES daily  QUEtiapine 25 milliGRAM(s) Oral two times a day    MEDICATIONS  (PRN):  acetaminophen   Tablet .. 650 milliGRAM(s) Oral every 6 hours PRN Mild Pain (1 - 3)  acetaminophen   Tablet .. 650 milliGRAM(s) Oral every 6 hours PRN Temp greater or equal to 38C (100.4F)  oxyCODONE    IR 5 milliGRAM(s) Oral four times a day PRN Severe Pain (7 - 10)  traMADol 25 milliGRAM(s) Oral every 6 hours PRN Moderate Pain (4 - 6)      Allergies    tetracycline (Unknown)    Intolerances      unable to obtain in entirety       PHYSICAL EXAM:     Vital Signs Last 24 Hrs  T(C): 37.4 (30 Aug 2020 06:00), Max: 38.8 (30 Aug 2020 04:28)  T(F): 99.4 (30 Aug 2020 06:00), Max: 101.8 (30 Aug 2020 04:28)  HR: 86 (30 Aug 2020 04:28) (86 - 99)  BP: 96/58 (30 Aug 2020 04:28) (96/58 - 133/73)  BP(mean): --  RR: 19 (30 Aug 2020 04:28) (17 - 19)  SpO2: 94% (30 Aug 2020 04:28) (93% - 94%)        GENERAL:  lying  in bed  HEENT:  NC/AT  ABDOMEN:  Soft nt  mild dt  EXTREMITIES  no  edema  NEURO: awake but confused          LABS:                        8.7    10.76 )-----------( 297      ( 30 Aug 2020 08:34 )             26.4     08-29    139  |  109<H>  |  39<H>  ----------------------------<  93  3.8   |  21<L>  |  0.77    Ca    7.4<L>      29 Aug 2020 07:22            08-29-20 @ 07:01  -  08-30-20 @ 07:00  --------------------------------------------------------  IN: 50 mL / OUT: 200 mL / NET: -150 mL        RADIOLOGY & ADDITIONAL TESTS:      < from: CT Abdomen and Pelvis w/ Oral Cont (08.31.20 @ 14:40) >    EXAM:  CT ABDOMEN AND PELVIS OC                            PROCEDURE DATE:  08/31/2020          INTERPRETATION:  CT ABDOMEN AND PELVIS WITH ORAL CONTRAST    CLINICAL INFORMATION: Fever, sepsis    COMPARISON: CT abdomen pelvis 5/9/2020    PROCEDURE:  CT of the Abdomen and Pelvis was performed without intravenous contrast.  Intravenous contrast: None.  Oral contrast: positive contrast was administered.  Sagittal and coronal reformats were performed.    FINDINGS:  LOWER CHEST: Trace right effusion.    LIVER: Normal.  BILE DUCTS: Nondilated.  GALLBLADDER: Gallstones.  SPLEEN: Normal.  PANCREAS: Normal.  ADRENALS: Normal.  KIDNEYS/URETERS: No hydronephrosis or urinary tract calculi.    BLADDER: Obscured by artifact  REPRODUCTIVE ORGANS: Obscured    BOWEL: Severe rectal wall thickening without definite extraluminal air or collection.  PERITONEUM: No free air or ascites.  VESSELS:  Aortic atherosclerosis without aneurysm.  RETROPERITONEUM/LYMPH NODES: No adenopathy.  ABDOMINAL WALL: Normal.  BONES: Left hip arthroplasty. Recent right inferior pubic rami fracture. Bilateral sacral fractures and age-indeterminate rib fractures.    IMPRESSION:    Severe proctitis without definite extraluminal air or collection.                YOEL CELAYA M.D., ATTENDING RADIOLOGIST  This document has been electronically signed. Aug 31 2020  3:31PM                < end of copied text >

## 2020-09-01 NOTE — PROGRESS NOTE ADULT - SUBJECTIVE AND OBJECTIVE BOX
Neurology follow up note    JAYJAY NAJERA75yFemale      Interval History:    Patient feels ok no new complaints.    MEDICATIONS    acetaminophen   Tablet .. 650 milliGRAM(s) Oral every 6 hours PRN  acetaminophen   Tablet .. 650 milliGRAM(s) Oral every 6 hours PRN  amLODIPine   Tablet 10 milliGRAM(s) Oral daily  ascorbic acid 500 milliGRAM(s) Oral daily  calcium carbonate 1250 mG  + Vitamin D (OsCal 500 + D) 1 Tablet(s) Oral daily  carbidopa/levodopa  25/100 1 Tablet(s) Oral <User Schedule>  ertapenem  IVPB 1000 milliGRAM(s) IV Intermittent every 24 hours  ferrous    sulfate 325 milliGRAM(s) Oral daily  heparin   Injectable 5000 Unit(s) SubCutaneous every 8 hours  hydrocortisone hemorrhoidal Suppository 1 Suppository(s) Rectal two times a day  iron sucrose Injectable 100 milliGRAM(s) IV Push every 24 hours  lactobacillus acidophilus 1 Tablet(s) Oral three times a day  lidocaine   Patch 1 Patch Transdermal every 24 hours  multivitamin/minerals 1 Tablet(s) Oral daily  oxyCODONE    IR 5 milliGRAM(s) Oral four times a day PRN  pantoprazole    Tablet 40 milliGRAM(s) Oral before breakfast  prednisoLONE acetate 1% Suspension 1 Drop(s) Both EYES daily  QUEtiapine 25 milliGRAM(s) Oral two times a day  traMADol 25 milliGRAM(s) Oral every 6 hours PRN      Allergies    tetracycline (Unknown)    Intolerances            Vital Signs Last 24 Hrs  T(C): 37.1 (01 Sep 2020 05:36), Max: 37.2 (31 Aug 2020 14:32)  T(F): 98.8 (01 Sep 2020 05:36), Max: 99 (31 Aug 2020 14:32)  HR: 76 (01 Sep 2020 05:36) (76 - 90)  BP: 92/50 (01 Sep 2020 05:57) (92/50 - 98/60)  BP(mean): --  RR: 17 (01 Sep 2020 05:36) (17 - 18)  SpO2: 94% (01 Sep 2020 05:36) (94% - 96%)         REVIEW OF SYSTEMS:  Constitutional:  No fever, chills, or night sweats.  Head:  Positive headache located in the occipital region in the area of head trauma.  Eyes:  No double vision or blurry vision.  Ears:  No ringing in the ears.  Neck:  No neck pain.  Respiratory:  No shortness of breath.  Cardiovascular:  No chest pain.  Abdomen:  No nausea, vomiting, or abdominal pain.  Extremities/Neurological:  No numbness or tingling.  Musculoskeletal:  Occasion joint pain.  Genitourinary:  No burning upon urination.  Psychiatric:  No underlying anxiety or depression.    PHYSICAL EXAMINATION: HEENT:  Head:  Normocephalic.  Eyes:  No scleral icterus.  Ears:  Hearing bilaterally intact.  NECK:  Supple.  RESPIRATORY:  Good air entry bilaterally.  CARDIOVASCULAR:  S1 and S2 heard.  ABDOMEN:  Soft and nontender.  EXTREMITIES:  No clubbing or cyanosis were noted.      NEUROLOGIC:  The patient is awake and alert.  Location was hospital.  Year was 2020.  Recall was 1/3 within three minutes, able to name simple objects.  positive  confusion today   Extraocular movements were intact.  Pupils were equal, round, and reactive bilaterally, 3 mm to 2 mm.  Speech was fluent.  Smile was symmetric.  Motor:  Bilateral upper were 4/5.  Bilateral lower were 3-/5.  Sensory:  Bilateral upper and lower intact to light touch.  The patient has increased significant tone in bilateral lower extremities, does have a decreased range of motion of her left foot, has a history of foot drop from previous trauma.  Positive resting tremors were noted, left hand greater than right.  Positive cogwheel rigidity was noted.  Positive bradykinesia was noted.            LABS:  CBC Full  -  ( 01 Sep 2020 09:31 )  WBC Count : 9.78 K/uL  RBC Count : 2.95 M/uL  Hemoglobin : 8.0 g/dL  Hematocrit : 24.6 %  Platelet Count - Automated : 387 K/uL  Mean Cell Volume : 83.4 fl  Mean Cell Hemoglobin : 27.1 pg  Mean Cell Hemoglobin Concentration : 32.5 gm/dL  Auto Neutrophil # : x  Auto Lymphocyte # : x  Auto Monocyte # : x  Auto Eosinophil # : x  Auto Basophil # : x  Auto Neutrophil % : x  Auto Lymphocyte % : x  Auto Monocyte % : x  Auto Eosinophil % : x  Auto Basophil % : x            Hemoglobin A1C:       Vitamin B12   PT/INR - ( 31 Aug 2020 08:46 )   PT: 14.8 sec;   INR: 1.28 ratio         PTT - ( 31 Aug 2020 08:46 )  PTT:26.6 sec      RADIOLOGY      ANALYSIS AND PLAN:  A 75-year-old with an episode of fall, head trauma, and mild cognitive impairment.  1.	For episode of fall, this appears to be most likely mechanical in nature.  There are no clear new signs, even though examination of the right lower extremity is limited to suggest new cerebrovascular accident has ensued.  2.	I Would recommend fall precautions.  3.	Physical Therapy evaluation if possible.  4.	For episode of head trauma, I would recommend neuro checks as needed.  5.	For history of Parkinson's disease, continue the patient on Sinemet.  6.	For mild cognitive impairment versus subtle dementia, I would recommend supportive therapy.  7.	Spoke with the son, Roman, his telephone number is [phone] 9/1/2020  8.	AMS suspect change in location more prominent today and possible UTI  9.	antibiotics as needed   10.	limit dopamine blocking agents if possible   11.	Greater than 34 minutes of time was spent with the patient, plan of care, reviewing data, and speaking to the family and multidisciplinary healthcare team.

## 2020-09-01 NOTE — PROGRESS NOTE ADULT - PROBLEM SELECTOR PLAN 1
Likely UTI, changed Rocephin to Invanz   ID following .   Follow up CT abdomen pelvis CT AP with proctitis ?stercoral colitis. Fevers resolved. Overall doing well.   On Invanz  Change to po Vantin and Flagyl x 7 days in am  GI follow up appreciated.

## 2020-09-01 NOTE — PROGRESS NOTE ADULT - SUBJECTIVE AND OBJECTIVE BOX
[facility] -- Eugene York, Cliff, Denise, Matias Jacobsen Savella  Office # [phone]      Follow Up:    HTN  Subjective/Observations:   No events overnight resting comfortably in bed.  No complaints of chest pain, dyspnea, or palpitations reported. No signs of orthopnea or PND. Denies dizziness/lightheadness C/O being hungry    REVIEW OF SYSTEMS: All other review of systems is negative unless indicated above    PAST MEDICAL & SURGICAL HISTORY:  Dementia  Anemia  Cataract  Parkinsons  History of left hip replacement      MEDICATIONS  (STANDING):  amLODIPine   Tablet 10 milliGRAM(s) Oral daily  ascorbic acid 500 milliGRAM(s) Oral daily  calcium carbonate 1250 mG  + Vitamin D (OsCal 500 + D) 1 Tablet(s) Oral daily  carbidopa/levodopa  25/100 1 Tablet(s) Oral <User Schedule>  ertapenem  IVPB 1000 milliGRAM(s) IV Intermittent every 24 hours  ferrous    sulfate 325 milliGRAM(s) Oral daily  heparin   Injectable 5000 Unit(s) SubCutaneous every 8 hours  hydrocortisone hemorrhoidal Suppository 1 Suppository(s) Rectal two times a day  iron sucrose Injectable 100 milliGRAM(s) IV Push every 24 hours  lactobacillus acidophilus 1 Tablet(s) Oral three times a day  lidocaine   Patch 1 Patch Transdermal every 24 hours  multivitamin/minerals 1 Tablet(s) Oral daily  pantoprazole    Tablet 40 milliGRAM(s) Oral before breakfast  prednisoLONE acetate 1% Suspension 1 Drop(s) Both EYES daily  QUEtiapine 25 milliGRAM(s) Oral two times a day    MEDICATIONS  (PRN):  acetaminophen   Tablet .. 650 milliGRAM(s) Oral every 6 hours PRN Mild Pain (1 - 3)  acetaminophen   Tablet .. 650 milliGRAM(s) Oral every 6 hours PRN Temp greater or equal to 38C (100.4F)  oxyCODONE    IR 5 milliGRAM(s) Oral four times a day PRN Severe Pain (7 - 10)  traMADol 25 milliGRAM(s) Oral every 6 hours PRN Moderate Pain (4 - 6)      Allergies    tetracycline (Unknown)    Intolerances        Vital Signs Last 24 Hrs  T(C): 37.1 (01 Sep 2020 05:36), Max: 37.2 (31 Aug 2020 14:32)  T(F): 98.8 (01 Sep 2020 05:36), Max: 99 (31 Aug 2020 14:32)  HR: 76 (01 Sep 2020 05:36) (76 - 90)  BP: 92/50 (01 Sep 2020 05:57) (92/50 - 98/60)  BP(mean): --  RR: 17 (01 Sep 2020 05:36) (17 - 18)  SpO2: 94% (01 Sep 2020 05:36) (94% - 96%)    I&O's Summary    31 Aug 2020 07:01  -  01 Sep 2020 07:00  --------------------------------------------------------  IN: 0 mL / OUT: 500 mL / NET: -500 mL          PHYSICAL EXAM:  TELE: Off tele   Constitutional: NAD, awake and alert, Frail   HEENT: Moist Mucous Membranes, Anicteric  Pulmonary: Non-labored, breath sounds are clear bilaterally, No wheezing, crackles or rhonchi  Cardiovascular: Regular, S1 and S2 nl, No murmurs, rubs, gallops or clicks  Gastrointestinal: Bowel Sounds present, soft, nontender.   Lymph: No lymphadenopathy. No peripheral edema.  Skin: No visible rashes or ulcers.  Psych:  Mood & affect appropriate    LABS: All Labs Reviewed:                        8.4    11.84 )-----------( 334      ( 31 Aug 2020 08:46 )             25.6                         8.7    10.76 )-----------( 297      ( 30 Aug 2020 08:34 )             26.4           PT/INR - ( 31 Aug 2020 08:46 )   PT: 14.8 sec;   INR: 1.28 ratio         PTT - ( 31 Aug 2020 08:46 )  PTT:26.6 sec       12 Lead ECG:   Ventricular Rate 102 BPM  Atrial Rate 102 BPM  P-R Interval 118 ms  QRS Duration 88 ms  Q-T Interval 352 ms  QTC Calculation(Bezet) 458 ms  P Axis 36 degrees  R Axis 24 degrees  T Axis 39 degrees  Diagnosis Line Sinus tachycardia  Nonspecific ST abnormality  Abnormal ECG  When compared with ECG of 09-MAY-2020 19:08,  No significant change was found  Confirmed by Stephon Walker MD ([id number]) on 8/26/2020 12:21:19 PM (08-25-20 @ 19:31)    < from: Xray Chest 1 View- PORTABLE-Urgent (08.29.20 @ 05:44) >  No change heart mediastinum. Biapical pleural thickening. No consolidation or effusion. Degenerative change bilateral shoulders.  Impression: No active infiltrates.  < end of copied text >    < from: CT Chest No Cont (08.25.20 @ 16:11) >  FINDINGS:  LUNGS AND AIRWAYS: Patent central airways.  Biapical scarring.  PLEURA: No pleural effusion.  MEDIASTINUM AND TY: No lymphadenopathy.  VESSELS: Nonaneurysmal  HEART: Heart size is normal. Coronary artery calcification. Decrease cardiac chamber blood pool attenuation suggests an anemic state.. No pericardial effusion.  CHEST WALL AND LOWER NECK: Within normal limits.  VISUALIZED UPPER ABDOMEN: Contracted gallbladder with calcified stones. A 2.1 cm hypodensity right hepatic lobe cannot be definitively characterized on this noncontrast study probably a cyst.  BONES: No acute    IMPRESSION:  No acute findings on this noncontrast study.  < end of copied text >

## 2020-09-01 NOTE — PROGRESS NOTE ADULT - ASSESSMENT
75 year old woman with HTN, Parkinson's disease, dementia, anemia, for EGD/colonoscopy, but now on hold for fever.    - EGD and colonoscopy on hold for fever  - Fever work up and abx per primary team.  - When ready, remains optimized for the OR from a cardiac point of view with no evidence of active ischemic heart disease, decompensated heart failure, severe obstructive valvular disease, or uncontrolled arrhythmia.    HTN  - BP: 92/50 (09-01-20 @ 05:57) (92/50 - 98/60)  -SBP soft likely related to NPo status for EGD that was held and fever  - Continue amlodipine   - Monitor and replete lytes  - To follow closely with you    VTE ppx  - cw  hep   -per primary team     - Monitor and replete lytes, keep K>4, Mg>2.  - All other medical needs as per primary team.  - Other cardiovascular workup will depend on clinical course.  - Will continue to follow.    Justin Ramirez DNP, ANP-c  Cardiology   Spectra #[phone]  [phone]

## 2020-09-02 LAB
HCT VFR BLD CALC: 26.8 % — LOW (ref 34.5–45)
HGB BLD-MCNC: 8.7 G/DL — LOW (ref 11.5–15.5)
MCHC RBC-ENTMCNC: 27.1 PG — SIGNIFICANT CHANGE UP (ref 27–34)
MCHC RBC-ENTMCNC: 32.5 GM/DL — SIGNIFICANT CHANGE UP (ref 32–36)
MCV RBC AUTO: 83.5 FL — SIGNIFICANT CHANGE UP (ref 80–100)
NRBC # BLD: 0 /100 WBCS — SIGNIFICANT CHANGE UP (ref 0–0)
PLATELET # BLD AUTO: 476 K/UL — HIGH (ref 150–400)
RBC # BLD: 3.21 M/UL — LOW (ref 3.8–5.2)
RBC # FLD: 13.6 % — SIGNIFICANT CHANGE UP (ref 10.3–14.5)
WBC # BLD: 7.25 K/UL — SIGNIFICANT CHANGE UP (ref 3.8–10.5)
WBC # FLD AUTO: 7.25 K/UL — SIGNIFICANT CHANGE UP (ref 3.8–10.5)

## 2020-09-02 PROCEDURE — 99232 SBSQ HOSP IP/OBS MODERATE 35: CPT

## 2020-09-02 RX ADMIN — Medication 1 SUPPOSITORY(S): at 06:12

## 2020-09-02 RX ADMIN — QUETIAPINE FUMARATE 25 MILLIGRAM(S): 200 TABLET, FILM COATED ORAL at 17:31

## 2020-09-02 RX ADMIN — CARBIDOPA AND LEVODOPA 1 TABLET(S): 25; 100 TABLET ORAL at 08:43

## 2020-09-02 RX ADMIN — LIDOCAINE 1 PATCH: 4 CREAM TOPICAL at 01:08

## 2020-09-02 RX ADMIN — Medication 1 TABLET(S): at 12:17

## 2020-09-02 RX ADMIN — CARBIDOPA AND LEVODOPA 1 TABLET(S): 25; 100 TABLET ORAL at 12:17

## 2020-09-02 RX ADMIN — CHOLESTYRAMINE 4 GRAM(S): 4 POWDER, FOR SUSPENSION ORAL at 08:43

## 2020-09-02 RX ADMIN — HEPARIN SODIUM 5000 UNIT(S): 5000 INJECTION INTRAVENOUS; SUBCUTANEOUS at 06:12

## 2020-09-02 RX ADMIN — Medication 1000 MILLIGRAM(S): at 21:50

## 2020-09-02 RX ADMIN — QUETIAPINE FUMARATE 25 MILLIGRAM(S): 200 TABLET, FILM COATED ORAL at 06:12

## 2020-09-02 RX ADMIN — Medication 1 TABLET(S): at 06:12

## 2020-09-02 RX ADMIN — Medication 325 MILLIGRAM(S): at 12:17

## 2020-09-02 RX ADMIN — PANTOPRAZOLE SODIUM 40 MILLIGRAM(S): 20 TABLET, DELAYED RELEASE ORAL at 06:12

## 2020-09-02 RX ADMIN — Medication 1 TABLET(S): at 14:06

## 2020-09-02 RX ADMIN — Medication 1 DROP(S): at 12:17

## 2020-09-02 RX ADMIN — HEPARIN SODIUM 5000 UNIT(S): 5000 INJECTION INTRAVENOUS; SUBCUTANEOUS at 14:06

## 2020-09-02 RX ADMIN — CARBIDOPA AND LEVODOPA 1 TABLET(S): 25; 100 TABLET ORAL at 17:31

## 2020-09-02 RX ADMIN — HEPARIN SODIUM 5000 UNIT(S): 5000 INJECTION INTRAVENOUS; SUBCUTANEOUS at 21:24

## 2020-09-02 RX ADMIN — Medication 1 TABLET(S): at 21:50

## 2020-09-02 RX ADMIN — Medication 500 MILLIGRAM(S): at 12:17

## 2020-09-02 RX ADMIN — AMLODIPINE BESYLATE 10 MILLIGRAM(S): 2.5 TABLET ORAL at 06:12

## 2020-09-02 RX ADMIN — Medication 1 SUPPOSITORY(S): at 17:31

## 2020-09-02 NOTE — PROGRESS NOTE ADULT - ASSESSMENT
8.26.2020 This is a 75 F with PMH of Parkinson's disease and mild cognitive impairment who comes s/p fall and pubic rami fracture. I spoke to son, Roman Wilson. Patient was d/c'd from [facility] last Thursday, 8.20.2020, after residing there for about 3 months. He reports that patient was doing well when she got home -- she was able to ambulate short distances in the house with a walker. She needs moderate help with her ADLs. He wants to take patient home with home care when she is medically ready. He is going to hire a HHA for assistance with patient's ADLs. He says that he is the HCP. Patient does not have any advanced directives such as DNR/DNI. At this point, he wants everything done. Will follow.    8.27.2020 Patient remains a full code. Patient to go home with home care in AM. Son is planning to hire a HHA.    8.28.2020 Seen by psych. Patient has no medical decision making capacity. D/C planning home with home care and HHA.    8.29.2020 Febrile overnight. For EGD/colon on Monday for GIB.    8.30.2020 Still febrile. Started on iv abx for possible UTI. Cultures pending. EGD/colonscopy on hold.     8.31.2020 Still febrile prossibly from UTI. For CT a/p to r/o abscess. On abx. EGD/colonoscopy on hold.    9.1.2020 CT severe proctitis. Abx changed to iv invanz. EGD/colonoscopy on hold. Full code.    9.2.2020 Improving on iv invanz.

## 2020-09-02 NOTE — PROGRESS NOTE ADULT - SUBJECTIVE AND OBJECTIVE BOX
Patient is a 75y old  Female who presents with a chief complaint of gi bleed  anemia (02 Sep 2020 09:59)      INTERVAL /OVERNIGHT EVENTS: alert awake confused    MEDICATIONS  (STANDING):  amLODIPine   Tablet 10 milliGRAM(s) Oral daily  ascorbic acid 500 milliGRAM(s) Oral daily  calcium carbonate 1250 mG  + Vitamin D (OsCal 500 + D) 1 Tablet(s) Oral daily  carbidopa/levodopa  25/100 1 Tablet(s) Oral <User Schedule>  cefpodoxime 200 milliGRAM(s) Oral every 12 hours  cholestyramine Powder (Sugar-Free) 4 Gram(s) Oral daily  epoetin edwin-epbx (RETACRIT) Injectable 77125 Unit(s) SubCutaneous <User Schedule>  ferrous    sulfate 325 milliGRAM(s) Oral daily  heparin   Injectable 5000 Unit(s) SubCutaneous every 8 hours  hydrocortisone hemorrhoidal Suppository 1 Suppository(s) Rectal two times a day  lactobacillus acidophilus 1 Tablet(s) Oral three times a day  lidocaine   Patch 1 Patch Transdermal every 24 hours  mesalamine Suppository 1000 milliGRAM(s) Rectal at bedtime  metroNIDAZOLE    Tablet 500 milliGRAM(s) Oral every 8 hours  multivitamin/minerals 1 Tablet(s) Oral daily  pantoprazole    Tablet 40 milliGRAM(s) Oral before breakfast  prednisoLONE acetate 1% Suspension 1 Drop(s) Both EYES daily  QUEtiapine 25 milliGRAM(s) Oral two times a day    MEDICATIONS  (PRN):  acetaminophen   Tablet .. 650 milliGRAM(s) Oral every 6 hours PRN Mild Pain (1 - 3)  acetaminophen   Tablet .. 650 milliGRAM(s) Oral every 6 hours PRN Temp greater or equal to 38C (100.4F)  oxyCODONE    IR 5 milliGRAM(s) Oral four times a day PRN Severe Pain (7 - 10)  traMADol 25 milliGRAM(s) Oral every 6 hours PRN Moderate Pain (4 - 6)      Allergies    tetracycline (Unknown)    Intolerances        REVIEW OF SYSTEMS:  CONSTITUTIONAL: No fever, weight loss, or fatigue  EYES: No eye pain, visual disturbances, or discharge  ENMT:  No difficulty hearing, tinnitus, vertigo; No sinus or throat pain  NECK: No pain or stiffness  RESPIRATORY: No cough, wheezing, chills or hemoptysis; No shortness of breath  CARDIOVASCULAR: No chest pain, palpitations, dizziness, or leg swelling  GASTROINTESTINAL: No abdominal or epigastric pain. No nausea, vomiting, or hematemesis; No diarrhea or constipation. No melena or hematochezia.  GENITOURINARY: No dysuria, frequency, hematuria, or incontinence  NEUROLOGICAL: No headaches, memory loss, loss of strength, numbness, or tremors  SKIN: No itching, burning, rashes, or lesions   LYMPH NODES: No enlarged glands  ENDOCRINE: No heat or cold intolerance; No hair loss; No polydipsia or polyuria  MUSCULOSKELETAL: No joint pain or swelling; No muscle, back, or extremity pain  PSYCHIATRIC: No depression, anxiety, mood swings, or difficulty sleeping  HEME/LYMPH: No easy bruising, or bleeding gums  ALLERGY AND IMMUNOLOGIC: No hives or eczema    Vital Signs Last 24 Hrs  T(C): 36.8 (02 Sep 2020 13:10), Max: 37.2 (01 Sep 2020 14:02)  T(F): 98.3 (02 Sep 2020 13:10), Max: 99 (01 Sep 2020 14:02)  HR: 87 (02 Sep 2020 13:10) (75 - 87)  BP: 111/69 (02 Sep 2020 13:10) (101/61 - 118/67)  BP(mean): --  RR: 18 (02 Sep 2020 13:10) (18 - 18)  SpO2: 98% (02 Sep 2020 13:10) (96% - 98%)    PHYSICAL EXAM:  GENERAL: NAD, well-groomed, well-developed  HEAD:  Atraumatic, Normocephalic  EYES: EOMI, PERRLA, conjunctiva and sclera clear  ENMT: No tonsillar erythema, exudates, or enlargement; Moist mucous membranes, Good dentition, No lesions  NECK: Supple, No JVD, Normal thyroid  NERVOUS SYSTEM:  Alert & Oriented X3, Good concentration; Motor Strength 5/5 B/L upper and lower extremities; DTRs 2+ intact and symmetric  CHEST/LUNG: Clear to auscultation bilaterally; No rales, rhonchi, wheezing, or rubs  HEART: Regular rate and rhythm; No murmurs, rubs, or gallops  ABDOMEN: Soft, Nontender, Nondistended; Bowel sounds present  EXTREMITIES:  2+ Peripheral Pulses, No clubbing, cyanosis, or edema  LYMPH: No lymphadenopathy noted  SKIN: No rashes or lesions    LABS:                        8.7    7.25  )-----------( 476      ( 02 Sep 2020 08:28 )             26.8               CAPILLARY BLOOD GLUCOSE          RADIOLOGY & ADDITIONAL TESTS:    Notes Reviewed:  [x ] YES  [ ] NO    Care Discussed with Consultants/Other Providers [ x] YES  [ ] NO

## 2020-09-02 NOTE — PROGRESS NOTE ADULT - ASSESSMENT
anemia  gi bleed  diarrhea  proctitis  uti      ct reviewed- showing severe proctitis; no overt s/s gib;  check stool studies, cont probiotic, add questran and canasa supp  cont abx; f/u further id recs  cont ppi daily, anusol bid   advance diet as tolerated  monitor cbc, transfuse prn  monitor abdominal exam/gi fxn  to follow

## 2020-09-02 NOTE — PROGRESS NOTE ADULT - SUBJECTIVE AND OBJECTIVE BOX
INTERVAL HPI/OVERNIGHT EVENTS:  pt seen and examined  denies n/v/abd pain  per rn, pt passing frequent soft brown bms  still with some rectal discomfort       MEDICATIONS  (STANDING):  amLODIPine   Tablet 10 milliGRAM(s) Oral daily  ascorbic acid 500 milliGRAM(s) Oral daily  calcium carbonate 1250 mG  + Vitamin D (OsCal 500 + D) 1 Tablet(s) Oral daily  carbidopa/levodopa  25/100 1 Tablet(s) Oral <User Schedule>  cefTRIAXone   IVPB 1000 milliGRAM(s) IV Intermittent every 24 hours  ferrous    sulfate 325 milliGRAM(s) Oral daily  heparin   Injectable 5000 Unit(s) SubCutaneous every 8 hours  hydrocortisone hemorrhoidal Suppository 1 Suppository(s) Rectal two times a day  lidocaine   Patch 1 Patch Transdermal every 24 hours  multivitamin/minerals 1 Tablet(s) Oral daily  pantoprazole    Tablet 40 milliGRAM(s) Oral before breakfast  prednisoLONE acetate 1% Suspension 1 Drop(s) Both EYES daily  QUEtiapine 25 milliGRAM(s) Oral two times a day    MEDICATIONS  (PRN):  acetaminophen   Tablet .. 650 milliGRAM(s) Oral every 6 hours PRN Mild Pain (1 - 3)  acetaminophen   Tablet .. 650 milliGRAM(s) Oral every 6 hours PRN Temp greater or equal to 38C (100.4F)  oxyCODONE    IR 5 milliGRAM(s) Oral four times a day PRN Severe Pain (7 - 10)  traMADol 25 milliGRAM(s) Oral every 6 hours PRN Moderate Pain (4 - 6)      Allergies    tetracycline (Unknown)    Intolerances      unable to obtain in entirety       PHYSICAL EXAM:     Vital Signs Last 24 Hrs  T(C): 37.4 (30 Aug 2020 06:00), Max: 38.8 (30 Aug 2020 04:28)  T(F): 99.4 (30 Aug 2020 06:00), Max: 101.8 (30 Aug 2020 04:28)  HR: 86 (30 Aug 2020 04:28) (86 - 99)  BP: 96/58 (30 Aug 2020 04:28) (96/58 - 133/73)  BP(mean): --  RR: 19 (30 Aug 2020 04:28) (17 - 19)  SpO2: 94% (30 Aug 2020 04:28) (93% - 94%)        GENERAL:  lying  in bed  HEENT:  NC/AT  ABDOMEN:  Soft nt  mild dt  EXTREMITIES  no  edema  NEURO: awake but confused          LABS:                        8.7    10.76 )-----------( 297      ( 30 Aug 2020 08:34 )             26.4     08-29    139  |  109<H>  |  39<H>  ----------------------------<  93  3.8   |  21<L>  |  0.77    Ca    7.4<L>      29 Aug 2020 07:22            08-29-20 @ 07:01  -  08-30-20 @ 07:00  --------------------------------------------------------  IN: 50 mL / OUT: 200 mL / NET: -150 mL        RADIOLOGY & ADDITIONAL TESTS:      < from: CT Abdomen and Pelvis w/ Oral Cont (08.31.20 @ 14:40) >    EXAM:  CT ABDOMEN AND PELVIS OC                            PROCEDURE DATE:  08/31/2020          INTERPRETATION:  CT ABDOMEN AND PELVIS WITH ORAL CONTRAST    CLINICAL INFORMATION: Fever, sepsis    COMPARISON: CT abdomen pelvis 5/9/2020    PROCEDURE:  CT of the Abdomen and Pelvis was performed without intravenous contrast.  Intravenous contrast: None.  Oral contrast: positive contrast was administered.  Sagittal and coronal reformats were performed.    FINDINGS:  LOWER CHEST: Trace right effusion.    LIVER: Normal.  BILE DUCTS: Nondilated.  GALLBLADDER: Gallstones.  SPLEEN: Normal.  PANCREAS: Normal.  ADRENALS: Normal.  KIDNEYS/URETERS: No hydronephrosis or urinary tract calculi.    BLADDER: Obscured by artifact  REPRODUCTIVE ORGANS: Obscured    BOWEL: Severe rectal wall thickening without definite extraluminal air or collection.  PERITONEUM: No free air or ascites.  VESSELS:  Aortic atherosclerosis without aneurysm.  RETROPERITONEUM/LYMPH NODES: No adenopathy.  ABDOMINAL WALL: Normal.  BONES: Left hip arthroplasty. Recent right inferior pubic rami fracture. Bilateral sacral fractures and age-indeterminate rib fractures.    IMPRESSION:    Severe proctitis without definite extraluminal air or collection.                YOEL CELAYA M.D., ATTENDING RADIOLOGIST  This document has been electronically signed. Aug 31 2020  3:31PM                < end of copied text >

## 2020-09-02 NOTE — PROGRESS NOTE ADULT - SUBJECTIVE AND OBJECTIVE BOX
Patient is a 75y old  Female who presents with a chief complaint of gi bleed  anemia (31 Aug 2020 09:20)      Subjective: Patient seen and examined at bedside. No acute events overnight.     PAIN: N  DYSPNEA: N	  NAUS/VOM: N	  SECRETIONS: N	  AGITATION: N    OTHER REVIEW OF SYSTEMS: negative    Vital Signs Last 24 Hrs  T(C): 36.8 (02 Sep 2020 13:10), Max: 37.2 (01 Sep 2020 14:02)  T(F): 98.3 (02 Sep 2020 13:10), Max: 99 (01 Sep 2020 14:02)  HR: 87 (02 Sep 2020 13:10) (75 - 87)  BP: 111/69 (02 Sep 2020 13:10) (101/61 - 118/67)  BP(mean): --  RR: 18 (02 Sep 2020 13:10) (18 - 18)  SpO2: 98% (02 Sep 2020 13:10) (96% - 98%)                                8.7    7.25  )-----------( 476      ( 02 Sep 2020 08:28 )             26.8       CAPILLARY BLOOD GLUCOSE                  acetaminophen   Tablet .. 650 milliGRAM(s) Oral every 6 hours PRN  acetaminophen   Tablet .. 650 milliGRAM(s) Oral every 6 hours PRN  amLODIPine   Tablet 10 milliGRAM(s) Oral daily  ascorbic acid 500 milliGRAM(s) Oral daily  calcium carbonate 1250 mG  + Vitamin D (OsCal 500 + D) 1 Tablet(s) Oral daily  carbidopa/levodopa  25/100 1 Tablet(s) Oral <User Schedule>  cefpodoxime 200 milliGRAM(s) Oral every 12 hours  cholestyramine Powder (Sugar-Free) 4 Gram(s) Oral daily  epoetin edwin-epbx (RETACRIT) Injectable 33339 Unit(s) SubCutaneous <User Schedule>  ferrous    sulfate 325 milliGRAM(s) Oral daily  heparin   Injectable 5000 Unit(s) SubCutaneous every 8 hours  hydrocortisone hemorrhoidal Suppository 1 Suppository(s) Rectal two times a day  lactobacillus acidophilus 1 Tablet(s) Oral three times a day  lidocaine   Patch 1 Patch Transdermal every 24 hours  mesalamine Suppository 1000 milliGRAM(s) Rectal at bedtime  metroNIDAZOLE    Tablet 500 milliGRAM(s) Oral every 8 hours  multivitamin/minerals 1 Tablet(s) Oral daily  oxyCODONE    IR 5 milliGRAM(s) Oral four times a day PRN  pantoprazole    Tablet 40 milliGRAM(s) Oral before breakfast  prednisoLONE acetate 1% Suspension 1 Drop(s) Both EYES daily  QUEtiapine 25 milliGRAM(s) Oral two times a day  traMADol 25 milliGRAM(s) Oral every 6 hours PRN      GENERAL:  resting comfortably in NAD  HEENT:  NC/AT EOMI PERRL  NECK: supple no JVD  CVS:  +S1 S2 RRR  RESP: CTA B/L  GI:  soft NT/ND +BS  : no suprapubic tenderness  MUSC:  no lower extremity edema  SKIN:  Warm, moist, no rashes   LYMPH: normal     MEDS REVIEWED	          OPIATE NAÏVE (Y/N)    tetracycline (Unknown)        ADVANCED DIRECTIVES:     FULL CODE         PSYCHOSOCIAL-SPIRITUAL ASSESSMENT:    _x__Reviewed     _x__Care  plan unchanged     ___Care plan adjusted as below    GOALS OF CARE DISCUSSION  	_x__Palliative care info/counseling provided	    ___Family meeting  	_x__Advanced Directives addressed	    ___See previous Palliative Medicine Note    AGENCY CHOICE DISCUSSED:   ___HOSPICE   ___[facility]  ___OTHER:              > 50% OF THE TIME SPENT IN COUNSELING AND COORDINATING CARE 	    Minutes:        PROLONGED SERVICE             FACE TO FACE:    PT            PT & FAMILY	       Minutes:      Advance Care Planning Time:

## 2020-09-02 NOTE — PROGRESS NOTE ADULT - ASSESSMENT
contact #  son----Roman Wilson  phone # [phone],, called and discussed with son    IMPRESSION:  75/ F with PMH of Parkinson's disease and mild cognitive impairment, ?dementia per son, ch anemia per son and was on iron outpatient in past, htn in 2020 who comes s/p fall around 8/25/20 and noted pubic rami fracture,  seen by orthopedics and is on medical management, guaiac positive stool, seen by gi and for possible egd/colon for monday, per pt son may had colonoscopy over 18 years ago, hb was at around 11--11.5, per son pt with always anemia and iron deficiency, pt had ct renal stone hunt in 5/2020--hepatic cyst, son declines repeat ct a/p at this time until endoscopy exam. As I spoke to son, Roman Wilson on phone patient with h/o admission at [place] in around 5/2020 after fall at home and was told rib fracture, had hb around 11-11.5. Patient was d/c'd from [facility] Thursday, 8.20.2020, after residing there for about 3 months. He reports that patient was doing well when she got home -- she was able to ambulate short distances in the house with a walker.   Anemia--likely multifactorial, h/o ch anemia/iron def as per son, iron profile this admission--low iron/transferrin saturation, seen by gi and for possible egd/colon on monday  RECOMMENDATION:  Anemia--likely multifactorial, guaiac pos stool, h/o ch anemia/iron def/multifactorial  iron profile --low iron/transferrin saturation/  patient is s/p iv iron 100 mg a day for 3 days as looking for a rapid response, 8/30/2020---9/1/2020  seen by gi , was planned for egd/colon and postponed by gi after ct scan--sever colitis, final decision gi work up as per pt/family/gi to exclude gi pathology/malignancy  guaiac pos stool/liver cyst on earlier ct--son declines ct a/p, radio scan--final decision/work up per gi  no evidence of hemolysis  hb is at 8.7 today, hb was at 8 yesterday--stable  patient is on retacrit 10,000 units s/c tiw--t/t/s and d/c retacrit if hb is over 10  monitor h/h--transfuse prbc as needed for /ymptomatic anemia  smear again from 9/1 reviewed--unremarkable except left shift wbc  fever/bandemia/uti--seen by id , work and management per id, is on iv invanz  gi/dvt prophylaxis--on heparin s/c  d/w pt at bedside, son on phone and in agreement of above

## 2020-09-02 NOTE — PROGRESS NOTE ADULT - SUBJECTIVE AND OBJECTIVE BOX
Patient is a 75y old  Female who presents with a chief complaint of fall (02 Sep 2020 13:57)       Pt is seen and examined  pt is awake and lying in bed/out of bed to chair  pt seems comfortable and denies any complaints at this time    HPI:  74 yo F PMHx Parkinson's disease, early dementia p/w Rt lower rib pain s/p fall. Accidental fall, no dizziness/ chest pain/ loss of consciousness.   Patient states that she was trying to grab a piece of coffee cake from the cabinet, slipped backwards, fell onto her right side and hit back of her head. Denies chest pain/ palpitations/ shortness of breath.    Son Roman present, helped her up.     In the ed patient had x ray of pelvis show sRt pubic fracture  CT head no acute bleed  CT C spine no fracture (25 Aug 2020 23:29)         ROS:  Negative except for:    MEDICATIONS  (STANDING):  amLODIPine   Tablet 10 milliGRAM(s) Oral daily  ascorbic acid 500 milliGRAM(s) Oral daily  calcium carbonate 1250 mG  + Vitamin D (OsCal 500 + D) 1 Tablet(s) Oral daily  carbidopa/levodopa  25/100 1 Tablet(s) Oral <User Schedule>  cefpodoxime 200 milliGRAM(s) Oral every 12 hours  cholestyramine Powder (Sugar-Free) 4 Gram(s) Oral daily  epoetin edwin-epbx (RETACRIT) Injectable 77300 Unit(s) SubCutaneous <User Schedule>  ferrous    sulfate 325 milliGRAM(s) Oral daily  heparin   Injectable 5000 Unit(s) SubCutaneous every 8 hours  hydrocortisone hemorrhoidal Suppository 1 Suppository(s) Rectal two times a day  lactobacillus acidophilus 1 Tablet(s) Oral three times a day  lidocaine   Patch 1 Patch Transdermal every 24 hours  mesalamine Suppository 1000 milliGRAM(s) Rectal at bedtime  metroNIDAZOLE    Tablet 500 milliGRAM(s) Oral every 8 hours  multivitamin/minerals 1 Tablet(s) Oral daily  pantoprazole    Tablet 40 milliGRAM(s) Oral before breakfast  prednisoLONE acetate 1% Suspension 1 Drop(s) Both EYES daily  QUEtiapine 25 milliGRAM(s) Oral two times a day    MEDICATIONS  (PRN):  acetaminophen   Tablet .. 650 milliGRAM(s) Oral every 6 hours PRN Mild Pain (1 - 3)  acetaminophen   Tablet .. 650 milliGRAM(s) Oral every 6 hours PRN Temp greater or equal to 38C (100.4F)  oxyCODONE    IR 5 milliGRAM(s) Oral four times a day PRN Severe Pain (7 - 10)  traMADol 25 milliGRAM(s) Oral every 6 hours PRN Moderate Pain (4 - 6)      Allergies    tetracycline (Unknown)    Intolerances        Vital Signs Last 24 Hrs  T(C): 36.8 (02 Sep 2020 13:10), Max: 36.8 (02 Sep 2020 13:10)  T(F): 98.3 (02 Sep 2020 13:10), Max: 98.3 (02 Sep 2020 13:10)  HR: 87 (02 Sep 2020 13:10) (75 - 87)  BP: 111/69 (02 Sep 2020 13:10) (101/61 - 118/67)  BP(mean): --  RR: 18 (02 Sep 2020 13:10) (18 - 18)  SpO2: 98% (02 Sep 2020 13:10) (96% - 98%)    PHYSICAL EXAM  General: adult in NAD  HEENT: clear oropharynx, anicteric sclera, pink conjunctiva  Neck: supple  CV: normal S1/S2 with no murmur rubs or gallops  Lungs: positive air movement b/l ant lungs,clear to auscultation, no wheezes, no rales  Abdomen: soft non-tender non-distended, no hepatosplenomegaly  Ext: no clubbing cyanosis or edema  Skin: no rashes and no petechiae  Neuro: alert and oriented X 4, no focal deficits  LABS:                          8.7    7.25  )-----------( 476      ( 02 Sep 2020 08:28 )             26.8         Mean Cell Volume : 83.5 fl  Mean Cell Hemoglobin : 27.1 pg  Mean Cell Hemoglobin Concentration : 32.5 gm/dL  Auto Neutrophil # : x  Auto Lymphocyte # : x  Auto Monocyte # : x  Auto Eosinophil # : x  Auto Basophil # : x  Auto Neutrophil % : x  Auto Lymphocyte % : x  Auto Monocyte % : x  Auto Eosinophil % : x  Auto Basophil % : x    Serial CBC's  09-02 @ 08:28  Hct-26.8 / Hgb-8.7 / Plat-476 / RBC-3.21 / WBC-7.25          Serial CBC's  09-01 @ 09:31  Hct-24.6 / Hgb-8.0 / Plat-387 / RBC-2.95 / WBC-9.78          Serial CBC's  08-31 @ 08:46  Hct-25.6 / Hgb-8.4 / Plat-334 / RBC-3.09 / WBC-11.84          Serial CBC's  08-30 @ 08:34  Hct-26.4 / Hgb-8.7 / Plat-297 / RBC-3.17 / WBC-10.76                        Ferritin, Serum: 127 ng/mL (08-29-20 @ 21:58)  Vitamin B12, Serum: 485 pg/mL (08-29-20 @ 21:58)  Folate, Serum: >20.0 ng/mL (08-29-20 @ 21:58)  Iron - Total Binding Capacity.: 263 ug/dL (08-29-20 @ 21:57)  Reticulocyte Percent: 1.0 % (08-29-20 @ 14:43)                BLOOD SMEAR INTERPRETATION:       RADIOLOGY & ADDITIONAL STUDIES: Patient is a 75y old  Female who presents with a chief complaint of fall (02 Sep 2020 13:57)       Pt is seen and examined  pt is awake and sitting in chair  pt seems comfortable and denies any complaints at this time    HPI:  74 yo F PMHx Parkinson's disease, early dementia p/w Rt lower rib pain s/p fall. Accidental fall, no dizziness/ chest pain/ loss of consciousness.   Patient states that she was trying to grab a piece of coffee cake from the cabinet, slipped backwards, fell onto her right side and hit back of her head. Denies chest pain/ palpitations/ shortness of breath.    Son Roman present, helped her up.     In the ed patient had x ray of pelvis show sRt pubic fracture  CT head no acute bleed  CT C spine no fracture (25 Aug 2020 23:29)         ROS:  as per hpi    MEDICATIONS  (STANDING):  amLODIPine   Tablet 10 milliGRAM(s) Oral daily  ascorbic acid 500 milliGRAM(s) Oral daily  calcium carbonate 1250 mG  + Vitamin D (OsCal 500 + D) 1 Tablet(s) Oral daily  carbidopa/levodopa  25/100 1 Tablet(s) Oral <User Schedule>  cefpodoxime 200 milliGRAM(s) Oral every 12 hours  cholestyramine Powder (Sugar-Free) 4 Gram(s) Oral daily  epoetin edwin-epbx (RETACRIT) Injectable 38671 Unit(s) SubCutaneous <User Schedule>  ferrous    sulfate 325 milliGRAM(s) Oral daily  heparin   Injectable 5000 Unit(s) SubCutaneous every 8 hours  hydrocortisone hemorrhoidal Suppository 1 Suppository(s) Rectal two times a day  lactobacillus acidophilus 1 Tablet(s) Oral three times a day  lidocaine   Patch 1 Patch Transdermal every 24 hours  mesalamine Suppository 1000 milliGRAM(s) Rectal at bedtime  metroNIDAZOLE    Tablet 500 milliGRAM(s) Oral every 8 hours  multivitamin/minerals 1 Tablet(s) Oral daily  pantoprazole    Tablet 40 milliGRAM(s) Oral before breakfast  prednisoLONE acetate 1% Suspension 1 Drop(s) Both EYES daily  QUEtiapine 25 milliGRAM(s) Oral two times a day    MEDICATIONS  (PRN):  acetaminophen   Tablet .. 650 milliGRAM(s) Oral every 6 hours PRN Mild Pain (1 - 3)  acetaminophen   Tablet .. 650 milliGRAM(s) Oral every 6 hours PRN Temp greater or equal to 38C (100.4F)  oxyCODONE    IR 5 milliGRAM(s) Oral four times a day PRN Severe Pain (7 - 10)  traMADol 25 milliGRAM(s) Oral every 6 hours PRN Moderate Pain (4 - 6)      Allergies    tetracycline (Unknown)    Intolerances        Vital Signs Last 24 Hrs  T(C): 36.8 (02 Sep 2020 13:10), Max: 36.8 (02 Sep 2020 13:10)  T(F): 98.3 (02 Sep 2020 13:10), Max: 98.3 (02 Sep 2020 13:10)  HR: 87 (02 Sep 2020 13:10) (75 - 87)  BP: 111/69 (02 Sep 2020 13:10) (101/61 - 118/67)  BP(mean): --  RR: 18 (02 Sep 2020 13:10) (18 - 18)  SpO2: 98% (02 Sep 2020 13:10) (96% - 98%)    PHYSICAL EXAM  General: adult in NAD  HEENT: clear oropharynx, anicteric sclera, pink conjunctiva  Neck: supple  CV: normal S1/S2 with no murmur rubs or gallops  Lungs: positive air movement b/l ant lungs,clear to auscultation, no wheezes, no rales  Abdomen: soft non-tender non-distended, no hepatosplenomegaly  Ext: no clubbing cyanosis or edema  Skin: no rashes and no petechiae  Neuro: alert and oriented X 4, no focal deficits  LABS:                          8.7    7.25  )-----------( 476      ( 02 Sep 2020 08:28 )             26.8         Mean Cell Volume : 83.5 fl  Mean Cell Hemoglobin : 27.1 pg  Mean Cell Hemoglobin Concentration : 32.5 gm/dL  Auto Neutrophil # : x  Auto Lymphocyte # : x  Auto Monocyte # : x  Auto Eosinophil # : x  Auto Basophil # : x  Auto Neutrophil % : x  Auto Lymphocyte % : x  Auto Monocyte % : x  Auto Eosinophil % : x  Auto Basophil % : x    Serial CBC's  09-02 @ 08:28  Hct-26.8 / Hgb-8.7 / Plat-476 / RBC-3.21 / WBC-7.25          Serial CBC's  09-01 @ 09:31  Hct-24.6 / Hgb-8.0 / Plat-387 / RBC-2.95 / WBC-9.78          Serial CBC's  08-31 @ 08:46  Hct-25.6 / Hgb-8.4 / Plat-334 / RBC-3.09 / WBC-11.84          Serial CBC's  08-30 @ 08:34  Hct-26.4 / Hgb-8.7 / Plat-297 / RBC-3.17 / WBC-10.76                        Ferritin, Serum: 127 ng/mL (08-29-20 @ 21:58)  Vitamin B12, Serum: 485 pg/mL (08-29-20 @ 21:58)  Folate, Serum: >20.0 ng/mL (08-29-20 @ 21:58)  Iron - Total Binding Capacity.: 263 ug/dL (08-29-20 @ 21:57)  Reticulocyte Percent: 1.0 % (08-29-20 @ 14:43)

## 2020-09-02 NOTE — PROGRESS NOTE ADULT - SUBJECTIVE AND OBJECTIVE BOX
[facility] -- Eugene York, Denise Coronado, Matias Jacobsen Savella  Office # [phone]      Follow Up:    htn  Subjective/Observations:     Seen at bedside, alert confused in NAD  REVIEW OF SYSTEMS: unable to provide any meaningful information     PAST MEDICAL & SURGICAL HISTORY:  Dementia  Anemia  Cataract  Parkinsons  History of left hip replacement      MEDICATIONS  (STANDING):  amLODIPine   Tablet 10 milliGRAM(s) Oral daily  ascorbic acid 500 milliGRAM(s) Oral daily  calcium carbonate 1250 mG  + Vitamin D (OsCal 500 + D) 1 Tablet(s) Oral daily  carbidopa/levodopa  25/100 1 Tablet(s) Oral <User Schedule>  cefpodoxime 200 milliGRAM(s) Oral every 12 hours  cholestyramine Powder (Sugar-Free) 4 Gram(s) Oral daily  epoetin edwin-epbx (RETACRIT) Injectable 27042 Unit(s) SubCutaneous <User Schedule>  ferrous    sulfate 325 milliGRAM(s) Oral daily  heparin   Injectable 5000 Unit(s) SubCutaneous every 8 hours  hydrocortisone hemorrhoidal Suppository 1 Suppository(s) Rectal two times a day  lactobacillus acidophilus 1 Tablet(s) Oral three times a day  lidocaine   Patch 1 Patch Transdermal every 24 hours  mesalamine Suppository 1000 milliGRAM(s) Rectal at bedtime  metroNIDAZOLE    Tablet 500 milliGRAM(s) Oral every 8 hours  multivitamin/minerals 1 Tablet(s) Oral daily  pantoprazole    Tablet 40 milliGRAM(s) Oral before breakfast  prednisoLONE acetate 1% Suspension 1 Drop(s) Both EYES daily  QUEtiapine 25 milliGRAM(s) Oral two times a day    MEDICATIONS  (PRN):  acetaminophen   Tablet .. 650 milliGRAM(s) Oral every 6 hours PRN Mild Pain (1 - 3)  acetaminophen   Tablet .. 650 milliGRAM(s) Oral every 6 hours PRN Temp greater or equal to 38C (100.4F)  oxyCODONE    IR 5 milliGRAM(s) Oral four times a day PRN Severe Pain (7 - 10)  traMADol 25 milliGRAM(s) Oral every 6 hours PRN Moderate Pain (4 - 6)      Allergies    tetracycline (Unknown)    Intolerances        Vital Signs Last 24 Hrs  T(C): 36.1 (02 Sep 2020 06:15), Max: 37.2 (01 Sep 2020 14:02)  T(F): 97 (02 Sep 2020 06:15), Max: 99 (01 Sep 2020 14:02)  HR: 79 (02 Sep 2020 06:15) (75 - 79)  BP: 118/67 (02 Sep 2020 06:15) (101/61 - 118/67)  BP(mean): --  RR: 18 (02 Sep 2020 06:15) (18 - 18)  SpO2: 96% (02 Sep 2020 06:15) (94% - 96%)    I&O's Summary    01 Sep 2020 07:01  -  02 Sep 2020 07:00  --------------------------------------------------------  IN: 0 mL / OUT: 500 mL / NET: -500 mL          PHYSICAL EXAM:  TELE: not on tele   Constitutional: NAD, awake and alert, well-developed  HEENT: Moist Mucous Membranes, Anicteric  Pulmonary: Non-labored, breath sounds are clear bilaterally, No wheezing, crackles or rhonchi  Cardiovascular: Regular, S1 and S2 nl, No murmurs, rubs, gallops or clicks  Gastrointestinal: Bowel Sounds present, soft, nontender.   Lymph: No lymphadenopathy. No peripheral edema.  Skin: No visible rashes or ulcers.  Psych:  Mood & affect appropriate    LABS: All Labs Reviewed:                        8.7    7.25  )-----------( 476      ( 02 Sep 2020 08:28 )             26.8                         8.0    9.78  )-----------( 387      ( 01 Sep 2020 09:31 )             24.6                         8.4    11.84 )-----------( 334      ( 31 Aug 2020 08:46 )             25.6                    < from: 12 Lead ECG (08.25.20 @ 19:31) >    Ventricular Rate 102 BPM    Atrial Rate 102 BPM    P-R Interval 118 ms    QRS Duration 88 ms    Q-T Interval 352 ms    QTC Calculation(Bezet) 458 ms    P Axis 36 degrees    R Axis 24 degrees    T Axis 39 degrees    Diagnosis Line Sinus tachycardia  Nonspecific ST abnormality  Abnormal ECG  When compared with ECG of 09-MAY-2020 19:08,  No significant change was found

## 2020-09-02 NOTE — PROGRESS NOTE ADULT - ASSESSMENT
74 yo F PMHx Parkinson's disease, early dementia admitted on 8/25/2020 for Rt lower rib pain s/p fall found to have age indeterminate right inferior pubic rami fracture. Course in hospital complicated by fevers likely sec UTI. No urinary retention. CXR clear. Ucx with Ecoli. CT AP with proctitis ?stercoral colitis. Fevers resolved. Overall doing well.     On Invanz  Continue Vantin and Flagyl x 7 days 9/8 as last day  Trend temps and cbc  Asp precautions  Stable from ID standpoint    Thank you for consulting us and involving us in the management of this most interesting and challenging case.     Please call us at [phone] with any concerns or further questions.

## 2020-09-02 NOTE — PROGRESS NOTE ADULT - SUBJECTIVE AND OBJECTIVE BOX
Neurology follow up note    JAYJAY [id number]      Interval History:    Patient feels ok no new complaints.    MEDICATIONS    acetaminophen   Tablet .. 650 milliGRAM(s) Oral every 6 hours PRN  acetaminophen   Tablet .. 650 milliGRAM(s) Oral every 6 hours PRN  amLODIPine   Tablet 10 milliGRAM(s) Oral daily  ascorbic acid 500 milliGRAM(s) Oral daily  calcium carbonate 1250 mG  + Vitamin D (OsCal 500 + D) 1 Tablet(s) Oral daily  carbidopa/levodopa  25/100 1 Tablet(s) Oral <User Schedule>  cefpodoxime 200 milliGRAM(s) Oral every 12 hours  cholestyramine Powder (Sugar-Free) 4 Gram(s) Oral daily  epoetin edwin-epbx (RETACRIT) Injectable 29877 Unit(s) SubCutaneous <User Schedule>  ferrous    sulfate 325 milliGRAM(s) Oral daily  heparin   Injectable 5000 Unit(s) SubCutaneous every 8 hours  hydrocortisone hemorrhoidal Suppository 1 Suppository(s) Rectal two times a day  lactobacillus acidophilus 1 Tablet(s) Oral three times a day  lidocaine   Patch 1 Patch Transdermal every 24 hours  mesalamine Suppository 1000 milliGRAM(s) Rectal at bedtime  metroNIDAZOLE    Tablet 500 milliGRAM(s) Oral every 8 hours  multivitamin/minerals 1 Tablet(s) Oral daily  oxyCODONE    IR 5 milliGRAM(s) Oral four times a day PRN  pantoprazole    Tablet 40 milliGRAM(s) Oral before breakfast  prednisoLONE acetate 1% Suspension 1 Drop(s) Both EYES daily  QUEtiapine 25 milliGRAM(s) Oral two times a day  traMADol 25 milliGRAM(s) Oral every 6 hours PRN      Allergies    tetracycline (Unknown)    Intolerances            Vital Signs Last 24 Hrs  T(C): 36.1 (02 Sep 2020 06:15), Max: 37.2 (01 Sep 2020 14:02)  T(F): 97 (02 Sep 2020 06:15), Max: 99 (01 Sep 2020 14:02)  HR: 79 (02 Sep 2020 06:15) (75 - 79)  BP: 118/67 (02 Sep 2020 06:15) (101/61 - 118/67)  BP(mean): --  RR: 18 (02 Sep 2020 06:15) (18 - 18)  SpO2: 96% (02 Sep 2020 06:15) (94% - 96%)       REVIEW OF SYSTEMS:  Constitutional:  No fever, chills, or night sweats.  Head:  Positive headache located in the occipital region in the area of head trauma.  Eyes:  No double vision or blurry vision.  Ears:  No ringing in the ears.  Neck:  No neck pain.  Respiratory:  No shortness of breath.  Cardiovascular:  No chest pain.  Abdomen:  No nausea, vomiting, or abdominal pain.  Extremities/Neurological:  No numbness or tingling.  Musculoskeletal:  Occasion joint pain.  Genitourinary:  No burning upon urination.  Psychiatric:  No underlying anxiety or depression.    PHYSICAL EXAMINATION: HEENT:  Head:  Normocephalic.  Eyes:  No scleral icterus.  Ears:  Hearing bilaterally intact.  NECK:  Supple.  RESPIRATORY:  Good air entry bilaterally.  CARDIOVASCULAR:  S1 and S2 heard.  ABDOMEN:  Soft and nontender.  EXTREMITIES:  No clubbing or cyanosis were noted.      NEUROLOGIC:  The patient is awake and alert.  Location was hospital.  Year was 2020.  Recall was 1/3 within three minutes, able to name simple objects.  positive  confusion today   Extraocular movements were intact.  Pupils were equal, round, and reactive bilaterally, 3 mm to 2 mm.  Speech was fluent.  Smile was symmetric.  Motor:  Bilateral upper were 4/5.  Bilateral lower were 3-/5.  Sensory:  Bilateral upper and lower intact to light touch.  The patient has increased significant tone in bilateral lower extremities, does have a decreased range of motion of her left foot, has a history of foot drop from previous trauma.  Positive resting tremors were noted, left hand greater than right.  Positive cogwheel rigidity was noted.  Positive bradykinesia was noted.               LABS:  CBC Full  -  ( 02 Sep 2020 08:28 )  WBC Count : 7.25 K/uL  RBC Count : 3.21 M/uL  Hemoglobin : 8.7 g/dL  Hematocrit : 26.8 %  Platelet Count - Automated : 476 K/uL  Mean Cell Volume : 83.5 fl  Mean Cell Hemoglobin : 27.1 pg  Mean Cell Hemoglobin Concentration : 32.5 gm/dL  Auto Neutrophil # : x  Auto Lymphocyte # : x  Auto Monocyte # : x  Auto Eosinophil # : x  Auto Basophil # : x  Auto Neutrophil % : x  Auto Lymphocyte % : x  Auto Monocyte % : x  Auto Eosinophil % : x  Auto Basophil % : x            Hemoglobin A1C:       Vitamin B12         RADIOLOGY    ANALYSIS AND PLAN:  A 75-year-old with an episode of fall, head trauma, and mild cognitive impairment.  1.	For episode of fall, this appears to be most likely mechanical in nature.  There are no clear new signs, even though examination of the right lower extremity is limited to suggest new cerebrovascular accident has ensued.  2.	I Would recommend fall precautions.  3.	Physical Therapy evaluation if possible.  4.	For episode of head trauma, I would recommend neuro checks as needed.  5.	For history of Parkinson's disease, continue the patient on Sinemet.  6.	For mild cognitive impairment versus subtle dementia, I would recommend supportive therapy.  7.	Spoke with the son, Roman, his telephone number is [phone] 9/2/2020  8.	AMS suspect change in location more prominent today and possible UTI  9.	antibiotics as needed   10.	limit dopamine blocking agents if possible   11.	Greater than 30 minutes of time was spent with the patient, plan of care, reviewing data, and speaking to the family and multidisciplinary healthcare team.

## 2020-09-02 NOTE — PROGRESS NOTE ADULT - ASSESSMENT
75 year old woman with HTN, Parkinson's disease, dementia, anemia, for EGD/colonoscopy, but now on hold for fever.    - EGD and colonoscopy on hold for now, follow up gi recs   - Fever work up and abx per primary team.  - When ready, remains optimized for the OR from a cardiac point of view with no evidence of active ischemic heart disease, decompensated heart failure, severe obstructive valvular disease, or uncontrolled arrhythmia.    HTN  114/61  - Continue amlodipine       VTE ppx  - cw  hep   -per primary team     - Monitor and replete lytes, keep K>4, Mg>2.  - All other medical needs as per primary team.  - Other cardiovascular workup will depend on clinical course.  - Will continue to follow.    Noy Madden FNP-C  Cardiology NP  [facility] [phone]

## 2020-09-02 NOTE — PROGRESS NOTE ADULT - PROBLEM SELECTOR PLAN 1
CT AP with proctitis ?stercoral colitis. Fevers resolved. Overall doing well.   Change to po Vantin and Flagyl x 7 days   GI follow up appreciated.

## 2020-09-02 NOTE — PROGRESS NOTE ADULT - SUBJECTIVE AND OBJECTIVE BOX
[facility] DIVISION of INFECTIOUS DISEASE  Stephon Waldron MD PhD, Monica Murphy MD, Pooja Price MD, Clari Salcedo MD  and providing coverage with Didi Smith MD and Daniel Navarro MD  Providing Infectious Disease Consultations at [facility], [facility]  [phone]    infectious diseases progress note:    JAYJAY NAJERA is a 75y y. o. Female patient    No concerning overnight events    Allergies    tetracycline (Unknown)    Intolerances        ANTIBIOTICS/RELEVANT:  antimicrobials  cefpodoxime 200 milliGRAM(s) Oral every 12 hours  metroNIDAZOLE    Tablet 500 milliGRAM(s) Oral every 8 hours    immunologic:  epoetin edwin-epbx (RETACRIT) Injectable 39683 Unit(s) SubCutaneous <User Schedule>    OTHER:  acetaminophen   Tablet .. 650 milliGRAM(s) Oral every 6 hours PRN  acetaminophen   Tablet .. 650 milliGRAM(s) Oral every 6 hours PRN  amLODIPine   Tablet 10 milliGRAM(s) Oral daily  ascorbic acid 500 milliGRAM(s) Oral daily  calcium carbonate 1250 mG  + Vitamin D (OsCal 500 + D) 1 Tablet(s) Oral daily  carbidopa/levodopa  25/100 1 Tablet(s) Oral <User Schedule>  cholestyramine Powder (Sugar-Free) 4 Gram(s) Oral daily  ferrous    sulfate 325 milliGRAM(s) Oral daily  heparin   Injectable 5000 Unit(s) SubCutaneous every 8 hours  hydrocortisone hemorrhoidal Suppository 1 Suppository(s) Rectal two times a day  lactobacillus acidophilus 1 Tablet(s) Oral three times a day  lidocaine   Patch 1 Patch Transdermal every 24 hours  mesalamine Suppository 1000 milliGRAM(s) Rectal at bedtime  multivitamin/minerals 1 Tablet(s) Oral daily  oxyCODONE    IR 5 milliGRAM(s) Oral four times a day PRN  pantoprazole    Tablet 40 milliGRAM(s) Oral before breakfast  prednisoLONE acetate 1% Suspension 1 Drop(s) Both EYES daily  QUEtiapine 25 milliGRAM(s) Oral two times a day  traMADol 25 milliGRAM(s) Oral every 6 hours PRN      Objective:  Vital Signs Last 24 Hrs  T(C): 36.1 (02 Sep 2020 06:15), Max: 37.2 (01 Sep 2020 14:02)  T(F): 97 (02 Sep 2020 06:15), Max: 99 (01 Sep 2020 14:02)  HR: 79 (02 Sep 2020 06:15) (75 - 79)  BP: 118/67 (02 Sep 2020 06:15) (101/61 - 118/67)  BP(mean): --  RR: 18 (02 Sep 2020 06:15) (18 - 18)  SpO2: 96% (02 Sep 2020 06:15) (96% - 96%)    T(C): 36.1 (09-02-20 @ 06:15), Max: 37.2 (08-31-20 @ 14:32)  T(C): 36.1 (09-02-20 @ 06:15), Max: 38.7 (08-31-20 @ 06:10)  T(C): 36.1 (09-02-20 @ 06:15), Max: 38.8 (08-30-20 @ 04:28)    PHYSICAL EXAM:  HEENT: NC atraumatic  Neck: supple  Respiratory: no accessory muscle use, breathing comfortably  Cardiovascular: distant  Gastrointestinal: normal appearing, nondistended  Extremities: no clubbing, no cyanosis,      LABS:                          8.7    7.25  )-----------( 476      ( 02 Sep 2020 08:28 )             26.8       7.25 09-02 @ 08:28  9.78 09-01 @ 09:31  11.84 08-31 @ 08:46  10.76 08-30 @ 08:34  9.44 08-29 @ 07:22  11.04 08-28 @ 09:20  9.34 08-27 @ 11:06              Creatinine, Serum: 0.77 mg/dL (08-29-20 @ 07:22)                INFLAMMATORY MARKERS  Auto Neutrophil #: 7.04 K/uL (09-01-20 @ 09:31)  Auto Lymphocyte #: 1.66 K/uL (09-01-20 @ 09:31)  Auto Neutrophil #: 7.22 K/uL (08-31-20 @ 08:46)  Auto Lymphocyte #: 2.60 K/uL (08-31-20 @ 08:46)  Auto Neutrophil #: 6.04 K/uL (08-29-20 @ 07:22)  Auto Lymphocyte #: 1.79 K/uL (08-29-20 @ 07:22)  Auto Neutrophil #: 9.43 K/uL (08-25-20 @ 17:44)  Auto Lymphocyte #: 2.02 K/uL (08-25-20 @ 17:44)    Lactate, Blood: 0.7 mmol/L (08-29-20 @ 07:22)    Auto Eosinophil #: 0.20 K/uL (09-01-20 @ 09:31)  Auto Eosinophil #: 0.12 K/uL (08-31-20 @ 08:46)  Auto Eosinophil #: 0.09 K/uL (08-29-20 @ 07:22)  Auto Eosinophil #: 0.03 K/uL (08-25-20 @ 17:44)        Procalcitonin, Serum: <0.05 (08-29-20 @ 19:36)            Ferritin, Serum: 127 ng/mL (08-29-20 @ 21:58)        INR: 1.28 ratio (08-31-20 @ 08:46)  Activated Partial Thromboplastin Time: 26.6 sec (08-31-20 @ 08:46)  INR: 1.20 ratio (08-30-20 @ 11:52)  Activated Partial Thromboplastin Time: 28.8 sec (08-30-20 @ 11:52)          MICROBIOLOGY:  Culture Results:   GI PCR Results: NOT detected  *******Please Note:*******  GI panel PCR evaluates for:  Campylobacter, Plesiomonas shigelloides, Salmonella,  Vibrio, Yersinia enterocolitica, Enteroaggregative  Escherichia coli (EAEC), Enteropathogenic E.coli (EPEC),  Enterotoxigenic E. coli (ETEC) lt/st, Shiga-like  toxin-producing E. coli (STEC) stx1/stx2,  Shigella/ Enteroinvasive E. coli (EIEC), Cryptosporidium,  Cyclospora cayetanensis, Entamoeba histolytica,  Giardia lamblia, Adenovirus F 40/41, Astrovirus,  Norovirus GI/GII, Rotavirus A, Sapovirus (09-01 @ 13:32)    Culture - Urine (08.30.20 @ 00:48)    -  Amikacin: S <=16    -  Amoxicillin/Clavulanic Acid: S <=8/4    -  Ampicillin: S <=8 These ampicillin results predict results for amoxicillin    -  Ampicillin/Sulbactam: S <=4/2 Enterobacter, Citrobacter, and Serratia may develop resistance during prolonged therapy (3-4 days)    -  Aztreonam: S <=4    -  Cefazolin: S <=2 (MIC_CL_COM_ENTERIC_CEFAZU) For uncomplicated UTI with K. pneumoniae, E. coli, or P. mirablis: LILLIANA <=16 is sensitive and LILLIANA >=32 is resistant. This also predicts results for oral agents cefaclor, cefdinir, cefpodoxime, cefprozil, cefuroxime axetil, cephalexin and locarbef for uncomplicated UTI. Note that some isolates may be susceptible to these agents while testing resistant to cefazolin.    -  Cefepime: S <=2    -  Cefoxitin: S <=8    -  Ceftriaxone: S <=1 Enterobacter, Citrobacter, and Serratia may develop resistance during prolonged therapy    -  Ciprofloxacin: S <=0.25    -  Ertapenem: S <=0.5    -  Gentamicin: S <=2    -  Levofloxacin: S <=0.5    -  Meropenem: S <=1    -  Nitrofurantoin: S <=32 Should not be used to treat pyelonephritis    -  Piperacillin/Tazobactam: S <=8    -  Tigecycline: S <=2    -  Tobramycin: S <=2    -  Trimethoprim/Sulfamethoxazole: S <=0.5/9.5    Specimen Source: .Urine Clean Catch (Midstream)    Culture Results:   >100,000 CFU/ml Escherichia coli    Organism Identification: Escherichia coli    Organism: Escherichia coli    Method Type: LILLIANA        RADIOLOGY & ADDITIONAL STUDIES:

## 2020-09-02 NOTE — PROGRESS NOTE ADULT - PROBLEM SELECTOR PLAN 2
serial CBC  Anemia--likely multifactorial, guaiac pos stool, h/o ch anemia/iron def/multifactorial  patient is on iv iron 100 mg a day for 3 days

## 2020-09-03 LAB
CULTURE RESULTS: SIGNIFICANT CHANGE UP
CULTURE RESULTS: SIGNIFICANT CHANGE UP
HCT VFR BLD CALC: 26.3 % — LOW (ref 34.5–45)
HGB BLD-MCNC: 8.5 G/DL — LOW (ref 11.5–15.5)
MCHC RBC-ENTMCNC: 26.9 PG — LOW (ref 27–34)
MCHC RBC-ENTMCNC: 32.3 GM/DL — SIGNIFICANT CHANGE UP (ref 32–36)
MCV RBC AUTO: 83.2 FL — SIGNIFICANT CHANGE UP (ref 80–100)
NRBC # BLD: 0 /100 WBCS — SIGNIFICANT CHANGE UP (ref 0–0)
PLATELET # BLD AUTO: 555 K/UL — HIGH (ref 150–400)
RBC # BLD: 3.16 M/UL — LOW (ref 3.8–5.2)
RBC # FLD: 13.8 % — SIGNIFICANT CHANGE UP (ref 10.3–14.5)
SPECIMEN SOURCE: SIGNIFICANT CHANGE UP
SPECIMEN SOURCE: SIGNIFICANT CHANGE UP
WBC # BLD: 8.17 K/UL — SIGNIFICANT CHANGE UP (ref 3.8–10.5)
WBC # FLD AUTO: 8.17 K/UL — SIGNIFICANT CHANGE UP (ref 3.8–10.5)

## 2020-09-03 PROCEDURE — 99232 SBSQ HOSP IP/OBS MODERATE 35: CPT

## 2020-09-03 RX ORDER — SOD SULF/SODIUM/NAHCO3/KCL/PEG
1 SOLUTION, RECONSTITUTED, ORAL ORAL EVERY 4 HOURS
Refills: 0 | Status: COMPLETED | OUTPATIENT
Start: 2020-09-03 | End: 2020-09-03

## 2020-09-03 RX ADMIN — Medication 10 MILLIGRAM(S): at 22:35

## 2020-09-03 RX ADMIN — Medication 500 MILLIGRAM(S): at 12:28

## 2020-09-03 RX ADMIN — QUETIAPINE FUMARATE 25 MILLIGRAM(S): 200 TABLET, FILM COATED ORAL at 05:51

## 2020-09-03 RX ADMIN — Medication 10 MILLIGRAM(S): at 17:12

## 2020-09-03 RX ADMIN — Medication 1 TABLET(S): at 22:36

## 2020-09-03 RX ADMIN — QUETIAPINE FUMARATE 25 MILLIGRAM(S): 200 TABLET, FILM COATED ORAL at 17:12

## 2020-09-03 RX ADMIN — CARBIDOPA AND LEVODOPA 1 TABLET(S): 25; 100 TABLET ORAL at 12:28

## 2020-09-03 RX ADMIN — Medication 1 TABLET(S): at 12:28

## 2020-09-03 RX ADMIN — Medication 1000 MILLIGRAM(S): at 22:35

## 2020-09-03 RX ADMIN — Medication 1 TABLET(S): at 14:00

## 2020-09-03 RX ADMIN — Medication 1 LITER(S): at 17:12

## 2020-09-03 RX ADMIN — Medication 325 MILLIGRAM(S): at 12:27

## 2020-09-03 RX ADMIN — Medication 1 TABLET(S): at 05:45

## 2020-09-03 RX ADMIN — CARBIDOPA AND LEVODOPA 1 TABLET(S): 25; 100 TABLET ORAL at 08:54

## 2020-09-03 RX ADMIN — Medication 1 TABLET(S): at 12:27

## 2020-09-03 RX ADMIN — HEPARIN SODIUM 5000 UNIT(S): 5000 INJECTION INTRAVENOUS; SUBCUTANEOUS at 22:36

## 2020-09-03 RX ADMIN — HEPARIN SODIUM 5000 UNIT(S): 5000 INJECTION INTRAVENOUS; SUBCUTANEOUS at 14:00

## 2020-09-03 RX ADMIN — PANTOPRAZOLE SODIUM 40 MILLIGRAM(S): 20 TABLET, DELAYED RELEASE ORAL at 05:45

## 2020-09-03 RX ADMIN — CHOLESTYRAMINE 4 GRAM(S): 4 POWDER, FOR SUSPENSION ORAL at 08:54

## 2020-09-03 RX ADMIN — Medication 1 SUPPOSITORY(S): at 05:50

## 2020-09-03 RX ADMIN — Medication 1 LITER(S): at 22:36

## 2020-09-03 RX ADMIN — HEPARIN SODIUM 5000 UNIT(S): 5000 INJECTION INTRAVENOUS; SUBCUTANEOUS at 05:45

## 2020-09-03 RX ADMIN — CARBIDOPA AND LEVODOPA 1 TABLET(S): 25; 100 TABLET ORAL at 17:15

## 2020-09-03 RX ADMIN — Medication 1 SUPPOSITORY(S): at 17:12

## 2020-09-03 RX ADMIN — Medication 1 DROP(S): at 12:27

## 2020-09-03 NOTE — PROGRESS NOTE ADULT - ASSESSMENT
75 year old woman with HTN, Parkinson's disease, dementia, anemia, for EGD/colonoscopy, but now on hold for fever.    - EGD and colonoscopy on hold for now, follow up gi recs   - Fever work up and abx per primary team.  - When ready, remains optimized for the OR from a cardiac point of view with no evidence of active ischemic heart disease, decompensated heart failure, severe obstructive valvular disease, or uncontrolled arrhythmia.    HTN  104/51   Continue amlodipine     VTE ppx  - cw  hep   -per primary team     - Monitor and replete lytes, keep K>4, Mg>2.  - All other medical needs as per primary team.  - Other cardiovascular workup will depend on clinical course.  - Will continue to follow.    Noy Madden FNP-C  Cardiology NP  [facility] [phone] 75 year old woman with HTN, Parkinson's disease, dementia, anemia, for EGD/colonoscopy, but now on hold for fever.    - EGD and colonoscopy on hold for now, follow up gi recs   - Fever work up and abx per primary team.  - When ready, remains optimized for the OR from a cardiac point of view with no evidence of active ischemic heart disease, decompensated heart failure, severe obstructive valvular disease, or uncontrolled arrhythmia.    HTN  104/51   Continue amlodipine     VTE ppx  - cw  hep   - per primary team     - Monitor and replete lytes, keep K>4, Mg>2.  - All other medical needs as per primary team.  - Other cardiovascular workup will depend on clinical course.  - Will continue to follow.    Noy Madden FNP-C  Cardiology NP  [facility] [phone]

## 2020-09-03 NOTE — PROGRESS NOTE ADULT - PROBLEM SELECTOR PLAN 2
serial CBC  Anemia--likely multifactorial, guaiac pos stool, h/o ch anemia/iron def/multifactorial  patient is on iv iron 100 mg a day for 3 days, GI work up, d/w son

## 2020-09-03 NOTE — PROGRESS NOTE ADULT - ASSESSMENT
contact #  son----Roman Wilson  phone # [phone],, called and discussed with son    IMPRESSION:  75/ F with PMH of Parkinson's disease and mild cognitive impairment, ?dementia per son, ch anemia per son and was on iron outpatient in past, htn in 2020 who comes s/p fall around 8/25/20 and noted pubic rami fracture,  seen by orthopedics and is on medical management, guaiac positive stool, seen by gi and for possible egd/colon for monday, per pt son may had colonoscopy over 18 years ago, hb was at around 11--11.5, per son pt with always anemia and iron deficiency, pt had ct renal stone hunt in 5/2020--hepatic cyst, son declines repeat ct a/p at this time until endoscopy exam. As I spoke to son, Roman Wilson on phone patient with h/o admission at [place] in around 5/2020 after fall at home and was told rib fracture, had hb around 11-11.5. Patient was d/c'd from [facility] Thursday, 8.20.2020, after residing there for about 3 months. He reports that patient was doing well when she got home -- she was able to ambulate short distances in the house with a walker.   Anemia--likely multifactorial, h/o ch anemia/iron def as per son, iron profile this admission--low iron/transferrin saturation, seen by gi and for possible egd/colon on monday  RECOMMENDATION:  Anemia--likely multifactorial, guaiac pos stool, h/o ch anemia/iron def/multifactorial  iron profile --low iron/transferrin saturation/  patient is s/p iv iron 100 mg a day for 3 days as looking for a rapid response, 8/30/2020---9/1/2020  seen by gi , was planned for egd/colon and postponed by gi after ct scan--sever colitis, final decision gi work up as per pt/family/gi to exclude gi pathology/malignancy  guaiac pos stool/liver cyst on earlier ct--son declines ct a/p, radio scan--final decision/work up per gi  no evidence of hemolysis  hb is at 8.5 today, hb was at 8.7 yesterday-- stable  patient is on retacrit 10,000 units s/c tiw--t/t/s and d/c retacrit if hb is over 10  monitor h/h--transfuse prbc as needed for /ymptomatic anemia  smear again from 9/1 reviewed--unremarkable except left shift wbc  s/p fever/bandemia/uti--seen by id , work and management per id, abx as per id  gi/dvt prophylaxis--on heparin s/c  d/w pt at bedside, son on phone and in agreement of above

## 2020-09-03 NOTE — CHART NOTE - NSCHARTNOTEFT_GEN_A_CORE
Assessment: Pt due for malnutrition f/u. Chart reviewed, hospital course noted.    Pt visited at bedside. Alert, forgetful but able to provide some subjective information as well as preferences. Endorses improving appetite/intake, diet advanced to soft this AM. States she consumed >50% of breakfast. Denied N/V/diarrhea/constipation, +BM 9/3. Food preferences obtained, to be honored. Declines Ensure supplement, states she hasn't been receiving mighty shakes.     Diet, Soft (09-03-20 @ 07:46)    Intake: good this AM, >50% of meal. Previously poor.    Current Weight: 127.4 pounds (9/2)    Pertinent Medications: MEDICATIONS  (STANDING):  amLODIPine   Tablet 10 milliGRAM(s) Oral daily  ascorbic acid 500 milliGRAM(s) Oral daily  calcium carbonate 1250 mG  + Vitamin D (OsCal 500 + D) 1 Tablet(s) Oral daily  carbidopa/levodopa  25/100 1 Tablet(s) Oral <User Schedule>  cefpodoxime 200 milliGRAM(s) Oral every 12 hours  cholestyramine Powder (Sugar-Free) 4 Gram(s) Oral daily  epoetin edwin-epbx (RETACRIT) Injectable 74299 Unit(s) SubCutaneous <User Schedule>  ferrous    sulfate 325 milliGRAM(s) Oral daily  heparin   Injectable 5000 Unit(s) SubCutaneous every 8 hours  hydrocortisone hemorrhoidal Suppository 1 Suppository(s) Rectal two times a day  lactobacillus acidophilus 1 Tablet(s) Oral three times a day  lidocaine   Patch 1 Patch Transdermal every 24 hours  mesalamine Suppository 1000 milliGRAM(s) Rectal at bedtime  metroNIDAZOLE    Tablet 500 milliGRAM(s) Oral every 8 hours  multivitamin/minerals 1 Tablet(s) Oral daily  pantoprazole    Tablet 40 milliGRAM(s) Oral before breakfast  prednisoLONE acetate 1% Suspension 1 Drop(s) Both EYES daily  QUEtiapine 25 milliGRAM(s) Oral two times a day    MEDICATIONS  (PRN):  acetaminophen   Tablet .. 650 milliGRAM(s) Oral every 6 hours PRN Mild Pain (1 - 3)  acetaminophen   Tablet .. 650 milliGRAM(s) Oral every 6 hours PRN Temp greater or equal to 38C (100.4F)  oxyCODONE    IR 5 milliGRAM(s) Oral four times a day PRN Severe Pain (7 - 10)    Pertinent Labs:      CAPILLARY BLOOD GLUCOSE        Skin: no edema or pressure injuries noted    Estimated Needs:   [x] no change since previous assessment  [ ] recalculated:     Previous Nutrition Diagnosis:   [ ] Inadequate Energy Intake [ ]Inadequate Oral Intake [ ] Excessive Energy Intake   [ ] Underweight [ ] Increased Nutrient Needs [ ] Overweight/Obesity   [ ] Altered GI Function [ ] Unintended Weight Loss [ ] Food & Nutrition Related Knowledge Deficit [X] Malnutrition     Nutrition Diagnosis is [x] ongoing - being addressed with improving appetite/intake    New Nutrition Diagnosis: [X] not applicable       Interventions:   Recommend  [ ] Change Diet To:  [ ] Nutrition Supplement  [ ] Nutrition Support  [X] Other: Encourage po intake at all meals. Provide pt's preferences.     Monitoring and Evaluation:   [X] PO intake [ x ] Tolerance to diet prescription [ x ] weights [ x ] labs[ x ] follow up per protocol  [ ] other:

## 2020-09-03 NOTE — PROGRESS NOTE ADULT - SUBJECTIVE AND OBJECTIVE BOX
Neurology follow up note    JAYJAY [id number]      Interval History:    Patient feels ok no new complaints.    MEDICATIONS    acetaminophen   Tablet .. 650 milliGRAM(s) Oral every 6 hours PRN  acetaminophen   Tablet .. 650 milliGRAM(s) Oral every 6 hours PRN  amLODIPine   Tablet 10 milliGRAM(s) Oral daily  ascorbic acid 500 milliGRAM(s) Oral daily  calcium carbonate 1250 mG  + Vitamin D (OsCal 500 + D) 1 Tablet(s) Oral daily  carbidopa/levodopa  25/100 1 Tablet(s) Oral <User Schedule>  cefpodoxime 200 milliGRAM(s) Oral every 12 hours  cholestyramine Powder (Sugar-Free) 4 Gram(s) Oral daily  epoetin edwin-epbx (RETACRIT) Injectable 96414 Unit(s) SubCutaneous <User Schedule>  ferrous    sulfate 325 milliGRAM(s) Oral daily  heparin   Injectable 5000 Unit(s) SubCutaneous every 8 hours  hydrocortisone hemorrhoidal Suppository 1 Suppository(s) Rectal two times a day  lactobacillus acidophilus 1 Tablet(s) Oral three times a day  lidocaine   Patch 1 Patch Transdermal every 24 hours  mesalamine Suppository 1000 milliGRAM(s) Rectal at bedtime  metroNIDAZOLE    Tablet 500 milliGRAM(s) Oral every 8 hours  multivitamin/minerals 1 Tablet(s) Oral daily  oxyCODONE    IR 5 milliGRAM(s) Oral four times a day PRN  pantoprazole    Tablet 40 milliGRAM(s) Oral before breakfast  prednisoLONE acetate 1% Suspension 1 Drop(s) Both EYES daily  QUEtiapine 25 milliGRAM(s) Oral two times a day      Allergies    tetracycline (Unknown)    Intolerances            Vital Signs Last 24 Hrs  T(C): 36.9 (03 Sep 2020 04:58), Max: 36.9 (03 Sep 2020 04:58)  T(F): 98.5 (03 Sep 2020 04:58), Max: 98.5 (03 Sep 2020 04:58)  HR: 71 (03 Sep 2020 04:58) (71 - 90)  BP: 104/51 (03 Sep 2020 04:58) (104/51 - 113/71)  BP(mean): --  RR: 18 (03 Sep 2020 04:58) (18 - 18)  SpO2: 96% (03 Sep 2020 04:58) (96% - 98%)       REVIEW OF SYSTEMS:  Constitutional:  No fever, chills, or night sweats.  Head:  Positive headache located in the occipital region in the area of head trauma.  Eyes:  No double vision or blurry vision.  Ears:  No ringing in the ears.  Neck:  No neck pain.  Respiratory:  No shortness of breath.  Cardiovascular:  No chest pain.  Abdomen:  No nausea, vomiting, or abdominal pain.  Extremities/Neurological:  No numbness or tingling.  Musculoskeletal:  Occasion joint pain.  Genitourinary:  No burning upon urination.  Psychiatric:  No underlying anxiety or depression.    PHYSICAL EXAMINATION: HEENT:  Head:  Normocephalic.  Eyes:  No scleral icterus.  Ears:  Hearing bilaterally intact.  NECK:  Supple.  RESPIRATORY:  Good air entry bilaterally.  CARDIOVASCULAR:  S1 and S2 heard.  ABDOMEN:  Soft and nontender.  EXTREMITIES:  No clubbing or cyanosis were noted.     NEUROLOGIC:  The patient is awake and alert.  Location was hospital.  Year was 2020.    Extraocular movements were intact.  Pupils were equal, round, and reactive bilaterally, 3 mm to 2 mm.  Speech was fluent.  Smile was symmetric.  Motor:  Bilateral upper were 4/5.  Bilateral lower were 3-/5.  Sensory:  Bilateral upper and lower intact to light touch.  The patient has increased significant tone in bilateral lower extremities, does have a decreased range of motion of her left foot, has a history of foot drop from previous trauma.  Positive resting tremors were noted, left hand greater than right.  Positive cogwheel rigidity was noted.  Positive bradykinesia was noted.                  LABS:  CBC Full  -  ( 03 Sep 2020 07:08 )  WBC Count : 8.17 K/uL  RBC Count : 3.16 M/uL  Hemoglobin : 8.5 g/dL  Hematocrit : 26.3 %  Platelet Count - Automated : 555 K/uL  Mean Cell Volume : 83.2 fl  Mean Cell Hemoglobin : 26.9 pg  Mean Cell Hemoglobin Concentration : 32.3 gm/dL  Auto Neutrophil # : x  Auto Lymphocyte # : x  Auto Monocyte # : x  Auto Eosinophil # : x  Auto Basophil # : x  Auto Neutrophil % : x  Auto Lymphocyte % : x  Auto Monocyte % : x  Auto Eosinophil % : x  Auto Basophil % : x            Hemoglobin A1C:       Vitamin B12         RADIOLOGY      ANALYSIS AND PLAN:  A 75-year-old with an episode of fall, head trauma, and mild cognitive impairment.  1.	For episode of fall, this appears to be most likely mechanical in nature.  There are no clear new signs, even though examination of the right lower extremity is limited to suggest new cerebrovascular accident has ensued.  2.	I Would recommend fall precautions.  3.	Physical Therapy evaluation if possible.  4.	For episode of head trauma, I would recommend neuro checks as needed.  5.	For history of Parkinson's disease, continue the patient on Sinemet.  6.	For mild cognitive impairment versus subtle dementia, I would recommend supportive therapy.  7.	Spoke with the son, Roman, his telephone number is [phone] 9/3/2020  8.	AMS suspect change in location more prominent today and possible UTI  9.	antibiotics as needed   10.	limit dopamine blocking agents if possible   11.	Greater than 30 minutes of time was spent with the patient, plan of care, reviewing data, and speaking to the family and multidisciplinary healthcare team.

## 2020-09-03 NOTE — PROGRESS NOTE ADULT - ASSESSMENT
8.26.2020 This is a 75 F with PMH of Parkinson's disease and mild cognitive impairment who comes s/p fall and pubic rami fracture. I spoke to son, Roman Wilson. Patient was d/c'd from [facility] last Thursday, 8.20.2020, after residing there for about 3 months. He reports that patient was doing well when she got home -- she was able to ambulate short distances in the house with a walker. She needs moderate help with her ADLs. He wants to take patient home with home care when she is medically ready. He is going to hire a HHA for assistance with patient's ADLs. He says that he is the HCP. Patient does not have any advanced directives such as DNR/DNI. At this point, he wants everything done. Will follow.    8.27.2020 Patient remains a full code. Patient to go home with home care in AM. Son is planning to hire a HHA.    8.28.2020 Seen by psych. Patient has no medical decision making capacity. D/C planning home with home care and HHA.    8.29.2020 Febrile overnight. For EGD/colon on Monday for GIB.    8.30.2020 Still febrile. Started on iv abx for possible UTI. Cultures pending. EGD/colonscopy on hold.     8.31.2020 Still febrile prossibly from UTI. For CT a/p to r/o abscess. On abx. EGD/colonoscopy on hold.    9.1.2020 CT severe proctitis. Abx changed to iv invanz. EGD/colonoscopy on hold. Full code.    9.2.2020 Improving on iv invanz.    9.3.2020 Changed to vantin and flagyl. Possible EGD/colon.

## 2020-09-03 NOTE — PROGRESS NOTE ADULT - SUBJECTIVE AND OBJECTIVE BOX
Patient is a 75y old  Female who presents with a chief complaint of fall (03 Sep 2020 13:12)       Pt is seen and examined  pt is awake and lying in bed/out of bed to chair  pt seems comfortable and denies any complaints at this time    HPI:  74 yo F PMHx Parkinson's disease, early dementia p/w Rt lower rib pain s/p fall. Accidental fall, no dizziness/ chest pain/ loss of consciousness.   Patient states that she was trying to grab a piece of coffee cake from the cabinet, slipped backwards, fell onto her right side and hit back of her head. Denies chest pain/ palpitations/ shortness of breath.    Son Roman present, helped her up.     In the ed patient had x ray of pelvis show sRt pubic fracture  CT head no acute bleed  CT C spine no fracture (25 Aug 2020 23:29)         ROS:  Negative except for:    MEDICATIONS  (STANDING):  amLODIPine   Tablet 10 milliGRAM(s) Oral daily  ascorbic acid 500 milliGRAM(s) Oral daily  bisacodyl 10 milliGRAM(s) Oral every 4 hours  calcium carbonate 1250 mG  + Vitamin D (OsCal 500 + D) 1 Tablet(s) Oral daily  carbidopa/levodopa  25/100 1 Tablet(s) Oral <User Schedule>  cefpodoxime 200 milliGRAM(s) Oral every 12 hours  cholestyramine Powder (Sugar-Free) 4 Gram(s) Oral daily  epoetin edwin-epbx (RETACRIT) Injectable 35513 Unit(s) SubCutaneous <User Schedule>  ferrous    sulfate 325 milliGRAM(s) Oral daily  heparin   Injectable 5000 Unit(s) SubCutaneous every 8 hours  hydrocortisone hemorrhoidal Suppository 1 Suppository(s) Rectal two times a day  lactobacillus acidophilus 1 Tablet(s) Oral three times a day  lidocaine   Patch 1 Patch Transdermal every 24 hours  mesalamine Suppository 1000 milliGRAM(s) Rectal at bedtime  metroNIDAZOLE    Tablet 500 milliGRAM(s) Oral every 8 hours  multivitamin/minerals 1 Tablet(s) Oral daily  pantoprazole    Tablet 40 milliGRAM(s) Oral before breakfast  polyethylene glycol/electrolyte Solution 1 Liter(s) Oral every 4 hours  prednisoLONE acetate 1% Suspension 1 Drop(s) Both EYES daily  QUEtiapine 25 milliGRAM(s) Oral two times a day    MEDICATIONS  (PRN):  acetaminophen   Tablet .. 650 milliGRAM(s) Oral every 6 hours PRN Mild Pain (1 - 3)  acetaminophen   Tablet .. 650 milliGRAM(s) Oral every 6 hours PRN Temp greater or equal to 38C (100.4F)  oxyCODONE    IR 5 milliGRAM(s) Oral four times a day PRN Severe Pain (7 - 10)      Allergies    tetracycline (Unknown)    Intolerances        Vital Signs Last 24 Hrs  T(C): 37 (03 Sep 2020 13:26), Max: 37 (03 Sep 2020 13:26)  T(F): 98.6 (03 Sep 2020 13:26), Max: 98.6 (03 Sep 2020 13:26)  HR: 84 (03 Sep 2020 13:26) (71 - 90)  BP: 124/57 (03 Sep 2020 13:26) (104/51 - 124/57)  BP(mean): --  RR: 16 (03 Sep 2020 13:26) (16 - 18)  SpO2: 97% (03 Sep 2020 13:26) (96% - 97%)    PHYSICAL EXAM  General: adult in NAD  HEENT: clear oropharynx, anicteric sclera, pink conjunctiva  Neck: supple  CV: normal S1/S2 with no murmur rubs or gallops  Lungs: positive air movement b/l ant lungs,clear to auscultation, no wheezes, no rales  Abdomen: soft non-tender non-distended, no hepatosplenomegaly  Ext: no clubbing cyanosis or edema  Skin: no rashes and no petechiae  Neuro: alert and oriented X 4, no focal deficits  LABS:                          8.5    8.17  )-----------( 555      ( 03 Sep 2020 07:08 )             26.3         Mean Cell Volume : 83.2 fl  Mean Cell Hemoglobin : 26.9 pg  Mean Cell Hemoglobin Concentration : 32.3 gm/dL  Auto Neutrophil # : x  Auto Lymphocyte # : x  Auto Monocyte # : x  Auto Eosinophil # : x  Auto Basophil # : x  Auto Neutrophil % : x  Auto Lymphocyte % : x  Auto Monocyte % : x  Auto Eosinophil % : x  Auto Basophil % : x    Serial CBC's  09-03 @ 07:08  Hct-26.3 / Hgb-8.5 / Plat-555 / RBC-3.16 / WBC-8.17          Serial CBC's  09-02 @ 08:28  Hct-26.8 / Hgb-8.7 / Plat-476 / RBC-3.21 / WBC-7.25          Serial CBC's  09-01 @ 09:31  Hct-24.6 / Hgb-8.0 / Plat-387 / RBC-2.95 / WBC-9.78          Serial CBC's  08-31 @ 08:46  Hct-25.6 / Hgb-8.4 / Plat-334 / RBC-3.09 / WBC-11.84                        Ferritin, Serum: 127 ng/mL (08-29-20 @ 21:58)  Vitamin B12, Serum: 485 pg/mL (08-29-20 @ 21:58)  Folate, Serum: >20.0 ng/mL (08-29-20 @ 21:58)  Iron - Total Binding Capacity.: 263 ug/dL (08-29-20 @ 21:57)  Reticulocyte Percent: 1.0 % (08-29-20 @ 14:43)                BLOOD SMEAR INTERPRETATION:       RADIOLOGY & ADDITIONAL STUDIES: Patient is a 75y old  Female who presents with a chief complaint of fall (03 Sep 2020 13:12)       Pt is seen and examined  pt is awake and lying in bed  pt seems comfortable and denies any complaints at this time    HPI:  74 yo F PMHx Parkinson's disease, early dementia p/w Rt lower rib pain s/p fall. Accidental fall, no dizziness/ chest pain/ loss of consciousness.   Patient states that she was trying to grab a piece of coffee cake from the cabinet, slipped backwards, fell onto her right side and hit back of her head. Denies chest pain/ palpitations/ shortness of breath.    Son Roman present, helped her up.     In the ed patient had x ray of pelvis show sRt pubic fracture  CT head no acute bleed  CT C spine no fracture (25 Aug 2020 23:29)         ROS:  as per hpi    MEDICATIONS  (STANDING):  amLODIPine   Tablet 10 milliGRAM(s) Oral daily  ascorbic acid 500 milliGRAM(s) Oral daily  bisacodyl 10 milliGRAM(s) Oral every 4 hours  calcium carbonate 1250 mG  + Vitamin D (OsCal 500 + D) 1 Tablet(s) Oral daily  carbidopa/levodopa  25/100 1 Tablet(s) Oral <User Schedule>  cefpodoxime 200 milliGRAM(s) Oral every 12 hours  cholestyramine Powder (Sugar-Free) 4 Gram(s) Oral daily  epoetin edwin-epbx (RETACRIT) Injectable 09658 Unit(s) SubCutaneous <User Schedule>  ferrous    sulfate 325 milliGRAM(s) Oral daily  heparin   Injectable 5000 Unit(s) SubCutaneous every 8 hours  hydrocortisone hemorrhoidal Suppository 1 Suppository(s) Rectal two times a day  lactobacillus acidophilus 1 Tablet(s) Oral three times a day  lidocaine   Patch 1 Patch Transdermal every 24 hours  mesalamine Suppository 1000 milliGRAM(s) Rectal at bedtime  metroNIDAZOLE    Tablet 500 milliGRAM(s) Oral every 8 hours  multivitamin/minerals 1 Tablet(s) Oral daily  pantoprazole    Tablet 40 milliGRAM(s) Oral before breakfast  polyethylene glycol/electrolyte Solution 1 Liter(s) Oral every 4 hours  prednisoLONE acetate 1% Suspension 1 Drop(s) Both EYES daily  QUEtiapine 25 milliGRAM(s) Oral two times a day    MEDICATIONS  (PRN):  acetaminophen   Tablet .. 650 milliGRAM(s) Oral every 6 hours PRN Mild Pain (1 - 3)  acetaminophen   Tablet .. 650 milliGRAM(s) Oral every 6 hours PRN Temp greater or equal to 38C (100.4F)  oxyCODONE    IR 5 milliGRAM(s) Oral four times a day PRN Severe Pain (7 - 10)      Allergies    tetracycline (Unknown)    Intolerances        Vital Signs Last 24 Hrs  T(C): 37 (03 Sep 2020 13:26), Max: 37 (03 Sep 2020 13:26)  T(F): 98.6 (03 Sep 2020 13:26), Max: 98.6 (03 Sep 2020 13:26)  HR: 84 (03 Sep 2020 13:26) (71 - 90)  BP: 124/57 (03 Sep 2020 13:26) (104/51 - 124/57)  BP(mean): --  RR: 16 (03 Sep 2020 13:26) (16 - 18)  SpO2: 97% (03 Sep 2020 13:26) (96% - 97%)    PHYSICAL EXAM  General: adult in NAD  HEENT: clear oropharynx, anicteric sclera, pink conjunctiva  Neck: supple  CV: normal S1/S2 with no murmur rubs or gallops  Lungs: positive air movement b/l ant lungs,clear to auscultation, no wheezes, no rales  Abdomen: soft non-tender non-distended, no hepatosplenomegaly  Ext: no clubbing cyanosis or edema  Skin: no rashes and no petechiae  Neuro: alert and oriented X 4, no focal deficits  LABS:                          8.5    8.17  )-----------( 555      ( 03 Sep 2020 07:08 )             26.3         Mean Cell Volume : 83.2 fl  Mean Cell Hemoglobin : 26.9 pg  Mean Cell Hemoglobin Concentration : 32.3 gm/dL  Auto Neutrophil # : x  Auto Lymphocyte # : x  Auto Monocyte # : x  Auto Eosinophil # : x  Auto Basophil # : x  Auto Neutrophil % : x  Auto Lymphocyte % : x  Auto Monocyte % : x  Auto Eosinophil % : x  Auto Basophil % : x    Serial CBC's  09-03 @ 07:08  Hct-26.3 / Hgb-8.5 / Plat-555 / RBC-3.16 / WBC-8.17          Serial CBC's  09-02 @ 08:28  Hct-26.8 / Hgb-8.7 / Plat-476 / RBC-3.21 / WBC-7.25          Serial CBC's  09-01 @ 09:31  Hct-24.6 / Hgb-8.0 / Plat-387 / RBC-2.95 / WBC-9.78          Serial CBC's  08-31 @ 08:46  Hct-25.6 / Hgb-8.4 / Plat-334 / RBC-3.09 / WBC-11.84                        Ferritin, Serum: 127 ng/mL (08-29-20 @ 21:58)  Vitamin B12, Serum: 485 pg/mL (08-29-20 @ 21:58)  Folate, Serum: >20.0 ng/mL (08-29-20 @ 21:58)  Iron - Total Binding Capacity.: 263 ug/dL (08-29-20 @ 21:57)  Reticulocyte Percent: 1.0 % (08-29-20 @ 14:43)    Iron with Total Binding Capacity (08.29.20 @ 21:57)    Iron - Total Binding Capacity.: 263 ug/dL    % Saturation, Iron: 4 %    Iron Total, Serum: 12 ug/dL    Unsaturated Iron Binding Capacity: 251 ug/dL    Haptoglobin, Serum (08.29.20 @ 21:57)    Haptoglobin, Serum: 419 mg/dL    Occult Blood, Feces (08.26.20 @ 12:48)    Occult Blood, Feces: Positive: Method: Peroxidase Catalyzation Activity                BLOOD SMEAR INTERPRETATION:       RADIOLOGY & ADDITIONAL STUDIES:

## 2020-09-03 NOTE — PROGRESS NOTE ADULT - ASSESSMENT
anemia  gi bleed  diarrhea  proctitis  uti      ct reviewed- showing severe proctitis; no overt s/s gib  cont probiotic, questran and canasa supp  cont abx per id   cont ppi daily, anusol bid  advance diet as tolerated  monitor cbc, transfuse prn  monitor abdominal exam/gi fxn  left message w son to discuss possible endoscopic w/u, awaiting call back

## 2020-09-03 NOTE — PROGRESS NOTE ADULT - SUBJECTIVE AND OBJECTIVE BOX
INTERVAL HPI/OVERNIGHT EVENTS:  pt seen and examined  some confusion  denies n/v/abd pain  states she is starving  1 bm documented yesterday, 1 loose bm overnight per nursing      MEDICATIONS  (STANDING):  amLODIPine   Tablet 10 milliGRAM(s) Oral daily  ascorbic acid 500 milliGRAM(s) Oral daily  calcium carbonate 1250 mG  + Vitamin D (OsCal 500 + D) 1 Tablet(s) Oral daily  carbidopa/levodopa  25/100 1 Tablet(s) Oral <User Schedule>  cefTRIAXone   IVPB 1000 milliGRAM(s) IV Intermittent every 24 hours  ferrous    sulfate 325 milliGRAM(s) Oral daily  heparin   Injectable 5000 Unit(s) SubCutaneous every 8 hours  hydrocortisone hemorrhoidal Suppository 1 Suppository(s) Rectal two times a day  lidocaine   Patch 1 Patch Transdermal every 24 hours  multivitamin/minerals 1 Tablet(s) Oral daily  pantoprazole    Tablet 40 milliGRAM(s) Oral before breakfast  prednisoLONE acetate 1% Suspension 1 Drop(s) Both EYES daily  QUEtiapine 25 milliGRAM(s) Oral two times a day    MEDICATIONS  (PRN):  acetaminophen   Tablet .. 650 milliGRAM(s) Oral every 6 hours PRN Mild Pain (1 - 3)  acetaminophen   Tablet .. 650 milliGRAM(s) Oral every 6 hours PRN Temp greater or equal to 38C (100.4F)  oxyCODONE    IR 5 milliGRAM(s) Oral four times a day PRN Severe Pain (7 - 10)  traMADol 25 milliGRAM(s) Oral every 6 hours PRN Moderate Pain (4 - 6)      Allergies    tetracycline (Unknown)    Intolerances      unable to obtain in entirety       PHYSICAL EXAM:     Vital Signs Last 24 Hrs  T(C): 37.4 (30 Aug 2020 06:00), Max: 38.8 (30 Aug 2020 04:28)  T(F): 99.4 (30 Aug 2020 06:00), Max: 101.8 (30 Aug 2020 04:28)  HR: 86 (30 Aug 2020 04:28) (86 - 99)  BP: 96/58 (30 Aug 2020 04:28) (96/58 - 133/73)  BP(mean): --  RR: 19 (30 Aug 2020 04:28) (17 - 19)  SpO2: 94% (30 Aug 2020 04:28) (93% - 94%)        GENERAL:  lying  in bed  HEENT:  NC/AT  ABDOMEN:  Soft nt  mild dt  EXTREMITIES  no  edema  NEURO: awake but confused          LABS:                        8.7    10.76 )-----------( 297      ( 30 Aug 2020 08:34 )             26.4     08-29    139  |  109<H>  |  39<H>  ----------------------------<  93  3.8   |  21<L>  |  0.77    Ca    7.4<L>      29 Aug 2020 07:22            08-29-20 @ 07:01  -  08-30-20 @ 07:00  --------------------------------------------------------  IN: 50 mL / OUT: 200 mL / NET: -150 mL        RADIOLOGY & ADDITIONAL TESTS:      < from: CT Abdomen and Pelvis w/ Oral Cont (08.31.20 @ 14:40) >    EXAM:  CT ABDOMEN AND PELVIS OC                            PROCEDURE DATE:  08/31/2020          INTERPRETATION:  CT ABDOMEN AND PELVIS WITH ORAL CONTRAST    CLINICAL INFORMATION: Fever, sepsis    COMPARISON: CT abdomen pelvis 5/9/2020    PROCEDURE:  CT of the Abdomen and Pelvis was performed without intravenous contrast.  Intravenous contrast: None.  Oral contrast: positive contrast was administered.  Sagittal and coronal reformats were performed.    FINDINGS:  LOWER CHEST: Trace right effusion.    LIVER: Normal.  BILE DUCTS: Nondilated.  GALLBLADDER: Gallstones.  SPLEEN: Normal.  PANCREAS: Normal.  ADRENALS: Normal.  KIDNEYS/URETERS: No hydronephrosis or urinary tract calculi.    BLADDER: Obscured by artifact  REPRODUCTIVE ORGANS: Obscured    BOWEL: Severe rectal wall thickening without definite extraluminal air or collection.  PERITONEUM: No free air or ascites.  VESSELS:  Aortic atherosclerosis without aneurysm.  RETROPERITONEUM/LYMPH NODES: No adenopathy.  ABDOMINAL WALL: Normal.  BONES: Left hip arthroplasty. Recent right inferior pubic rami fracture. Bilateral sacral fractures and age-indeterminate rib fractures.    IMPRESSION:    Severe proctitis without definite extraluminal air or collection.                YOEL CELAYA M.D., ATTENDING RADIOLOGIST  This document has been electronically signed. Aug 31 2020  3:31PM                < end of copied text >

## 2020-09-03 NOTE — PROGRESS NOTE ADULT - SUBJECTIVE AND OBJECTIVE BOX
[facility] -- Eugene York, Denise Coronado, Matias Jacobsen Savella  Office # [phone]      Follow Up:    htn  Subjective/Observations:   Seen at bedside, alert confused, in no acute distress     REVIEW OF SYSTEMS: unable to provide any meaningful information     PAST MEDICAL & SURGICAL HISTORY:  Dementia  Anemia  Cataract  Parkinsons  History of left hip replacement      MEDICATIONS  (STANDING):  amLODIPine   Tablet 10 milliGRAM(s) Oral daily  ascorbic acid 500 milliGRAM(s) Oral daily  calcium carbonate 1250 mG  + Vitamin D (OsCal 500 + D) 1 Tablet(s) Oral daily  carbidopa/levodopa  25/100 1 Tablet(s) Oral <User Schedule>  cefpodoxime 200 milliGRAM(s) Oral every 12 hours  cholestyramine Powder (Sugar-Free) 4 Gram(s) Oral daily  epoetin edwin-epbx (RETACRIT) Injectable 91504 Unit(s) SubCutaneous <User Schedule>  ferrous    sulfate 325 milliGRAM(s) Oral daily  heparin   Injectable 5000 Unit(s) SubCutaneous every 8 hours  hydrocortisone hemorrhoidal Suppository 1 Suppository(s) Rectal two times a day  lactobacillus acidophilus 1 Tablet(s) Oral three times a day  lidocaine   Patch 1 Patch Transdermal every 24 hours  mesalamine Suppository 1000 milliGRAM(s) Rectal at bedtime  metroNIDAZOLE    Tablet 500 milliGRAM(s) Oral every 8 hours  multivitamin/minerals 1 Tablet(s) Oral daily  pantoprazole    Tablet 40 milliGRAM(s) Oral before breakfast  prednisoLONE acetate 1% Suspension 1 Drop(s) Both EYES daily  QUEtiapine 25 milliGRAM(s) Oral two times a day    MEDICATIONS  (PRN):  acetaminophen   Tablet .. 650 milliGRAM(s) Oral every 6 hours PRN Mild Pain (1 - 3)  acetaminophen   Tablet .. 650 milliGRAM(s) Oral every 6 hours PRN Temp greater or equal to 38C (100.4F)  oxyCODONE    IR 5 milliGRAM(s) Oral four times a day PRN Severe Pain (7 - 10)      Allergies    tetracycline (Unknown)    Intolerances        Vital Signs Last 24 Hrs  T(C): 36.9 (03 Sep 2020 04:58), Max: 36.9 (03 Sep 2020 04:58)  T(F): 98.5 (03 Sep 2020 04:58), Max: 98.5 (03 Sep 2020 04:58)  HR: 71 (03 Sep 2020 04:58) (71 - 90)  BP: 104/51 (03 Sep 2020 04:58) (104/51 - 113/71)  BP(mean): --  RR: 18 (03 Sep 2020 04:58) (18 - 18)  SpO2: 96% (03 Sep 2020 04:58) (96% - 98%)    I&O's Summary    02 Sep 2020 07:01  -  03 Sep 2020 07:00  --------------------------------------------------------  IN: 100 mL / OUT: 400 mL / NET: -300 mL          PHYSICAL EXAM:  TELE: not on tele   Constitutional: NAD, awake and alert, well-developed  HEENT: Moist Mucous Membranes, Anicteric  Pulmonary: Non-labored, breath sounds are clear bilaterally, No wheezing, crackles or rhonchi  Cardiovascular: Regular, S1 and S2 nl, No murmurs, rubs, gallops or clicks  Gastrointestinal: Bowel Sounds present, soft, nontender.   Lymph: No lymphadenopathy. No peripheral edema.  Skin: No visible rashes or ulcers.  Psych:  Mood & affect appropriate    LABS: All Labs Reviewed:                        8.5    8.17  )-----------( 555      ( 03 Sep 2020 07:08 )             26.3                         8.7    7.25  )-----------( 476      ( 02 Sep 2020 08:28 )             26.8                         8.0    9.78  )-----------( 387      ( 01 Sep 2020 09:31 )             24.6       ECG:  < from: 12 Lead ECG (08.25.20 @ 19:31) >    Ventricular Rate 102 BPM    Atrial Rate 102 BPM    P-R Interval 118 ms    QRS Duration 88 ms    Q-T Interval 352 ms    QTC Calculation(Bezet) 458 ms    P Axis 36 degrees    R Axis 24 degrees    T Axis 39 degrees    Diagnosis Line Sinus tachycardia  Nonspecific ST abnormality  Abnormal ECG  When compared with ECG of 09-MAY-2020 19:08,  No significant change was found        Radiology:      < from: Xray Chest 1 View- PORTABLE-Urgent (08.29.20 @ 05:44) >    No change heart mediastinum. Biapical pleural thickening. No consolidation or effusion. Degenerative change bilateral shoulders.    Impression: No active infiltrates.    < end of copied text >

## 2020-09-03 NOTE — PROGRESS NOTE ADULT - SUBJECTIVE AND OBJECTIVE BOX
Patient is a 75y old  Female who presents with a chief complaint of gi bleed  anemia (31 Aug 2020 09:20)      Subjective: Patient seen and examined at bedside. No acute events overnight.     PAIN: N  DYSPNEA: N	  NAUS/VOM: N	  SECRETIONS: N	  AGITATION: N    OTHER REVIEW OF SYSTEMS: negative    Vital Signs Last 24 Hrs  T(C): 37 (03 Sep 2020 13:26), Max: 37 (03 Sep 2020 13:26)  T(F): 98.6 (03 Sep 2020 13:26), Max: 98.6 (03 Sep 2020 13:26)  HR: 84 (03 Sep 2020 13:26) (71 - 90)  BP: 124/57 (03 Sep 2020 13:26) (104/51 - 124/57)  BP(mean): --  RR: 16 (03 Sep 2020 13:26) (16 - 18)  SpO2: 97% (03 Sep 2020 13:26) (96% - 97%)                                8.5    8.17  )-----------( 555      ( 03 Sep 2020 07:08 )             26.3       CAPILLARY BLOOD GLUCOSE                  acetaminophen   Tablet .. 650 milliGRAM(s) Oral every 6 hours PRN  acetaminophen   Tablet .. 650 milliGRAM(s) Oral every 6 hours PRN  amLODIPine   Tablet 10 milliGRAM(s) Oral daily  ascorbic acid 500 milliGRAM(s) Oral daily  bisacodyl 10 milliGRAM(s) Oral every 4 hours  calcium carbonate 1250 mG  + Vitamin D (OsCal 500 + D) 1 Tablet(s) Oral daily  carbidopa/levodopa  25/100 1 Tablet(s) Oral <User Schedule>  cefpodoxime 200 milliGRAM(s) Oral every 12 hours  cholestyramine Powder (Sugar-Free) 4 Gram(s) Oral daily  epoetin edwin-epbx (RETACRIT) Injectable 26776 Unit(s) SubCutaneous <User Schedule>  ferrous    sulfate 325 milliGRAM(s) Oral daily  heparin   Injectable 5000 Unit(s) SubCutaneous every 8 hours  hydrocortisone hemorrhoidal Suppository 1 Suppository(s) Rectal two times a day  lactobacillus acidophilus 1 Tablet(s) Oral three times a day  lidocaine   Patch 1 Patch Transdermal every 24 hours  mesalamine Suppository 1000 milliGRAM(s) Rectal at bedtime  metroNIDAZOLE    Tablet 500 milliGRAM(s) Oral every 8 hours  multivitamin/minerals 1 Tablet(s) Oral daily  oxyCODONE    IR 5 milliGRAM(s) Oral four times a day PRN  pantoprazole    Tablet 40 milliGRAM(s) Oral before breakfast  polyethylene glycol/electrolyte Solution 1 Liter(s) Oral every 4 hours  prednisoLONE acetate 1% Suspension 1 Drop(s) Both EYES daily  QUEtiapine 25 milliGRAM(s) Oral two times a day      GENERAL:  resting comfortably in NAD  HEENT:  NC/AT EOMI PERRL  NECK: supple no JVD  CVS:  +S1 S2 RRR  RESP: CTA B/L  GI:  soft NT/ND +BS  : no suprapubic tenderness  MUSC:  no lower extremity edema  SKIN:  Warm, moist, no rashes   LYMPH: normal     MEDS REVIEWED	          OPIATE NAÏVE (Y/N)    tetracycline (Unknown)        ADVANCED DIRECTIVES:     FULL CODE         PSYCHOSOCIAL-SPIRITUAL ASSESSMENT:    _x__Reviewed     _x__Care  plan unchanged     ___Care plan adjusted as below    GOALS OF CARE DISCUSSION  	_x__Palliative care info/counseling provided	    ___Family meeting  	_x__Advanced Directives addressed	    ___See previous Palliative Medicine Note    AGENCY CHOICE DISCUSSED:   ___HOSPICE   ___CALVARY  ___OTHER:              > 50% OF THE TIME SPENT IN COUNSELING AND COORDINATING CARE 	    Minutes:        PROLONGED SERVICE             FACE TO FACE:    PT            PT & FAMILY	       Minutes:      Advance Care Planning Time:

## 2020-09-03 NOTE — PROGRESS NOTE ADULT - SUBJECTIVE AND OBJECTIVE BOX
Patient is a 75y old  Female who presents with a chief complaint of gi bleed  anemia (03 Sep 2020 10:41)      INTERVAL /OVERNIGHT EVENTS: still anemic, confused    MEDICATIONS  (STANDING):  amLODIPine   Tablet 10 milliGRAM(s) Oral daily  ascorbic acid 500 milliGRAM(s) Oral daily  calcium carbonate 1250 mG  + Vitamin D (OsCal 500 + D) 1 Tablet(s) Oral daily  carbidopa/levodopa  25/100 1 Tablet(s) Oral <User Schedule>  cefpodoxime 200 milliGRAM(s) Oral every 12 hours  cholestyramine Powder (Sugar-Free) 4 Gram(s) Oral daily  epoetin edwin-epbx (RETACRIT) Injectable 43422 Unit(s) SubCutaneous <User Schedule>  ferrous    sulfate 325 milliGRAM(s) Oral daily  heparin   Injectable 5000 Unit(s) SubCutaneous every 8 hours  hydrocortisone hemorrhoidal Suppository 1 Suppository(s) Rectal two times a day  lactobacillus acidophilus 1 Tablet(s) Oral three times a day  lidocaine   Patch 1 Patch Transdermal every 24 hours  mesalamine Suppository 1000 milliGRAM(s) Rectal at bedtime  metroNIDAZOLE    Tablet 500 milliGRAM(s) Oral every 8 hours  multivitamin/minerals 1 Tablet(s) Oral daily  pantoprazole    Tablet 40 milliGRAM(s) Oral before breakfast  prednisoLONE acetate 1% Suspension 1 Drop(s) Both EYES daily  QUEtiapine 25 milliGRAM(s) Oral two times a day    MEDICATIONS  (PRN):  acetaminophen   Tablet .. 650 milliGRAM(s) Oral every 6 hours PRN Mild Pain (1 - 3)  acetaminophen   Tablet .. 650 milliGRAM(s) Oral every 6 hours PRN Temp greater or equal to 38C (100.4F)  oxyCODONE    IR 5 milliGRAM(s) Oral four times a day PRN Severe Pain (7 - 10)      Allergies    tetracycline (Unknown)    Intolerances        REVIEW OF SYSTEMS:  CONSTITUTIONAL: No fever, weight loss, or fatigue  EYES: No eye pain, visual disturbances, or discharge  ENMT:  No difficulty hearing, tinnitus, vertigo; No sinus or throat pain  NECK: No pain or stiffness  RESPIRATORY: No cough, wheezing, chills or hemoptysis; No shortness of breath  CARDIOVASCULAR: No chest pain, palpitations, dizziness, or leg swelling  GASTROINTESTINAL: No abdominal or epigastric pain. No nausea, vomiting, or hematemesis; No diarrhea or constipation. No melena or hematochezia.  GENITOURINARY: No dysuria, frequency, hematuria, or incontinence  NEUROLOGICAL: No headaches, memory loss, loss of strength, numbness, or tremors  SKIN: No itching, burning, rashes, or lesions   LYMPH NODES: No enlarged glands  ENDOCRINE: No heat or cold intolerance; No hair loss; No polydipsia or polyuria  MUSCULOSKELETAL: No joint pain or swelling; No muscle, back, or extremity pain  PSYCHIATRIC: No depression, anxiety, mood swings, or difficulty sleeping  HEME/LYMPH: No easy bruising, or bleeding gums  ALLERGY AND IMMUNOLOGIC: No hives or eczema    Vital Signs Last 24 Hrs  T(C): 36.9 (03 Sep 2020 04:58), Max: 36.9 (03 Sep 2020 04:58)  T(F): 98.5 (03 Sep 2020 04:58), Max: 98.5 (03 Sep 2020 04:58)  HR: 71 (03 Sep 2020 04:58) (71 - 90)  BP: 104/51 (03 Sep 2020 04:58) (104/51 - 113/71)  BP(mean): --  RR: 18 (03 Sep 2020 04:58) (18 - 18)  SpO2: 96% (03 Sep 2020 04:58) (96% - 97%)    PHYSICAL EXAM:  GENERAL: NAD, well-groomed, well-developed  HEAD:  Atraumatic, Normocephalic  EYES: EOMI, PERRLA, conjunctiva and sclera clear  ENMT: No tonsillar erythema, exudates, or enlargement; Moist mucous membranes, Good dentition, No lesions  NECK: Supple, No JVD, Normal thyroid  NERVOUS SYSTEM:  Alert & Oriented X3, Good concentration; Motor Strength 5/5 B/L upper and lower extremities; DTRs 2+ intact and symmetric  CHEST/LUNG: Clear to auscultation bilaterally; No rales, rhonchi, wheezing, or rubs  HEART: Regular rate and rhythm; No murmurs, rubs, or gallops  ABDOMEN: Soft, Nontender, Nondistended; Bowel sounds present  EXTREMITIES:  2+ Peripheral Pulses, No clubbing, cyanosis, or edema  LYMPH: No lymphadenopathy noted  SKIN: No rashes or lesions    LABS:                        8.5    8.17  )-----------( 555      ( 03 Sep 2020 07:08 )             26.3               CAPILLARY BLOOD GLUCOSE          RADIOLOGY & ADDITIONAL TESTS:    Notes Reviewed:  [x ] YES  [ ] NO    Care Discussed with Consultants/Other Providers [x ] YES  [ ] NO

## 2020-09-04 LAB
HCT VFR BLD CALC: 28.4 % — LOW (ref 34.5–45)
HGB BLD-MCNC: 9 G/DL — LOW (ref 11.5–15.5)
MCHC RBC-ENTMCNC: 26.9 PG — LOW (ref 27–34)
MCHC RBC-ENTMCNC: 31.7 GM/DL — LOW (ref 32–36)
MCV RBC AUTO: 85 FL — SIGNIFICANT CHANGE UP (ref 80–100)
NRBC # BLD: 0 /100 WBCS — SIGNIFICANT CHANGE UP (ref 0–0)
PLATELET # BLD AUTO: 656 K/UL — HIGH (ref 150–400)
RBC # BLD: 3.34 M/UL — LOW (ref 3.8–5.2)
RBC # FLD: 14 % — SIGNIFICANT CHANGE UP (ref 10.3–14.5)
WBC # BLD: 11.21 K/UL — HIGH (ref 3.8–10.5)
WBC # FLD AUTO: 11.21 K/UL — HIGH (ref 3.8–10.5)

## 2020-09-04 PROCEDURE — 88305 TISSUE EXAM BY PATHOLOGIST: CPT | Mod: 26

## 2020-09-04 PROCEDURE — 88342 IMHCHEM/IMCYTCHM 1ST ANTB: CPT | Mod: 26

## 2020-09-04 PROCEDURE — 88312 SPECIAL STAINS GROUP 1: CPT | Mod: 26

## 2020-09-04 PROCEDURE — 99232 SBSQ HOSP IP/OBS MODERATE 35: CPT

## 2020-09-04 RX ORDER — HYDROCORTISONE 1 %
1 OINTMENT (GRAM) TOPICAL
Qty: 60 | Refills: 0
Start: 2020-09-04 | End: 2020-10-03

## 2020-09-04 RX ORDER — LACTOBACILLUS ACIDOPHILUS 100MM CELL
0 CAPSULE ORAL
Qty: 0 | Refills: 0 | DISCHARGE
Start: 2020-09-04

## 2020-09-04 RX ORDER — IRON SUCROSE 20 MG/ML
100 INJECTION, SOLUTION INTRAVENOUS ONCE
Refills: 0 | Status: COMPLETED | OUTPATIENT
Start: 2020-09-04 | End: 2020-09-04

## 2020-09-04 RX ORDER — MESALAMINE 400 MG
1 TABLET, DELAYED RELEASE (ENTERIC COATED) ORAL
Qty: 30 | Refills: 0
Start: 2020-09-04 | End: 2020-10-03

## 2020-09-04 RX ORDER — PANTOPRAZOLE SODIUM 20 MG/1
1 TABLET, DELAYED RELEASE ORAL
Qty: 30 | Refills: 0
Start: 2020-09-04 | End: 2020-10-03

## 2020-09-04 RX ORDER — CEFPODOXIME PROXETIL 100 MG
1 TABLET ORAL
Qty: 6 | Refills: 0
Start: 2020-09-04 | End: 2020-09-06

## 2020-09-04 RX ADMIN — IRON SUCROSE 100 MILLIGRAM(S): 20 INJECTION, SOLUTION INTRAVENOUS at 17:26

## 2020-09-04 RX ADMIN — HEPARIN SODIUM 5000 UNIT(S): 5000 INJECTION INTRAVENOUS; SUBCUTANEOUS at 05:48

## 2020-09-04 RX ADMIN — HEPARIN SODIUM 5000 UNIT(S): 5000 INJECTION INTRAVENOUS; SUBCUTANEOUS at 22:36

## 2020-09-04 RX ADMIN — PANTOPRAZOLE SODIUM 40 MILLIGRAM(S): 20 TABLET, DELAYED RELEASE ORAL at 05:48

## 2020-09-04 RX ADMIN — AMLODIPINE BESYLATE 10 MILLIGRAM(S): 2.5 TABLET ORAL at 05:48

## 2020-09-04 RX ADMIN — Medication 1 DROP(S): at 17:26

## 2020-09-04 RX ADMIN — Medication 1 TABLET(S): at 05:48

## 2020-09-04 RX ADMIN — Medication 1 TABLET(S): at 22:35

## 2020-09-04 RX ADMIN — Medication 325 MILLIGRAM(S): at 17:32

## 2020-09-04 RX ADMIN — QUETIAPINE FUMARATE 25 MILLIGRAM(S): 200 TABLET, FILM COATED ORAL at 17:32

## 2020-09-04 RX ADMIN — Medication 500 MILLIGRAM(S): at 17:32

## 2020-09-04 RX ADMIN — Medication 1 TABLET(S): at 17:32

## 2020-09-04 RX ADMIN — QUETIAPINE FUMARATE 25 MILLIGRAM(S): 200 TABLET, FILM COATED ORAL at 05:48

## 2020-09-04 RX ADMIN — CARBIDOPA AND LEVODOPA 1 TABLET(S): 25; 100 TABLET ORAL at 08:38

## 2020-09-04 RX ADMIN — Medication 1000 MILLIGRAM(S): at 22:35

## 2020-09-04 RX ADMIN — CARBIDOPA AND LEVODOPA 1 TABLET(S): 25; 100 TABLET ORAL at 17:32

## 2020-09-04 RX ADMIN — Medication 1 SUPPOSITORY(S): at 17:35

## 2020-09-04 NOTE — PROGRESS NOTE ADULT - SUBJECTIVE AND OBJECTIVE BOX
Neurology follow up note    JAYJAY [id number]      Interval History:    Patient feels ok no new complaints.    MEDICATIONS    acetaminophen   Tablet .. 650 milliGRAM(s) Oral every 6 hours PRN  acetaminophen   Tablet .. 650 milliGRAM(s) Oral every 6 hours PRN  amLODIPine   Tablet 10 milliGRAM(s) Oral daily  ascorbic acid 500 milliGRAM(s) Oral daily  calcium carbonate 1250 mG  + Vitamin D (OsCal 500 + D) 1 Tablet(s) Oral daily  carbidopa/levodopa  25/100 1 Tablet(s) Oral <User Schedule>  cefpodoxime 200 milliGRAM(s) Oral every 12 hours  cholestyramine Powder (Sugar-Free) 4 Gram(s) Oral daily  epoetin edwin-epbx (RETACRIT) Injectable 63310 Unit(s) SubCutaneous <User Schedule>  ferrous    sulfate 325 milliGRAM(s) Oral daily  heparin   Injectable 5000 Unit(s) SubCutaneous every 8 hours  hydrocortisone hemorrhoidal Suppository 1 Suppository(s) Rectal two times a day  iron sucrose Injectable 100 milliGRAM(s) IV Push once  lactobacillus acidophilus 1 Tablet(s) Oral three times a day  lidocaine   Patch 1 Patch Transdermal every 24 hours  mesalamine Suppository 1000 milliGRAM(s) Rectal at bedtime  metroNIDAZOLE    Tablet 500 milliGRAM(s) Oral every 8 hours  multivitamin/minerals 1 Tablet(s) Oral daily  oxyCODONE    IR 5 milliGRAM(s) Oral four times a day PRN  pantoprazole    Tablet 40 milliGRAM(s) Oral before breakfast  prednisoLONE acetate 1% Suspension 1 Drop(s) Both EYES daily  QUEtiapine 25 milliGRAM(s) Oral two times a day      Allergies    tetracycline (Unknown)    Intolerances            Vital Signs Last 24 Hrs  T(C): 37.1 (04 Sep 2020 04:59), Max: 37.1 (04 Sep 2020 04:59)  T(F): 98.8 (04 Sep 2020 04:59), Max: 98.8 (04 Sep 2020 04:59)  HR: 78 (04 Sep 2020 04:59) (78 - 84)  BP: 121/65 (04 Sep 2020 04:59) (103/58 - 124/57)  BP(mean): --  RR: 17 (04 Sep 2020 04:59) (16 - 18)  SpO2: 97% (04 Sep 2020 04:59) (94% - 97%)         REVIEW OF SYSTEMS:  Constitutional:  No fever, chills, or night sweats.  Head:  Positive headache located in the occipital region in the area of head trauma.  Eyes:  No double vision or blurry vision.  Ears:  No ringing in the ears.  Neck:  No neck pain.  Respiratory:  No shortness of breath.  Cardiovascular:  No chest pain.  Abdomen:  No nausea, vomiting, or abdominal pain.  Extremities/Neurological:  No numbness or tingling.  Musculoskeletal:  Occasion joint pain.  Genitourinary:  No burning upon urination.  Psychiatric:  No underlying anxiety or depression.    PHYSICAL EXAMINATION: HEENT:  Head:  Normocephalic.  Eyes:  No scleral icterus.  Ears:  Hearing bilaterally intact.  NECK:  Supple.  RESPIRATORY:  Good air entry bilaterally.  CARDIOVASCULAR:  S1 and S2 heard.  ABDOMEN:  Soft and nontender.  EXTREMITIES:  No clubbing or cyanosis were noted.     NEUROLOGIC:  The patient is awake and alert.  Location was hospital.  Year was 2020.    Extraocular movements were intact.  Pupils were equal, round, and reactive bilaterally, 3 mm to 2 mm.  Speech was fluent.  Smile was symmetric.  Motor:  Bilateral upper were 4/5.  Bilateral lower were 3-/5.  Sensory:  Bilateral upper and lower intact to light touch.  The patient has increased significant tone in bilateral lower extremities, does have a decreased range of motion of her left foot, has a history of foot drop from previous trauma.  Positive resting tremors were noted, left hand greater than right.  Positive cogwheel rigidity was noted.  Positive bradykinesia was noted.                  LABS:  CBC Full  -  ( 04 Sep 2020 07:48 )  WBC Count : 11.21 K/uL  RBC Count : 3.34 M/uL  Hemoglobin : 9.0 g/dL  Hematocrit : 28.4 %  Platelet Count - Automated : 656 K/uL  Mean Cell Volume : 85.0 fl  Mean Cell Hemoglobin : 26.9 pg  Mean Cell Hemoglobin Concentration : 31.7 gm/dL  Auto Neutrophil # : x  Auto Lymphocyte # : x  Auto Monocyte # : x  Auto Eosinophil # : x  Auto Basophil # : x  Auto Neutrophil % : x  Auto Lymphocyte % : x  Auto Monocyte % : x  Auto Eosinophil % : x  Auto Basophil % : x            Hemoglobin A1C:       Vitamin B12         RADIOLOGY    ANALYSIS AND PLAN:  A 75-year-old with an episode of fall, head trauma, and mild cognitive impairment.  1.	For episode of fall, this appears to be most likely mechanical in nature.  There are no clear new signs, even though examination of the right lower extremity is limited to suggest new cerebrovascular accident has ensued.  2.	I Would recommend fall precautions.  3.	Physical Therapy evaluation if possible.  4.	For episode of head trauma, I would recommend neuro checks as needed.  5.	For history of Parkinson's disease, continue the patient on Sinemet.  6.	For mild cognitive impairment versus subtle dementia, I would recommend supportive therapy.  7.	Spoke with the son, Roman, his telephone number is [phone] 9/4/2020  8.	AMS suspect change in location more prominent today and possible UTI  9.	antibiotics as needed   10.	GI work up as needed   11.	limit dopamine blocking agents if possible   12.	Greater than 30 minutes of time was spent with the patient, plan of care, reviewing data, and speaking to the family and multidisciplinary healthcare team.

## 2020-09-04 NOTE — PROGRESS NOTE ADULT - SUBJECTIVE AND OBJECTIVE BOX
Patient is a 75y old  Female who presents with a chief complaint of egd (04 Sep 2020 08:22)       Pt is seen and examined  pt is awake and lying in bed/out of bed to chair  pt seems comfortable and denies any complaints at this time    HPI:  76 yo F PMHx Parkinson's disease, early dementia p/w Rt lower rib pain s/p fall. Accidental fall, no dizziness/ chest pain/ loss of consciousness.   Patient states that she was trying to grab a piece of coffee cake from the cabinet, slipped backwards, fell onto her right side and hit back of her head. Denies chest pain/ palpitations/ shortness of breath.    Son Roman present, helped her up.     In the ed patient had x ray of pelvis show sRt pubic fracture  CT head no acute bleed  CT C spine no fracture (25 Aug 2020 23:29)         ROS:  Negative except for:    MEDICATIONS  (STANDING):  amLODIPine   Tablet 10 milliGRAM(s) Oral daily  ascorbic acid 500 milliGRAM(s) Oral daily  calcium carbonate 1250 mG  + Vitamin D (OsCal 500 + D) 1 Tablet(s) Oral daily  carbidopa/levodopa  25/100 1 Tablet(s) Oral <User Schedule>  cefpodoxime 200 milliGRAM(s) Oral every 12 hours  cholestyramine Powder (Sugar-Free) 4 Gram(s) Oral daily  epoetin edwin-epbx (RETACRIT) Injectable 11903 Unit(s) SubCutaneous <User Schedule>  ferrous    sulfate 325 milliGRAM(s) Oral daily  heparin   Injectable 5000 Unit(s) SubCutaneous every 8 hours  hydrocortisone hemorrhoidal Suppository 1 Suppository(s) Rectal two times a day  lactobacillus acidophilus 1 Tablet(s) Oral three times a day  lidocaine   Patch 1 Patch Transdermal every 24 hours  mesalamine Suppository 1000 milliGRAM(s) Rectal at bedtime  metroNIDAZOLE    Tablet 500 milliGRAM(s) Oral every 8 hours  multivitamin/minerals 1 Tablet(s) Oral daily  pantoprazole    Tablet 40 milliGRAM(s) Oral before breakfast  prednisoLONE acetate 1% Suspension 1 Drop(s) Both EYES daily  QUEtiapine 25 milliGRAM(s) Oral two times a day    MEDICATIONS  (PRN):  acetaminophen   Tablet .. 650 milliGRAM(s) Oral every 6 hours PRN Mild Pain (1 - 3)  acetaminophen   Tablet .. 650 milliGRAM(s) Oral every 6 hours PRN Temp greater or equal to 38C (100.4F)  oxyCODONE    IR 5 milliGRAM(s) Oral four times a day PRN Severe Pain (7 - 10)      Allergies    tetracycline (Unknown)    Intolerances        Vital Signs Last 24 Hrs  T(C): 37.1 (04 Sep 2020 04:59), Max: 37.1 (04 Sep 2020 04:59)  T(F): 98.8 (04 Sep 2020 04:59), Max: 98.8 (04 Sep 2020 04:59)  HR: 78 (04 Sep 2020 04:59) (78 - 84)  BP: 121/65 (04 Sep 2020 04:59) (103/58 - 124/57)  BP(mean): --  RR: 17 (04 Sep 2020 04:59) (16 - 18)  SpO2: 97% (04 Sep 2020 04:59) (94% - 97%)    PHYSICAL EXAM  General: adult in NAD  HEENT: clear oropharynx, anicteric sclera, pink conjunctiva  Neck: supple  CV: normal S1/S2 with no murmur rubs or gallops  Lungs: positive air movement b/l ant lungs,clear to auscultation, no wheezes, no rales  Abdomen: soft non-tender non-distended, no hepatosplenomegaly  Ext: no clubbing cyanosis or edema  Skin: no rashes and no petechiae  Neuro: alert and oriented X 4, no focal deficits  LABS:                          9.0    11.21 )-----------( 656      ( 04 Sep 2020 07:48 )             28.4         Mean Cell Volume : 85.0 fl  Mean Cell Hemoglobin : 26.9 pg  Mean Cell Hemoglobin Concentration : 31.7 gm/dL  Auto Neutrophil # : x  Auto Lymphocyte # : x  Auto Monocyte # : x  Auto Eosinophil # : x  Auto Basophil # : x  Auto Neutrophil % : x  Auto Lymphocyte % : x  Auto Monocyte % : x  Auto Eosinophil % : x  Auto Basophil % : x    Serial CBC's  09-04 @ 07:48  Hct-28.4 / Hgb-9.0 / Plat-656 / RBC-3.34 / WBC-11.21          Serial CBC's  09-03 @ 07:08  Hct-26.3 / Hgb-8.5 / Plat-555 / RBC-3.16 / WBC-8.17          Serial CBC's  09-02 @ 08:28  Hct-26.8 / Hgb-8.7 / Plat-476 / RBC-3.21 / WBC-7.25          Serial CBC's  09-01 @ 09:31  Hct-24.6 / Hgb-8.0 / Plat-387 / RBC-2.95 / WBC-9.78                        Ferritin, Serum: 127 ng/mL (08-29-20 @ 21:58)  Vitamin B12, Serum: 485 pg/mL (08-29-20 @ 21:58)  Folate, Serum: >20.0 ng/mL (08-29-20 @ 21:58)  Iron - Total Binding Capacity.: 263 ug/dL (08-29-20 @ 21:57)  Reticulocyte Percent: 1.0 % (08-29-20 @ 14:43)                BLOOD SMEAR INTERPRETATION:       RADIOLOGY & ADDITIONAL STUDIES: Patient is a 75y old  Female who presents with a chief complaint of egd (04 Sep 2020 08:22)       Pt is seen and examined  pt is awake and lying in bed  pt seems comfortable and denies any complaints at this time    HPI:  76 yo F PMHx Parkinson's disease, early dementia p/w Rt lower rib pain s/p fall. Accidental fall, no dizziness/ chest pain/ loss of consciousness.   Patient states that she was trying to grab a piece of coffee cake from the cabinet, slipped backwards, fell onto her right side and hit back of her head. Denies chest pain/ palpitations/ shortness of breath.    Son Roman present, helped her up.     In the ed patient had x ray of pelvis show sRt pubic fracture  CT head no acute bleed  CT C spine no fracture (25 Aug 2020 23:29)         ROS:  as per hpi    MEDICATIONS  (STANDING):  amLODIPine   Tablet 10 milliGRAM(s) Oral daily  ascorbic acid 500 milliGRAM(s) Oral daily  calcium carbonate 1250 mG  + Vitamin D (OsCal 500 + D) 1 Tablet(s) Oral daily  carbidopa/levodopa  25/100 1 Tablet(s) Oral <User Schedule>  cefpodoxime 200 milliGRAM(s) Oral every 12 hours  cholestyramine Powder (Sugar-Free) 4 Gram(s) Oral daily  epoetin edwin-epbx (RETACRIT) Injectable 13935 Unit(s) SubCutaneous <User Schedule>  ferrous    sulfate 325 milliGRAM(s) Oral daily  heparin   Injectable 5000 Unit(s) SubCutaneous every 8 hours  hydrocortisone hemorrhoidal Suppository 1 Suppository(s) Rectal two times a day  lactobacillus acidophilus 1 Tablet(s) Oral three times a day  lidocaine   Patch 1 Patch Transdermal every 24 hours  mesalamine Suppository 1000 milliGRAM(s) Rectal at bedtime  metroNIDAZOLE    Tablet 500 milliGRAM(s) Oral every 8 hours  multivitamin/minerals 1 Tablet(s) Oral daily  pantoprazole    Tablet 40 milliGRAM(s) Oral before breakfast  prednisoLONE acetate 1% Suspension 1 Drop(s) Both EYES daily  QUEtiapine 25 milliGRAM(s) Oral two times a day    MEDICATIONS  (PRN):  acetaminophen   Tablet .. 650 milliGRAM(s) Oral every 6 hours PRN Mild Pain (1 - 3)  acetaminophen   Tablet .. 650 milliGRAM(s) Oral every 6 hours PRN Temp greater or equal to 38C (100.4F)  oxyCODONE    IR 5 milliGRAM(s) Oral four times a day PRN Severe Pain (7 - 10)      Allergies    tetracycline (Unknown)    Intolerances        Vital Signs Last 24 Hrs  T(C): 37.1 (04 Sep 2020 04:59), Max: 37.1 (04 Sep 2020 04:59)  T(F): 98.8 (04 Sep 2020 04:59), Max: 98.8 (04 Sep 2020 04:59)  HR: 78 (04 Sep 2020 04:59) (78 - 84)  BP: 121/65 (04 Sep 2020 04:59) (103/58 - 124/57)  BP(mean): --  RR: 17 (04 Sep 2020 04:59) (16 - 18)  SpO2: 97% (04 Sep 2020 04:59) (94% - 97%)    PHYSICAL EXAM  General: adult in NAD  HEENT: clear oropharynx, anicteric sclera, pink conjunctiva  Neck: supple  CV: normal S1/S2 with no murmur rubs or gallops  Lungs: positive air movement b/l ant lungs,clear to auscultation, no wheezes, no rales  Abdomen: soft non-tender non-distended, no hepatosplenomegaly  Ext: no clubbing cyanosis or edema  Skin: no rashes and no petechiae  Neuro: alert and oriented X 4, no focal deficits  LABS:                          9.0    11.21 )-----------( 656      ( 04 Sep 2020 07:48 )             28.4         Mean Cell Volume : 85.0 fl  Mean Cell Hemoglobin : 26.9 pg  Mean Cell Hemoglobin Concentration : 31.7 gm/dL  Auto Neutrophil # : x  Auto Lymphocyte # : x  Auto Monocyte # : x  Auto Eosinophil # : x  Auto Basophil # : x  Auto Neutrophil % : x  Auto Lymphocyte % : x  Auto Monocyte % : x  Auto Eosinophil % : x  Auto Basophil % : x    Serial CBC's  09-04 @ 07:48  Hct-28.4 / Hgb-9.0 / Plat-656 / RBC-3.34 / WBC-11.21          Serial CBC's  09-03 @ 07:08  Hct-26.3 / Hgb-8.5 / Plat-555 / RBC-3.16 / WBC-8.17          Serial CBC's  09-02 @ 08:28  Hct-26.8 / Hgb-8.7 / Plat-476 / RBC-3.21 / WBC-7.25          Serial CBC's  09-01 @ 09:31  Hct-24.6 / Hgb-8.0 / Plat-387 / RBC-2.95 / WBC-9.78                        Ferritin, Serum: 127 ng/mL (08-29-20 @ 21:58)  Vitamin B12, Serum: 485 pg/mL (08-29-20 @ 21:58)  Folate, Serum: >20.0 ng/mL (08-29-20 @ 21:58)  Iron - Total Binding Capacity.: 263 ug/dL (08-29-20 @ 21:57)  Reticulocyte Percent: 1.0 % (08-29-20 @ 14:43)

## 2020-09-04 NOTE — PROGRESS NOTE ADULT - SUBJECTIVE AND OBJECTIVE BOX
s/p egd/colon    mild gastritis biopsied  rectosigmoid ulceration, likely from constipation, biopsied r/o ca    rec  reg diet  miralax bid  f/u path  d/w son jaiden

## 2020-09-04 NOTE — PROGRESS NOTE ADULT - SUBJECTIVE AND OBJECTIVE BOX
[facility] -- Eugene York, Denise Coronado, Matias Jacobsen Savella  Office # [phone]      Follow Up:  pre op egd    Subjective/Observations:   seen at bedside, alert confused in NAD    REVIEW OF SYSTEMS: unable to provide any meaningful information     PAST MEDICAL & SURGICAL HISTORY:  Dementia  Anemia  Cataract  Parkinsons  History of left hip replacement      MEDICATIONS  (STANDING):  amLODIPine   Tablet 10 milliGRAM(s) Oral daily  ascorbic acid 500 milliGRAM(s) Oral daily  calcium carbonate 1250 mG  + Vitamin D (OsCal 500 + D) 1 Tablet(s) Oral daily  carbidopa/levodopa  25/100 1 Tablet(s) Oral <User Schedule>  cefpodoxime 200 milliGRAM(s) Oral every 12 hours  cholestyramine Powder (Sugar-Free) 4 Gram(s) Oral daily  epoetin edwin-epbx (RETACRIT) Injectable 69465 Unit(s) SubCutaneous <User Schedule>  ferrous    sulfate 325 milliGRAM(s) Oral daily  heparin   Injectable 5000 Unit(s) SubCutaneous every 8 hours  hydrocortisone hemorrhoidal Suppository 1 Suppository(s) Rectal two times a day  lactobacillus acidophilus 1 Tablet(s) Oral three times a day  lidocaine   Patch 1 Patch Transdermal every 24 hours  mesalamine Suppository 1000 milliGRAM(s) Rectal at bedtime  metroNIDAZOLE    Tablet 500 milliGRAM(s) Oral every 8 hours  multivitamin/minerals 1 Tablet(s) Oral daily  pantoprazole    Tablet 40 milliGRAM(s) Oral before breakfast  prednisoLONE acetate 1% Suspension 1 Drop(s) Both EYES daily  QUEtiapine 25 milliGRAM(s) Oral two times a day    MEDICATIONS  (PRN):  acetaminophen   Tablet .. 650 milliGRAM(s) Oral every 6 hours PRN Mild Pain (1 - 3)  acetaminophen   Tablet .. 650 milliGRAM(s) Oral every 6 hours PRN Temp greater or equal to 38C (100.4F)  oxyCODONE    IR 5 milliGRAM(s) Oral four times a day PRN Severe Pain (7 - 10)      Allergies    tetracycline (Unknown)    Intolerances        Vital Signs Last 24 Hrs  T(C): 37.1 (04 Sep 2020 04:59), Max: 37.1 (04 Sep 2020 04:59)  T(F): 98.8 (04 Sep 2020 04:59), Max: 98.8 (04 Sep 2020 04:59)  HR: 78 (04 Sep 2020 04:59) (78 - 84)  BP: 121/65 (04 Sep 2020 04:59) (103/58 - 124/57)  BP(mean): --  RR: 17 (04 Sep 2020 04:59) (16 - 18)  SpO2: 97% (04 Sep 2020 04:59) (94% - 97%)    I&O's Summary    03 Sep 2020 07:01  -  04 Sep 2020 07:00  --------------------------------------------------------  IN: 0 mL / OUT: 450 mL / NET: -450 mL          PHYSICAL EXAM:  TELE: not on tele   Constitutional: NAD, awake and alert, well-developed  HEENT: Moist Mucous Membranes, Anicteric  Pulmonary: Non-labored, breath sounds are clear bilaterally, No wheezing, crackles or rhonchi  Cardiovascular: Regular, S1 and S2 nl, No murmurs, rubs, gallops or clicks  Gastrointestinal: Bowel Sounds present, soft, nontender.   Lymph: No lymphadenopathy. No peripheral edema.  Skin: No visible rashes or ulcers.  Psych:  Mood & affect appropriate    LABS: All Labs Reviewed:                        9.0    11.21 )-----------( 656      ( 04 Sep 2020 07:48 )             28.4                         8.5    8.17  )-----------( 555      ( 03 Sep 2020 07:08 )             26.3                         8.7    7.25  )-----------( 476      ( 02 Sep 2020 08:28 )             26.8                    EC< from: 12 Lead ECG (20 @ 19:31) >    Ventricular Rate 102 BPM    Atrial Rate 102 BPM    P-R Interval 118 ms    QRS Duration 88 ms    Q-T Interval 352 ms    QTC Calculation(Bezet) 458 ms    P Axis 36 degrees    R Axis 24 degrees    T Axis 39 degrees    Diagnosis Line Sinus tachycardia  Nonspecific ST abnormality  Abnormal ECG  When compared with ECG of 09-MAY-2020 19:08,  No significant change was found

## 2020-09-04 NOTE — PROGRESS NOTE ADULT - ASSESSMENT
8.26.2020 This is a 75 F with PMH of Parkinson's disease and mild cognitive impairment who comes s/p fall and pubic rami fracture. I spoke to son, Roman Wilson. Patient was d/c'd from [facility] last Thursday, 8.20.2020, after residing there for about 3 months. He reports that patient was doing well when she got home -- she was able to ambulate short distances in the house with a walker. She needs moderate help with her ADLs. He wants to take patient home with home care when she is medically ready. He is going to hire a HHA for assistance with patient's ADLs. He says that he is the HCP. Patient does not have any advanced directives such as DNR/DNI. At this point, he wants everything done. Will follow.    8.27.2020 Patient remains a full code. Patient to go home with home care in AM. Son is planning to hire a HHA.    8.28.2020 Seen by psych. Patient has no medical decision making capacity. D/C planning home with home care and HHA.    8.29.2020 Febrile overnight. For EGD/colon on Monday for GIB.    8.30.2020 Still febrile. Started on iv abx for possible UTI. Cultures pending. EGD/colonscopy on hold.     8.31.2020 Still febrile prossibly from UTI. For CT a/p to r/o abscess. On abx. EGD/colonoscopy on hold.    9.1.2020 CT severe proctitis. Abx changed to iv invanz. EGD/colonoscopy on hold. Full code.    9.2.2020 Improving on iv invanz.    9.3.2020 Changed to vantin and flagyl. Possible EGD/colon.    9.4.2020 For EGD/colon today. D/C planning home in AM with HHA.

## 2020-09-04 NOTE — PROGRESS NOTE ADULT - ASSESSMENT
75 year old woman with HTN, Parkinson's disease, dementia, anemia, for EGD/colonoscopy, but now on hold for fever.    - EGD and colonoscopy on hold for now, follow up gi recs   - When ready, remains optimized for the OR from a cardiac point of view with no evidence of active ischemic heart disease, decompensated heart failure, severe obstructive valvular disease, or uncontrolled arrhythmia.    HTN  121/65   Continue amlodipine     VTE ppx  - cw  hep   - per primary team     - Monitor and replete lytes, keep K>4, Mg>2.  - All other medical needs as per primary team.  - Other cardiovascular workup will depend on clinical course.  - Will continue to follow.    Noy Madden FNP-C  Cardiology NP  [facility] [id number] [phone]

## 2020-09-04 NOTE — PROGRESS NOTE ADULT - SUBJECTIVE AND OBJECTIVE BOX
Patient is a 75y old  Female who presents with a chief complaint of egd (04 Sep 2020 08:22)      INTERVAL /OVERNIGHT EVENTS: alert awake confused    MEDICATIONS  (STANDING):  amLODIPine   Tablet 10 milliGRAM(s) Oral daily  ascorbic acid 500 milliGRAM(s) Oral daily  calcium carbonate 1250 mG  + Vitamin D (OsCal 500 + D) 1 Tablet(s) Oral daily  carbidopa/levodopa  25/100 1 Tablet(s) Oral <User Schedule>  cefpodoxime 200 milliGRAM(s) Oral every 12 hours  cholestyramine Powder (Sugar-Free) 4 Gram(s) Oral daily  epoetin edwin-epbx (RETACRIT) Injectable 66426 Unit(s) SubCutaneous <User Schedule>  ferrous    sulfate 325 milliGRAM(s) Oral daily  heparin   Injectable 5000 Unit(s) SubCutaneous every 8 hours  hydrocortisone hemorrhoidal Suppository 1 Suppository(s) Rectal two times a day  lactobacillus acidophilus 1 Tablet(s) Oral three times a day  lidocaine   Patch 1 Patch Transdermal every 24 hours  mesalamine Suppository 1000 milliGRAM(s) Rectal at bedtime  metroNIDAZOLE    Tablet 500 milliGRAM(s) Oral every 8 hours  multivitamin/minerals 1 Tablet(s) Oral daily  pantoprazole    Tablet 40 milliGRAM(s) Oral before breakfast  prednisoLONE acetate 1% Suspension 1 Drop(s) Both EYES daily  QUEtiapine 25 milliGRAM(s) Oral two times a day    MEDICATIONS  (PRN):  acetaminophen   Tablet .. 650 milliGRAM(s) Oral every 6 hours PRN Mild Pain (1 - 3)  acetaminophen   Tablet .. 650 milliGRAM(s) Oral every 6 hours PRN Temp greater or equal to 38C (100.4F)  oxyCODONE    IR 5 milliGRAM(s) Oral four times a day PRN Severe Pain (7 - 10)      Allergies    tetracycline (Unknown)    Intolerances        REVIEW OF SYSTEMS: unable to obtain      Vital Signs Last 24 Hrs  T(C): 36.8 (04 Sep 2020 16:12), Max: 37.1 (04 Sep 2020 04:59)  T(F): 98.2 (04 Sep 2020 16:12), Max: 98.8 (04 Sep 2020 04:59)  HR: 80 (04 Sep 2020 16:12) (78 - 84)  BP: 131/57 (04 Sep 2020 16:12) (99/62 - 131/57)  BP(mean): --  RR: 18 (04 Sep 2020 16:12) (17 - 18)  SpO2: 97% (04 Sep 2020 12:30) (94% - 97%)    PHYSICAL EXAM:  GENERAL: NAD, well-groomed, well-developed  HEAD:  Atraumatic, Normocephalic  EYES: EOMI, PERRLA, conjunctiva and sclera clear  ENMT: No tonsillar erythema, exudates, or enlargement; Moist mucous membranes, Good dentition, No lesions  NECK: Supple, No JVD, Normal thyroid  NERVOUS SYSTEM:  Alert & Oriented X3, Good concentration; Motor Strength 5/5 B/L upper and lower extremities; DTRs 2+ intact and symmetric  CHEST/LUNG: Clear to auscultation bilaterally; No rales, rhonchi, wheezing, or rubs  HEART: Regular rate and rhythm; No murmurs, rubs, or gallops  ABDOMEN: Soft, Nontender, Nondistended; Bowel sounds present  EXTREMITIES:  2+ Peripheral Pulses, No clubbing, cyanosis, or edema  LYMPH: No lymphadenopathy noted  SKIN: No rashes or lesions    LABS:                        9.0    11.21 )-----------( 656      ( 04 Sep 2020 07:48 )             28.4               CAPILLARY BLOOD GLUCOSE          RADIOLOGY & ADDITIONAL TESTS:    Notes Reviewed:  [x ] YES  [ ] NO    Care Discussed with Consultants/Other Providers [x ] YES  [ ] NO

## 2020-09-04 NOTE — PROGRESS NOTE ADULT - PROBLEM SELECTOR PLAN 2
serial CBC  Anemia--likely multifactorial, guaiac pos stool, h/o ch anemia/iron def/multifactorial  patient is on iv iron 100 mg a day for 3 days, GI work up, for EGD with colon

## 2020-09-04 NOTE — PROGRESS NOTE ADULT - ASSESSMENT
contact #  son----Roman Wilson  phone # [phone]    IMPRESSION:  75/ F with PMH of Parkinson's disease and mild cognitive impairment, ?dementia per son, ch anemia per son and was on iron outpatient in past, htn in 2020 who comes s/p fall around 8/25/20 and noted pubic rami fracture,  seen by orthopedics and is on medical management, guaiac positive stool, seen by gi and for possible egd/colon for monday, per pt son may had colonoscopy over 18 years ago, hb was at around 11--11.5, per son pt with always anemia and iron deficiency, pt had ct renal stone [place] in 5/2020--hepatic cyst, son declines repeat ct a/p at this time until endoscopy exam. As I spoke to son, Roman Wilson on phone patient with h/o admission at [facility] in around 5/2020 after fall at home and was told rib fracture, had hb around 11-11.5. Patient was d/c'd from [facility] Thursday, 8.20.2020, after residing there for about 3 months. He reports that patient was doing well when she got home -- she was able to ambulate short distances in the house with a walker.   Anemia--likely multifactorial, h/o ch anemia/iron def as per son, iron profile this admission--low iron/transferrin saturation, seen by gi and for possible egd/colon on monday 9/4/20--d/w son, pt is for possible endoscopy as per gi  RECOMMENDATION:  Anemia--likely multifactorial, guaiac pos stool, h/o ch anemia/iron def/multifactorial  iron profile --low iron/transferrin saturation/low nl ferritin  patient is s/p iv iron 100 mg a day for 3 days as looking for a rapid response, 8/30/2020---9/1/2020, will give iv iron today  seen by gi , was planned for egd/colon and postponed by gi after ct scan--sever colitis, final decision gi work up as per pt/family/gi to exclude gi pathology/malignancy, ?for possible endoscopy today per gi  guaiac pos stool/liver cyst on earlier ct--son declines ct a/p, radio scan--final decision/work up per gi  no evidence of hemolysis  hb is at 9 today, hb was at 8.5 yesterday-- stable  patient is on retacrit 10,000 units s/c tiw--t/t/s and d/c retacrit if hb is over 10  monitor h/h--transfuse prbc as needed for /ymptomatic anemia  smear again from 9/1 reviewed--unremarkable except left shift wbc  s/p fever/bandemia/uti--seen by id , work and management per id, abx as per id  gi/dvt prophylaxis--on heparin s/c  d/w pt at bedside, called son at [phone] on phone and is in agreement

## 2020-09-04 NOTE — PROGRESS NOTE ADULT - SUBJECTIVE AND OBJECTIVE BOX
Patient is a 75y old  Female who presents with a chief complaint of gi bleed  anemia (31 Aug 2020 09:20)      Subjective: Patient seen and examined at bedside. No acute events overnight.     PAIN: N  DYSPNEA: N	  NAUS/VOM: N	  SECRETIONS: N	  AGITATION: N    OTHER REVIEW OF SYSTEMS: negative    Vital Signs Last 24 Hrs  T(C): 37.1 (04 Sep 2020 04:59), Max: 37.1 (04 Sep 2020 04:59)  T(F): 98.8 (04 Sep 2020 04:59), Max: 98.8 (04 Sep 2020 04:59)  HR: 78 (04 Sep 2020 04:59) (78 - 84)  BP: 121/65 (04 Sep 2020 04:59) (103/58 - 124/57)  BP(mean): --  RR: 17 (04 Sep 2020 04:59) (16 - 18)  SpO2: 97% (04 Sep 2020 04:59) (94% - 97%)                                9.0    11.21 )-----------( 656      ( 04 Sep 2020 07:48 )             28.4       CAPILLARY BLOOD GLUCOSE                  acetaminophen   Tablet .. 650 milliGRAM(s) Oral every 6 hours PRN  acetaminophen   Tablet .. 650 milliGRAM(s) Oral every 6 hours PRN  amLODIPine   Tablet 10 milliGRAM(s) Oral daily  ascorbic acid 500 milliGRAM(s) Oral daily  calcium carbonate 1250 mG  + Vitamin D (OsCal 500 + D) 1 Tablet(s) Oral daily  carbidopa/levodopa  25/100 1 Tablet(s) Oral <User Schedule>  cefpodoxime 200 milliGRAM(s) Oral every 12 hours  cholestyramine Powder (Sugar-Free) 4 Gram(s) Oral daily  epoetin edwin-epbx (RETACRIT) Injectable 03651 Unit(s) SubCutaneous <User Schedule>  ferrous    sulfate 325 milliGRAM(s) Oral daily  heparin   Injectable 5000 Unit(s) SubCutaneous every 8 hours  hydrocortisone hemorrhoidal Suppository 1 Suppository(s) Rectal two times a day  iron sucrose Injectable 100 milliGRAM(s) IV Push once  lactobacillus acidophilus 1 Tablet(s) Oral three times a day  lidocaine   Patch 1 Patch Transdermal every 24 hours  mesalamine Suppository 1000 milliGRAM(s) Rectal at bedtime  metroNIDAZOLE    Tablet 500 milliGRAM(s) Oral every 8 hours  multivitamin/minerals 1 Tablet(s) Oral daily  oxyCODONE    IR 5 milliGRAM(s) Oral four times a day PRN  pantoprazole    Tablet 40 milliGRAM(s) Oral before breakfast  prednisoLONE acetate 1% Suspension 1 Drop(s) Both EYES daily  QUEtiapine 25 milliGRAM(s) Oral two times a day      GENERAL:  resting comfortably in NAD  HEENT:  NC/AT EOMI PERRL  NECK: supple no JVD  CVS:  +S1 S2 RRR  RESP: CTA B/L  GI:  soft NT/ND +BS  : no suprapubic tenderness  MUSC:  no lower extremity edema  SKIN:  Warm, moist, no rashes   LYMPH: normal     MEDS REVIEWED	          OPIATE NAÏVE (Y/N)    tetracycline (Unknown)        ADVANCED DIRECTIVES:     FULL CODE         PSYCHOSOCIAL-SPIRITUAL ASSESSMENT:    _x__Reviewed     _x__Care  plan unchanged     ___Care plan adjusted as below    GOALS OF CARE DISCUSSION  	_x__Palliative care info/counseling provided	    ___Family meeting  	_x__Advanced Directives addressed	    ___See previous Palliative Medicine Note    AGENCY CHOICE DISCUSSED:   ___HOSPICE   ___[facility]  ___OTHER:              > 50% OF THE TIME SPENT IN COUNSELING AND COORDINATING CARE 	    Minutes:        PROLONGED SERVICE             FACE TO FACE:    PT            PT & FAMILY	       Minutes:      Advance Care Planning Time:

## 2020-09-05 DIAGNOSIS — K62.6 ULCER OF ANUS AND RECTUM: ICD-10-CM

## 2020-09-05 LAB
ACANTHOCYTES BLD QL SMEAR: SLIGHT — SIGNIFICANT CHANGE UP
ANION GAP SERPL CALC-SCNC: 4 MMOL/L — LOW (ref 5–17)
ANISOCYTOSIS BLD QL: SLIGHT — SIGNIFICANT CHANGE UP
BASOPHILS # BLD AUTO: 0 K/UL — SIGNIFICANT CHANGE UP (ref 0–0.2)
BASOPHILS NFR BLD AUTO: 0 % — SIGNIFICANT CHANGE UP (ref 0–2)
BUN SERPL-MCNC: 14 MG/DL — SIGNIFICANT CHANGE UP (ref 7–23)
CALCIUM SERPL-MCNC: 8.4 MG/DL — LOW (ref 8.5–10.1)
CHLORIDE SERPL-SCNC: 110 MMOL/L — HIGH (ref 96–108)
CO2 SERPL-SCNC: 28 MMOL/L — SIGNIFICANT CHANGE UP (ref 22–31)
CREAT SERPL-MCNC: 0.64 MG/DL — SIGNIFICANT CHANGE UP (ref 0.5–1.3)
ELLIPTOCYTES BLD QL SMEAR: SLIGHT — SIGNIFICANT CHANGE UP
EOSINOPHIL # BLD AUTO: 0.28 K/UL — SIGNIFICANT CHANGE UP (ref 0–0.5)
EOSINOPHIL NFR BLD AUTO: 2 % — SIGNIFICANT CHANGE UP (ref 0–6)
GLUCOSE SERPL-MCNC: 119 MG/DL — HIGH (ref 70–99)
HCT VFR BLD CALC: 30.4 % — LOW (ref 34.5–45)
HGB BLD-MCNC: 9.7 G/DL — LOW (ref 11.5–15.5)
HYPOCHROMIA BLD QL: SLIGHT — SIGNIFICANT CHANGE UP
LYMPHOCYTES # BLD AUTO: 2.94 K/UL — SIGNIFICANT CHANGE UP (ref 1–3.3)
LYMPHOCYTES # BLD AUTO: 21 % — SIGNIFICANT CHANGE UP (ref 13–44)
MACROCYTES BLD QL: SLIGHT — SIGNIFICANT CHANGE UP
MANUAL SMEAR VERIFICATION: SIGNIFICANT CHANGE UP
MCHC RBC-ENTMCNC: 27.5 PG — SIGNIFICANT CHANGE UP (ref 27–34)
MCHC RBC-ENTMCNC: 31.9 GM/DL — LOW (ref 32–36)
MCV RBC AUTO: 86.1 FL — SIGNIFICANT CHANGE UP (ref 80–100)
MICROCYTES BLD QL: SLIGHT — SIGNIFICANT CHANGE UP
MONOCYTES # BLD AUTO: 0.28 K/UL — SIGNIFICANT CHANGE UP (ref 0–0.9)
MONOCYTES NFR BLD AUTO: 2 % — SIGNIFICANT CHANGE UP (ref 2–14)
MYELOCYTES NFR BLD: 2 % — HIGH (ref 0–0)
NEUTROPHILS # BLD AUTO: 10.22 K/UL — HIGH (ref 1.8–7.4)
NEUTROPHILS NFR BLD AUTO: 73 % — SIGNIFICANT CHANGE UP (ref 43–77)
NRBC # BLD: 0 — SIGNIFICANT CHANGE UP
NRBC # BLD: SIGNIFICANT CHANGE UP /100 WBCS (ref 0–0)
PLAT MORPH BLD: NORMAL — SIGNIFICANT CHANGE UP
PLATELET # BLD AUTO: 724 K/UL — HIGH (ref 150–400)
POIKILOCYTOSIS BLD QL AUTO: SLIGHT — SIGNIFICANT CHANGE UP
POLYCHROMASIA BLD QL SMEAR: SLIGHT — SIGNIFICANT CHANGE UP
POTASSIUM SERPL-MCNC: 4.4 MMOL/L — SIGNIFICANT CHANGE UP (ref 3.5–5.3)
POTASSIUM SERPL-SCNC: 4.4 MMOL/L — SIGNIFICANT CHANGE UP (ref 3.5–5.3)
RBC # BLD: 3.53 M/UL — LOW (ref 3.8–5.2)
RBC # FLD: 14.1 % — SIGNIFICANT CHANGE UP (ref 10.3–14.5)
RBC BLD AUTO: ABNORMAL
SODIUM SERPL-SCNC: 142 MMOL/L — SIGNIFICANT CHANGE UP (ref 135–145)
WBC # BLD: 14 K/UL — HIGH (ref 3.8–10.5)
WBC # FLD AUTO: 14 K/UL — HIGH (ref 3.8–10.5)

## 2020-09-05 PROCEDURE — 99232 SBSQ HOSP IP/OBS MODERATE 35: CPT

## 2020-09-05 RX ADMIN — HEPARIN SODIUM 5000 UNIT(S): 5000 INJECTION INTRAVENOUS; SUBCUTANEOUS at 05:42

## 2020-09-05 RX ADMIN — Medication 1 SUPPOSITORY(S): at 05:41

## 2020-09-05 RX ADMIN — HEPARIN SODIUM 5000 UNIT(S): 5000 INJECTION INTRAVENOUS; SUBCUTANEOUS at 13:03

## 2020-09-05 RX ADMIN — Medication 1000 MILLIGRAM(S): at 21:27

## 2020-09-05 RX ADMIN — AMLODIPINE BESYLATE 10 MILLIGRAM(S): 2.5 TABLET ORAL at 05:41

## 2020-09-05 RX ADMIN — HEPARIN SODIUM 5000 UNIT(S): 5000 INJECTION INTRAVENOUS; SUBCUTANEOUS at 21:27

## 2020-09-05 RX ADMIN — Medication 500 MILLIGRAM(S): at 12:51

## 2020-09-05 RX ADMIN — Medication 1 TABLET(S): at 12:49

## 2020-09-05 RX ADMIN — CARBIDOPA AND LEVODOPA 1 TABLET(S): 25; 100 TABLET ORAL at 12:51

## 2020-09-05 RX ADMIN — QUETIAPINE FUMARATE 25 MILLIGRAM(S): 200 TABLET, FILM COATED ORAL at 05:41

## 2020-09-05 RX ADMIN — Medication 1 DROP(S): at 12:52

## 2020-09-05 RX ADMIN — CARBIDOPA AND LEVODOPA 1 TABLET(S): 25; 100 TABLET ORAL at 08:25

## 2020-09-05 RX ADMIN — Medication 1 TABLET(S): at 05:41

## 2020-09-05 RX ADMIN — Medication 1 TABLET(S): at 13:03

## 2020-09-05 RX ADMIN — Medication 325 MILLIGRAM(S): at 12:50

## 2020-09-05 RX ADMIN — Medication 1 TABLET(S): at 21:27

## 2020-09-05 RX ADMIN — QUETIAPINE FUMARATE 25 MILLIGRAM(S): 200 TABLET, FILM COATED ORAL at 17:34

## 2020-09-05 RX ADMIN — PANTOPRAZOLE SODIUM 40 MILLIGRAM(S): 20 TABLET, DELAYED RELEASE ORAL at 05:41

## 2020-09-05 RX ADMIN — CARBIDOPA AND LEVODOPA 1 TABLET(S): 25; 100 TABLET ORAL at 17:34

## 2020-09-05 RX ADMIN — Medication 1 SUPPOSITORY(S): at 17:34

## 2020-09-05 NOTE — PROGRESS NOTE ADULT - SUBJECTIVE AND OBJECTIVE BOX
Patient is a 75y old  Female who presents with a chief complaint of gi bleed  anemia (05 Sep 2020 08:37)      INTERVAL /OVERNIGHT EVENTS: alert awake confused    MEDICATIONS  (STANDING):  amLODIPine   Tablet 10 milliGRAM(s) Oral daily  ascorbic acid 500 milliGRAM(s) Oral daily  calcium carbonate 1250 mG  + Vitamin D (OsCal 500 + D) 1 Tablet(s) Oral daily  carbidopa/levodopa  25/100 1 Tablet(s) Oral <User Schedule>  cefpodoxime 200 milliGRAM(s) Oral every 12 hours  epoetin edwin-epbx (RETACRIT) Injectable 64832 Unit(s) SubCutaneous <User Schedule>  ferrous    sulfate 325 milliGRAM(s) Oral daily  heparin   Injectable 5000 Unit(s) SubCutaneous every 8 hours  hydrocortisone hemorrhoidal Suppository 1 Suppository(s) Rectal two times a day  lactobacillus acidophilus 1 Tablet(s) Oral three times a day  lidocaine   Patch 1 Patch Transdermal every 24 hours  mesalamine Suppository 1000 milliGRAM(s) Rectal at bedtime  metroNIDAZOLE    Tablet 500 milliGRAM(s) Oral every 8 hours  multivitamin/minerals 1 Tablet(s) Oral daily  pantoprazole    Tablet 40 milliGRAM(s) Oral before breakfast  prednisoLONE acetate 1% Suspension 1 Drop(s) Both EYES daily  QUEtiapine 25 milliGRAM(s) Oral two times a day    MEDICATIONS  (PRN):  acetaminophen   Tablet .. 650 milliGRAM(s) Oral every 6 hours PRN Mild Pain (1 - 3)  acetaminophen   Tablet .. 650 milliGRAM(s) Oral every 6 hours PRN Temp greater or equal to 38C (100.4F)      Allergies    tetracycline (Unknown)    Intolerances        REVIEW OF SYSTEMS: denies    Vital Signs Last 24 Hrs  T(C): 37.2 (05 Sep 2020 12:51), Max: 37.3 (05 Sep 2020 05:14)  T(F): 98.9 (05 Sep 2020 12:51), Max: 99.1 (05 Sep 2020 05:14)  HR: 92 (05 Sep 2020 12:51) (77 - 92)  BP: 121/75 (05 Sep 2020 12:51) (104/61 - 125/73)  BP(mean): --  RR: 18 (05 Sep 2020 12:51) (17 - 18)  SpO2: 98% (05 Sep 2020 12:51) (96% - 98%)    PHYSICAL EXAM:  GENERAL: NAD, well-groomed, well-developed  HEAD:  Atraumatic, Normocephalic  EYES: EOMI, PERRLA, conjunctiva and sclera clear  ENMT: No tonsillar erythema, exudates, or enlargement; Moist mucous membranes, Good dentition, No lesions  NECK: Supple, No JVD, Normal thyroid  NERVOUS SYSTEM:  Alert & awake confused; Motor Strength 5/5 B/L upper and lower extremities; DTRs 2+ intact and symmetric  CHEST/LUNG: Clear to auscultation bilaterally; No rales, rhonchi, wheezing, or rubs  HEART: Regular rate and rhythm; No murmurs, rubs, or gallops  ABDOMEN: Soft, Nontender, Nondistended; Bowel sounds present  EXTREMITIES:  2+ Peripheral Pulses, No clubbing, cyanosis, or edema  LYMPH: No lymphadenopathy noted  SKIN: No rashes or lesions    LABS:                        9.7    14.00 )-----------( 724      ( 05 Sep 2020 15:08 )             30.4     05 Sep 2020 15:08    142    |  110    |  14     ----------------------------<  119    4.4     |  28     |  0.64     Ca    8.4        05 Sep 2020 15:08          CAPILLARY BLOOD GLUCOSE          RADIOLOGY & ADDITIONAL TESTS:    Notes Reviewed:  [ x] YES  [ ] NO    Care Discussed with Consultants/Other Providers [x ] YES  [ ] NO

## 2020-09-05 NOTE — PROGRESS NOTE ADULT - ASSESSMENT
75 year old woman with HTN, Parkinson's disease, dementia, anemia, for EGD/colonoscopy.     Patient s/p EGD/colonoscopy which showed gastritis and rectosigmoid ulceration likely 2/2 constipation, bx'd to r/o malignancy    Hypertension  - BP: 125/73 (09-05-20 @ 05:14) (99/62 - 131/57)  - Continue amlodipine     VTE ppx  - Continue hep SQ per primary team    - Monitor and replete lytes, keep K>4, Mg>2.  - All other medical needs as per primary team.  - Other cardiovascular workup will depend on clinical course.  - Will continue to follow.    Radha Louise, MS FNP, [facility]  Nurse Practitioner- Cardiology   Spect[facility] #[phone]

## 2020-09-05 NOTE — PROGRESS NOTE ADULT - SUBJECTIVE AND OBJECTIVE BOX
[facility] -- Eugene York, Denise Coronado, Matias Jacobsen Savella, [place]: Office # [phone]    Follow Up:  Cardiac optimization pre/post op     Subjective/Observations: Patient seen and examined. Patient awake and alert, resting comfortably in bed. No complaints of chest pain, SOB, LE edema, cough. No signs of orthopnea or PND. Tolerating room air.     REVIEW OF SYSTEMS: All review of systems is negative for eye, ENT, GI, , allergic, dermatologic, musculoskeletal and neurologic except as described above    PAST MEDICAL & SURGICAL HISTORY:  Dementia  Anemia  Anemia  Cataract  Parkinsons  History of left hip replacement    MEDICATIONS  (STANDING):  amLODIPine   Tablet 10 milliGRAM(s) Oral daily  ascorbic acid 500 milliGRAM(s) Oral daily  calcium carbonate 1250 mG  + Vitamin D (OsCal 500 + D) 1 Tablet(s) Oral daily  carbidopa/levodopa  25/100 1 Tablet(s) Oral <User Schedule>  cefpodoxime 200 milliGRAM(s) Oral every 12 hours  epoetin edwin-epbx (RETACRIT) Injectable 24827 Unit(s) SubCutaneous <User Schedule>  ferrous    sulfate 325 milliGRAM(s) Oral daily  heparin   Injectable 5000 Unit(s) SubCutaneous every 8 hours  hydrocortisone hemorrhoidal Suppository 1 Suppository(s) Rectal two times a day  lactobacillus acidophilus 1 Tablet(s) Oral three times a day  lidocaine   Patch 1 Patch Transdermal every 24 hours  mesalamine Suppository 1000 milliGRAM(s) Rectal at bedtime  metroNIDAZOLE    Tablet 500 milliGRAM(s) Oral every 8 hours  multivitamin/minerals 1 Tablet(s) Oral daily  pantoprazole    Tablet 40 milliGRAM(s) Oral before breakfast  prednisoLONE acetate 1% Suspension 1 Drop(s) Both EYES daily  QUEtiapine 25 milliGRAM(s) Oral two times a day    MEDICATIONS  (PRN):  acetaminophen   Tablet .. 650 milliGRAM(s) Oral every 6 hours PRN Mild Pain (1 - 3)  acetaminophen   Tablet .. 650 milliGRAM(s) Oral every 6 hours PRN Temp greater or equal to 38C (100.4F)    Allergies  tetracycline (Unknown)    Vital Signs Last 24 Hrs  T(C): 37.3 (05 Sep 2020 05:14), Max: 37.3 (05 Sep 2020 05:14)  T(F): 99.1 (05 Sep 2020 05:14), Max: 99.1 (05 Sep 2020 05:14)  HR: 78 (05 Sep 2020 05:14) (77 - 84)  BP: 125/73 (05 Sep 2020 05:14) (99/62 - 131/57)  BP(mean): --  RR: 17 (05 Sep 2020 05:14) (17 - 18)  SpO2: 96% (05 Sep 2020 05:14) (96% - 97%)    I&O's Summary    04 Sep 2020 07:01  -  05 Sep 2020 07:00  --------------------------------------------------------  IN: 0 mL / OUT: 500 mL / NET: -500 mL    TELE: Not on telemetry   PHYSICAL EXAM:  Appearance: NAD, no distress, alert, Frail   HEENT: Moist Mucous Membranes, Anicteric  Cardiovascular: Regular rate and rhythm, Normal S1 S2, No JVD, No murmurs, No rubs, gallops or clicks  Respiratory: Non-labored, Clear to auscultation, No rales, No rhonchi, No wheezing.   Gastrointestinal:  Soft, Non-tender, + BS  Neurologic: Non-focal  Skin: Warm and dry, No visible rashes or ulcers, No ecchymosis, No cyanosis  Musculoskeletal: No clubbing, No cyanosis, No joint swelling/tenderness  Psychiatry: Mood & affect appropriate  Lymph: No peripheral edema.     LABS: All Labs Reviewed:                        9.0    11.21 )-----------( 656      ( 04 Sep 2020 07:48 )             28.4                         8.5    8.17  )-----------( 555      ( 03 Sep 2020 07:08 )             26.3     < from: 12 Lead ECG (08.25.20 @ 19:31) >  Ventricular Rate 102 BPM  Atrial Rate 102 BPM  P-R Interval 118 ms  QRS Duration 88 ms  Q-T Interval 352 ms  QTC Calculation(Bezet) 458 ms  P Axis 36 degrees  R Axis 24 degrees  T Axis 39 degrees  Diagnosis Line Sinus tachycardia  Nonspecific ST abnormality  Abnormal ECG  When compared with ECG of 09-MAY-2020 19:08,  No significant change was found  Confirmed by Stephon Walker MD ([id number]) on 8/26/2020 12:21:19 PM  < end of copied text >    < from: CT Abdomen and Pelvis w/ Oral Cont (08.31.20 @ 14:40) >  IMPRESSION:  Severe proctitis without definite extraluminal air or collection.  < end of copied text >    < from: Xray Chest 1 View- PORTABLE-Urgent (08.29.20 @ 05:44) >  No change heart mediastinum. Biapical pleural thickening. No consolidation or effusion. Degenerative change bilateral shoulders.  Impression: No active infiltrates.  < end of copied text >    < from: CT Chest No Cont (08.25.20 @ 16:11) >  FINDINGS:  LUNGS AND AIRWAYS: Patent central airways.  Biapical scarring.  PLEURA: No pleural effusion.  MEDIASTINUM AND TY: No lymphadenopathy.  VESSELS: Nonaneurysmal  HEART: Heart size is normal. Coronary artery calcification. Decrease cardiac chamber blood pool attenuation suggests an anemic state.. No pericardial effusion.  CHEST WALL AND LOWER NECK: Within normal limits.  VISUALIZED UPPER ABDOMEN: Contracted gallbladder with calcified stones. A 2.1 cm hypodensity right hepatic lobe cannot be definitively characterized on this noncontrast study probably a cyst.  BONES: No acute  IMPRESSION:  No acute findings on this noncontrast study.  < end of copied text >

## 2020-09-05 NOTE — PROGRESS NOTE ADULT - SUBJECTIVE AND OBJECTIVE BOX
INTERVAL HPI/OVERNIGHT EVENTS:  pt seen and examined  some confusion  denies n/v/abd pain  cristian diet  per rn still having some loose non bloody bms  no new labs      MEDICATIONS  (STANDING):  amLODIPine   Tablet 10 milliGRAM(s) Oral daily  ascorbic acid 500 milliGRAM(s) Oral daily  calcium carbonate 1250 mG  + Vitamin D (OsCal 500 + D) 1 Tablet(s) Oral daily  carbidopa/levodopa  25/100 1 Tablet(s) Oral <User Schedule>  cefTRIAXone   IVPB 1000 milliGRAM(s) IV Intermittent every 24 hours  ferrous    sulfate 325 milliGRAM(s) Oral daily  heparin   Injectable 5000 Unit(s) SubCutaneous every 8 hours  hydrocortisone hemorrhoidal Suppository 1 Suppository(s) Rectal two times a day  lidocaine   Patch 1 Patch Transdermal every 24 hours  multivitamin/minerals 1 Tablet(s) Oral daily  pantoprazole    Tablet 40 milliGRAM(s) Oral before breakfast  prednisoLONE acetate 1% Suspension 1 Drop(s) Both EYES daily  QUEtiapine 25 milliGRAM(s) Oral two times a day    MEDICATIONS  (PRN):  acetaminophen   Tablet .. 650 milliGRAM(s) Oral every 6 hours PRN Mild Pain (1 - 3)  acetaminophen   Tablet .. 650 milliGRAM(s) Oral every 6 hours PRN Temp greater or equal to 38C (100.4F)  oxyCODONE    IR 5 milliGRAM(s) Oral four times a day PRN Severe Pain (7 - 10)  traMADol 25 milliGRAM(s) Oral every 6 hours PRN Moderate Pain (4 - 6)      Allergies    tetracycline (Unknown)    Intolerances      unable to obtain in entirety       PHYSICAL EXAM:     Vital Signs Last 24 Hrs  T(C): 37.4 (30 Aug 2020 06:00), Max: 38.8 (30 Aug 2020 04:28)  T(F): 99.4 (30 Aug 2020 06:00), Max: 101.8 (30 Aug 2020 04:28)  HR: 86 (30 Aug 2020 04:28) (86 - 99)  BP: 96/58 (30 Aug 2020 04:28) (96/58 - 133/73)  BP(mean): --  RR: 19 (30 Aug 2020 04:28) (17 - 19)  SpO2: 94% (30 Aug 2020 04:28) (93% - 94%)        GENERAL:  lying  in bed  HEENT:  NC/AT  ABDOMEN:  Soft nt  mild dt  EXTREMITIES  no  edema  NEURO: awake alert but confused          LABS:                        8.7    10.76 )-----------( 297      ( 30 Aug 2020 08:34 )             26.4     08-29    139  |  109<H>  |  39<H>  ----------------------------<  93  3.8   |  21<L>  |  0.77    Ca    7.4<L>      29 Aug 2020 07:22            08-29-20 @ 07:01  -  08-30-20 @ 07:00  --------------------------------------------------------  IN: 50 mL / OUT: 200 mL / NET: -150 mL        RADIOLOGY & ADDITIONAL TESTS:      < from: CT Abdomen and Pelvis w/ Oral Cont (08.31.20 @ 14:40) >    EXAM:  CT ABDOMEN AND PELVIS OC                            PROCEDURE DATE:  08/31/2020          INTERPRETATION:  CT ABDOMEN AND PELVIS WITH ORAL CONTRAST    CLINICAL INFORMATION: Fever, sepsis    COMPARISON: CT abdomen pelvis 5/9/2020    PROCEDURE:  CT of the Abdomen and Pelvis was performed without intravenous contrast.  Intravenous contrast: None.  Oral contrast: positive contrast was administered.  Sagittal and coronal reformats were performed.    FINDINGS:  LOWER CHEST: Trace right effusion.    LIVER: Normal.  BILE DUCTS: Nondilated.  GALLBLADDER: Gallstones.  SPLEEN: Normal.  PANCREAS: Normal.  ADRENALS: Normal.  KIDNEYS/URETERS: No hydronephrosis or urinary tract calculi.    BLADDER: Obscured by artifact  REPRODUCTIVE ORGANS: Obscured    BOWEL: Severe rectal wall thickening without definite extraluminal air or collection.  PERITONEUM: No free air or ascites.  VESSELS:  Aortic atherosclerosis without aneurysm.  RETROPERITONEUM/LYMPH NODES: No adenopathy.  ABDOMINAL WALL: Normal.  BONES: Left hip arthroplasty. Recent right inferior pubic rami fracture. Bilateral sacral fractures and age-indeterminate rib fractures.    IMPRESSION:    Severe proctitis without definite extraluminal air or collection.                YOEL CELAYA M.D., ATTENDING RADIOLOGIST  This document has been electronically signed. Aug 31 2020  3:31PM                < end of copied text >

## 2020-09-05 NOTE — PROGRESS NOTE ADULT - PROBLEM SELECTOR PLAN 2
serial CBC  Anemia--likely multifactorial, guaiac pos stool, h/o ch anemia/iron def/multifactorial  patient is on iv iron 100 mg a day for 3 days, GI work up, s/p EGD with colon

## 2020-09-05 NOTE — PROGRESS NOTE ADULT - SUBJECTIVE AND OBJECTIVE BOX
Patient is a 75y old  Female who presents with a chief complaint of gi bleed  anemia (31 Aug 2020 09:20)      Subjective: Patient seen and examined at bedside. No acute events overnight.     PAIN: N  DYSPNEA: N	  NAUS/VOM: N	  SECRETIONS: N	  AGITATION: N    OTHER REVIEW OF SYSTEMS: negative    Vital Signs Last 24 Hrs  T(C): 37.2 (05 Sep 2020 12:51), Max: 37.3 (05 Sep 2020 05:14)  T(F): 98.9 (05 Sep 2020 12:51), Max: 99.1 (05 Sep 2020 05:14)  HR: 92 (05 Sep 2020 12:51) (77 - 92)  BP: 121/75 (05 Sep 2020 12:51) (104/61 - 131/57)  BP(mean): --  RR: 18 (05 Sep 2020 12:51) (17 - 18)  SpO2: 98% (05 Sep 2020 12:51) (96% - 98%)                                9.0    11.21 )-----------( 656      ( 04 Sep 2020 07:48 )             28.4       CAPILLARY BLOOD GLUCOSE                  acetaminophen   Tablet .. 650 milliGRAM(s) Oral every 6 hours PRN  acetaminophen   Tablet .. 650 milliGRAM(s) Oral every 6 hours PRN  amLODIPine   Tablet 10 milliGRAM(s) Oral daily  ascorbic acid 500 milliGRAM(s) Oral daily  calcium carbonate 1250 mG  + Vitamin D (OsCal 500 + D) 1 Tablet(s) Oral daily  carbidopa/levodopa  25/100 1 Tablet(s) Oral <User Schedule>  cefpodoxime 200 milliGRAM(s) Oral every 12 hours  epoetin edwin-epbx (RETACRIT) Injectable 77479 Unit(s) SubCutaneous <User Schedule>  ferrous    sulfate 325 milliGRAM(s) Oral daily  heparin   Injectable 5000 Unit(s) SubCutaneous every 8 hours  hydrocortisone hemorrhoidal Suppository 1 Suppository(s) Rectal two times a day  lactobacillus acidophilus 1 Tablet(s) Oral three times a day  lidocaine   Patch 1 Patch Transdermal every 24 hours  mesalamine Suppository 1000 milliGRAM(s) Rectal at bedtime  metroNIDAZOLE    Tablet 500 milliGRAM(s) Oral every 8 hours  multivitamin/minerals 1 Tablet(s) Oral daily  pantoprazole    Tablet 40 milliGRAM(s) Oral before breakfast  prednisoLONE acetate 1% Suspension 1 Drop(s) Both EYES daily  QUEtiapine 25 milliGRAM(s) Oral two times a day      GENERAL:  resting comfortably in NAD  HEENT:  NC/AT EOMI PERRL  NECK: supple no JVD  CVS:  +S1 S2 RRR  RESP: CTA B/L  GI:  soft NT/ND +BS  : no suprapubic tenderness  MUSC:  no lower extremity edema  SKIN:  Warm, moist, no rashes   LYMPH: normal     MEDS REVIEWED	          OPIATE NAÏVE (Y/N)    tetracycline (Unknown)        ADVANCED DIRECTIVES:     FULL CODE         PSYCHOSOCIAL-SPIRITUAL ASSESSMENT:    _x__Reviewed     _x__Care  plan unchanged     ___Care plan adjusted as below    GOALS OF CARE DISCUSSION  	_x__Palliative care info/counseling provided	    ___Family meeting  	_x__Advanced Directives addressed	    ___See previous Palliative Medicine Note    AGENCY CHOICE DISCUSSED:   ___HOSPICE   ___CALVARY  ___OTHER:              > 50% OF THE TIME SPENT IN COUNSELING AND COORDINATING CARE 	    Minutes:        PROLONGED SERVICE             FACE TO FACE:    PT            PT & FAMILY	       Minutes:      Advance Care Planning Time:

## 2020-09-05 NOTE — PROGRESS NOTE ADULT - SUBJECTIVE AND OBJECTIVE BOX
Patient is a 75y old  Female who presents with a chief complaint of gi bleed  anemia (05 Sep 2020 08:37)       Pt is seen and examined  pt is awake and lying in bed/out of bed to chair  pt seems comfortable and denies any complaints at this time    HPI:  74 yo F PMHx Parkinson's disease, early dementia p/w Rt lower rib pain s/p fall. Accidental fall, no dizziness/ chest pain/ loss of consciousness.   Patient states that she was trying to grab a piece of coffee cake from the cabinet, slipped backwards, fell onto her right side and hit back of her head. Denies chest pain/ palpitations/ shortness of breath.    Son Roman present, helped her up.     In the ed patient had x ray of pelvis show sRt pubic fracture  CT head no acute bleed  CT C spine no fracture (25 Aug 2020 23:29)         ROS:  Negative except for:    MEDICATIONS  (STANDING):  amLODIPine   Tablet 10 milliGRAM(s) Oral daily  ascorbic acid 500 milliGRAM(s) Oral daily  calcium carbonate 1250 mG  + Vitamin D (OsCal 500 + D) 1 Tablet(s) Oral daily  carbidopa/levodopa  25/100 1 Tablet(s) Oral <User Schedule>  cefpodoxime 200 milliGRAM(s) Oral every 12 hours  epoetin edwin-epbx (RETACRIT) Injectable 04419 Unit(s) SubCutaneous <User Schedule>  ferrous    sulfate 325 milliGRAM(s) Oral daily  heparin   Injectable 5000 Unit(s) SubCutaneous every 8 hours  hydrocortisone hemorrhoidal Suppository 1 Suppository(s) Rectal two times a day  lactobacillus acidophilus 1 Tablet(s) Oral three times a day  lidocaine   Patch 1 Patch Transdermal every 24 hours  mesalamine Suppository 1000 milliGRAM(s) Rectal at bedtime  metroNIDAZOLE    Tablet 500 milliGRAM(s) Oral every 8 hours  multivitamin/minerals 1 Tablet(s) Oral daily  pantoprazole    Tablet 40 milliGRAM(s) Oral before breakfast  prednisoLONE acetate 1% Suspension 1 Drop(s) Both EYES daily  QUEtiapine 25 milliGRAM(s) Oral two times a day    MEDICATIONS  (PRN):  acetaminophen   Tablet .. 650 milliGRAM(s) Oral every 6 hours PRN Mild Pain (1 - 3)  acetaminophen   Tablet .. 650 milliGRAM(s) Oral every 6 hours PRN Temp greater or equal to 38C (100.4F)      Allergies    tetracycline (Unknown)    Intolerances        Vital Signs Last 24 Hrs  T(C): 37.3 (05 Sep 2020 05:14), Max: 37.3 (05 Sep 2020 05:14)  T(F): 99.1 (05 Sep 2020 05:14), Max: 99.1 (05 Sep 2020 05:14)  HR: 78 (05 Sep 2020 05:14) (77 - 84)  BP: 125/73 (05 Sep 2020 05:14) (99/62 - 131/57)  BP(mean): --  RR: 17 (05 Sep 2020 05:14) (17 - 18)  SpO2: 96% (05 Sep 2020 05:14) (96% - 97%)    PHYSICAL EXAM  General: adult in NAD  HEENT: clear oropharynx, anicteric sclera, pink conjunctiva  Neck: supple  CV: normal S1/S2 with no murmur rubs or gallops  Lungs: positive air movement b/l ant lungs,clear to auscultation, no wheezes, no rales  Abdomen: soft non-tender non-distended, no hepatosplenomegaly  Ext: no clubbing cyanosis or edema  Skin: no rashes and no petechiae  Neuro: alert and oriented X 4, no focal deficits  LABS:                          9.0    11.21 )-----------( 656      ( 04 Sep 2020 07:48 )             28.4         Mean Cell Volume : 85.0 fl  Mean Cell Hemoglobin : 26.9 pg  Mean Cell Hemoglobin Concentration : 31.7 gm/dL  Auto Neutrophil # : x  Auto Lymphocyte # : x  Auto Monocyte # : x  Auto Eosinophil # : x  Auto Basophil # : x  Auto Neutrophil % : x  Auto Lymphocyte % : x  Auto Monocyte % : x  Auto Eosinophil % : x  Auto Basophil % : x    Serial CBC's  09-04 @ 07:48  Hct-28.4 / Hgb-9.0 / Plat-656 / RBC-3.34 / WBC-11.21          Serial CBC's  09-03 @ 07:08  Hct-26.3 / Hgb-8.5 / Plat-555 / RBC-3.16 / WBC-8.17          Serial CBC's  09-02 @ 08:28  Hct-26.8 / Hgb-8.7 / Plat-476 / RBC-3.21 / WBC-7.25                        Ferritin, Serum: 127 ng/mL (08-29-20 @ 21:58)  Vitamin B12, Serum: 485 pg/mL (08-29-20 @ 21:58)  Folate, Serum: >20.0 ng/mL (08-29-20 @ 21:58)  Iron - Total Binding Capacity.: 263 ug/dL (08-29-20 @ 21:57)  Reticulocyte Percent: 1.0 % (08-29-20 @ 14:43)                BLOOD SMEAR INTERPRETATION:       RADIOLOGY & ADDITIONAL STUDIES: Patient is a 75y old  Female who presents with a chief complaint of gi bleed  anemia (05 Sep 2020 08:37)       Pt is seen and examined  pt is awake and lying in bed  pt seems comfortable and denies any complaints at this time    HPI:  74 yo F PMHx Parkinson's disease, early dementia p/w Rt lower rib pain s/p fall. Accidental fall, no dizziness/ chest pain/ loss of consciousness.   Patient states that she was trying to grab a piece of coffee cake from the cabinet, slipped backwards, fell onto her right side and hit back of her head. Denies chest pain/ palpitations/ shortness of breath.    Son Roman present, helped her up.     In the ed patient had x ray of pelvis show sRt pubic fracture  CT head no acute bleed  CT C spine no fracture (25 Aug 2020 23:29)         ROS:  as per hpi    MEDICATIONS  (STANDING):  amLODIPine   Tablet 10 milliGRAM(s) Oral daily  ascorbic acid 500 milliGRAM(s) Oral daily  calcium carbonate 1250 mG  + Vitamin D (OsCal 500 + D) 1 Tablet(s) Oral daily  carbidopa/levodopa  25/100 1 Tablet(s) Oral <User Schedule>  cefpodoxime 200 milliGRAM(s) Oral every 12 hours  epoetin edwin-epbx (RETACRIT) Injectable 14697 Unit(s) SubCutaneous <User Schedule>  ferrous    sulfate 325 milliGRAM(s) Oral daily  heparin   Injectable 5000 Unit(s) SubCutaneous every 8 hours  hydrocortisone hemorrhoidal Suppository 1 Suppository(s) Rectal two times a day  lactobacillus acidophilus 1 Tablet(s) Oral three times a day  lidocaine   Patch 1 Patch Transdermal every 24 hours  mesalamine Suppository 1000 milliGRAM(s) Rectal at bedtime  metroNIDAZOLE    Tablet 500 milliGRAM(s) Oral every 8 hours  multivitamin/minerals 1 Tablet(s) Oral daily  pantoprazole    Tablet 40 milliGRAM(s) Oral before breakfast  prednisoLONE acetate 1% Suspension 1 Drop(s) Both EYES daily  QUEtiapine 25 milliGRAM(s) Oral two times a day    MEDICATIONS  (PRN):  acetaminophen   Tablet .. 650 milliGRAM(s) Oral every 6 hours PRN Mild Pain (1 - 3)  acetaminophen   Tablet .. 650 milliGRAM(s) Oral every 6 hours PRN Temp greater or equal to 38C (100.4F)      Allergies    tetracycline (Unknown)    Intolerances        Vital Signs Last 24 Hrs  T(C): 37.3 (05 Sep 2020 05:14), Max: 37.3 (05 Sep 2020 05:14)  T(F): 99.1 (05 Sep 2020 05:14), Max: 99.1 (05 Sep 2020 05:14)  HR: 78 (05 Sep 2020 05:14) (77 - 84)  BP: 125/73 (05 Sep 2020 05:14) (99/62 - 131/57)  BP(mean): --  RR: 17 (05 Sep 2020 05:14) (17 - 18)  SpO2: 96% (05 Sep 2020 05:14) (96% - 97%)    PHYSICAL EXAM  General: adult in NAD  HEENT: clear oropharynx, anicteric sclera, pink conjunctiva  Neck: supple  CV: normal S1/S2 with no murmur rubs or gallops  Lungs: positive air movement b/l ant lungs,clear to auscultation, no wheezes, no rales  Abdomen: soft non-tender non-distended, no hepatosplenomegaly  Ext: no clubbing cyanosis or edema  Skin: no rashes and no petechiae  Neuro: alert and oriented X 4, no focal deficits  LABS:                          9.0    11.21 )-----------( 656      ( 04 Sep 2020 07:48 )             28.4         Mean Cell Volume : 85.0 fl  Mean Cell Hemoglobin : 26.9 pg  Mean Cell Hemoglobin Concentration : 31.7 gm/dL  Auto Neutrophil # : x  Auto Lymphocyte # : x  Auto Monocyte # : x  Auto Eosinophil # : x  Auto Basophil # : x  Auto Neutrophil % : x  Auto Lymphocyte % : x  Auto Monocyte % : x  Auto Eosinophil % : x  Auto Basophil % : x    Serial CBC's  09-04 @ 07:48  Hct-28.4 / Hgb-9.0 / Plat-656 / RBC-3.34 / WBC-11.21          Serial CBC's  09-03 @ 07:08  Hct-26.3 / Hgb-8.5 / Plat-555 / RBC-3.16 / WBC-8.17          Serial CBC's  09-02 @ 08:28  Hct-26.8 / Hgb-8.7 / Plat-476 / RBC-3.21 / WBC-7.25                        Ferritin, Serum: 127 ng/mL (08-29-20 @ 21:58)  Vitamin B12, Serum: 485 pg/mL (08-29-20 @ 21:58)  Folate, Serum: >20.0 ng/mL (08-29-20 @ 21:58)  Iron - Total Binding Capacity.: 263 ug/dL (08-29-20 @ 21:57)  Reticulocyte Percent: 1.0 % (08-29-20 @ 14:43)

## 2020-09-05 NOTE — PROGRESS NOTE ADULT - ASSESSMENT
8.26.2020 This is a 75 F with PMH of Parkinson's disease and mild cognitive impairment who comes s/p fall and pubic rami fracture. I spoke to son, Roman Wilson. Patient was d/c'd from [facility] last Thursday, 8.20.2020, after residing there for about 3 months. He reports that patient was doing well when she got home -- she was able to ambulate short distances in the house with a walker. She needs moderate help with her ADLs. He wants to take patient home with home care when she is medically ready. He is going to hire a HHA for assistance with patient's ADLs. He says that he is the HCP. Patient does not have any advanced directives such as DNR/DNI. At this point, he wants everything done. Will follow.    8.27.2020 Patient remains a full code. Patient to go home with home care in AM. Son is planning to hire a HHA.    8.28.2020 Seen by psych. Patient has no medical decision making capacity. D/C planning home with home care and HHA.    8.29.2020 Febrile overnight. For EGD/colon on Monday for GIB.    8.30.2020 Still febrile. Started on iv abx for possible UTI. Cultures pending. EGD/colonscopy on hold.     8.31.2020 Still febrile prossibly from UTI. For CT a/p to r/o abscess. On abx. EGD/colonoscopy on hold.    9.1.2020 CT severe proctitis. Abx changed to iv invanz. EGD/colonoscopy on hold. Full code.    9.2.2020 Improving on iv invanz.    9.3.2020 Changed to vantin and flagyl. Possible EGD/colon.    9.4.2020 For EGD/colon today. D/C planning home in AM with HHA.    9.5.2020 S/P EGD/colon. No acute pathology. D/C home with HHA on 9.8.2020.

## 2020-09-05 NOTE — PROGRESS NOTE ADULT - SUBJECTIVE AND OBJECTIVE BOX
Neurology Follow up note    JAYJAY NAJERA75yFemale    HPI:  74 yo F PMHx Parkinson's disease, early dementia p/w Rt lower rib pain s/p fall. Accidental fall, no dizziness/ chest pain/ loss of consciousness.   Patient states that she was trying to grab a piece of coffee cake from the cabinet, slipped backwards, fell onto her right side and hit back of her head. Denies chest pain/ palpitations/ shortness of breath.    Son Roman present, helped her up.     In the ed patient had x ray of pelvis show sRt pubic fracture  CT head no acute bleed  CT C spine no fracture (25 Aug 2020 23:29)      Interval History -calmer    Patient is seen, chart was reviewed and case was discussed with the treatment team.  Pt is not in any distress.   Lying on bed comfortably.     .    Vital Signs Last 24 Hrs  T(C): 37.2 (05 Sep 2020 12:51), Max: 37.3 (05 Sep 2020 05:14)  T(F): 98.9 (05 Sep 2020 12:51), Max: 99.1 (05 Sep 2020 05:14)  HR: 92 (05 Sep 2020 12:51) (77 - 92)  BP: 121/75 (05 Sep 2020 12:51) (104/61 - 131/57)  BP(mean): --  RR: 18 (05 Sep 2020 12:51) (17 - 18)  SpO2: 98% (05 Sep 2020 12:51) (96% - 98%)        REVIEW OF SYSTEMS:    Constitutional: No fever, weight loss or fatigue  Eyes: No eye pain, visual disturbances, or discharge  ENT:  No difficulty hearing, tinnitus, vertigo;   Neck: No pain or stiffness  Respiratory: No cough, wheezing, chills or hemoptysis  Cardiovascular: No chest pain, palpitations, shortness of breath, dizziness or leg swelling  Gastrointestinal: No abdominal pain, vomiting  Genitourinary: No dysuria, frequency, hematuria   Neurological: No headaches,   Psychiatric: No depression, anxiety, mood swings   Musculoskeletal: No joint pain or swelling;  Skin: No itching, burning, rashes or lesions   Lymph Nodes: No enlarged glands  Endocrine: No heat or cold intolerance; No hair loss,  Allergy and Immunologic: No hives or eczema    On Neurological Examination:    Mental Status - Pt is alert, awake, Follows commands     Speech -  Normal.     Cranial Nerves - Pupils 3 mm equal and reactive to light, extraocular eye movements intact. Pt has no visual field deficit.  Pt has no  facial asymmetry. Facial sensation is intact.Tongue - is in midline.    Muscle tone - cogwheel rigidity    Motor Exam - 4/5      Sensory Exam - Pi Pt withdraws all extremities equally on stimulation. No asymmetry seen.     coordination:    Finger to nose: normal      Deep tendon Reflexes - 2 plus all over.       Neck Supple -  Yes.     MEDICATIONS  (STANDING):  amLODIPine   Tablet 10 milliGRAM(s) Oral daily  ascorbic acid 500 milliGRAM(s) Oral daily  calcium carbonate 1250 mG  + Vitamin D (OsCal 500 + D) 1 Tablet(s) Oral daily  carbidopa/levodopa  25/100 1 Tablet(s) Oral <User Schedule>  cefpodoxime 200 milliGRAM(s) Oral every 12 hours  epoetin edwin-epbx (RETACRIT) Injectable 00173 Unit(s) SubCutaneous <User Schedule>  ferrous    sulfate 325 milliGRAM(s) Oral daily  heparin   Injectable 5000 Unit(s) SubCutaneous every 8 hours  hydrocortisone hemorrhoidal Suppository 1 Suppository(s) Rectal two times a day  lactobacillus acidophilus 1 Tablet(s) Oral three times a day  lidocaine   Patch 1 Patch Transdermal every 24 hours  mesalamine Suppository 1000 milliGRAM(s) Rectal at bedtime  metroNIDAZOLE    Tablet 500 milliGRAM(s) Oral every 8 hours  multivitamin/minerals 1 Tablet(s) Oral daily  pantoprazole    Tablet 40 milliGRAM(s) Oral before breakfast  prednisoLONE acetate 1% Suspension 1 Drop(s) Both EYES daily  QUEtiapine 25 milliGRAM(s) Oral two times a day    MEDICATIONS  (PRN):  acetaminophen   Tablet .. 650 milliGRAM(s) Oral every 6 hours PRN Mild Pain (1 - 3)  acetaminophen   Tablet .. 650 milliGRAM(s) Oral every 6 hours PRN Temp greater or equal to 38C (100.4F)        Allergies    tetracycline (Unknown)    Intolerances                                  9.7    14.00 )-----------( 724      ( 05 Sep 2020 15:08 )             30.4         Hemoglobin A1C:     Vitamin B12     RADIOLOGY    ASSESSMENT AND PLAN:      seen for ams cw metabolic encephalopathy  PD  sepsis    antibiotic as per medical team  STARTED ON SEROQUEL  CONTINUE SINEMET  Physical therapy evaluation.  OOB to chair/ambulation with assistance only.  Pain is accessed and addressed.  Would continue to follow.

## 2020-09-05 NOTE — PROGRESS NOTE ADULT - ASSESSMENT
anemia  gi bleed  diarrhea  proctitis  uti      ct reviewed- showing severe proctitis  sp egd/colon- findings of gastritis and rectosigmoid ulceration likely 2/2 constipation, bx'd to r/o malignancy  f/u pathology  cont abx and present meds  rec miralax bid upon dc  diet as tolerated  dc planning from gi perspective

## 2020-09-05 NOTE — PROGRESS NOTE ADULT - ASSESSMENT
contact #  son----Roman Wilson  phone # [phone], called and discussed     IMPRESSION:  75/ F with PMH of Parkinson's disease and mild cognitive impairment, ?dementia per son, ch anemia per son and was on iron outpatient in past, htn in 2020 who comes s/p fall around 8/25/20 and noted pubic rami fracture,  seen by orthopedics and is on medical management, guaiac positive stool, seen by gi and for possible egd/colon for monday, per pt son may had colonoscopy over 18 years ago, hb was at around 11--11.5, per son pt with always anemia and iron deficiency, pt had ct renal stone [place] in 5/2020--hepatic cyst, son declines repeat ct a/p at this time until endoscopy exam. As I spoke to son, Roman Wilson on phone patient with h/o admission at [facility] in around 5/2020 after fall at home and was told rib fracture, had hb around 11-11.5. Patient was d/c'd from [facility] Thursday, 8.20.2020, after residing there for about 3 months. He reports that patient was doing well when she got home -- she was able to ambulate short distances in the house with a walker.   Anemia--likely multifactorial, h/o ch anemia/iron def as per son, iron profile this admission--low iron/transferrin saturation, seen by gi and for possible egd/colon on monday 9/4/20--d/w son, pt is for possible endoscopy as per gi  RECOMMENDATION:  Anemia--likely multifactorial, guaiac pos stool, h/o ch anemia/iron def/multifactorial  iron profile --low iron/transferrin saturation/low nl ferritin  patient is s/p iv iron 100 mg a day for 3 days as looking for a rapid response, 8/30/2020---9/1/2020, 9/4 will give iv iron today  seen by gi   as per gi----------------"ct reviewed- showing severe proctitis  sp egd/colon- findings of gastritis and rectosigmoid ulceration likely 2/2 constipation, bx'd to r/o malignancy  f/u pathology"---------------------------------- final decision gi work up as per pt/family/gi to exclude gi malignancy  guaiac pos stool/liver cyst on earlier ct--son declines ct a/p, radio scan--final decision/work up per gi  no evidence of hemolysis  hb is at 9 on 9/4, hb was at 8.5 on 9/3, cbc 9/5--pen  patient is on retacrit 10,000 units s/c tiw--t/t/s and d/c retacrit if hb is over 10  monitor h/h--transfuse prbc as needed for /ymptomatic anemia  smear again from 9/1 reviewed--unremarkable except left shift wbc  s/p fever/bandemia/uti--seen by id , work and management per id, abx as per id  gi/dvt prophylaxis--on heparin s/c  d/w pt at bedside, called son at [phone] on phone and is in agreement

## 2020-09-06 LAB
HCT VFR BLD CALC: 29.4 % — LOW (ref 34.5–45)
HGB BLD-MCNC: 9.3 G/DL — LOW (ref 11.5–15.5)
MCHC RBC-ENTMCNC: 27.4 PG — SIGNIFICANT CHANGE UP (ref 27–34)
MCHC RBC-ENTMCNC: 31.6 GM/DL — LOW (ref 32–36)
MCV RBC AUTO: 86.5 FL — SIGNIFICANT CHANGE UP (ref 80–100)
NRBC # BLD: 0 /100 WBCS — SIGNIFICANT CHANGE UP (ref 0–0)
PLATELET # BLD AUTO: 695 K/UL — HIGH (ref 150–400)
RBC # BLD: 3.4 M/UL — LOW (ref 3.8–5.2)
RBC # FLD: 14.5 % — SIGNIFICANT CHANGE UP (ref 10.3–14.5)
WBC # BLD: 11.91 K/UL — HIGH (ref 3.8–10.5)
WBC # FLD AUTO: 11.91 K/UL — HIGH (ref 3.8–10.5)

## 2020-09-06 PROCEDURE — 99232 SBSQ HOSP IP/OBS MODERATE 35: CPT

## 2020-09-06 RX ADMIN — CARBIDOPA AND LEVODOPA 1 TABLET(S): 25; 100 TABLET ORAL at 07:42

## 2020-09-06 RX ADMIN — Medication 1 TABLET(S): at 21:32

## 2020-09-06 RX ADMIN — QUETIAPINE FUMARATE 25 MILLIGRAM(S): 200 TABLET, FILM COATED ORAL at 17:32

## 2020-09-06 RX ADMIN — HEPARIN SODIUM 5000 UNIT(S): 5000 INJECTION INTRAVENOUS; SUBCUTANEOUS at 14:29

## 2020-09-06 RX ADMIN — Medication 500 MILLIGRAM(S): at 11:42

## 2020-09-06 RX ADMIN — HEPARIN SODIUM 5000 UNIT(S): 5000 INJECTION INTRAVENOUS; SUBCUTANEOUS at 21:32

## 2020-09-06 RX ADMIN — HEPARIN SODIUM 5000 UNIT(S): 5000 INJECTION INTRAVENOUS; SUBCUTANEOUS at 05:16

## 2020-09-06 RX ADMIN — Medication 1 SUPPOSITORY(S): at 17:31

## 2020-09-06 RX ADMIN — PANTOPRAZOLE SODIUM 40 MILLIGRAM(S): 20 TABLET, DELAYED RELEASE ORAL at 05:15

## 2020-09-06 RX ADMIN — Medication 325 MILLIGRAM(S): at 11:43

## 2020-09-06 RX ADMIN — Medication 1 SUPPOSITORY(S): at 05:16

## 2020-09-06 RX ADMIN — Medication 1 TABLET(S): at 05:15

## 2020-09-06 RX ADMIN — CARBIDOPA AND LEVODOPA 1 TABLET(S): 25; 100 TABLET ORAL at 11:42

## 2020-09-06 RX ADMIN — Medication 1000 MILLIGRAM(S): at 21:33

## 2020-09-06 RX ADMIN — Medication 1 TABLET(S): at 14:29

## 2020-09-06 RX ADMIN — Medication 1 DROP(S): at 11:43

## 2020-09-06 RX ADMIN — CARBIDOPA AND LEVODOPA 1 TABLET(S): 25; 100 TABLET ORAL at 17:31

## 2020-09-06 RX ADMIN — QUETIAPINE FUMARATE 25 MILLIGRAM(S): 200 TABLET, FILM COATED ORAL at 05:15

## 2020-09-06 RX ADMIN — Medication 1 TABLET(S): at 11:42

## 2020-09-06 RX ADMIN — Medication 1 TABLET(S): at 11:43

## 2020-09-06 NOTE — PROGRESS NOTE ADULT - ASSESSMENT
anemia  gi bleed, resolved  diarrhea, resolved  proctitis  uti    ct reviewed- showing severe proctitis  sp egd/colon- findings of gastritis and rectosigmoid ulceration likely 2/2 constipation, bx'd to r/o malignancy  f/u pathology  cont abx and present meds  rec miralax bid upon dc  diet as tolerated  dc planning from gi perspective

## 2020-09-06 NOTE — CHART NOTE - NSCHARTNOTEFT_GEN_A_CORE
Assessment: Pt seen for malnutrition follow-up. As per chart pt is a 75 year old female with a PMH of Parkinson's disease and mild cognitive impairment who comes s/p fall and pubic rami fracture. S/P (9/4) findings of gastritis and rectosigmoid ulceration likely 2/2 constipation, bx'd to r/o malignancy.     Pt seen at bedside, confused at time of visit, able to answer some basic questions. Pt reports poor appetite and PO intake due to not liking the food, states that "she does not like the ethenic food as it looks like worms, wants American foods.". Pt's food preferences obtained and meal adjsuted to provide her with food she feels more comfortable eating. Pt receiving Health shakes, however states that she does not like them. No GI distress noted at this time, last BM 9/4.     Factors impacting intake: [ ] none [ ] nausea  [ ] vomiting [ ] diarrhea [ ] constipation  [ ]chewing problems [ ] swallowing issues  [x ] other: lack of PO intake secondary to cognitive impairment      Diet Presciption: Diet, Soft (09-04-20 @ 16:54)    Intake: fair     Current Weight: (9/2) 127.4lbs  Previous Weight: (8/31) 128.5lbs  % Weight Change-weight has remained stable, continue to obtain and trend pt's weights     Pertinent Medications: MEDICATIONS  (STANDING):  amLODIPine   Tablet 10 milliGRAM(s) Oral daily  ascorbic acid 500 milliGRAM(s) Oral daily  calcium carbonate 1250 mG  + Vitamin D (OsCal 500 + D) 1 Tablet(s) Oral daily  carbidopa/levodopa  25/100 1 Tablet(s) Oral <User Schedule>  cefpodoxime 200 milliGRAM(s) Oral every 12 hours  epoetin edwin-epbx (RETACRIT) Injectable 94622 Unit(s) SubCutaneous <User Schedule>  ferrous    sulfate 325 milliGRAM(s) Oral daily  heparin   Injectable 5000 Unit(s) SubCutaneous every 8 hours  hydrocortisone hemorrhoidal Suppository 1 Suppository(s) Rectal two times a day  lactobacillus acidophilus 1 Tablet(s) Oral three times a day  lidocaine   Patch 1 Patch Transdermal every 24 hours  mesalamine Suppository 1000 milliGRAM(s) Rectal at bedtime  metroNIDAZOLE    Tablet 500 milliGRAM(s) Oral every 8 hours  multivitamin/minerals 1 Tablet(s) Oral daily  pantoprazole    Tablet 40 milliGRAM(s) Oral before breakfast  prednisoLONE acetate 1% Suspension 1 Drop(s) Both EYES daily  QUEtiapine 25 milliGRAM(s) Oral two times a day    MEDICATIONS  (PRN):  acetaminophen   Tablet .. 650 milliGRAM(s) Oral every 6 hours PRN Mild Pain (1 - 3)  acetaminophen   Tablet .. 650 milliGRAM(s) Oral every 6 hours PRN Temp greater or equal to 38C (100.4F)    Pertinent Labs: 09-05 Na142 mmol/L Glu 119 mg/dL<H> K+ 4.4 mmol/L Cr  0.64 mg/dL BUN 14 mg/dL, Chloride 110, Calcium 8.4, 9/6-Hgb 9.3, Hct 29.4     CAPILLARY BLOOD GLUCOSE    Skin: no edema or pressure injuries noted at this time     Estimated Needs:   [ x] no change since previous assessment  [ ] recalculated:     Previous Nutrition Diagnosis:   [ x] Malnutrition     Nutrition Diagnosis is [x ] ongoing-being addressed with honoring pt's food preferences     New Nutrition Diagnosis: [x ] not applicable       Interventions: Continue with current Soft diet   Recommend  [ ] Change Diet To:  [ ] Nutrition Supplement  [ ] Nutrition Support  [x ] Other:   1) Continue to honor pt's food preferences   2) Encourage adequate PO intake  3) Monitor pt's PO intake, weight, skin, edema, GI distress     Monitoring and Evaluation:   [ x] PO intake [ x ] Tolerance to diet prescription [ x ] weights [ x ] labs[ x ] follow up per protocol  [x ] other: RD to remain available

## 2020-09-06 NOTE — PROGRESS NOTE ADULT - ASSESSMENT
75 year old woman with HTN, Parkinson's disease, dementia, anemia, for EGD/colonoscopy.     Patient s/p EGD/colonoscopy which showed gastritis and rectosigmoid ulceration likely 2/2 constipation, bx'd to r/o malignancy    Hypertension  - BP: 99/59 (09-06-20 @ 05:31) (99/59 - 121/75)  - Continue amlodipine     VTE ppx  - Continue hep SQ per primary team    - Patient stable from cardiac stand point.   - Monitor and replete lytes, keep K>4, Mg>2.  - All other medical needs as per primary team.  - Other cardiovascular workup will depend on clinical course.  - Will continue to follow.    Radha Louise, MS FNP, [facility]  Nurse Practitioner- Cardiology   Spectra #[phone]

## 2020-09-06 NOTE — PROGRESS NOTE ADULT - ASSESSMENT
8.26.2020 This is a 75 F with PMH of Parkinson's disease and mild cognitive impairment who comes s/p fall and pubic rami fracture. I spoke to son, Roman Wilson. Patient was d/c'd from [facility] last Thursday, 8.20.2020, after residing there for about 3 months. He reports that patient was doing well when she got home -- she was able to ambulate short distances in the house with a walker. She needs moderate help with her ADLs. He wants to take patient home with home care when she is medically ready. He is going to hire a HHA for assistance with patient's ADLs. He says that he is the HCP. Patient does not have any advanced directives such as DNR/DNI. At this point, he wants everything done. Will follow.    8.27.2020 Patient remains a full code. Patient to go home with home care in AM. Son is planning to hire a HHA.    8.28.2020 Seen by psych. Patient has no medical decision making capacity. D/C planning home with home care and HHA.    8.29.2020 Febrile overnight. For EGD/colon on Monday for GIB.    8.30.2020 Still febrile. Started on iv abx for possible UTI. Cultures pending. EGD/colonscopy on hold.     8.31.2020 Still febrile prossibly from UTI. For CT a/p to r/o abscess. On abx. EGD/colonoscopy on hold.    9.1.2020 CT severe proctitis. Abx changed to iv invanz. EGD/colonoscopy on hold. Full code.    9.2.2020 Improving on iv invanz.    9.3.2020 Changed to vantin and flagyl. Possible EGD/colon.    9.4.2020 For EGD/colon today. D/C planning home in AM with HHA.    9.5.2020 S/P EGD/colon. No acute pathology. D/C home with HHA on 9.8.2020.    9.6.2020 D/C planning on 9.8.2020.

## 2020-09-06 NOTE — PROGRESS NOTE ADULT - SUBJECTIVE AND OBJECTIVE BOX
INTERVAL HPI/OVERNIGHT EVENTS:  pt seen and examined  resting in bed, confused  per rn no acute gi events overnight    MEDICATIONS  (STANDING):  amLODIPine   Tablet 10 milliGRAM(s) Oral daily  ascorbic acid 500 milliGRAM(s) Oral daily  calcium carbonate 1250 mG  + Vitamin D (OsCal 500 + D) 1 Tablet(s) Oral daily  carbidopa/levodopa  25/100 1 Tablet(s) Oral <User Schedule>  cefTRIAXone   IVPB 1000 milliGRAM(s) IV Intermittent every 24 hours  ferrous    sulfate 325 milliGRAM(s) Oral daily  heparin   Injectable 5000 Unit(s) SubCutaneous every 8 hours  hydrocortisone hemorrhoidal Suppository 1 Suppository(s) Rectal two times a day  lidocaine   Patch 1 Patch Transdermal every 24 hours  multivitamin/minerals 1 Tablet(s) Oral daily  pantoprazole    Tablet 40 milliGRAM(s) Oral before breakfast  prednisoLONE acetate 1% Suspension 1 Drop(s) Both EYES daily  QUEtiapine 25 milliGRAM(s) Oral two times a day    MEDICATIONS  (PRN):  acetaminophen   Tablet .. 650 milliGRAM(s) Oral every 6 hours PRN Mild Pain (1 - 3)  acetaminophen   Tablet .. 650 milliGRAM(s) Oral every 6 hours PRN Temp greater or equal to 38C (100.4F)  oxyCODONE    IR 5 milliGRAM(s) Oral four times a day PRN Severe Pain (7 - 10)  traMADol 25 milliGRAM(s) Oral every 6 hours PRN Moderate Pain (4 - 6)      Allergies    tetracycline (Unknown)    Intolerances      unable to obtain       PHYSICAL EXAM:     Vital Signs Last 24 Hrs  T(C): 37.4 (30 Aug 2020 06:00), Max: 38.8 (30 Aug 2020 04:28)  T(F): 99.4 (30 Aug 2020 06:00), Max: 101.8 (30 Aug 2020 04:28)  HR: 86 (30 Aug 2020 04:28) (86 - 99)  BP: 96/58 (30 Aug 2020 04:28) (96/58 - 133/73)  BP(mean): --  RR: 19 (30 Aug 2020 04:28) (17 - 19)  SpO2: 94% (30 Aug 2020 04:28) (93% - 94%)        GENERAL:  lying  in bed  HEENT:  NC/AT  ABDOMEN:  Soft nt  mild dt  EXTREMITIES  no  edema  NEURO: awake alert but confused          LABS:                        8.7    10.76 )-----------( 297      ( 30 Aug 2020 08:34 )             26.4     08-29    139  |  109<H>  |  39<H>  ----------------------------<  93  3.8   |  21<L>  |  0.77    Ca    7.4<L>      29 Aug 2020 07:22            08-29-20 @ 07:01  -  08-30-20 @ 07:00  --------------------------------------------------------  IN: 50 mL / OUT: 200 mL / NET: -150 mL        RADIOLOGY & ADDITIONAL TESTS:      < from: CT Abdomen and Pelvis w/ Oral Cont (08.31.20 @ 14:40) >    EXAM:  CT ABDOMEN AND PELVIS OC                            PROCEDURE DATE:  08/31/2020          INTERPRETATION:  CT ABDOMEN AND PELVIS WITH ORAL CONTRAST    CLINICAL INFORMATION: Fever, sepsis    COMPARISON: CT abdomen pelvis 5/9/2020    PROCEDURE:  CT of the Abdomen and Pelvis was performed without intravenous contrast.  Intravenous contrast: None.  Oral contrast: positive contrast was administered.  Sagittal and coronal reformats were performed.    FINDINGS:  LOWER CHEST: Trace right effusion.    LIVER: Normal.  BILE DUCTS: Nondilated.  GALLBLADDER: Gallstones.  SPLEEN: Normal.  PANCREAS: Normal.  ADRENALS: Normal.  KIDNEYS/URETERS: No hydronephrosis or urinary tract calculi.    BLADDER: Obscured by artifact  REPRODUCTIVE ORGANS: Obscured    BOWEL: Severe rectal wall thickening without definite extraluminal air or collection.  PERITONEUM: No free air or ascites.  VESSELS:  Aortic atherosclerosis without aneurysm.  RETROPERITONEUM/LYMPH NODES: No adenopathy.  ABDOMINAL WALL: Normal.  BONES: Left hip arthroplasty. Recent right inferior pubic rami fracture. Bilateral sacral fractures and age-indeterminate rib fractures.    IMPRESSION:    Severe proctitis without definite extraluminal air or collection.                YOEL CELAYA M.D., ATTENDING RADIOLOGIST  This document has been electronically signed. Aug 31 2020  3:31PM                < end of copied text >

## 2020-09-06 NOTE — PROGRESS NOTE ADULT - SUBJECTIVE AND OBJECTIVE BOX
Patient is a 75y old  Female who presents with a chief complaint of Laceration of scalp and foreign body in scalp after a fall. (06 Sep 2020 09:24)       Pt is seen and examined  pt is awake and lying in bed/out of bed to chair  pt seems comfortable and denies any complaints at this time    HPI:  76 yo F PMHx Parkinson's disease, early dementia p/w Rt lower rib pain s/p fall. Accidental fall, no dizziness/ chest pain/ loss of consciousness.   Patient states that she was trying to grab a piece of coffee cake from the cabinet, slipped backwards, fell onto her right side and hit back of her head. Denies chest pain/ palpitations/ shortness of breath.    Son Roman present, helped her up.     In the ed patient had x ray of pelvis show sRt pubic fracture  CT head no acute bleed  CT C spine no fracture (25 Aug 2020 23:29)         ROS:  Negative except for:    MEDICATIONS  (STANDING):  amLODIPine   Tablet 10 milliGRAM(s) Oral daily  ascorbic acid 500 milliGRAM(s) Oral daily  calcium carbonate 1250 mG  + Vitamin D (OsCal 500 + D) 1 Tablet(s) Oral daily  carbidopa/levodopa  25/100 1 Tablet(s) Oral <User Schedule>  cefpodoxime 200 milliGRAM(s) Oral every 12 hours  epoetin edwin-epbx (RETACRIT) Injectable 54930 Unit(s) SubCutaneous <User Schedule>  ferrous    sulfate 325 milliGRAM(s) Oral daily  heparin   Injectable 5000 Unit(s) SubCutaneous every 8 hours  hydrocortisone hemorrhoidal Suppository 1 Suppository(s) Rectal two times a day  lactobacillus acidophilus 1 Tablet(s) Oral three times a day  lidocaine   Patch 1 Patch Transdermal every 24 hours  mesalamine Suppository 1000 milliGRAM(s) Rectal at bedtime  metroNIDAZOLE    Tablet 500 milliGRAM(s) Oral every 8 hours  multivitamin/minerals 1 Tablet(s) Oral daily  pantoprazole    Tablet 40 milliGRAM(s) Oral before breakfast  prednisoLONE acetate 1% Suspension 1 Drop(s) Both EYES daily  QUEtiapine 25 milliGRAM(s) Oral two times a day    MEDICATIONS  (PRN):  acetaminophen   Tablet .. 650 milliGRAM(s) Oral every 6 hours PRN Mild Pain (1 - 3)  acetaminophen   Tablet .. 650 milliGRAM(s) Oral every 6 hours PRN Temp greater or equal to 38C (100.4F)      Allergies    tetracycline (Unknown)    Intolerances        Vital Signs Last 24 Hrs  T(C): 36.6 (06 Sep 2020 12:36), Max: 37.1 (05 Sep 2020 22:02)  T(F): 97.9 (06 Sep 2020 12:36), Max: 98.8 (05 Sep 2020 22:02)  HR: 78 (06 Sep 2020 12:36) (78 - 84)  BP: 111/63 (06 Sep 2020 12:36) (99/59 - 111/63)  BP(mean): --  RR: 17 (06 Sep 2020 12:36) (17 - 18)  SpO2: 97% (06 Sep 2020 12:36) (95% - 97%)    PHYSICAL EXAM  General: adult in NAD  HEENT: clear oropharynx, anicteric sclera, pink conjunctiva  Neck: supple  CV: normal S1/S2 with no murmur rubs or gallops  Lungs: positive air movement b/l ant lungs,clear to auscultation, no wheezes, no rales  Abdomen: soft non-tender non-distended, no hepatosplenomegaly  Ext: no clubbing cyanosis or edema  Skin: no rashes and no petechiae  Neuro: alert and oriented X 4, no focal deficits  LABS:                          9.3    11.91 )-----------( 695      ( 06 Sep 2020 05:38 )             29.4         Mean Cell Volume : 86.5 fl  Mean Cell Hemoglobin : 27.4 pg  Mean Cell Hemoglobin Concentration : 31.6 gm/dL  Auto Neutrophil # : x  Auto Lymphocyte # : x  Auto Monocyte # : x  Auto Eosinophil # : x  Auto Basophil # : x  Auto Neutrophil % : x  Auto Lymphocyte % : x  Auto Monocyte % : x  Auto Eosinophil % : x  Auto Basophil % : x    Serial CBC's  09-06 @ 05:38  Hct-29.4 / Hgb-9.3 / Plat-695 / RBC-3.40 / WBC-11.91          Serial CBC's  09-05 @ 15:08  Hct-30.4 / Hgb-9.7 / Plat-724 / RBC-3.53 / WBC-14.00          Serial CBC's  09-04 @ 07:48  Hct-28.4 / Hgb-9.0 / Plat-656 / RBC-3.34 / WBC-11.21          Serial CBC's  09-03 @ 07:08  Hct-26.3 / Hgb-8.5 / Plat-555 / RBC-3.16 / WBC-8.17            09-05    142  |  110<H>  |  14  ----------------------------<  119<H>  4.4   |  28  |  0.64    Ca    8.4<L>      05 Sep 2020 15:08            Ferritin, Serum: 127 ng/mL (08-29-20 @ 21:58)  Vitamin B12, Serum: 485 pg/mL (08-29-20 @ 21:58)  Folate, Serum: >20.0 ng/mL (08-29-20 @ 21:58)  Iron - Total Binding Capacity.: 263 ug/dL (08-29-20 @ 21:57)  Reticulocyte Percent: 1.0 % (08-29-20 @ 14:43)                BLOOD SMEAR INTERPRETATION:       RADIOLOGY & ADDITIONAL STUDIES: Patient is a 75y old  Female who presents with a chief complaint of Laceration of scalp and foreign body in scalp after a fall. (06 Sep 2020 09:24)       Pt is seen and examined  pt is awake and lying in bed  patient is having her dinner  pt seems comfortable and denies any complaints at this time    HPI:  74 yo F PMHx Parkinson's disease, early dementia p/w Rt lower rib pain s/p fall. Accidental fall, no dizziness/ chest pain/ loss of consciousness.   Patient states that she was trying to grab a piece of coffee cake from the cabinet, slipped backwards, fell onto her right side and hit back of her head. Denies chest pain/ palpitations/ shortness of breath.    Son Roman present, helped her up.     In the ed patient had x ray of pelvis show sRt pubic fracture  CT head no acute bleed  CT C spine no fracture (25 Aug 2020 23:29)         ROS:  as per hpi    MEDICATIONS  (STANDING):  amLODIPine   Tablet 10 milliGRAM(s) Oral daily  ascorbic acid 500 milliGRAM(s) Oral daily  calcium carbonate 1250 mG  + Vitamin D (OsCal 500 + D) 1 Tablet(s) Oral daily  carbidopa/levodopa  25/100 1 Tablet(s) Oral <User Schedule>  cefpodoxime 200 milliGRAM(s) Oral every 12 hours  epoetin edwin-epbx (RETACRIT) Injectable 29269 Unit(s) SubCutaneous <User Schedule>  ferrous    sulfate 325 milliGRAM(s) Oral daily  heparin   Injectable 5000 Unit(s) SubCutaneous every 8 hours  hydrocortisone hemorrhoidal Suppository 1 Suppository(s) Rectal two times a day  lactobacillus acidophilus 1 Tablet(s) Oral three times a day  lidocaine   Patch 1 Patch Transdermal every 24 hours  mesalamine Suppository 1000 milliGRAM(s) Rectal at bedtime  metroNIDAZOLE    Tablet 500 milliGRAM(s) Oral every 8 hours  multivitamin/minerals 1 Tablet(s) Oral daily  pantoprazole    Tablet 40 milliGRAM(s) Oral before breakfast  prednisoLONE acetate 1% Suspension 1 Drop(s) Both EYES daily  QUEtiapine 25 milliGRAM(s) Oral two times a day    MEDICATIONS  (PRN):  acetaminophen   Tablet .. 650 milliGRAM(s) Oral every 6 hours PRN Mild Pain (1 - 3)  acetaminophen   Tablet .. 650 milliGRAM(s) Oral every 6 hours PRN Temp greater or equal to 38C (100.4F)      Allergies    tetracycline (Unknown)    Intolerances        Vital Signs Last 24 Hrs  T(C): 36.6 (06 Sep 2020 12:36), Max: 37.1 (05 Sep 2020 22:02)  T(F): 97.9 (06 Sep 2020 12:36), Max: 98.8 (05 Sep 2020 22:02)  HR: 78 (06 Sep 2020 12:36) (78 - 84)  BP: 111/63 (06 Sep 2020 12:36) (99/59 - 111/63)  BP(mean): --  RR: 17 (06 Sep 2020 12:36) (17 - 18)  SpO2: 97% (06 Sep 2020 12:36) (95% - 97%)    PHYSICAL EXAM  General: adult in NAD  HEENT: clear oropharynx, anicteric sclera, pink conjunctiva  Neck: supple  CV: normal S1/S2 with no murmur rubs or gallops  Lungs: positive air movement b/l ant lungs,clear to auscultation, no wheezes, no rales  Abdomen: soft non-tender non-distended, no hepatosplenomegaly  Ext: no clubbing cyanosis or edema  Skin: no rashes and no petechiae  Neuro: alert and oriented X 4, no focal deficits  LABS:                          9.3    11.91 )-----------( 695      ( 06 Sep 2020 05:38 )             29.4         Mean Cell Volume : 86.5 fl  Mean Cell Hemoglobin : 27.4 pg  Mean Cell Hemoglobin Concentration : 31.6 gm/dL  Auto Neutrophil # : x  Auto Lymphocyte # : x  Auto Monocyte # : x  Auto Eosinophil # : x  Auto Basophil # : x  Auto Neutrophil % : x  Auto Lymphocyte % : x  Auto Monocyte % : x  Auto Eosinophil % : x  Auto Basophil % : x    Serial CBC's  09-06 @ 05:38  Hct-29.4 / Hgb-9.3 / Plat-695 / RBC-3.40 / WBC-11.91          Serial CBC's  09-05 @ 15:08  Hct-30.4 / Hgb-9.7 / Plat-724 / RBC-3.53 / WBC-14.00          Serial CBC's  09-04 @ 07:48  Hct-28.4 / Hgb-9.0 / Plat-656 / RBC-3.34 / WBC-11.21          Serial CBC's  09-03 @ 07:08  Hct-26.3 / Hgb-8.5 / Plat-555 / RBC-3.16 / WBC-8.17            09-05    142  |  110<H>  |  14  ----------------------------<  119<H>  4.4   |  28  |  0.64    Ca    8.4<L>      05 Sep 2020 15:08            Ferritin, Serum: 127 ng/mL (08-29-20 @ 21:58)  Vitamin B12, Serum: 485 pg/mL (08-29-20 @ 21:58)  Folate, Serum: >20.0 ng/mL (08-29-20 @ 21:58)  Iron - Total Binding Capacity.: 263 ug/dL (08-29-20 @ 21:57)  Reticulocyte Percent: 1.0 % (08-29-20 @ 14:43)

## 2020-09-06 NOTE — PROGRESS NOTE ADULT - SUBJECTIVE AND OBJECTIVE BOX
Patient is a 75y old  Female who presents with a chief complaint of gi bleed  anemia (31 Aug 2020 09:20)      Subjective: Patient seen and examined at bedside. No acute events overnight.     PAIN: N  DYSPNEA: N	  NAUS/VOM: N	  SECRETIONS: N	  AGITATION: N    OTHER REVIEW OF SYSTEMS: negative    Vital Signs Last 24 Hrs  T(C): 36.7 (06 Sep 2020 05:31), Max: 37.2 (05 Sep 2020 12:51)  T(F): 98.1 (06 Sep 2020 05:31), Max: 98.9 (05 Sep 2020 12:51)  HR: 79 (06 Sep 2020 05:31) (79 - 92)  BP: 99/59 (06 Sep 2020 05:31) (99/59 - 121/75)  BP(mean): --  RR: 17 (06 Sep 2020 05:31) (17 - 18)  SpO2: 97% (06 Sep 2020 05:31) (95% - 98%)    09-05    142  |  110<H>  |  14  ----------------------------<  119<H>  4.4   |  28  |  0.64    Ca    8.4<L>      05 Sep 2020 15:08                            9.3    11.91 )-----------( 695      ( 06 Sep 2020 05:38 )             29.4       CAPILLARY BLOOD GLUCOSE                  acetaminophen   Tablet .. 650 milliGRAM(s) Oral every 6 hours PRN  acetaminophen   Tablet .. 650 milliGRAM(s) Oral every 6 hours PRN  amLODIPine   Tablet 10 milliGRAM(s) Oral daily  ascorbic acid 500 milliGRAM(s) Oral daily  calcium carbonate 1250 mG  + Vitamin D (OsCal 500 + D) 1 Tablet(s) Oral daily  carbidopa/levodopa  25/100 1 Tablet(s) Oral <User Schedule>  cefpodoxime 200 milliGRAM(s) Oral every 12 hours  epoetin edwin-epbx (RETACRIT) Injectable 31960 Unit(s) SubCutaneous <User Schedule>  ferrous    sulfate 325 milliGRAM(s) Oral daily  heparin   Injectable 5000 Unit(s) SubCutaneous every 8 hours  hydrocortisone hemorrhoidal Suppository 1 Suppository(s) Rectal two times a day  lactobacillus acidophilus 1 Tablet(s) Oral three times a day  lidocaine   Patch 1 Patch Transdermal every 24 hours  mesalamine Suppository 1000 milliGRAM(s) Rectal at bedtime  metroNIDAZOLE    Tablet 500 milliGRAM(s) Oral every 8 hours  multivitamin/minerals 1 Tablet(s) Oral daily  pantoprazole    Tablet 40 milliGRAM(s) Oral before breakfast  prednisoLONE acetate 1% Suspension 1 Drop(s) Both EYES daily  QUEtiapine 25 milliGRAM(s) Oral two times a day      GENERAL:  resting comfortably in NAD  HEENT:  NC/AT EOMI PERRL  NECK: supple no JVD  CVS:  +S1 S2 RRR  RESP: CTA B/L  GI:  soft NT/ND +BS  : no suprapubic tenderness  MUSC:  no lower extremity edema  SKIN:  Warm, moist, no rashes   LYMPH: normal     MEDS REVIEWED	          OPIATE NAÏVE (Y/N)    tetracycline (Unknown)        ADVANCED DIRECTIVES:     FULL CODE         PSYCHOSOCIAL-SPIRITUAL ASSESSMENT:    _x__Reviewed     _x__Care  plan unchanged     ___Care plan adjusted as below    GOALS OF CARE DISCUSSION  	_x__Palliative care info/counseling provided	    ___Family meeting  	_x__Advanced Directives addressed	    ___See previous Palliative Medicine Note    AGENCY CHOICE DISCUSSED:   ___HOSPICE   ___[facility]  ___OTHER:              > 50% OF THE TIME SPENT IN COUNSELING AND COORDINATING CARE 	    Minutes:        PROLONGED SERVICE             FACE TO FACE:    PT            PT & FAMILY	       Minutes:      Advance Care Planning Time:

## 2020-09-06 NOTE — PROGRESS NOTE ADULT - ASSESSMENT
contact #  son----Roman Wilson  phone # [phone], called and discussed     IMPRESSION:  75/ F with PMH of Parkinson's disease and mild cognitive impairment, ?dementia per son, ch anemia per son and was on iron outpatient in past, htn in 2020 who comes s/p fall around 8/25/20 and noted pubic rami fracture,  seen by orthopedics and is on medical management, guaiac positive stool, seen by gi and for possible egd/colon for monday, per pt son may had colonoscopy over 18 years ago, hb was at around 11--11.5, per son pt with always anemia and iron deficiency, pt had ct renal stone hunt in 5/2020--hepatic cyst, son declines repeat ct a/p at this time until endoscopy exam. As I spoke to son, Roman Wilson on phone patient with h/o admission at [facility] in around 5/2020 after fall at home and was told rib fracture, had hb around 11-11.5. Patient was d/c'd from [facility] Thursday, 8.20.2020, after residing there for about 3 months. He reports that patient was doing well when she got home -- she was able to ambulate short distances in the house with a walker.   Anemia--likely multifactorial, h/o ch anemia/iron def as per son, iron profile this admission--low iron/transferrin saturation, seen by gi and gi work up per gi/family, s/p egd/colon  RECOMMENDATION:  Anemia--likely multifactorial, guaiac pos stool, h/o ch anemia/iron def/multifactorial  iron profile --low iron/transferrin saturation/low nl ferritin  patient is s/p iv iron 100 mg a day for 3 days as looking for a rapid response, 8/30/2020---9/1/2020, 9/4 will give iv iron today  seen by gi   as per gi----------------"ct reviewed- showing severe proctitis  sp egd/colon- findings of gastritis and rectosigmoid ulceration likely 2/2 constipation, bx'd to r/o malignancy  f/u pathology"---------------------------------- final decision gi work up as per pt/family/gi to exclude gi malignancy  guaiac pos stool/liver cyst on earlier ct--son declines ct a/p, radio scan--final decision/work up per gi  no evidence of hemolysis  hb is at 9.3 today, hb was at 9.7 yesterday   patient is on retacrit 10,000 units s/c tiw--t/t/s and d/c retacrit if hb is over 10  monitor h/h--transfuse prbc as needed for /ymptomatic anemia  smear again from 9/1 reviewed--unremarkable except left shift wbc  s/p fever/bandemia/uti--seen by id , work and management per id, abx as per id  gi/dvt prophylaxis--on heparin s/c  d/w pt at bedside, called son at [phone] on phone and is in agreement

## 2020-09-06 NOTE — PROGRESS NOTE ADULT - SUBJECTIVE AND OBJECTIVE BOX
Neurology Follow up note    JAYJAY NAJERA75yFemale    HPI:  76 yo F PMHx Parkinson's disease, early dementia p/w Rt lower rib pain s/p fall. Accidental fall, no dizziness/ chest pain/ loss of consciousness.   Patient states that she was trying to grab a piece of coffee cake from the cabinet, slipped backwards, fell onto her right side and hit back of her head. Denies chest pain/ palpitations/ shortness of breath.    Son Roman present, helped her up.     In the ed patient had x ray of pelvis show sRt pubic fracture  CT head no acute bleed  CT C spine no fracture (25 Aug 2020 23:29)      Interval History -no new events    Patient is seen, chart was reviewed and case was discussed with the treatment team.  Pt is not in any distress.   Lying on bed comfortably.     .    Vital Signs Last 24 Hrs  T(C): 36.6 (06 Sep 2020 12:36), Max: 37.1 (05 Sep 2020 22:02)  T(F): 97.9 (06 Sep 2020 12:36), Max: 98.8 (05 Sep 2020 22:02)  HR: 78 (06 Sep 2020 12:36) (78 - 84)  BP: 111/63 (06 Sep 2020 12:36) (99/59 - 111/63)  BP(mean): --  RR: 17 (06 Sep 2020 12:36) (17 - 18)  SpO2: 97% (06 Sep 2020 12:36) (95% - 97%)        REVIEW OF SYSTEMS:    Constitutional: No fever, weight loss or fatigue  Eyes: No eye pain, visual disturbances, or discharge  ENT:  No difficulty hearing, tinnitus, vertigo;   Neck: No pain or stiffness  Respiratory: No cough, wheezing, chills or hemoptysis  Cardiovascular: No chest pain, palpitations, shortness of breath,   Gastrointestinal: No abdominal pain, vomiting  Genitourinary: No dysuria, frequency, hematuria   Neurological: No headaches,   Psychiatric: No depression, anxiety, mood swings   Musculoskeletal: No joint pain or swelling;  Skin: No itching, burning, rashes or lesions   Lymph Nodes: No enlarged glands  Endocrine: No heat or cold intolerance; No hair loss,  Allergy and Immunologic: No hives or eczema    On Neurological Examination:    Mental Status - Pt is alert, awake, Follows commands     Speech -  Normal.     Cranial Nerves - Pupils 3 mm equal and reactive to light, extraocular eye movements intact. Pt has no visual field deficit.  Pt has no  facial asymmetry. Facial sensation is intact.Tongue - is in midline.    Muscle tone - cogwheel rigidity    Motor Exam - 4/5      Sensory Exam - Pi Pt withdraws all extremities equally on stimulation. No asymmetry seen.     coordination:    Finger to nose: normal      Deep tendon Reflexes - 2 plus all over.       Neck Supple -  Yes.     MEDICATIONS  (STANDING):  amLODIPine   Tablet 10 milliGRAM(s) Oral daily  ascorbic acid 500 milliGRAM(s) Oral daily  calcium carbonate 1250 mG  + Vitamin D (OsCal 500 + D) 1 Tablet(s) Oral daily  carbidopa/levodopa  25/100 1 Tablet(s) Oral <User Schedule>  cefpodoxime 200 milliGRAM(s) Oral every 12 hours  epoetin edwin-epbx (RETACRIT) Injectable 01970 Unit(s) SubCutaneous <User Schedule>  ferrous    sulfate 325 milliGRAM(s) Oral daily  heparin   Injectable 5000 Unit(s) SubCutaneous every 8 hours  hydrocortisone hemorrhoidal Suppository 1 Suppository(s) Rectal two times a day  lactobacillus acidophilus 1 Tablet(s) Oral three times a day  lidocaine   Patch 1 Patch Transdermal every 24 hours  mesalamine Suppository 1000 milliGRAM(s) Rectal at bedtime  metroNIDAZOLE    Tablet 500 milliGRAM(s) Oral every 8 hours  multivitamin/minerals 1 Tablet(s) Oral daily  pantoprazole    Tablet 40 milliGRAM(s) Oral before breakfast  prednisoLONE acetate 1% Suspension 1 Drop(s) Both EYES daily  QUEtiapine 25 milliGRAM(s) Oral two times a day    MEDICATIONS  (PRN):  acetaminophen   Tablet .. 650 milliGRAM(s) Oral every 6 hours PRN Mild Pain (1 - 3)  acetaminophen   Tablet .. 650 milliGRAM(s) Oral every 6 hours PRN Temp greater or equal to 38C (100.4F)        Allergies    tetracycline (Unknown)    Intolerances                          9.3    11.91 )-----------( 695      ( 06 Sep 2020 05:38 )             29.4       Hemoglobin A1C:     Vitamin B12     RADIOLOGY    ASSESSMENT AND PLAN:      seen for ams cw metabolic encephalopathy  PD  sepsis      ON SEROQUEL for AGITATION  CONTINUE SINEMET  Physical therapy evaluation.  OOB to chair/ambulation with assistance only.  Pain is accessed and addressed.  Would continue to follow.

## 2020-09-06 NOTE — PROGRESS NOTE ADULT - SUBJECTIVE AND OBJECTIVE BOX
[facility] -- Eugene York, Denise Coronado, Matias Jacobsen Savella, [place]: Office # [phone]    Follow Up:  Cardiac optimization pre/post op     Subjective/Observations: Patient seen and examined. Patient awake and alert, resting comfortably in bed. No complaints of chest pain, SOB, LE edema, cough. No signs of orthopnea or PND. Tolerating room air.     REVIEW OF SYSTEMS: All review of systems is negative for eye, ENT, GI, , allergic, dermatologic, musculoskeletal and neurologic except as described above    PAST MEDICAL & SURGICAL HISTORY:  Dementia  Anemia  Anemia  Cataract  Parkinsons  History of left hip replacement    MEDICATIONS  (STANDING):  amLODIPine   Tablet 10 milliGRAM(s) Oral daily  ascorbic acid 500 milliGRAM(s) Oral daily  calcium carbonate 1250 mG  + Vitamin D (OsCal 500 + D) 1 Tablet(s) Oral daily  carbidopa/levodopa  25/100 1 Tablet(s) Oral <User Schedule>  cefpodoxime 200 milliGRAM(s) Oral every 12 hours  epoetin edwin-epbx (RETACRIT) Injectable 14943 Unit(s) SubCutaneous <User Schedule>  ferrous    sulfate 325 milliGRAM(s) Oral daily  heparin   Injectable 5000 Unit(s) SubCutaneous every 8 hours  hydrocortisone hemorrhoidal Suppository 1 Suppository(s) Rectal two times a day  lactobacillus acidophilus 1 Tablet(s) Oral three times a day  lidocaine   Patch 1 Patch Transdermal every 24 hours  mesalamine Suppository 1000 milliGRAM(s) Rectal at bedtime  metroNIDAZOLE    Tablet 500 milliGRAM(s) Oral every 8 hours  multivitamin/minerals 1 Tablet(s) Oral daily  pantoprazole    Tablet 40 milliGRAM(s) Oral before breakfast  prednisoLONE acetate 1% Suspension 1 Drop(s) Both EYES daily  QUEtiapine 25 milliGRAM(s) Oral two times a day    MEDICATIONS  (PRN):  acetaminophen   Tablet .. 650 milliGRAM(s) Oral every 6 hours PRN Mild Pain (1 - 3)  acetaminophen   Tablet .. 650 milliGRAM(s) Oral every 6 hours PRN Temp greater or equal to 38C (100.4F)    Allergies  tetracycline (Unknown)    Vital Signs Last 24 Hrs  T(C): 36.7 (06 Sep 2020 05:31), Max: 37.2 (05 Sep 2020 12:51)  T(F): 98.1 (06 Sep 2020 05:31), Max: 98.9 (05 Sep 2020 12:51)  HR: 79 (06 Sep 2020 05:31) (79 - 92)  BP: 99/59 (06 Sep 2020 05:31) (99/59 - 121/75)  BP(mean): --  RR: 17 (06 Sep 2020 05:31) (17 - 18)  SpO2: 97% (06 Sep 2020 05:31) (95% - 98%)    I&O's Summary    05 Sep 2020 07:01  -  06 Sep 2020 07:00  --------------------------------------------------------  IN: 0 mL / OUT: 800 mL / NET: -800 mL    TELE: Not on telemetry   PHYSICAL EXAM:  Appearance: NAD, no distress, alert, Frail   HEENT: Moist Mucous Membranes, Anicteric  Cardiovascular: Regular rate and rhythm, Normal S1 S2, No JVD, No murmurs, No rubs, gallops or clicks  Respiratory: Non-labored, Clear to auscultation, No rales, No rhonchi, No wheezing.   Gastrointestinal:  Soft, Non-tender, + BS  Neurologic: Non-focal  Skin: Warm and dry, No visible rashes or ulcers, No ecchymosis, No cyanosis  Musculoskeletal: No clubbing, No cyanosis, No joint swelling/tenderness  Psychiatry: Mood & affect appropriate  Lymph: No peripheral edema.     LABS: All Labs Reviewed:                        9.3    11.91 )-----------( 695      ( 06 Sep 2020 05:38 )             29.4                         9.7    14.00 )-----------( 724      ( 05 Sep 2020 15:08 )             30.4                         9.0    11.21 )-----------( 656      ( 04 Sep 2020 07:48 )             28.4     05 Sep 2020 15:08    142    |  110    |  14     ----------------------------<  119    4.4     |  28     |  0.64     Ca    8.4        05 Sep 2020 15:08    < from: 12 Lead ECG (08.25.20 @ 19:31) >  Ventricular Rate 102 BPM  Atrial Rate 102 BPM  P-R Interval 118 ms  QRS Duration 88 ms  Q-T Interval 352 ms  QTC Calculation(Bezet) 458 ms  P Axis 36 degrees  R Axis 24 degrees  T Axis 39 degrees  Diagnosis Line Sinus tachycardia  Nonspecific ST abnormality  Abnormal ECG  When compared with ECG of 09-MAY-2020 19:08,  No significant change was found  Confirmed by Stephon Walker MD ([id number]) on 8/26/2020 12:21:19 PM  < end of copied text >    < from: CT Abdomen and Pelvis w/ Oral Cont (08.31.20 @ 14:40) >  IMPRESSION:  Severe proctitis without definite extraluminal air or collection.  < end of copied text >    < from: Xray Chest 1 View- PORTABLE-Urgent (08.29.20 @ 05:44) >  No change heart mediastinum. Biapical pleural thickening. No consolidation or effusion. Degenerative change bilateral shoulders.  Impression: No active infiltrates.  < end of copied text >    < from: CT Chest No Cont (08.25.20 @ 16:11) >  FINDINGS:  LUNGS AND AIRWAYS: Patent central airways.  Biapical scarring.  PLEURA: No pleural effusion.  MEDIASTINUM AND TY: No lymphadenopathy.  VESSELS: Nonaneurysmal  HEART: Heart size is normal. Coronary artery calcification. Decrease cardiac chamber blood pool attenuation suggests an anemic state.. No pericardial effusion.  CHEST WALL AND LOWER NECK: Within normal limits.  VISUALIZED UPPER ABDOMEN: Contracted gallbladder with calcified stones. A 2.1 cm hypodensity right hepatic lobe cannot be definitively characterized on this noncontrast study probably a cyst.  BONES: No acute  IMPRESSION:  No acute findings on this noncontrast study.  < end of copied text >

## 2020-09-06 NOTE — PROGRESS NOTE ADULT - SUBJECTIVE AND OBJECTIVE BOX
Patient is a 75y old  Female who presents with a chief complaint of gi bleed  anemia (05 Sep 2020 08:37)      INTERVAL /OVERNIGHT EVENTS: alert awake confused    MEDICATIONS  (STANDING):  amLODIPine   Tablet 10 milliGRAM(s) Oral daily  ascorbic acid 500 milliGRAM(s) Oral daily  calcium carbonate 1250 mG  + Vitamin D (OsCal 500 + D) 1 Tablet(s) Oral daily  carbidopa/levodopa  25/100 1 Tablet(s) Oral <User Schedule>  cefpodoxime 200 milliGRAM(s) Oral every 12 hours  epoetin edwin-epbx (RETACRIT) Injectable 20610 Unit(s) SubCutaneous <User Schedule>  ferrous    sulfate 325 milliGRAM(s) Oral daily  heparin   Injectable 5000 Unit(s) SubCutaneous every 8 hours  hydrocortisone hemorrhoidal Suppository 1 Suppository(s) Rectal two times a day  lactobacillus acidophilus 1 Tablet(s) Oral three times a day  lidocaine   Patch 1 Patch Transdermal every 24 hours  mesalamine Suppository 1000 milliGRAM(s) Rectal at bedtime  metroNIDAZOLE    Tablet 500 milliGRAM(s) Oral every 8 hours  multivitamin/minerals 1 Tablet(s) Oral daily  pantoprazole    Tablet 40 milliGRAM(s) Oral before breakfast  prednisoLONE acetate 1% Suspension 1 Drop(s) Both EYES daily  QUEtiapine 25 milliGRAM(s) Oral two times a day    MEDICATIONS  (PRN):  acetaminophen   Tablet .. 650 milliGRAM(s) Oral every 6 hours PRN Mild Pain (1 - 3)  acetaminophen   Tablet .. 650 milliGRAM(s) Oral every 6 hours PRN Temp greater or equal to 38C (100.4F)    Allergies    tetracycline (Unknown)    Intolerances        REVIEW OF SYSTEMS: denies    Vital Signs Last 24 Hrs  T(C): 36.7 (06 Sep 2020 05:31), Max: 37.2 (05 Sep 2020 12:51)  T(F): 98.1 (06 Sep 2020 05:31), Max: 98.9 (05 Sep 2020 12:51)  HR: 79 (06 Sep 2020 05:31) (79 - 92)  BP: 99/59 (06 Sep 2020 05:31) (99/59 - 121/75)  RR: 17 (06 Sep 2020 05:31) (17 - 18)  SpO2: 97% (06 Sep 2020 05:31) (95% - 98%)    PHYSICAL EXAM:  GENERAL: NAD, well-groomed, well-developed  HEAD:  Atraumatic, Normocephalic  EYES: EOMI, PERRLA, conjunctiva and sclera clear  ENMT: No tonsillar erythema, exudates, or enlargement; Moist mucous membranes, Good dentition, No lesions  NECK: Supple, No JVD, Normal thyroid  NERVOUS SYSTEM:  Alert & awake confused; Motor Strength 5/5 B/L upper and lower extremities; DTRs 2+ intact and symmetric  CHEST/LUNG: Clear to auscultation bilaterally; No rales, rhonchi, wheezing, or rubs  HEART: Regular rate and rhythm; No murmurs, rubs, or gallops  ABDOMEN: Soft, Nontender, Nondistended; Bowel sounds present  EXTREMITIES:  2+ Peripheral Pulses, No clubbing, cyanosis, or edema  LYMPH: No lymphadenopathy noted  SKIN: No rashes or lesions    LABS:                        9.3    11.91 )-----------( 695      ( 06 Sep 2020 05:38 )             29.4     05 Sep 2020 15:08    142    |  110    |  14     ----------------------------<  119    4.4     |  28     |  0.64     Ca    8.4        05 Sep 2020 15:08    CAPILLARY BLOOD GLUCOSE                     9.7    14.00 )-----------( 724      ( 05 Sep 2020 15:08 )             30.4     05 Sep 2020 15:08    142    |  110    |  14     ----------------------------<  119    4.4     |  28     |  0.64     Ca    8.4        05 Sep 2020 15:08          CAPILLARY BLOOD GLUCOSE          RADIOLOGY & ADDITIONAL TESTS:    Notes Reviewed:  [ x] YES  [ ] NO    Care Discussed with Consultants/Other Providers [x ] YES  [ ] NO

## 2020-09-07 PROCEDURE — 99232 SBSQ HOSP IP/OBS MODERATE 35: CPT

## 2020-09-07 RX ADMIN — Medication 1 TABLET(S): at 14:16

## 2020-09-07 RX ADMIN — HEPARIN SODIUM 5000 UNIT(S): 5000 INJECTION INTRAVENOUS; SUBCUTANEOUS at 14:16

## 2020-09-07 RX ADMIN — Medication 1 SUPPOSITORY(S): at 17:31

## 2020-09-07 RX ADMIN — Medication 1 SUPPOSITORY(S): at 05:15

## 2020-09-07 RX ADMIN — CARBIDOPA AND LEVODOPA 1 TABLET(S): 25; 100 TABLET ORAL at 17:31

## 2020-09-07 RX ADMIN — CARBIDOPA AND LEVODOPA 1 TABLET(S): 25; 100 TABLET ORAL at 12:12

## 2020-09-07 RX ADMIN — LIDOCAINE 1 PATCH: 4 CREAM TOPICAL at 19:30

## 2020-09-07 RX ADMIN — Medication 325 MILLIGRAM(S): at 12:09

## 2020-09-07 RX ADMIN — PANTOPRAZOLE SODIUM 40 MILLIGRAM(S): 20 TABLET, DELAYED RELEASE ORAL at 05:15

## 2020-09-07 RX ADMIN — Medication 1 TABLET(S): at 22:05

## 2020-09-07 RX ADMIN — Medication 1 DROP(S): at 12:08

## 2020-09-07 RX ADMIN — QUETIAPINE FUMARATE 25 MILLIGRAM(S): 200 TABLET, FILM COATED ORAL at 17:31

## 2020-09-07 RX ADMIN — Medication 1 TABLET(S): at 12:09

## 2020-09-07 RX ADMIN — HEPARIN SODIUM 5000 UNIT(S): 5000 INJECTION INTRAVENOUS; SUBCUTANEOUS at 05:15

## 2020-09-07 RX ADMIN — QUETIAPINE FUMARATE 25 MILLIGRAM(S): 200 TABLET, FILM COATED ORAL at 05:15

## 2020-09-07 RX ADMIN — CARBIDOPA AND LEVODOPA 1 TABLET(S): 25; 100 TABLET ORAL at 08:31

## 2020-09-07 RX ADMIN — Medication 500 MILLIGRAM(S): at 12:09

## 2020-09-07 RX ADMIN — AMLODIPINE BESYLATE 10 MILLIGRAM(S): 2.5 TABLET ORAL at 05:14

## 2020-09-07 RX ADMIN — Medication 1000 MILLIGRAM(S): at 22:05

## 2020-09-07 RX ADMIN — HEPARIN SODIUM 5000 UNIT(S): 5000 INJECTION INTRAVENOUS; SUBCUTANEOUS at 22:06

## 2020-09-07 RX ADMIN — LIDOCAINE 1 PATCH: 4 CREAM TOPICAL at 16:00

## 2020-09-07 RX ADMIN — Medication 1 TABLET(S): at 05:15

## 2020-09-07 NOTE — PROGRESS NOTE ADULT - SUBJECTIVE AND OBJECTIVE BOX
[facility] -- Eugene York, Denise Coronado, Matias Jacobsen Savella  Office # [phone]      Follow Up:  htn    Subjective/Observations:   seen at bedside alert in no acute distress    REVIEW OF SYSTEMS: unable to provide any meaningful information   PAST MEDICAL & SURGICAL HISTORY:  Dementia  Anemia  Cataract  Parkinsons  History of left hip replacement      MEDICATIONS  (STANDING):  amLODIPine   Tablet 10 milliGRAM(s) Oral daily  ascorbic acid 500 milliGRAM(s) Oral daily  calcium carbonate 1250 mG  + Vitamin D (OsCal 500 + D) 1 Tablet(s) Oral daily  carbidopa/levodopa  25/100 1 Tablet(s) Oral <User Schedule>  cefpodoxime 200 milliGRAM(s) Oral every 12 hours  epoetin edwin-epbx (RETACRIT) Injectable 54744 Unit(s) SubCutaneous <User Schedule>  ferrous    sulfate 325 milliGRAM(s) Oral daily  heparin   Injectable 5000 Unit(s) SubCutaneous every 8 hours  hydrocortisone hemorrhoidal Suppository 1 Suppository(s) Rectal two times a day  lactobacillus acidophilus 1 Tablet(s) Oral three times a day  lidocaine   Patch 1 Patch Transdermal every 24 hours  mesalamine Suppository 1000 milliGRAM(s) Rectal at bedtime  metroNIDAZOLE    Tablet 500 milliGRAM(s) Oral every 8 hours  multivitamin/minerals 1 Tablet(s) Oral daily  pantoprazole    Tablet 40 milliGRAM(s) Oral before breakfast  prednisoLONE acetate 1% Suspension 1 Drop(s) Both EYES daily  QUEtiapine 25 milliGRAM(s) Oral two times a day    MEDICATIONS  (PRN):  acetaminophen   Tablet .. 650 milliGRAM(s) Oral every 6 hours PRN Mild Pain (1 - 3)  acetaminophen   Tablet .. 650 milliGRAM(s) Oral every 6 hours PRN Temp greater or equal to 38C (100.4F)      Allergies    tetracycline (Unknown)    Intolerances        Vital Signs Last 24 Hrs  T(C): 36.4 (07 Sep 2020 05:45), Max: 36.6 (06 Sep 2020 12:36)  T(F): 97.6 (07 Sep 2020 05:45), Max: 97.9 (06 Sep 2020 12:36)  HR: 73 (07 Sep 2020 05:45) (73 - 78)  BP: 117/68 (07 Sep 2020 05:45) (104/56 - 117/68)  BP(mean): --  RR: 17 (07 Sep 2020 05:45) (17 - 17)  SpO2: 97% (07 Sep 2020 05:45) (96% - 97%)    I&O's Summary        PHYSICAL EXAM:  TELE: not on tele   Constitutional: NAD, awake and alert, well-developed  HEENT: Moist Mucous Membranes, Anicteric  Pulmonary: Non-labored, breath sounds are clear bilaterally, No wheezing, crackles or rhonchi  Cardiovascular: Regular, S1 and S2 nl, No murmurs, rubs, gallops or clicks  Gastrointestinal: Bowel Sounds present, soft, nontender.   Lymph: No lymphadenopathy. No peripheral edema.  Skin: No visible rashes or ulcers.  Psych:  Mood & affect appropriate    LABS: All Labs Reviewed:                        9.3    11.91 )-----------( 695      ( 06 Sep 2020 05:38 )             29.4                         9.7    14.00 )-----------( 724      ( 05 Sep 2020 15:08 )             30.4     05 Sep 2020 15:08    142    |  110    |  14     ----------------------------<  119    4.4     |  28     |  0.64     Ca    8.4        05 Sep 2020 15:08               ECG:  < from: 12 Lead ECG (08.25.20 @ 19:31) >    Ventricular Rate 102 BPM    Atrial Rate 102 BPM    P-R Interval 118 ms    QRS Duration 88 ms    Q-T Interval 352 ms    QTC Calculation(Bezet) 458 ms    P Axis 36 degrees    R Axis 24 degrees    T Axis 39 degrees    Diagnosis Line Sinus tachycardia  Nonspecific ST abnormality  Abnormal ECG  When compared with ECG of 09-MAY-2020 19:08,  No significant change was found    < end of copied text >    Echo:    Radiology:

## 2020-09-07 NOTE — PROGRESS NOTE ADULT - PROBLEM SELECTOR PLAN 6
sinemet  neuro eval with Dr. WILLAMS appreciated  PT eval, ? MARLEN
sinemet  neuro eval with Dr. WILLAMS appreciated  PT eval, ? MARLEN
sinemet  neuro eval with Dr. WILLAMS appreciated  PT eval, ? MARLEN vs HCPT
sinemet  neuro eval with Dr. WILLAMS appreciated  PT eval, ? MARLEN vs HCPT
fever control with tylenol  empiric abx  ? UTI
sinemet  neuro eval with Dr. WILLAMS appreciated  PT eval, ? MARLEN vs HCPT

## 2020-09-07 NOTE — PROGRESS NOTE ADULT - PROBLEM SELECTOR PLAN 5
local care  primary closure
serial CBC  ? etiology  GI eval
serial CBC  ? etiology  GI eval and heme eval
serial CBC  ? etiology  GI eval and heme eval
local care  primary closure
local care  primary closure

## 2020-09-07 NOTE — PROGRESS NOTE ADULT - SUBJECTIVE AND OBJECTIVE BOX
Patient is a 75y old  Female who presents with a chief complaint of gi bleed  anemia (05 Sep 2020 08:37)      INTERVAL /OVERNIGHT EVENTS: alert awake confused    MEDICATIONS  (STANDING):  amLODIPine   Tablet 10 milliGRAM(s) Oral daily  ascorbic acid 500 milliGRAM(s) Oral daily  calcium carbonate 1250 mG  + Vitamin D (OsCal 500 + D) 1 Tablet(s) Oral daily  carbidopa/levodopa  25/100 1 Tablet(s) Oral <User Schedule>  cefpodoxime 200 milliGRAM(s) Oral every 12 hours  epoetin edwin-epbx (RETACRIT) Injectable 45285 Unit(s) SubCutaneous <User Schedule>  ferrous    sulfate 325 milliGRAM(s) Oral daily  heparin   Injectable 5000 Unit(s) SubCutaneous every 8 hours  hydrocortisone hemorrhoidal Suppository 1 Suppository(s) Rectal two times a day  lactobacillus acidophilus 1 Tablet(s) Oral three times a day  lidocaine   Patch 1 Patch Transdermal every 24 hours  mesalamine Suppository 1000 milliGRAM(s) Rectal at bedtime  metroNIDAZOLE    Tablet 500 milliGRAM(s) Oral every 8 hours  multivitamin/minerals 1 Tablet(s) Oral daily  pantoprazole    Tablet 40 milliGRAM(s) Oral before breakfast  prednisoLONE acetate 1% Suspension 1 Drop(s) Both EYES daily  QUEtiapine 25 milliGRAM(s) Oral two times a day    MEDICATIONS  (PRN):  acetaminophen   Tablet .. 650 milliGRAM(s) Oral every 6 hours PRN Mild Pain (1 - 3)  acetaminophen   Tablet .. 650 milliGRAM(s) Oral every 6 hours PRN Temp greater or equal to 38C (100.4F)    Allergies    tetracycline (Unknown)    Intolerances        REVIEW OF SYSTEMS: denies    Vital Signs Last 24 Hrs  T(C): 36.4 (07 Sep 2020 05:45), Max: 36.6 (06 Sep 2020 12:36)  T(F): 97.6 (07 Sep 2020 05:45), Max: 97.9 (06 Sep 2020 12:36)  HR: 73 (07 Sep 2020 05:45) (73 - 78)  BP: 117/68 (07 Sep 2020 05:45) (104/56 - 117/68)  RR: 17 (07 Sep 2020 05:45) (17 - 17)  SpO2: 97% (07 Sep 2020 05:45) (96% - 97%)    PHYSICAL EXAM:  GENERAL: NAD, well-groomed, well-developed  HEAD:  Atraumatic, Normocephalic  EYES: EOMI, PERRLA, conjunctiva and sclera clear  ENMT: No tonsillar erythema, exudates, or enlargement; Moist mucous membranes, Good dentition, No lesions  NECK: Supple, No JVD, Normal thyroid  NERVOUS SYSTEM:  Alert & awake confused; Motor Strength 5/5 B/L upper and lower extremities; DTRs 2+ intact and symmetric  CHEST/LUNG: Clear to auscultation bilaterally; No rales, rhonchi, wheezing, or rubs  HEART: Regular rate and rhythm; No murmurs, rubs, or gallops  ABDOMEN: Soft, Nontender, Nondistended; Bowel sounds present  EXTREMITIES:  2+ Peripheral Pulses, No clubbing, cyanosis, or edema  LYMPH: No lymphadenopathy noted  SKIN: No rashes or lesions    LABS:                             9.3    11.91 )-----------( 695      ( 06 Sep 2020 05:38 )             29.4     05 Sep 2020 15:08    142    |  110    |  14     ----------------------------<  119    4.4     |  28     |  0.64     Ca    8.4        05 Sep 2020 15:08    CAPILLARY BLOOD GLUCOSE                     9.7    14.00 )-----------( 724      ( 05 Sep 2020 15:08 )             30.4     05 Sep 2020 15:08    142    |  110    |  14     ----------------------------<  119    4.4     |  28     |  0.64     Ca    8.4        05 Sep 2020 15:08          CAPILLARY BLOOD GLUCOSE          RADIOLOGY & ADDITIONAL TESTS:    Notes Reviewed:  [ x] YES  [ ] NO    Care Discussed with Consultants/Other Providers [x ] YES  [ ] NO

## 2020-09-07 NOTE — PROGRESS NOTE ADULT - PROBLEM SELECTOR PLAN 3
ortho eval with Dr. MURRY appreciated  PT eval  ? MARLEN vs HCPT
local care  primary closure
ortho eval with Dr. MURRY appreciated  PT eval  ? MARLEN vs HCPT
local care  primary closure
ortho eval with Dr. MURRY appreciated  PT eval  ? MARLEN vs HCPT
ortho eval with Dr. MURRY appreciated  PT eval  ? MARLEN
ortho eval with Dr. MURRY appreciated  PT eval  ? MARLEN

## 2020-09-07 NOTE — PROGRESS NOTE ADULT - NSHPATTENDINGPLANDISCUSS_GEN_ALL_CORE
patient and RN
patient and RN and son and CM and SW
patient and RN

## 2020-09-07 NOTE — PROGRESS NOTE ADULT - ASSESSMENT
anemia  gi bleed, resolved  diarrhea, resolved  proctitis  uti    sp egd/colon- findings of gastritis and rectosigmoid ulceration likely 2/2 constipation, bx'd to r/o malignancy  f/u pathology  cont abx and present meds  rec miralax bid upon dc  diet as tolerated  dc planning from gi perspective

## 2020-09-07 NOTE — PROGRESS NOTE ADULT - ASSESSMENT
contact #  son----Roman Wilson  phone # [phone], called and discussed     IMPRESSION:  75/ F with PMH of Parkinson's disease and mild cognitive impairment, ?dementia per son, ch anemia per son and was on iron outpatient in past, htn in 2020 who comes s/p fall around 8/25/20 and noted pubic rami fracture,  seen by orthopedics and is on medical management, guaiac positive stool, seen by gi and for possible egd/colon for monday, per pt son may had colonoscopy over 18 years ago, hb was at around 11--11.5, per son pt with always anemia and iron deficiency, pt had ct renal stone [place] in 5/2020--hepatic cyst, son declines repeat ct a/p at this time until endoscopy exam. As I spoke to son, Roman Wilson on phone patient with h/o admission at [facility] in around 5/2020 after fall at home and was told rib fracture, had hb around 11-11.5. Patient was d/c'd from [facility] Thursday, 8.20.2020, after residing there for about 3 months. He reports that patient was doing well when she got home -- she was able to ambulate short distances in the house with a walker.   Anemia--likely multifactorial, h/o ch anemia/iron def as per son, iron profile this admission--low iron/transferrin saturation, seen by gi and gi work up per gi/family, s/p egd/colon  RECOMMENDATION:  Anemia--likely multifactorial, guaiac pos stool, h/o ch anemia/iron def/multifactorial  iron profile --low iron/transferrin saturation/low nl ferritin  patient is s/p iv iron 100 mg a day for 3 days as looking for a rapid response, 8/30/2020---9/1/2020, 9/4  seen by gi   as per gi----------------"ct reviewed- showing severe proctitis  sp egd/colon- findings of gastritis and rectosigmoid ulceration likely 2/2 constipation, bx'd to r/o malignancy  f/u pathology"---------------------------------- final decision gi work up as per pt/family/gi to exclude gi malignancy  guaiac pos stool/liver cyst on earlier ct--son declines ct a/p, radio scan--final decision/work up per gi  no evidence of hemolysis  hb is at 9.3 on 9/6, hb was at 9.7 on 9/5, cbc 9/7--pt refuses  patient is on retacrit 10,000 units s/c tiw--t/t/s and d/c retacrit if hb is over 10  monitor h/h--transfuse prbc as needed for /ymptomatic anemia  smear again from 9/1 reviewed--unremarkable except left shift wbc  s/p fever/bandemia/uti--seen by id , work and management per id  gi/dvt prophylaxis--on heparin s/c  d/w pt at bedside, is in agreement

## 2020-09-07 NOTE — PROGRESS NOTE ADULT - SUBJECTIVE AND OBJECTIVE BOX
Patient is a 75y old  Female who presents with a chief complaint of gi bleed  anemia (31 Aug 2020 09:20)      Subjective: Patient seen and examined at bedside. No acute events overnight.     PAIN: N  DYSPNEA: N	  NAUS/VOM: N	  SECRETIONS: N	  AGITATION: N    OTHER REVIEW OF SYSTEMS: negative    Vital Signs Last 24 Hrs  T(C): 36.4 (07 Sep 2020 05:45), Max: 36.6 (06 Sep 2020 12:36)  T(F): 97.6 (07 Sep 2020 05:45), Max: 97.9 (06 Sep 2020 12:36)  HR: 73 (07 Sep 2020 05:45) (73 - 78)  BP: 117/68 (07 Sep 2020 05:45) (104/56 - 117/68)  BP(mean): --  RR: 17 (07 Sep 2020 05:45) (17 - 17)  SpO2: 97% (07 Sep 2020 05:45) (96% - 97%)    09-05    142  |  110<H>  |  14  ----------------------------<  119<H>  4.4   |  28  |  0.64    Ca    8.4<L>      05 Sep 2020 15:08                            9.3    11.91 )-----------( 695      ( 06 Sep 2020 05:38 )             29.4       CAPILLARY BLOOD GLUCOSE                  acetaminophen   Tablet .. 650 milliGRAM(s) Oral every 6 hours PRN  acetaminophen   Tablet .. 650 milliGRAM(s) Oral every 6 hours PRN  amLODIPine   Tablet 10 milliGRAM(s) Oral daily  ascorbic acid 500 milliGRAM(s) Oral daily  calcium carbonate 1250 mG  + Vitamin D (OsCal 500 + D) 1 Tablet(s) Oral daily  carbidopa/levodopa  25/100 1 Tablet(s) Oral <User Schedule>  cefpodoxime 200 milliGRAM(s) Oral every 12 hours  epoetin edwin-epbx (RETACRIT) Injectable 64895 Unit(s) SubCutaneous <User Schedule>  ferrous    sulfate 325 milliGRAM(s) Oral daily  heparin   Injectable 5000 Unit(s) SubCutaneous every 8 hours  hydrocortisone hemorrhoidal Suppository 1 Suppository(s) Rectal two times a day  lactobacillus acidophilus 1 Tablet(s) Oral three times a day  lidocaine   Patch 1 Patch Transdermal every 24 hours  mesalamine Suppository 1000 milliGRAM(s) Rectal at bedtime  metroNIDAZOLE    Tablet 500 milliGRAM(s) Oral every 8 hours  multivitamin/minerals 1 Tablet(s) Oral daily  pantoprazole    Tablet 40 milliGRAM(s) Oral before breakfast  prednisoLONE acetate 1% Suspension 1 Drop(s) Both EYES daily  QUEtiapine 25 milliGRAM(s) Oral two times a day      GENERAL:  resting comfortably in NAD  HEENT:  NC/AT EOMI PERRL  NECK: supple no JVD  CVS:  +S1 S2 RRR  RESP: CTA B/L  GI:  soft NT/ND +BS  : no suprapubic tenderness  MUSC:  no lower extremity edema  SKIN:  Warm, moist, no rashes   LYMPH: normal     MEDS REVIEWED	          OPIATE NAÏVE (Y/N)    tetracycline (Unknown)        ADVANCED DIRECTIVES:     FULL CODE         PSYCHOSOCIAL-SPIRITUAL ASSESSMENT:    _x__Reviewed     _x__Care  plan unchanged     ___Care plan adjusted as below    GOALS OF CARE DISCUSSION  	_x__Palliative care info/counseling provided	    ___Family meeting  	_x__Advanced Directives addressed	    ___See previous Palliative Medicine Note    AGENCY CHOICE DISCUSSED:   ___HOSPICE   ___[facility]  ___OTHER:              > 50% OF THE TIME SPENT IN COUNSELING AND COORDINATING CARE 	    Minutes:        PROLONGED SERVICE             FACE TO FACE:    PT            PT & FAMILY	       Minutes:      Advance Care Planning Time:

## 2020-09-07 NOTE — PROGRESS NOTE ADULT - REASON FOR ADMISSION
gi bleed  anemia
gi bleed  anemia
ams
egd
fever
gi bleed  anemia
htn
gi bleed  anemia
fall
fall with pelvic fracture
fall
Laceration of scalp and foreign body in scalp after a fall.
Laceration of scalp and foreign body in scalp after a fall.
fall

## 2020-09-07 NOTE — PROGRESS NOTE ADULT - SUBJECTIVE AND OBJECTIVE BOX
INTERVAL HPI/OVERNIGHT EVENTS:  pt seen and examined  confused but denies abd pain  no overnight events per nursing    MEDICATIONS  (STANDING):  amLODIPine   Tablet 10 milliGRAM(s) Oral daily  ascorbic acid 500 milliGRAM(s) Oral daily  calcium carbonate 1250 mG  + Vitamin D (OsCal 500 + D) 1 Tablet(s) Oral daily  carbidopa/levodopa  25/100 1 Tablet(s) Oral <User Schedule>  cefTRIAXone   IVPB 1000 milliGRAM(s) IV Intermittent every 24 hours  ferrous    sulfate 325 milliGRAM(s) Oral daily  heparin   Injectable 5000 Unit(s) SubCutaneous every 8 hours  hydrocortisone hemorrhoidal Suppository 1 Suppository(s) Rectal two times a day  lidocaine   Patch 1 Patch Transdermal every 24 hours  multivitamin/minerals 1 Tablet(s) Oral daily  pantoprazole    Tablet 40 milliGRAM(s) Oral before breakfast  prednisoLONE acetate 1% Suspension 1 Drop(s) Both EYES daily  QUEtiapine 25 milliGRAM(s) Oral two times a day    MEDICATIONS  (PRN):  acetaminophen   Tablet .. 650 milliGRAM(s) Oral every 6 hours PRN Mild Pain (1 - 3)  acetaminophen   Tablet .. 650 milliGRAM(s) Oral every 6 hours PRN Temp greater or equal to 38C (100.4F)  oxyCODONE    IR 5 milliGRAM(s) Oral four times a day PRN Severe Pain (7 - 10)  traMADol 25 milliGRAM(s) Oral every 6 hours PRN Moderate Pain (4 - 6)      Allergies    tetracycline (Unknown)    Intolerances      unable to obtain       PHYSICAL EXAM:     Vital Signs Last 24 Hrs  T(C): 37.4 (30 Aug 2020 06:00), Max: 38.8 (30 Aug 2020 04:28)  T(F): 99.4 (30 Aug 2020 06:00), Max: 101.8 (30 Aug 2020 04:28)  HR: 86 (30 Aug 2020 04:28) (86 - 99)  BP: 96/58 (30 Aug 2020 04:28) (96/58 - 133/73)  BP(mean): --  RR: 19 (30 Aug 2020 04:28) (17 - 19)  SpO2: 94% (30 Aug 2020 04:28) (93% - 94%)        GENERAL:  lying  in bed  HEENT:  NC/AT  ABDOMEN:  Soft nt  mild dt  EXTREMITIES  no  edema  NEURO: awake alert but confused          LABS:                        8.7    10.76 )-----------( 297      ( 30 Aug 2020 08:34 )             26.4     08-29    139  |  109<H>  |  39<H>  ----------------------------<  93  3.8   |  21<L>  |  0.77    Ca    7.4<L>      29 Aug 2020 07:22            08-29-20 @ 07:01  -  08-30-20 @ 07:00  --------------------------------------------------------  IN: 50 mL / OUT: 200 mL / NET: -150 mL        RADIOLOGY & ADDITIONAL TESTS:      < from: CT Abdomen and Pelvis w/ Oral Cont (08.31.20 @ 14:40) >    EXAM:  CT ABDOMEN AND PELVIS OC                            PROCEDURE DATE:  08/31/2020          INTERPRETATION:  CT ABDOMEN AND PELVIS WITH ORAL CONTRAST    CLINICAL INFORMATION: Fever, sepsis    COMPARISON: CT abdomen pelvis 5/9/2020    PROCEDURE:  CT of the Abdomen and Pelvis was performed without intravenous contrast.  Intravenous contrast: None.  Oral contrast: positive contrast was administered.  Sagittal and coronal reformats were performed.    FINDINGS:  LOWER CHEST: Trace right effusion.    LIVER: Normal.  BILE DUCTS: Nondilated.  GALLBLADDER: Gallstones.  SPLEEN: Normal.  PANCREAS: Normal.  ADRENALS: Normal.  KIDNEYS/URETERS: No hydronephrosis or urinary tract calculi.    BLADDER: Obscured by artifact  REPRODUCTIVE ORGANS: Obscured    BOWEL: Severe rectal wall thickening without definite extraluminal air or collection.  PERITONEUM: No free air or ascites.  VESSELS:  Aortic atherosclerosis without aneurysm.  RETROPERITONEUM/LYMPH NODES: No adenopathy.  ABDOMINAL WALL: Normal.  BONES: Left hip arthroplasty. Recent right inferior pubic rami fracture. Bilateral sacral fractures and age-indeterminate rib fractures.    IMPRESSION:    Severe proctitis without definite extraluminal air or collection.                YOEL ECLAYA M.D., ATTENDING RADIOLOGIST  This document has been electronically signed. Aug 31 2020  3:31PM                < end of copied text >

## 2020-09-07 NOTE — PROGRESS NOTE ADULT - SUBJECTIVE AND OBJECTIVE BOX
Patient is a 75y old  Female who presents with a chief complaint of ams (07 Sep 2020 09:19)       Pt is seen and examined  pt is awake and lying in bed/out of bed to chair  pt seems comfortable and denies any complaints at this time    HPI:  74 yo F PMHx Parkinson's disease, early dementia p/w Rt lower rib pain s/p fall. Accidental fall, no dizziness/ chest pain/ loss of consciousness.   Patient states that she was trying to grab a piece of coffee cake from the cabinet, slipped backwards, fell onto her right side and hit back of her head. Denies chest pain/ palpitations/ shortness of breath.    Son Roman present, helped her up.     In the ed patient had x ray of pelvis show sRt pubic fracture  CT head no acute bleed  CT C spine no fracture (25 Aug 2020 23:29)         ROS:  Negative except for:    MEDICATIONS  (STANDING):  amLODIPine   Tablet 10 milliGRAM(s) Oral daily  ascorbic acid 500 milliGRAM(s) Oral daily  calcium carbonate 1250 mG  + Vitamin D (OsCal 500 + D) 1 Tablet(s) Oral daily  carbidopa/levodopa  25/100 1 Tablet(s) Oral <User Schedule>  cefpodoxime 200 milliGRAM(s) Oral every 12 hours  epoetin edwin-epbx (RETACRIT) Injectable 04358 Unit(s) SubCutaneous <User Schedule>  ferrous    sulfate 325 milliGRAM(s) Oral daily  heparin   Injectable 5000 Unit(s) SubCutaneous every 8 hours  hydrocortisone hemorrhoidal Suppository 1 Suppository(s) Rectal two times a day  lactobacillus acidophilus 1 Tablet(s) Oral three times a day  lidocaine   Patch 1 Patch Transdermal every 24 hours  mesalamine Suppository 1000 milliGRAM(s) Rectal at bedtime  metroNIDAZOLE    Tablet 500 milliGRAM(s) Oral every 8 hours  multivitamin/minerals 1 Tablet(s) Oral daily  pantoprazole    Tablet 40 milliGRAM(s) Oral before breakfast  prednisoLONE acetate 1% Suspension 1 Drop(s) Both EYES daily  QUEtiapine 25 milliGRAM(s) Oral two times a day    MEDICATIONS  (PRN):  acetaminophen   Tablet .. 650 milliGRAM(s) Oral every 6 hours PRN Mild Pain (1 - 3)  acetaminophen   Tablet .. 650 milliGRAM(s) Oral every 6 hours PRN Temp greater or equal to 38C (100.4F)      Allergies    tetracycline (Unknown)    Intolerances        Vital Signs Last 24 Hrs  T(C): 36.4 (07 Sep 2020 05:45), Max: 36.6 (06 Sep 2020 12:36)  T(F): 97.6 (07 Sep 2020 05:45), Max: 97.9 (06 Sep 2020 12:36)  HR: 73 (07 Sep 2020 05:45) (73 - 78)  BP: 117/68 (07 Sep 2020 05:45) (104/56 - 117/68)  BP(mean): --  RR: 17 (07 Sep 2020 05:45) (17 - 17)  SpO2: 97% (07 Sep 2020 05:45) (96% - 97%)    PHYSICAL EXAM  General: adult in NAD  HEENT: clear oropharynx, anicteric sclera, pink conjunctiva  Neck: supple  CV: normal S1/S2 with no murmur rubs or gallops  Lungs: positive air movement b/l ant lungs,clear to auscultation, no wheezes, no rales  Abdomen: soft non-tender non-distended, no hepatosplenomegaly  Ext: no clubbing cyanosis or edema  Skin: no rashes and no petechiae  Neuro: alert and oriented X 4, no focal deficits  LABS:                          9.3    11.91 )-----------( 695      ( 06 Sep 2020 05:38 )             29.4         Mean Cell Volume : 86.5 fl  Mean Cell Hemoglobin : 27.4 pg  Mean Cell Hemoglobin Concentration : 31.6 gm/dL  Auto Neutrophil # : x  Auto Lymphocyte # : x  Auto Monocyte # : x  Auto Eosinophil # : x  Auto Basophil # : x  Auto Neutrophil % : x  Auto Lymphocyte % : x  Auto Monocyte % : x  Auto Eosinophil % : x  Auto Basophil % : x    Serial CBC's  09-06 @ 05:38  Hct-29.4 / Hgb-9.3 / Plat-695 / RBC-3.40 / WBC-11.91          Serial CBC's  09-05 @ 15:08  Hct-30.4 / Hgb-9.7 / Plat-724 / RBC-3.53 / WBC-14.00          Serial CBC's  09-04 @ 07:48  Hct-28.4 / Hgb-9.0 / Plat-656 / RBC-3.34 / WBC-11.21            09-05    142  |  110<H>  |  14  ----------------------------<  119<H>  4.4   |  28  |  0.64    Ca    8.4<L>      05 Sep 2020 15:08            Ferritin, Serum: 127 ng/mL (08-29-20 @ 21:58)  Vitamin B12, Serum: 485 pg/mL (08-29-20 @ 21:58)  Folate, Serum: >20.0 ng/mL (08-29-20 @ 21:58)  Iron - Total Binding Capacity.: 263 ug/dL (08-29-20 @ 21:57)  Reticulocyte Percent: 1.0 % (08-29-20 @ 14:43)                BLOOD SMEAR INTERPRETATION:       RADIOLOGY & ADDITIONAL STUDIES: Patient is a 75y old  Female who presents with a chief complaint of ams (07 Sep 2020 09:19)       Pt is seen and examined  pt is awake and lying in bed  pt seems comfortable and denies any complaints at this time    HPI:  74 yo F PMHx Parkinson's disease, early dementia p/w Rt lower rib pain s/p fall. Accidental fall, no dizziness/ chest pain/ loss of consciousness.   Patient states that she was trying to grab a piece of coffee cake from the cabinet, slipped backwards, fell onto her right side and hit back of her head. Denies chest pain/ palpitations/ shortness of breath.    Son Roman present, helped her up.     In the ed patient had x ray of pelvis show sRt pubic fracture  CT head no acute bleed  CT C spine no fracture (25 Aug 2020 23:29)         ROS:  as per hpi    MEDICATIONS  (STANDING):  amLODIPine   Tablet 10 milliGRAM(s) Oral daily  ascorbic acid 500 milliGRAM(s) Oral daily  calcium carbonate 1250 mG  + Vitamin D (OsCal 500 + D) 1 Tablet(s) Oral daily  carbidopa/levodopa  25/100 1 Tablet(s) Oral <User Schedule>  cefpodoxime 200 milliGRAM(s) Oral every 12 hours  epoetin edwin-epbx (RETACRIT) Injectable 77596 Unit(s) SubCutaneous <User Schedule>  ferrous    sulfate 325 milliGRAM(s) Oral daily  heparin   Injectable 5000 Unit(s) SubCutaneous every 8 hours  hydrocortisone hemorrhoidal Suppository 1 Suppository(s) Rectal two times a day  lactobacillus acidophilus 1 Tablet(s) Oral three times a day  lidocaine   Patch 1 Patch Transdermal every 24 hours  mesalamine Suppository 1000 milliGRAM(s) Rectal at bedtime  metroNIDAZOLE    Tablet 500 milliGRAM(s) Oral every 8 hours  multivitamin/minerals 1 Tablet(s) Oral daily  pantoprazole    Tablet 40 milliGRAM(s) Oral before breakfast  prednisoLONE acetate 1% Suspension 1 Drop(s) Both EYES daily  QUEtiapine 25 milliGRAM(s) Oral two times a day    MEDICATIONS  (PRN):  acetaminophen   Tablet .. 650 milliGRAM(s) Oral every 6 hours PRN Mild Pain (1 - 3)  acetaminophen   Tablet .. 650 milliGRAM(s) Oral every 6 hours PRN Temp greater or equal to 38C (100.4F)      Allergies    tetracycline (Unknown)    Intolerances        Vital Signs Last 24 Hrs  T(C): 36.4 (07 Sep 2020 05:45), Max: 36.6 (06 Sep 2020 12:36)  T(F): 97.6 (07 Sep 2020 05:45), Max: 97.9 (06 Sep 2020 12:36)  HR: 73 (07 Sep 2020 05:45) (73 - 78)  BP: 117/68 (07 Sep 2020 05:45) (104/56 - 117/68)  BP(mean): --  RR: 17 (07 Sep 2020 05:45) (17 - 17)  SpO2: 97% (07 Sep 2020 05:45) (96% - 97%)    PHYSICAL EXAM  General: adult in NAD  HEENT: clear oropharynx, anicteric sclera, pink conjunctiva  Neck: supple  CV: normal S1/S2 with no murmur rubs or gallops  Lungs: positive air movement b/l ant lungs,clear to auscultation, no wheezes, no rales  Abdomen: soft non-tender non-distended, no hepatosplenomegaly  Ext: no clubbing cyanosis or edema  Skin: no rashes and no petechiae  Neuro: alert and oriented X 4, no focal deficits  LABS:                          9.3    11.91 )-----------( 695      ( 06 Sep 2020 05:38 )             29.4         Mean Cell Volume : 86.5 fl  Mean Cell Hemoglobin : 27.4 pg  Mean Cell Hemoglobin Concentration : 31.6 gm/dL  Auto Neutrophil # : x  Auto Lymphocyte # : x  Auto Monocyte # : x  Auto Eosinophil # : x  Auto Basophil # : x  Auto Neutrophil % : x  Auto Lymphocyte % : x  Auto Monocyte % : x  Auto Eosinophil % : x  Auto Basophil % : x    Serial CBC's  09-06 @ 05:38  Hct-29.4 / Hgb-9.3 / Plat-695 / RBC-3.40 / WBC-11.91          Serial CBC's  09-05 @ 15:08  Hct-30.4 / Hgb-9.7 / Plat-724 / RBC-3.53 / WBC-14.00          Serial CBC's  09-04 @ 07:48  Hct-28.4 / Hgb-9.0 / Plat-656 / RBC-3.34 / WBC-11.21            09-05    142  |  110<H>  |  14  ----------------------------<  119<H>  4.4   |  28  |  0.64    Ca    8.4<L>      05 Sep 2020 15:08            Ferritin, Serum: 127 ng/mL (08-29-20 @ 21:58)  Vitamin B12, Serum: 485 pg/mL (08-29-20 @ 21:58)  Folate, Serum: >20.0 ng/mL (08-29-20 @ 21:58)  Iron - Total Binding Capacity.: 263 ug/dL (08-29-20 @ 21:57)  Reticulocyte Percent: 1.0 % (08-29-20 @ 14:43)

## 2020-09-07 NOTE — PROGRESS NOTE ADULT - ASSESSMENT
75 year old woman with HTN, Parkinson's disease, dementia, anemia, for EGD/colonoscopy.     GI  s/p EGD/colonoscopy which showed gastritis and rectosigmoid ulceration likely 2/2 constipation, bx'd to r/o malignancy    Hypertension  - Continue amlodipine     VTE ppx  - Continue hep SQ per primary team    - Patient stable from cardiac stand point.   - Monitor and replete lytes, keep K>4, Mg>2.  - All other medical needs as per primary team.  - Other cardiovascular workup will depend on clinical course.  - Will continue to follow.  Noy Madden FNP-C  Cardiology NP  [id number] [phone]

## 2020-09-07 NOTE — PROGRESS NOTE ADULT - PROBLEM SELECTOR PROBLEM 3
Pubic ramus fracture, right, closed, initial encounter
Pubic ramus fracture, right, closed, initial encounter
Scalp laceration, initial encounter
Pubic ramus fracture, right, closed, initial encounter
Scalp laceration, initial encounter
Pubic ramus fracture, right, closed, initial encounter
Pubic ramus fracture, right, closed, initial encounter

## 2020-09-07 NOTE — PROGRESS NOTE ADULT - PROBLEM SELECTOR PLAN 4
fall precautions  neuro eval with Dr. ЕКАТЕРИНА kimble
fall precautions  neuro eval with Dr. ЕКАТЕРИНА kimble
[place]  neuro eval  PT eval
fall precautions  neuro eval with Dr. ЕКАТЕРИНА kimble
[place]  neuro eval with Dr. WILLAMS  PT eval, ? MARLEN
[place]  neuro eval with Dr. WILLAMS appreciated  PT eval, ? MARLEN
fall precautions  neuro eval with Dr. ЕКАТЕРИНА kimble
fall precautions  neuro eval with Dr. ЕКАТЕРИНА kimble

## 2020-09-07 NOTE — PROGRESS NOTE ADULT - SUBJECTIVE AND OBJECTIVE BOX
Neurology Follow up note    JAYJAY NAJERA75yFemale    HPI:  76 yo F PMHx Parkinson's disease, early dementia p/w Rt lower rib pain s/p fall. Accidental fall, no dizziness/ chest pain/ loss of consciousness.   Patient states that she was trying to grab a piece of coffee cake from the cabinet, slipped backwards, fell onto her right side and hit back of her head. Denies chest pain/ palpitations/ shortness of breath.    Son Roman present, helped her up.     In the ed patient had x ray of pelvis show sRt pubic fracture  CT head no acute bleed  CT C spine no fracture (25 Aug 2020 23:29)      Interval History -no complaints    Patient is seen, chart was reviewed and case was discussed with the treatment team.  Pt is not in any distress.   Lying on bed comfortably.     .    Vital Signs Last 24 Hrs  T(C): 36.4 (07 Sep 2020 05:45), Max: 36.6 (06 Sep 2020 12:36)  T(F): 97.6 (07 Sep 2020 05:45), Max: 97.9 (06 Sep 2020 12:36)  HR: 73 (07 Sep 2020 05:45) (73 - 78)  BP: 117/68 (07 Sep 2020 05:45) (104/56 - 117/68)  BP(mean): --  RR: 17 (07 Sep 2020 05:45) (17 - 17)  SpO2: 97% (07 Sep 2020 05:45) (96% - 97%)        REVIEW OF SYSTEMS:    Constitutional: No fever, weight loss or fatigue  Eyes: No eye pain, visual disturbances, or discharge  ENT:  No difficulty hearing, tinnitus, vertigo;   Neck: No pain or stiffness  Respiratory: No cough, wheezing, chills or hemoptysis  Cardiovascular: No chest pain, palpitations, shortness of breath,   Gastrointestinal: No abdominal pain, vomiting  Genitourinary: No dysuria, frequency, hematuria   Neurological: No headaches,   Psychiatric: No depression, anxiety, mood swings   Musculoskeletal: No joint pain or swelling;  Skin: No itching, burning, rashes or lesions   Lymph Nodes: No enlarged glands  Endocrine: No heat or cold intolerance; No hair loss,  Allergy and Immunologic: No hives or eczema    On Neurological Examination:    Mental Status - Pt is alert, awake, Follows commands     Speech -  dysarthric    Cranial Nerves - Pupils 3 mm equal and reactive to light, extraocular eye movements intact. Pt has no visual field deficit.  Pt has no  facial asymmetry. Facial sensation is intact.Tongue - is in midline.    Muscle tone - cogwheel rigidity    Motor Exam - 4/5      Sensory Exam - Pi Pt withdraws all extremities equally on stimulation. No asymmetry seen.     coordination:    Finger to nose: normal      Deep tendon Reflexes - 2 plus all over.       Neck Supple -  Yes.     MEDICATIONS  (STANDING):  amLODIPine   Tablet 10 milliGRAM(s) Oral daily  ascorbic acid 500 milliGRAM(s) Oral daily  calcium carbonate 1250 mG  + Vitamin D (OsCal 500 + D) 1 Tablet(s) Oral daily  carbidopa/levodopa  25/100 1 Tablet(s) Oral <User Schedule>  cefpodoxime 200 milliGRAM(s) Oral every 12 hours  epoetin edwin-epbx (RETACRIT) Injectable 33258 Unit(s) SubCutaneous <User Schedule>  ferrous    sulfate 325 milliGRAM(s) Oral daily  heparin   Injectable 5000 Unit(s) SubCutaneous every 8 hours  hydrocortisone hemorrhoidal Suppository 1 Suppository(s) Rectal two times a day  lactobacillus acidophilus 1 Tablet(s) Oral three times a day  lidocaine   Patch 1 Patch Transdermal every 24 hours  mesalamine Suppository 1000 milliGRAM(s) Rectal at bedtime  metroNIDAZOLE    Tablet 500 milliGRAM(s) Oral every 8 hours  multivitamin/minerals 1 Tablet(s) Oral daily  pantoprazole    Tablet 40 milliGRAM(s) Oral before breakfast  prednisoLONE acetate 1% Suspension 1 Drop(s) Both EYES daily  QUEtiapine 25 milliGRAM(s) Oral two times a day    MEDICATIONS  (PRN):  acetaminophen   Tablet .. 650 milliGRAM(s) Oral every 6 hours PRN Mild Pain (1 - 3)  acetaminophen   Tablet .. 650 milliGRAM(s) Oral every 6 hours PRN Temp greater or equal to 38C (100.4F)        Allergies    tetracycline (Unknown)    Intolerances                                             9.3    11.91 )-----------( 695      ( 06 Sep 2020 05:38 )             29.4     Hemoglobin A1C:     Vitamin B12     RADIOLOGY    ASSESSMENT AND PLAN:      seen for ams cw metabolic encephalopathy  PD  sepsis      ON SEROQUEL for AGITATION  CONTINUE SINEMET for PD  Physical therapy evaluation.  OOB to chair/ambulation with assistance only.  Pain is accessed and addressed.  Would continue to follow.

## 2020-09-07 NOTE — PROGRESS NOTE ADULT - ASSESSMENT
8.26.2020 This is a 75 F with PMH of Parkinson's disease and mild cognitive impairment who comes s/p fall and pubic rami fracture. I spoke to son, Roman Wilson. Patient was d/c'd from [facility] last Thursday, 8.20.2020, after residing there for about 3 months. He reports that patient was doing well when she got home -- she was able to ambulate short distances in the house with a walker. She needs moderate help with her ADLs. He wants to take patient home with home care when she is medically ready. He is going to hire a HHA for assistance with patient's ADLs. He says that he is the HCP. Patient does not have any advanced directives such as DNR/DNI. At this point, he wants everything done. Will follow.    8.27.2020 Patient remains a full code. Patient to go home with home care in AM. Son is planning to hire a HHA.    8.28.2020 Seen by psych. Patient has no medical decision making capacity. D/C planning home with home care and HHA.    8.29.2020 Febrile overnight. For EGD/colon on Monday for GIB.    8.30.2020 Still febrile. Started on iv abx for possible UTI. Cultures pending. EGD/colonscopy on hold.     8.31.2020 Still febrile prossibly from UTI. For CT a/p to r/o abscess. On abx. EGD/colonoscopy on hold.    9.1.2020 CT severe proctitis. Abx changed to iv invanz. EGD/colonoscopy on hold. Full code.    9.2.2020 Improving on iv invanz.    9.3.2020 Changed to vantin and flagyl. Possible EGD/colon.    9.4.2020 For EGD/colon today. D/C planning home in AM with HHA.    9.5.2020 S/P EGD/colon. No acute pathology. D/C home with HHA on 9.8.2020.    9.6.2020 D/C planning on 9.8.2020.    9.7.2020 Clinically stable. D/C planning in AM.

## 2020-09-07 NOTE — PROGRESS NOTE ADULT - PROBLEM SELECTOR PROBLEM 2
Anemia
Fall, initial encounter
Anemia
Fall, initial encounter
Anemia

## 2020-09-07 NOTE — PROGRESS NOTE ADULT - PROBLEM SELECTOR PROBLEM 4
Fall, initial encounter
Fall, initial encounter
Parkinsons
Fall, initial encounter
Parkinsons
Fall, initial encounter
Fall, initial encounter

## 2020-09-07 NOTE — PROGRESS NOTE ADULT - PROBLEM SELECTOR PROBLEM 5
Scalp laceration, initial encounter
Scalp laceration, initial encounter
Anemia
Scalp laceration, initial encounter

## 2020-09-08 VITALS
TEMPERATURE: 98 F | HEART RATE: 78 BPM | DIASTOLIC BLOOD PRESSURE: 70 MMHG | OXYGEN SATURATION: 97 % | SYSTOLIC BLOOD PRESSURE: 121 MMHG | RESPIRATION RATE: 19 BRPM

## 2020-09-08 LAB
BASOPHILS # BLD AUTO: 0.05 K/UL — SIGNIFICANT CHANGE UP (ref 0–0.2)
BASOPHILS NFR BLD AUTO: 0.5 % — SIGNIFICANT CHANGE UP (ref 0–2)
EOSINOPHIL # BLD AUTO: 0.14 K/UL — SIGNIFICANT CHANGE UP (ref 0–0.5)
EOSINOPHIL NFR BLD AUTO: 1.3 % — SIGNIFICANT CHANGE UP (ref 0–6)
HCT VFR BLD CALC: 30.5 % — LOW (ref 34.5–45)
HGB BLD-MCNC: 9.5 G/DL — LOW (ref 11.5–15.5)
IMM GRANULOCYTES NFR BLD AUTO: 2.7 % — HIGH (ref 0–1.5)
LYMPHOCYTES # BLD AUTO: 2.63 K/UL — SIGNIFICANT CHANGE UP (ref 1–3.3)
LYMPHOCYTES # BLD AUTO: 24.7 % — SIGNIFICANT CHANGE UP (ref 13–44)
MCHC RBC-ENTMCNC: 27.3 PG — SIGNIFICANT CHANGE UP (ref 27–34)
MCHC RBC-ENTMCNC: 31.1 GM/DL — LOW (ref 32–36)
MCV RBC AUTO: 87.6 FL — SIGNIFICANT CHANGE UP (ref 80–100)
MONOCYTES # BLD AUTO: 0.56 K/UL — SIGNIFICANT CHANGE UP (ref 0–0.9)
MONOCYTES NFR BLD AUTO: 5.3 % — SIGNIFICANT CHANGE UP (ref 2–14)
NEUTROPHILS # BLD AUTO: 6.98 K/UL — SIGNIFICANT CHANGE UP (ref 1.8–7.4)
NEUTROPHILS NFR BLD AUTO: 65.5 % — SIGNIFICANT CHANGE UP (ref 43–77)
NRBC # BLD: 0 /100 WBCS — SIGNIFICANT CHANGE UP (ref 0–0)
PLATELET # BLD AUTO: 692 K/UL — HIGH (ref 150–400)
RBC # BLD: 3.48 M/UL — LOW (ref 3.8–5.2)
RBC # FLD: 16.1 % — HIGH (ref 10.3–14.5)
WBC # BLD: 10.65 K/UL — HIGH (ref 3.8–10.5)
WBC # FLD AUTO: 10.65 K/UL — HIGH (ref 3.8–10.5)

## 2020-09-08 PROCEDURE — 88342 IMHCHEM/IMCYTCHM 1ST ANTB: CPT

## 2020-09-08 PROCEDURE — 83550 IRON BINDING TEST: CPT

## 2020-09-08 PROCEDURE — 97116 GAIT TRAINING THERAPY: CPT

## 2020-09-08 PROCEDURE — 93005 ELECTROCARDIOGRAM TRACING: CPT

## 2020-09-08 PROCEDURE — 97110 THERAPEUTIC EXERCISES: CPT

## 2020-09-08 PROCEDURE — 84145 PROCALCITONIN (PCT): CPT

## 2020-09-08 PROCEDURE — 71250 CT THORAX DX C-: CPT

## 2020-09-08 PROCEDURE — 83540 ASSAY OF IRON: CPT

## 2020-09-08 PROCEDURE — 85610 PROTHROMBIN TIME: CPT

## 2020-09-08 PROCEDURE — 97530 THERAPEUTIC ACTIVITIES: CPT

## 2020-09-08 PROCEDURE — 97161 PT EVAL LOW COMPLEX 20 MIN: CPT

## 2020-09-08 PROCEDURE — 81001 URINALYSIS AUTO W/SCOPE: CPT

## 2020-09-08 PROCEDURE — 83605 ASSAY OF LACTIC ACID: CPT

## 2020-09-08 PROCEDURE — 80048 BASIC METABOLIC PNL TOTAL CA: CPT

## 2020-09-08 PROCEDURE — 86769 SARS-COV-2 COVID-19 ANTIBODY: CPT

## 2020-09-08 PROCEDURE — 88305 TISSUE EXAM BY PATHOLOGIST: CPT

## 2020-09-08 PROCEDURE — 82607 VITAMIN B-12: CPT

## 2020-09-08 PROCEDURE — 36415 COLL VENOUS BLD VENIPUNCTURE: CPT

## 2020-09-08 PROCEDURE — 82746 ASSAY OF FOLIC ACID SERUM: CPT

## 2020-09-08 PROCEDURE — 88312 SPECIAL STAINS GROUP 1: CPT

## 2020-09-08 PROCEDURE — 74176 CT ABD & PELVIS W/O CONTRAST: CPT

## 2020-09-08 PROCEDURE — 97535 SELF CARE MNGMENT TRAINING: CPT

## 2020-09-08 PROCEDURE — 99285 EMERGENCY DEPT VISIT HI MDM: CPT | Mod: 25

## 2020-09-08 PROCEDURE — 82728 ASSAY OF FERRITIN: CPT

## 2020-09-08 PROCEDURE — 80053 COMPREHEN METABOLIC PANEL: CPT

## 2020-09-08 PROCEDURE — 82272 OCCULT BLD FECES 1-3 TESTS: CPT

## 2020-09-08 PROCEDURE — 71045 X-RAY EXAM CHEST 1 VIEW: CPT

## 2020-09-08 PROCEDURE — 97166 OT EVAL MOD COMPLEX 45 MIN: CPT

## 2020-09-08 PROCEDURE — 72170 X-RAY EXAM OF PELVIS: CPT

## 2020-09-08 PROCEDURE — 70450 CT HEAD/BRAIN W/O DYE: CPT

## 2020-09-08 PROCEDURE — 87086 URINE CULTURE/COLONY COUNT: CPT

## 2020-09-08 PROCEDURE — 83010 ASSAY OF HAPTOGLOBIN QUANT: CPT

## 2020-09-08 PROCEDURE — 87040 BLOOD CULTURE FOR BACTERIA: CPT

## 2020-09-08 PROCEDURE — 85045 AUTOMATED RETICULOCYTE COUNT: CPT

## 2020-09-08 PROCEDURE — 99232 SBSQ HOSP IP/OBS MODERATE 35: CPT

## 2020-09-08 PROCEDURE — 87186 SC STD MICRODIL/AGAR DIL: CPT

## 2020-09-08 PROCEDURE — 72125 CT NECK SPINE W/O DYE: CPT

## 2020-09-08 PROCEDURE — 87507 IADNA-DNA/RNA PROBE TQ 12-25: CPT

## 2020-09-08 PROCEDURE — 85730 THROMBOPLASTIN TIME PARTIAL: CPT

## 2020-09-08 PROCEDURE — 85027 COMPLETE CBC AUTOMATED: CPT

## 2020-09-08 PROCEDURE — 87635 SARS-COV-2 COVID-19 AMP PRB: CPT

## 2020-09-08 RX ADMIN — LIDOCAINE 1 PATCH: 4 CREAM TOPICAL at 04:30

## 2020-09-08 RX ADMIN — Medication 500 MILLIGRAM(S): at 11:53

## 2020-09-08 RX ADMIN — Medication 1 SUPPOSITORY(S): at 05:52

## 2020-09-08 RX ADMIN — AMLODIPINE BESYLATE 10 MILLIGRAM(S): 2.5 TABLET ORAL at 05:52

## 2020-09-08 RX ADMIN — Medication 1 TABLET(S): at 05:52

## 2020-09-08 RX ADMIN — PANTOPRAZOLE SODIUM 40 MILLIGRAM(S): 20 TABLET, DELAYED RELEASE ORAL at 05:52

## 2020-09-08 RX ADMIN — Medication 1 TABLET(S): at 11:53

## 2020-09-08 RX ADMIN — CARBIDOPA AND LEVODOPA 1 TABLET(S): 25; 100 TABLET ORAL at 11:53

## 2020-09-08 RX ADMIN — CARBIDOPA AND LEVODOPA 1 TABLET(S): 25; 100 TABLET ORAL at 09:45

## 2020-09-08 RX ADMIN — Medication 1 DROP(S): at 11:56

## 2020-09-08 RX ADMIN — Medication 1 TABLET(S): at 11:52

## 2020-09-08 RX ADMIN — Medication 325 MILLIGRAM(S): at 11:53

## 2020-09-08 RX ADMIN — HEPARIN SODIUM 5000 UNIT(S): 5000 INJECTION INTRAVENOUS; SUBCUTANEOUS at 05:52

## 2020-09-08 RX ADMIN — QUETIAPINE FUMARATE 25 MILLIGRAM(S): 200 TABLET, FILM COATED ORAL at 05:52

## 2020-09-08 NOTE — CHART NOTE - NSCHARTNOTEFT_GEN_A_CORE
Do you have Advance Directives (HCP / LV / Organ donation / Documentation of oral advance Directive):   ( x   )  yes    (      )    NO                                                                            Do you have LV - Living will :                                                                                                                                             (    )  yes    (    x  )   No    Do you have HCP - Health Care Proxy:                                                                                                                            (   x  )  yes   (       ) N0    Do you have DNR- Do Not Resuscitate :                                                                                                                           (      )  yes  (     x   )  No    Do you have DNI- Do Not intubate  :                                                                                                                               (      )  yes   (     x  ) No    Do you have MOLST - Medical orders for Life sustaining treatment  :                                                                    (      ) yes    (     x  ) No    Decision Maker :  (   x  ) Patient     (      )  HCA   (     ) Public Health Law Surrogate     (      ) Surrogate  (       ) Guardian    Goals of Care :  (    x  )   Complete Care     (       ) No Limitations                              (       )   Comfort Care       (       )  Hospice                               (      )   Limited medical Intervention / s    Medical Interventions :   (   x     )   CPR       (        )  DNR                                               (    x    )  Intubation with MV - Mechanical Ventilation  (   x   ) BIPAP/CPAP    (         )   DNI                                               (     x    )  Artificial Nutrition -  IVF, TPN / PPN, Tube Feeds             (         )   No Feeding Tube                                                (    x    ) Use Antibiotics                         (          ) No Antibiotics                                                (    x     ) Blood and Blood Products     (         )   No Blood or Blood products                                                (    x      )  Dialysis                                    (         )  No Dialysis                                                (          )  Medical Management only  (         )  No Invasive Interventions or Surgery  Time spent :                        (    x   ) up to 30 minutes                       (           )   more than 30 minutes  ACP and GOC reviewed and discussed

## 2020-09-08 NOTE — PROGRESS NOTE ADULT - ASSESSMENT
75 year old woman with HTN, Parkinson's disease, dementia, anemia, for EGD/colonoscopy.     GI  s/p EGD/colonoscopy which showed gastritis and rectosigmoid ulceration likely 2/2 constipation, bx'd to r/o malignancy    Hypertension  - BP: 121/70 (09-08-20 @ 05:00) (109/64 - 121/70)  - Continue amlodipine     VTE ppx  - Continue hep SQ per primary team    - Patient stable from cardiac stand point for discharge   - Monitor and replete lytes, keep K>4, Mg>2.  - All other medical needs as per primary team.  - Other cardiovascular workup will depend on clinical course.  - Will continue to follow.      Radha Louise, MS FNP, [facility]  Nurse Practitioner- Cardiology   Spectra #[phone]

## 2020-09-08 NOTE — PROGRESS NOTE ADULT - PROVIDER SPECIALTY LIST ADULT
Cardiology
Family Medicine
Gastroenterology
Heme/Onc
Hospitalist
Infectious Disease
Neurology
Palliative Care
Neurology
Gastroenterology
Gastroenterology
Palliative Care
Gastroenterology
Palliative Care
Internal Medicine
Internal Medicine
Family Medicine
Family Medicine
Hospitalist

## 2020-09-08 NOTE — PROGRESS NOTE ADULT - SUBJECTIVE AND OBJECTIVE BOX
Patient is a 75y old  Female who presents with a chief complaint of gi bleed  anemia (31 Aug 2020 09:20)      Subjective: Patient seen and examined at bedside. No acute events overnight.     PAIN: N  DYSPNEA: N	  NAUS/VOM: N	  SECRETIONS: N	  AGITATION: N    OTHER REVIEW OF SYSTEMS: negative    Vital Signs Last 24 Hrs  T(C): 36.8 (08 Sep 2020 05:00), Max: 36.8 (08 Sep 2020 05:00)  T(F): 98.3 (08 Sep 2020 05:00), Max: 98.3 (08 Sep 2020 05:00)  HR: 78 (08 Sep 2020 05:00) (78 - 83)  BP: 121/70 (08 Sep 2020 05:00) (109/64 - 121/70)  BP(mean): --  RR: 19 (08 Sep 2020 05:00) (18 - 19)  SpO2: 97% (08 Sep 2020 05:00) (97% - 97%)                                9.5    10.65 )-----------( 692      ( 08 Sep 2020 09:05 )             30.5       CAPILLARY BLOOD GLUCOSE                  acetaminophen   Tablet .. 650 milliGRAM(s) Oral every 6 hours PRN  acetaminophen   Tablet .. 650 milliGRAM(s) Oral every 6 hours PRN  amLODIPine   Tablet 10 milliGRAM(s) Oral daily  ascorbic acid 500 milliGRAM(s) Oral daily  calcium carbonate 1250 mG  + Vitamin D (OsCal 500 + D) 1 Tablet(s) Oral daily  carbidopa/levodopa  25/100 1 Tablet(s) Oral <User Schedule>  cefpodoxime 200 milliGRAM(s) Oral every 12 hours  epoetin edwin-epbx (RETACRIT) Injectable 01746 Unit(s) SubCutaneous <User Schedule>  ferrous    sulfate 325 milliGRAM(s) Oral daily  heparin   Injectable 5000 Unit(s) SubCutaneous every 8 hours  hydrocortisone hemorrhoidal Suppository 1 Suppository(s) Rectal two times a day  lactobacillus acidophilus 1 Tablet(s) Oral three times a day  lidocaine   Patch 1 Patch Transdermal every 24 hours  mesalamine Suppository 1000 milliGRAM(s) Rectal at bedtime  metroNIDAZOLE    Tablet 500 milliGRAM(s) Oral every 8 hours  multivitamin/minerals 1 Tablet(s) Oral daily  pantoprazole    Tablet 40 milliGRAM(s) Oral before breakfast  prednisoLONE acetate 1% Suspension 1 Drop(s) Both EYES daily  QUEtiapine 25 milliGRAM(s) Oral two times a day      GENERAL:  resting comfortably in NAD  HEENT:  NC/AT EOMI PERRL  NECK: supple no JVD  CVS:  +S1 S2 RRR  RESP: CTA B/L  GI:  soft NT/ND +BS  : no suprapubic tenderness  MUSC:  no lower extremity edema  SKIN:  Warm, moist, no rashes   LYMPH: normal     MEDS REVIEWED	          OPIATE NAÏVE (Y/N)    tetracycline (Unknown)        ADVANCED DIRECTIVES:     FULL CODE         PSYCHOSOCIAL-SPIRITUAL ASSESSMENT:    _x__Reviewed     _x__Care  plan unchanged     ___Care plan adjusted as below    GOALS OF CARE DISCUSSION  	_x__Palliative care info/counseling provided	    ___Family meeting  	_x__Advanced Directives addressed	    ___See previous Palliative Medicine Note    AGENCY CHOICE DISCUSSED:   ___HOSPICE   ___CALVARY  ___OTHER:              > 50% OF THE TIME SPENT IN COUNSELING AND COORDINATING CARE 	    Minutes:        PROLONGED SERVICE             FACE TO FACE:    PT            PT & FAMILY	       Minutes:      Advance Care Planning Time:

## 2020-09-08 NOTE — PROGRESS NOTE ADULT - ATTENDING COMMENTS
Advanced care planning was discussed with patient and family.  Advanced care planning forms were reviewed and discussed.  Risks, benefits and alternatives of gastroenterologic procedures were discussed in detail and all questions were answered.    30 minutes spent.
I personally saw and examined the patient in detail.  I have spoken to the above provider regarding the assessment and plan of care.  I reviewed the above assessment and plan of care, and agree.  I have made changes in the body of the note where appropriate.
I saw and examined the patient personally. Spoke with above provider regarding this case. I reviewed the above findings completely.  I agree with the above history, physical, and plan which I have edited where appropriate.
Seen/examined. agree with above.
The patient was personally seen and examined, in addition to being examined and evaluated by NP.  All elements of the note were edited where appropriate.
The patient was personally seen and examined, in addition to being examined and evaluated by NP.  All elements of the note were edited where appropriate.
Chart reviewed    Patient seen and examined     Agree with plan as outlined above
The patient was personally seen and examined, in addition to being examined and evaluated by NP.  All elements of the note were edited where appropriate.
The patient was personally seen and examined, in addition to being examined and evaluated by NP.  All elements of the note were edited where appropriate.
Advanced care planning was discussed with patient and family.  Advanced care planning forms were reviewed and discussed.  Risks, benefits and alternatives of gastroenterologic procedures were discussed in detail and all questions were answered.    30 minutes spent.
Seen/examined. agree with above.
patient is as medically optimal as possible for procedure

## 2020-09-08 NOTE — PROGRESS NOTE ADULT - SUBJECTIVE AND OBJECTIVE BOX
[facility] -- Eugene York, Denise Coronado, Matias Jacobsen, Jensen Walter: Office # [phone]    Follow Up:  HTN    Subjective/Observations: Patient seen and examined. Patient awake and alert, resting comfortably in bed. No complaints of chest pain, SOB, LE edema, cough. No signs of orthopnea or PND. Tolerating room air.     REVIEW OF SYSTEMS: All review of systems is negative for eye, ENT, GI, , allergic, dermatologic, musculoskeletal and neurologic except as described above    PAST MEDICAL & SURGICAL HISTORY:  Dementia  Anemia  Anemia  Cataract  Parkinsons  History of left hip replacement    MEDICATIONS  (STANDING):  amLODIPine   Tablet 10 milliGRAM(s) Oral daily  ascorbic acid 500 milliGRAM(s) Oral daily  calcium carbonate 1250 mG  + Vitamin D (OsCal 500 + D) 1 Tablet(s) Oral daily  carbidopa/levodopa  25/100 1 Tablet(s) Oral <User Schedule>  cefpodoxime 200 milliGRAM(s) Oral every 12 hours  epoetin edwin-epbx (RETACRIT) Injectable 28433 Unit(s) SubCutaneous <User Schedule>  ferrous    sulfate 325 milliGRAM(s) Oral daily  heparin   Injectable 5000 Unit(s) SubCutaneous every 8 hours  hydrocortisone hemorrhoidal Suppository 1 Suppository(s) Rectal two times a day  lactobacillus acidophilus 1 Tablet(s) Oral three times a day  lidocaine   Patch 1 Patch Transdermal every 24 hours  mesalamine Suppository 1000 milliGRAM(s) Rectal at bedtime  metroNIDAZOLE    Tablet 500 milliGRAM(s) Oral every 8 hours  multivitamin/minerals 1 Tablet(s) Oral daily  pantoprazole    Tablet 40 milliGRAM(s) Oral before breakfast  prednisoLONE acetate 1% Suspension 1 Drop(s) Both EYES daily  QUEtiapine 25 milliGRAM(s) Oral two times a day    MEDICATIONS  (PRN):  acetaminophen   Tablet .. 650 milliGRAM(s) Oral every 6 hours PRN Mild Pain (1 - 3)  acetaminophen   Tablet .. 650 milliGRAM(s) Oral every 6 hours PRN Temp greater or equal to 38C (100.4F)    Allergies  tetracycline (Unknown)    Vital Signs Last 24 Hrs  T(C): 36.8 (08 Sep 2020 05:00), Max: 36.8 (08 Sep 2020 05:00)  T(F): 98.3 (08 Sep 2020 05:00), Max: 98.3 (08 Sep 2020 05:00)  HR: 78 (08 Sep 2020 05:00) (78 - 83)  BP: 121/70 (08 Sep 2020 05:00) (109/64 - 121/70)  BP(mean): --  RR: 19 (08 Sep 2020 05:00) (18 - 19)  SpO2: 97% (08 Sep 2020 05:00) (97% - 97%)    I&O's Summary      TELE: Not on telemetry   PHYSICAL EXAM:  Appearance: NAD, no distress, alert, Frail   HEENT: Moist Mucous Membranes, Anicteric  Cardiovascular: Regular rate and rhythm, Normal S1 S2, No JVD, No murmurs, No rubs, gallops or clicks  Respiratory: Non-labored, Clear to auscultation, No rales, No rhonchi, No wheezing.   Gastrointestinal:  Soft, Non-tender, + BS  Neurologic: Non-focal  Skin: Warm and dry, No visible rashes or ulcers, No ecchymosis, No cyanosis  Musculoskeletal: No clubbing, No cyanosis, No joint swelling/tenderness  Psychiatry: Mood & affect appropriate  Lymph: No peripheral edema.     LABS: All Labs Reviewed:                        9.5    10.65 )-----------( 692      ( 08 Sep 2020 09:05 )             30.5                         9.3    11.91 )-----------( 695      ( 06 Sep 2020 05:38 )             29.4                         9.7    14.00 )-----------( 724      ( 05 Sep 2020 15:08 )             30.4     05 Sep 2020 15:08    142    |  110    |  14     ----------------------------<  119    4.4     |  28     |  0.64     Ca    8.4        05 Sep 2020 15:08    < from: 12 Lead ECG (08.25.20 @ 19:31) >  Ventricular Rate 102 BPM  Atrial Rate 102 BPM  P-R Interval 118 ms  QRS Duration 88 ms  Q-T Interval 352 ms  QTC Calculation(Bezet) 458 ms  P Axis 36 degrees  R Axis 24 degrees  T Axis 39 degrees  Diagnosis Line Sinus tachycardia  Nonspecific ST abnormality  Abnormal ECG  When compared with ECG of 09-MAY-2020 19:08,  No significant change was found  Confirmed by Stephon Walker MD ([id number]) on 8/26/2020 12:21:19 PM  < end of copied text >    < from: CT Abdomen and Pelvis w/ Oral Cont (08.31.20 @ 14:40) >  IMPRESSION:  Severe proctitis without definite extraluminal air or collection.  < end of copied text >    < from: Xray Chest 1 View- PORTABLE-Urgent (08.29.20 @ 05:44) >  No change heart mediastinum. Biapical pleural thickening. No consolidation or effusion. Degenerative change bilateral shoulders.  Impression: No active infiltrates.  < end of copied text >    < from: CT Chest No Cont (08.25.20 @ 16:11) >  FINDINGS:  LUNGS AND AIRWAYS: Patent central airways.  Biapical scarring.  PLEURA: No pleural effusion.  MEDIASTINUM AND TY: No lymphadenopathy.  VESSELS: Nonaneurysmal  HEART: Heart size is normal. Coronary artery calcification. Decrease cardiac chamber blood pool attenuation suggests an anemic state.. No pericardial effusion.  CHEST WALL AND LOWER NECK: Within normal limits.  VISUALIZED UPPER ABDOMEN: Contracted gallbladder with calcified stones. A 2.1 cm hypodensity right hepatic lobe cannot be definitively characterized on this noncontrast study probably a cyst.  BONES: No acute  IMPRESSION:  No acute findings on this noncontrast study.  < end of copied text >

## 2020-09-08 NOTE — PROGRESS NOTE ADULT - SUBJECTIVE AND OBJECTIVE BOX
Patient is a 75y old  Female who presents with a chief complaint of ams (07 Sep 2020 09:19)       Pt is seen and examined  pt is awake and lying in bed/out of bed to chair  pt seems comfortable and denies any complaints at this time    HPI:  74 yo F PMHx Parkinson's disease, early dementia p/w Rt lower rib pain s/p fall. Accidental fall, no dizziness/ chest pain/ loss of consciousness.   Patient states that she was trying to grab a piece of coffee cake from the cabinet, slipped backwards, fell onto her right side and hit back of her head. Denies chest pain/ palpitations/ shortness of breath.    Son Roman present, helped her up.     In the ed patient had x ray of pelvis show sRt pubic fracture  CT head no acute bleed  CT C spine no fracture (25 Aug 2020 23:29)         ROS:  Negative except for:    MEDICATIONS  (STANDING):  amLODIPine   Tablet 10 milliGRAM(s) Oral daily  ascorbic acid 500 milliGRAM(s) Oral daily  calcium carbonate 1250 mG  + Vitamin D (OsCal 500 + D) 1 Tablet(s) Oral daily  carbidopa/levodopa  25/100 1 Tablet(s) Oral <User Schedule>  cefpodoxime 200 milliGRAM(s) Oral every 12 hours  epoetin edwin-epbx (RETACRIT) Injectable 03575 Unit(s) SubCutaneous <User Schedule>  ferrous    sulfate 325 milliGRAM(s) Oral daily  heparin   Injectable 5000 Unit(s) SubCutaneous every 8 hours  hydrocortisone hemorrhoidal Suppository 1 Suppository(s) Rectal two times a day  lactobacillus acidophilus 1 Tablet(s) Oral three times a day  lidocaine   Patch 1 Patch Transdermal every 24 hours  mesalamine Suppository 1000 milliGRAM(s) Rectal at bedtime  metroNIDAZOLE    Tablet 500 milliGRAM(s) Oral every 8 hours  multivitamin/minerals 1 Tablet(s) Oral daily  pantoprazole    Tablet 40 milliGRAM(s) Oral before breakfast  prednisoLONE acetate 1% Suspension 1 Drop(s) Both EYES daily  QUEtiapine 25 milliGRAM(s) Oral two times a day    MEDICATIONS  (PRN):  acetaminophen   Tablet .. 650 milliGRAM(s) Oral every 6 hours PRN Mild Pain (1 - 3)  acetaminophen   Tablet .. 650 milliGRAM(s) Oral every 6 hours PRN Temp greater or equal to 38C (100.4F)      Allergies    tetracycline (Unknown)    Intolerances        Vital Signs Last 24 Hrs  T(C): 36.8 (08 Sep 2020 05:00), Max: 36.8 (08 Sep 2020 05:00)  T(F): 98.3 (08 Sep 2020 05:00), Max: 98.3 (08 Sep 2020 05:00)  HR: 78 (08 Sep 2020 05:00) (78 - 83)  BP: 121/70 (08 Sep 2020 05:00) (109/64 - 121/70)  BP(mean): --  RR: 19 (08 Sep 2020 05:00) (18 - 19)  SpO2: 97% (08 Sep 2020 05:00) (97% - 97%)    PHYSICAL EXAM  General: adult in NAD  HEENT: clear oropharynx, anicteric sclera, pink conjunctiva  Neck: supple  CV: normal S1/S2 with no murmur rubs or gallops  Lungs: positive air movement b/l ant lungs,clear to auscultation, no wheezes, no rales  Abdomen: soft non-tender non-distended, no hepatosplenomegaly  Ext: no clubbing cyanosis or edema  Skin: no rashes and no petechiae  Neuro: alert and oriented X 4, no focal deficits  LABS:                          9.5    10.65 )-----------( 692      ( 08 Sep 2020 09:05 )             30.5         Mean Cell Volume : 87.6 fl  Mean Cell Hemoglobin : 27.3 pg  Mean Cell Hemoglobin Concentration : 31.1 gm/dL  Auto Neutrophil # : 6.98 K/uL  Auto Lymphocyte # : 2.63 K/uL  Auto Monocyte # : 0.56 K/uL  Auto Eosinophil # : 0.14 K/uL  Auto Basophil # : 0.05 K/uL  Auto Neutrophil % : 65.5 %  Auto Lymphocyte % : 24.7 %  Auto Monocyte % : 5.3 %  Auto Eosinophil % : 1.3 %  Auto Basophil % : 0.5 %    Serial CBC's  09-08 @ 09:05  Hct-30.5 / Hgb-9.5 / Plat-692 / RBC-3.48 / WBC-10.65          Serial CBC's  09-06 @ 05:38  Hct-29.4 / Hgb-9.3 / Plat-695 / RBC-3.40 / WBC-11.91          Serial CBC's  09-05 @ 15:08  Hct-30.4 / Hgb-9.7 / Plat-724 / RBC-3.53 / WBC-14.00                        Ferritin, Serum: 127 ng/mL (08-29-20 @ 21:58)  Vitamin B12, Serum: 485 pg/mL (08-29-20 @ 21:58)  Folate, Serum: >20.0 ng/mL (08-29-20 @ 21:58)  Iron - Total Binding Capacity.: 263 ug/dL (08-29-20 @ 21:57)  Reticulocyte Percent: 1.0 % (08-29-20 @ 14:43)                BLOOD SMEAR INTERPRETATION:       RADIOLOGY & ADDITIONAL STUDIES: Patient is a 75y old  Female who presents with a chief complaint of ams (07 Sep 2020 09:19)       Pt is seen and examined  pt is awake and lying in bed  pt seems comfortable and denies any complaints at this time    HPI:  76 yo F PMHx Parkinson's disease, early dementia p/w Rt lower rib pain s/p fall. Accidental fall, no dizziness/ chest pain/ loss of consciousness.   Patient states that she was trying to grab a piece of coffee cake from the cabinet, slipped backwards, fell onto her right side and hit back of her head. Denies chest pain/ palpitations/ shortness of breath.    Son Roman present, helped her up.     In the ed patient had x ray of pelvis show sRt pubic fracture  CT head no acute bleed  CT C spine no fracture (25 Aug 2020 23:29)         ROS:  as per hpi    MEDICATIONS  (STANDING):  amLODIPine   Tablet 10 milliGRAM(s) Oral daily  ascorbic acid 500 milliGRAM(s) Oral daily  calcium carbonate 1250 mG  + Vitamin D (OsCal 500 + D) 1 Tablet(s) Oral daily  carbidopa/levodopa  25/100 1 Tablet(s) Oral <User Schedule>  cefpodoxime 200 milliGRAM(s) Oral every 12 hours  epoetin edwin-epbx (RETACRIT) Injectable 93416 Unit(s) SubCutaneous <User Schedule>  ferrous    sulfate 325 milliGRAM(s) Oral daily  heparin   Injectable 5000 Unit(s) SubCutaneous every 8 hours  hydrocortisone hemorrhoidal Suppository 1 Suppository(s) Rectal two times a day  lactobacillus acidophilus 1 Tablet(s) Oral three times a day  lidocaine   Patch 1 Patch Transdermal every 24 hours  mesalamine Suppository 1000 milliGRAM(s) Rectal at bedtime  metroNIDAZOLE    Tablet 500 milliGRAM(s) Oral every 8 hours  multivitamin/minerals 1 Tablet(s) Oral daily  pantoprazole    Tablet 40 milliGRAM(s) Oral before breakfast  prednisoLONE acetate 1% Suspension 1 Drop(s) Both EYES daily  QUEtiapine 25 milliGRAM(s) Oral two times a day    MEDICATIONS  (PRN):  acetaminophen   Tablet .. 650 milliGRAM(s) Oral every 6 hours PRN Mild Pain (1 - 3)  acetaminophen   Tablet .. 650 milliGRAM(s) Oral every 6 hours PRN Temp greater or equal to 38C (100.4F)      Allergies    tetracycline (Unknown)    Intolerances        Vital Signs Last 24 Hrs  T(C): 36.8 (08 Sep 2020 05:00), Max: 36.8 (08 Sep 2020 05:00)  T(F): 98.3 (08 Sep 2020 05:00), Max: 98.3 (08 Sep 2020 05:00)  HR: 78 (08 Sep 2020 05:00) (78 - 83)  BP: 121/70 (08 Sep 2020 05:00) (109/64 - 121/70)  BP(mean): --  RR: 19 (08 Sep 2020 05:00) (18 - 19)  SpO2: 97% (08 Sep 2020 05:00) (97% - 97%)    PHYSICAL EXAM  General: adult in NAD  HEENT: clear oropharynx, anicteric sclera, pink conjunctiva  Neck: supple  CV: normal S1/S2 with no murmur rubs or gallops  Lungs: positive air movement b/l ant lungs,clear to auscultation, no wheezes, no rales  Abdomen: soft non-tender non-distended, no hepatosplenomegaly  Ext: no clubbing cyanosis or edema  Skin: no rashes and no petechiae  Neuro: alert and oriented X 4, no focal deficits  LABS:                          9.5    10.65 )-----------( 692      ( 08 Sep 2020 09:05 )             30.5         Mean Cell Volume : 87.6 fl  Mean Cell Hemoglobin : 27.3 pg  Mean Cell Hemoglobin Concentration : 31.1 gm/dL  Auto Neutrophil # : 6.98 K/uL  Auto Lymphocyte # : 2.63 K/uL  Auto Monocyte # : 0.56 K/uL  Auto Eosinophil # : 0.14 K/uL  Auto Basophil # : 0.05 K/uL  Auto Neutrophil % : 65.5 %  Auto Lymphocyte % : 24.7 %  Auto Monocyte % : 5.3 %  Auto Eosinophil % : 1.3 %  Auto Basophil % : 0.5 %    Serial CBC's  09-08 @ 09:05  Hct-30.5 / Hgb-9.5 / Plat-692 / RBC-3.48 / WBC-10.65          Serial CBC's  09-06 @ 05:38  Hct-29.4 / Hgb-9.3 / Plat-695 / RBC-3.40 / WBC-11.91          Serial CBC's  09-05 @ 15:08  Hct-30.4 / Hgb-9.7 / Plat-724 / RBC-3.53 / WBC-14.00                        Ferritin, Serum: 127 ng/mL (08-29-20 @ 21:58)  Vitamin B12, Serum: 485 pg/mL (08-29-20 @ 21:58)  Folate, Serum: >20.0 ng/mL (08-29-20 @ 21:58)  Iron - Total Binding Capacity.: 263 ug/dL (08-29-20 @ 21:57)  Reticulocyte Percent: 1.0 % (08-29-20 @ 14:43)

## 2020-09-08 NOTE — PROGRESS NOTE ADULT - SUBJECTIVE AND OBJECTIVE BOX
INTERVAL HPI/OVERNIGHT EVENTS:  pt seen and examined  offers no gi complaints  cristian po  no overnight events per nursing  no new labs    MEDICATIONS  (STANDING):  amLODIPine   Tablet 10 milliGRAM(s) Oral daily  ascorbic acid 500 milliGRAM(s) Oral daily  calcium carbonate 1250 mG  + Vitamin D (OsCal 500 + D) 1 Tablet(s) Oral daily  carbidopa/levodopa  25/100 1 Tablet(s) Oral <User Schedule>  cefpodoxime 200 milliGRAM(s) Oral every 12 hours  epoetin edwin-epbx (RETACRIT) Injectable 76848 Unit(s) SubCutaneous <User Schedule>  ferrous    sulfate 325 milliGRAM(s) Oral daily  heparin   Injectable 5000 Unit(s) SubCutaneous every 8 hours  hydrocortisone hemorrhoidal Suppository 1 Suppository(s) Rectal two times a day  lactobacillus acidophilus 1 Tablet(s) Oral three times a day  lidocaine   Patch 1 Patch Transdermal every 24 hours  mesalamine Suppository 1000 milliGRAM(s) Rectal at bedtime  metroNIDAZOLE    Tablet 500 milliGRAM(s) Oral every 8 hours  multivitamin/minerals 1 Tablet(s) Oral daily  pantoprazole    Tablet 40 milliGRAM(s) Oral before breakfast  prednisoLONE acetate 1% Suspension 1 Drop(s) Both EYES daily  QUEtiapine 25 milliGRAM(s) Oral two times a day    MEDICATIONS  (PRN):  acetaminophen   Tablet .. 650 milliGRAM(s) Oral every 6 hours PRN Mild Pain (1 - 3)  acetaminophen   Tablet .. 650 milliGRAM(s) Oral every 6 hours PRN Temp greater or equal to 38C (100.4F)      Allergies    tetracycline (Unknown)    Intolerances        Review of Systems:  unable to obtain in entirety       Vital Signs Last 24 Hrs  T(C): 36.8 (08 Sep 2020 05:00), Max: 36.8 (08 Sep 2020 05:00)  T(F): 98.3 (08 Sep 2020 05:00), Max: 98.3 (08 Sep 2020 05:00)  HR: 78 (08 Sep 2020 05:00) (78 - 89)  BP: 121/70 (08 Sep 2020 05:00) (109/64 - 121/70)  BP(mean): --  RR: 19 (08 Sep 2020 05:00) (18 - 19)  SpO2: 97% (08 Sep 2020 05:00) (96% - 97%)    PHYSICAL EXAM:    GENERAL:  lying  in bed  HEENT:  NC/AT  ABDOMEN:  Soft nt  mild dt  EXTREMITIES  no  edema  NEURO: awake alert but confused    LABS:                RADIOLOGY & ADDITIONAL TESTS:

## 2020-09-08 NOTE — PROGRESS NOTE ADULT - ASSESSMENT
8.26.2020 This is a 75 F with PMH of Parkinson's disease and mild cognitive impairment who comes s/p fall and pubic rami fracture. I spoke to son, Roman Wilson. Patient was d/c'd from [facility] last Thursday, 8.20.2020, after residing there for about 3 months. He reports that patient was doing well when she got home -- she was able to ambulate short distances in the house with a walker. She needs moderate help with her ADLs. He wants to take patient home with home care when she is medically ready. He is going to hire a HHA for assistance with patient's ADLs. He says that he is the HCP. Patient does not have any advanced directives such as DNR/DNI. At this point, he wants everything done. Will follow.    8.27.2020 Patient remains a full code. Patient to go home with home care in AM. Son is planning to hire a HHA.    8.28.2020 Seen by psych. Patient has no medical decision making capacity. D/C planning home with home care and HHA.    8.29.2020 Febrile overnight. For EGD/colon on Monday for GIB.    8.30.2020 Still febrile. Started on iv abx for possible UTI. Cultures pending. EGD/colonscopy on hold.     8.31.2020 Still febrile prossibly from UTI. For CT a/p to r/o abscess. On abx. EGD/colonoscopy on hold.    9.1.2020 CT severe proctitis. Abx changed to iv invanz. EGD/colonoscopy on hold. Full code.    9.2.2020 Improving on iv invanz.    9.3.2020 Changed to vantin and flagyl. Possible EGD/colon.    9.4.2020 For EGD/colon today. D/C planning home in AM with HHA.    9.5.2020 S/P EGD/colon. No acute pathology. D/C home with HHA on 9.8.2020.    9.6.2020 D/C planning on 9.8.2020.    9.7.2020 Clinically stable. D/C planning in AM.    9.8.2020 D/C planning home today with HHA.

## 2020-09-08 NOTE — PROGRESS NOTE ADULT - ASSESSMENT
contact #  son----Roman Wilson  phone # [phone], called and discussed     IMPRESSION:  75/ F with PMH of Parkinson's disease and mild cognitive impairment, ?dementia per son, ch anemia per son and was on iron outpatient in past, htn in 2020 who comes s/p fall around 8/25/20 and noted pubic rami fracture,  seen by orthopedics and is on medical management, guaiac positive stool, seen by gi and for possible egd/colon for monday, per pt son may had colonoscopy over 18 years ago, hb was at around 11--11.5, per son pt with always anemia and iron deficiency, pt had ct renal stone hunt in 5/2020--hepatic cyst, son declines repeat ct a/p at this time until endoscopy exam. As I spoke to son, Roman Wilson on phone patient with h/o admission at [place] in around 5/2020 after fall at home and was told rib fracture, had hb around 11-11.5. Patient was d/c'd from [facility] Thursday, 8.20.2020, after residing there for about 3 months. He reports that patient was doing well when she got home -- she was able to ambulate short distances in the house with a walker.   Anemia--likely multifactorial, h/o ch anemia/iron def as per son, iron profile this admission--low iron/transferrin saturation, seen by gi and gi work up per gi/family, s/p egd/colon  RECOMMENDATION:  Anemia--likely multifactorial, guaiac pos stool, h/o ch anemia/iron def/multifactorial  iron profile --low iron/transferrin saturation/low nl ferritin  patient is s/p iv iron 100 mg a day for 3 days as looking for a rapid response, 8/30/2020---9/1/2020, 9/4  seen by gi   as per gi----------------"ct reviewed- showing severe proctitis  sp egd/colon- findings of gastritis and rectosigmoid ulceration likely 2/2 constipation, bx'd to r/o malignancy  f/u pathology"---------------------------------- final decision gi work up as per pt/family/gi to exclude gi malignancy  guaiac pos stool/liver cyst on earlier ct--son declines ct a/p, radio scan--final decision/work up per gi  no evidence of hemolysis  hb is at 9.5 on 9/8, hb was at 9.3 on 9/6  patient is on retacrit 10,000 units s/c tiw--t/t/s and d/c retacrit if hb is over 10  monitor h/h--transfuse prbc as needed for /ymptomatic anemia  smear again from 9/1 reviewed--unremarkable except left shift wbc  s/p fever/bandemia/uti--seen by id , work and management per id  gi/dvt prophylaxis--on heparin s/c  d/w pt at bedside, is in agreement

## 2020-09-16 ENCOUNTER — INPATIENT (INPATIENT)
Facility: HOSPITAL | Age: 75
LOS: 2 days | Discharge: HOME | DRG: 948 | End: 2020-09-19
Attending: HOSPITALIST | Admitting: STUDENT IN AN ORGANIZED HEALTH CARE EDUCATION/TRAINING PROGRAM
Payer: MEDICARE

## 2020-09-16 VITALS
TEMPERATURE: 98 F | WEIGHT: 149.91 LBS | HEIGHT: 66 IN | DIASTOLIC BLOOD PRESSURE: 61 MMHG | SYSTOLIC BLOOD PRESSURE: 119 MMHG | HEART RATE: 78 BPM | OXYGEN SATURATION: 99 % | RESPIRATION RATE: 16 BRPM

## 2020-09-16 DIAGNOSIS — G20 PARKINSON'S DISEASE: ICD-10-CM

## 2020-09-16 DIAGNOSIS — R09.89 OTHER SPECIFIED SYMPTOMS AND SIGNS INVOLVING THE CIRCULATORY AND RESPIRATORY SYSTEMS: ICD-10-CM

## 2020-09-16 DIAGNOSIS — Z96.642 PRESENCE OF LEFT ARTIFICIAL HIP JOINT: Chronic | ICD-10-CM

## 2020-09-16 DIAGNOSIS — M79.89 OTHER SPECIFIED SOFT TISSUE DISORDERS: ICD-10-CM

## 2020-09-16 DIAGNOSIS — I10 ESSENTIAL (PRIMARY) HYPERTENSION: ICD-10-CM

## 2020-09-16 DIAGNOSIS — Z29.9 ENCOUNTER FOR PROPHYLACTIC MEASURES, UNSPECIFIED: ICD-10-CM

## 2020-09-16 DIAGNOSIS — D50.9 IRON DEFICIENCY ANEMIA, UNSPECIFIED: ICD-10-CM

## 2020-09-16 DIAGNOSIS — F03.91 UNSPECIFIED DEMENTIA WITH BEHAVIORAL DISTURBANCE: ICD-10-CM

## 2020-09-16 DIAGNOSIS — M79.606 PAIN IN LEG, UNSPECIFIED: ICD-10-CM

## 2020-09-16 LAB
ALBUMIN SERPL ELPH-MCNC: 3.7 G/DL — SIGNIFICANT CHANGE UP (ref 3.3–5)
ALP SERPL-CCNC: 167 U/L — HIGH (ref 40–120)
ALT FLD-CCNC: 28 U/L — SIGNIFICANT CHANGE UP (ref 10–45)
ANION GAP SERPL CALC-SCNC: 10 MMOL/L — SIGNIFICANT CHANGE UP (ref 5–17)
APPEARANCE UR: CLEAR — SIGNIFICANT CHANGE UP
APTT BLD: 24.9 SEC — LOW (ref 27.5–35.5)
AST SERPL-CCNC: 48 U/L — HIGH (ref 10–40)
BASE EXCESS BLDV CALC-SCNC: 3.3 MMOL/L — HIGH (ref -2–2)
BASOPHILS # BLD AUTO: 0.04 K/UL — SIGNIFICANT CHANGE UP (ref 0–0.2)
BASOPHILS NFR BLD AUTO: 0.6 % — SIGNIFICANT CHANGE UP (ref 0–2)
BILIRUB SERPL-MCNC: 0.2 MG/DL — SIGNIFICANT CHANGE UP (ref 0.2–1.2)
BILIRUB UR-MCNC: NEGATIVE — SIGNIFICANT CHANGE UP
BUN SERPL-MCNC: 14 MG/DL — SIGNIFICANT CHANGE UP (ref 7–23)
CA-I SERPL-SCNC: 1.2 MMOL/L — SIGNIFICANT CHANGE UP (ref 1.12–1.3)
CALCIUM SERPL-MCNC: 9 MG/DL — SIGNIFICANT CHANGE UP (ref 8.4–10.5)
CHLORIDE BLDV-SCNC: 102 MMOL/L — SIGNIFICANT CHANGE UP (ref 96–108)
CHLORIDE SERPL-SCNC: 101 MMOL/L — SIGNIFICANT CHANGE UP (ref 96–108)
CO2 BLDV-SCNC: 31 MMOL/L — HIGH (ref 22–30)
CO2 SERPL-SCNC: 26 MMOL/L — SIGNIFICANT CHANGE UP (ref 22–31)
COLOR SPEC: YELLOW — SIGNIFICANT CHANGE UP
CREAT SERPL-MCNC: 0.48 MG/DL — LOW (ref 0.5–1.3)
DIFF PNL FLD: NEGATIVE — SIGNIFICANT CHANGE UP
EOSINOPHIL # BLD AUTO: 0.14 K/UL — SIGNIFICANT CHANGE UP (ref 0–0.5)
EOSINOPHIL NFR BLD AUTO: 2.2 % — SIGNIFICANT CHANGE UP (ref 0–6)
GAS PNL BLDV: 139 MMOL/L — SIGNIFICANT CHANGE UP (ref 135–145)
GAS PNL BLDV: SIGNIFICANT CHANGE UP
GAS PNL BLDV: SIGNIFICANT CHANGE UP
GLUCOSE BLDV-MCNC: 110 MG/DL — HIGH (ref 70–99)
GLUCOSE SERPL-MCNC: 116 MG/DL — HIGH (ref 70–99)
GLUCOSE UR QL: NEGATIVE — SIGNIFICANT CHANGE UP
HCO3 BLDV-SCNC: 29 MMOL/L — SIGNIFICANT CHANGE UP (ref 21–29)
HCT VFR BLD CALC: 35.1 % — SIGNIFICANT CHANGE UP (ref 34.5–45)
HCT VFR BLDA CALC: 33 % — LOW (ref 39–50)
HGB BLD CALC-MCNC: 10.8 G/DL — LOW (ref 11.5–15.5)
HGB BLD-MCNC: 10.7 G/DL — LOW (ref 11.5–15.5)
HOROWITZ INDEX BLDV+IHG-RTO: SIGNIFICANT CHANGE UP
IMM GRANULOCYTES NFR BLD AUTO: 0.2 % — SIGNIFICANT CHANGE UP (ref 0–1.5)
INR BLD: 1.06 RATIO — SIGNIFICANT CHANGE UP (ref 0.88–1.16)
KETONES UR-MCNC: NEGATIVE — SIGNIFICANT CHANGE UP
LACTATE BLDV-MCNC: 1.3 MMOL/L — SIGNIFICANT CHANGE UP (ref 0.7–2)
LEUKOCYTE ESTERASE UR-ACNC: NEGATIVE — SIGNIFICANT CHANGE UP
LYMPHOCYTES # BLD AUTO: 1.67 K/UL — SIGNIFICANT CHANGE UP (ref 1–3.3)
LYMPHOCYTES # BLD AUTO: 26.8 % — SIGNIFICANT CHANGE UP (ref 13–44)
MCHC RBC-ENTMCNC: 27.2 PG — SIGNIFICANT CHANGE UP (ref 27–34)
MCHC RBC-ENTMCNC: 30.5 GM/DL — LOW (ref 32–36)
MCV RBC AUTO: 89.3 FL — SIGNIFICANT CHANGE UP (ref 80–100)
MONOCYTES # BLD AUTO: 0.51 K/UL — SIGNIFICANT CHANGE UP (ref 0–0.9)
MONOCYTES NFR BLD AUTO: 8.2 % — SIGNIFICANT CHANGE UP (ref 2–14)
NEUTROPHILS # BLD AUTO: 3.87 K/UL — SIGNIFICANT CHANGE UP (ref 1.8–7.4)
NEUTROPHILS NFR BLD AUTO: 62 % — SIGNIFICANT CHANGE UP (ref 43–77)
NITRITE UR-MCNC: NEGATIVE — SIGNIFICANT CHANGE UP
NRBC # BLD: 0 /100 WBCS — SIGNIFICANT CHANGE UP (ref 0–0)
PCO2 BLDV: 54 MMHG — HIGH (ref 35–50)
PH BLDV: 7.35 — SIGNIFICANT CHANGE UP (ref 7.35–7.45)
PH UR: 6.5 — SIGNIFICANT CHANGE UP (ref 5–8)
PLATELET # BLD AUTO: 480 K/UL — HIGH (ref 150–400)
PO2 BLDV: 21 MMHG — LOW (ref 25–45)
POTASSIUM BLDV-SCNC: 4.5 MMOL/L — SIGNIFICANT CHANGE UP (ref 3.5–5.3)
POTASSIUM SERPL-MCNC: 5.2 MMOL/L — SIGNIFICANT CHANGE UP (ref 3.5–5.3)
POTASSIUM SERPL-SCNC: 5.2 MMOL/L — SIGNIFICANT CHANGE UP (ref 3.5–5.3)
PROT SERPL-MCNC: 7 G/DL — SIGNIFICANT CHANGE UP (ref 6–8.3)
PROT UR-MCNC: NEGATIVE — SIGNIFICANT CHANGE UP
PROTHROM AB SERPL-ACNC: 12.6 SEC — SIGNIFICANT CHANGE UP (ref 10.6–13.6)
RBC # BLD: 3.93 M/UL — SIGNIFICANT CHANGE UP (ref 3.8–5.2)
RBC # FLD: 15.8 % — HIGH (ref 10.3–14.5)
SAO2 % BLDV: 25 % — LOW (ref 67–88)
SODIUM SERPL-SCNC: 137 MMOL/L — SIGNIFICANT CHANGE UP (ref 135–145)
SP GR SPEC: 1.01 — SIGNIFICANT CHANGE UP (ref 1.01–1.02)
UROBILINOGEN FLD QL: NEGATIVE — SIGNIFICANT CHANGE UP
WBC # BLD: 6.24 K/UL — SIGNIFICANT CHANGE UP (ref 3.8–10.5)
WBC # FLD AUTO: 6.24 K/UL — SIGNIFICANT CHANGE UP (ref 3.8–10.5)

## 2020-09-16 PROCEDURE — 73562 X-RAY EXAM OF KNEE 3: CPT | Mod: 26,RT

## 2020-09-16 PROCEDURE — 72170 X-RAY EXAM OF PELVIS: CPT | Mod: 26

## 2020-09-16 PROCEDURE — 73552 X-RAY EXAM OF FEMUR 2/>: CPT | Mod: 26,RT

## 2020-09-16 PROCEDURE — 99285 EMERGENCY DEPT VISIT HI MDM: CPT | Mod: CS

## 2020-09-16 PROCEDURE — 99223 1ST HOSP IP/OBS HIGH 75: CPT

## 2020-09-16 PROCEDURE — 93971 EXTREMITY STUDY: CPT | Mod: 26,RT

## 2020-09-16 RX ORDER — PREDNISOLONE SODIUM PHOSPHATE 1 %
1 DROPS OPHTHALMIC (EYE) DAILY
Refills: 0 | Status: DISCONTINUED | OUTPATIENT
Start: 2020-09-16 | End: 2020-09-19

## 2020-09-16 RX ORDER — CARBIDOPA AND LEVODOPA 25; 100 MG/1; MG/1
1 TABLET ORAL THREE TIMES A DAY
Refills: 0 | Status: DISCONTINUED | OUTPATIENT
Start: 2020-09-16 | End: 2020-09-19

## 2020-09-16 RX ORDER — FERROUS SULFATE 325(65) MG
325 TABLET ORAL DAILY
Refills: 0 | Status: DISCONTINUED | OUTPATIENT
Start: 2020-09-16 | End: 2020-09-19

## 2020-09-16 RX ORDER — ACETAMINOPHEN 500 MG
650 TABLET ORAL EVERY 6 HOURS
Refills: 0 | Status: DISCONTINUED | OUTPATIENT
Start: 2020-09-16 | End: 2020-09-19

## 2020-09-16 RX ORDER — QUETIAPINE FUMARATE 200 MG/1
25 TABLET, FILM COATED ORAL
Refills: 0 | Status: DISCONTINUED | OUTPATIENT
Start: 2020-09-16 | End: 2020-09-19

## 2020-09-16 RX ORDER — AMLODIPINE BESYLATE 2.5 MG/1
10 TABLET ORAL DAILY
Refills: 0 | Status: DISCONTINUED | OUTPATIENT
Start: 2020-09-16 | End: 2020-09-19

## 2020-09-16 RX ORDER — ACETAMINOPHEN 500 MG
650 TABLET ORAL ONCE
Refills: 0 | Status: COMPLETED | OUTPATIENT
Start: 2020-09-16 | End: 2020-09-16

## 2020-09-16 RX ORDER — POLYETHYLENE GLYCOL 3350 17 G/17G
17 POWDER, FOR SOLUTION ORAL DAILY
Refills: 0 | Status: DISCONTINUED | OUTPATIENT
Start: 2020-09-16 | End: 2020-09-19

## 2020-09-16 RX ORDER — PANTOPRAZOLE SODIUM 20 MG/1
40 TABLET, DELAYED RELEASE ORAL
Refills: 0 | Status: DISCONTINUED | OUTPATIENT
Start: 2020-09-16 | End: 2020-09-19

## 2020-09-16 RX ADMIN — CARBIDOPA AND LEVODOPA 1 TABLET(S): 25; 100 TABLET ORAL at 22:22

## 2020-09-16 RX ADMIN — Medication 650 MILLIGRAM(S): at 22:22

## 2020-09-16 NOTE — ED PROVIDER NOTE - PROGRESS NOTE DETAILS
Endorsed Dr Kalyn Louise MD, Facep Spoke to Son (Roman) (power of ) multiple times to keep him informed about the US, xray and UA. Lengthy discussion in regards to pt plan of care at home. Son reports he has hired a private Aid and PT to come to house starting Monday and VNS (friend of his) for help with care of the pt. Son also reports that "a friend of mine works in the hospital and has been checking my moms chart to see what you have done for her in the ER" and was concerned why the initial xray ordered were cancelled- spoke to pt and re ordered the xrays. Son is very upset and was screaming over the phone when tried to explain that if he feels that he doesn't have enough/ adequate help then pt can be admitted to the hospital for possible placement or sub-acute rehab. Son screaming that he is paying out of his pocket to take care of his mother and doesn't want her admitted and wants her to come home. He is home now and waiting to receive her. Will do a non emergent ambulance form. -ALVARO Blackmon Roman Ibrahim MD. pt signed out to me pending SW discussion with son. After discussion with SW, pt to be admitted for long term placement. Paged hospitalist Roman Ibrahim MD. abeba w/ hospitalist (for unattached), acceptred for admission

## 2020-09-16 NOTE — ED PROVIDER NOTE - PHYSICAL EXAMINATION
Gen: AAO x 3, NAD;   Skin: No rashes or lesions  HEENT: NC/AT, PERRLA, EOMI, MMM  Resp: unlabored CTAB  Cardiac: rrr s1s2, no murmurs, rubs or gallops  GI: ND, +BS, Soft, NT  Ext: no pedal edema, L hip with limited ROM due to pain from pelvic fx.   Neuro: no focal deficits

## 2020-09-16 NOTE — H&P ADULT - HISTORY OF PRESENT ILLNESS
75F w/ hx of Parkinson's dementia, HTN, glaucoma p/w RLE edema and difficulty with home care. 75F w/ hx of Parkinson's dementia, HTN, glaucoma p/w RLE edema and difficulty with home care. Pt has had multiple mechanical falls related to Parkinson's this year. Had May admission to [place] after mechanical fall while using bathroom which resulted in multiple rib fractures. Pt then went for a prolonged stay at rehab. Later on after going home fell again in August while trying to grab coffee cake resulting in pubic rami fracture. Pt had prolonged admission in [place] for pubic rami fracture where other things such as anemia was worked up. She was also evaluated by cardiology and neurology as well. She was found to have rectosigmoid ulcer thought to be from constipation and gastritis. Had some RLE weakness which neuro felt should not be worked up further. After going home son had difficulty arranging appropriate care but has had Home PT visiting. Recently got new HHA who son feels is good. Friend who is nurse visited today and felt she noticed RLE edema. Sent pt to hospital to eval for VTE. Son wasn't sure if HHA would be available tomorrow, also wanted to see if placement would be possible given difficulty caring for patient at home. Pt currently complaining of not being fed in ER. Very much wants to go home and does not want to discuss anything else despite prompting. No specific complaints regarding pain.

## 2020-09-16 NOTE — ED ADULT NURSE REASSESSMENT NOTE - NS ED NURSE REASSESS COMMENT FT1
Patient refuses to allow RN to take temperature, collect urine sample or place permi-cath for urine.

## 2020-09-16 NOTE — ED PROVIDER NOTE - OBJECTIVE STATEMENT
75yof pmhx of Parkinson BIB EMS for RLE swelling noticed by VNS. pt reports s/p fall on 8/25 and admitted to [place] for pelvic fx and 'nothing was done". Discharged 09/06 home to Son who is the sole care provided; along with home PT and SW. Today VNS came and noticed swelling. pt reports "always have knee swelling". No pain, No new falls or injuries. no fever or chills. No chest pain or cough.

## 2020-09-16 NOTE — ED PROVIDER NOTE - NSFOLLOWUPINSTRUCTIONS_ED_ALL_ED_FT
Follow up with your PMD within 48-72 hrs. Show copies of your reports given to you. Take all of your medications as previously prescribed.   Return for any concerns.

## 2020-09-16 NOTE — H&P ADULT - PROBLEM SELECTOR PLAN 1
Pt seems to have gradually worsening ability to ambulate. Son having difficulty maintaining care at home. See SW note for greater detail, he has used up 100 medicare days and not able to get insurance overage over further rehab stay. Was initially usure if HHA would be available for tomorrow but has since been able to contact [facility] and she is available. He wants to see options regarding LT care or rehab facilities and is willing to pay out of pocket but wants to discuss any insurance options. Is willing to take mother home tomorrow but would like some time to call facilities to see his options as he is having difficulty providing sufficient care at home.   -Cont. sinemet TID  -SW consult  -PT consult  -Can consider outpt follow up with neurology regarding titrating Parkinson's meds

## 2020-09-16 NOTE — CHART NOTE - NSCHARTNOTEFT_GEN_A_CORE
LMSW received a referral from the medical team for discharge planning. Medical chart was reviewed. Patient is a 75 year old female presented to the ED foe leg pain.  Per chart patient's medical history includes: Parkinson's Disease. LMSW received a call from the patient's son, [place], regarding discharge planning. Son reports he has been the primary caregiver for the patient since being discharged from [facility]. Per son the patient was discharged from rehab on 8/25/2020 for rib fractures where she used all 100 days of her Medicare coverage. Since being discharged she has been to [place] twice and fell at home where she suffered a pelvic fracture. Since that time the patient has been receiving home PT/ OT but he feels it not sufficient. Additionally son informed he has hired additional HHA hours. Son noted he is not working at this time and he supplements the hours of HHA services. LMSW discussed with the son at length different discharge options such as LTC and rehab stay again. LMSW explained at length patient will not have a new 100 days of STR Medicare days and she would be a private pay. Son explained he has the funds to cover the costs. Son inquired about Medicaid insurance. LMSW advised son to contact an elder care  for information; son noted he has spoken with an  whereas he is unwilling to take those steps at this time. Son further explained he is having difficulty contacting his aide and he is unable to care for his mother. LMSW advised he medical team will be informed and his mother will be admitted for placement. Son acknowledged. Son noted he feels he was being taken advantage of by the HHA and he does not feel comfortable with her caring for his mother in the future. Medical team was updated.  LMSW will follow up as needed.

## 2020-09-16 NOTE — ED ADULT NURSE NOTE - NSIMPLEMENTINTERV_GEN_ALL_ED
Implemented All Fall Risk Interventions:  [place] to call system. Call bell, personal items and telephone within reach. Instruct patient to call for assistance. Room bathroom lighting operational. Non-slip footwear when patient is off stretcher. Physically safe environment: no spills, clutter or unnecessary equipment. Stretcher in lowest position, wheels locked, appropriate side rails in place. Provide visual cue, wrist band, yellow gown, etc. Monitor gait and stability. Monitor for mental status changes and reorient to person, place, and time. Review medications for side effects contributing to fall risk. Reinforce activity limits and safety measures with patient and family.

## 2020-09-16 NOTE — H&P ADULT - NSHPPHYSICALEXAM_GEN_ALL_CORE
Vital Signs Last 24 Hrs  T(C): 37.1 (09-16-20 @ 18:41), Max: 37.5 (09-16-20 @ 13:40)  T(F): 98.7 (09-16-20 @ 18:41), Max: 99.5 (09-16-20 @ 13:40)  HR: 95 (09-16-20 @ 18:41) (78 - 95)  BP: 130/66 (09-16-20 @ 18:41) (119/61 - 130/66)  BP(mean): 85 (09-16-20 @ 18:41) (85 - 85)  RR: 18 (09-16-20 @ 18:41) (16 - 18)  SpO2: 99% (09-16-20 @ 18:41) (99% - 100%)

## 2020-09-16 NOTE — ED PROVIDER NOTE - ATTENDING CONTRIBUTION TO CARE
Private Physician Can't recall  75y female pmh parkinson's dz,colitis, Pubic famus fx. pt had fallen 8/25 and taken to [facility] admitted and dc approx 1wk ago.Now comes to ed for eval of swollen rt leg/ro dvt. NO c/p,sob,abd pain,nvdc,fc,ha,recnent fall. PE WDWN female thin looking fatigued, awake alert ncat neck supple chest clear ap  abd soft Neuro gcs 15 speech fluent pain light touch intact. cv no rubs, gallops or murmurs  Krish Louise MD, Facep Private Physician Can't recall  75y female pmh parkinson's dz,colitis, Pubic famus fx. pt had fallen 8/25 and taken to [facility] admitted and dc approx 1wk ago.Now comes to ed for eval of swollen rt leg/ro dvt. NO c/p,sob,abd pain,nvdc,fc,ha,recnent fall. PE WDWN female thin looking fatigued, awake alert ncat neck supple chest clear ap  abd soft Neuro gcs 15 speech fluent pain light touch intact. cv no rubs, gallops or murmurs  Krish Louise MD, Facep    Discussed with Son Roman Pt requires aide and walker to ambulate . Seen by VNS and referred to ed To eval rt knee swelling. Concerns for DVT by vns.

## 2020-09-16 NOTE — H&P ADULT - PROBLEM SELECTOR PLAN 4
Hgb stable compared to prior admission. See GI recs from August admission regarding EGD and colonoscopy.  -Pt does not want more blood draws  -pantoprazole  -Cont. ferrous sulfate

## 2020-09-16 NOTE — ED PROVIDER NOTE - PATIENT PORTAL LINK FT
You can access the FollowMyHealth Patient Portal offered by [facility] by registering at the following website: [email or web address]. By joining [employer]’s FollowMyHealth portal, you will also be able to view your health information using other applications (apps) compatible with our system.

## 2020-09-16 NOTE — ED PROVIDER NOTE - CLINICAL SUMMARY MEDICAL DECISION MAKING FREE TEXT BOX
Adult female w parkinson's dz, dementia, multiple falls sent to ed by son/caretaker for c/o leg pain swelling. Son also reports pt more confused today. No fever ha, head truama, Check venous doppler leg ro dvt.labs ua, cxr, if neg probable admit for placement  Krish Louise MD, Facep

## 2020-09-16 NOTE — ED ADULT NURSE NOTE - OBJECTIVE STATEMENT
75y female arrived to ED BIBEMS 75y female arrived to ED [facility] for RLE swelling. Patient sent in by visiting nurse services. Patient fell 8/25 admitted to [place] for pelvic fx and d/c 9/6. Patient lives at home with son, receives PT and VNS at home. Patient appears pale and clammy. Denies CP, SOB, n/v/d, abd pain, chills, fever, new falls, burning urination. Patient has moments of being A&Ox3 followed by moments of confusion. Patient was not cooperative on arrival. PMHx parkinsons. Patient temperature is elevated but not febrile. VS otherwise stable. Not ambulatory s/p fall.

## 2020-09-16 NOTE — H&P ADULT - COMMENTS
attempted to obtain accurate ROS but unable to given dementia and pt's agitated state. Pt refused to discuss most ROS questions.

## 2020-09-17 LAB
CULTURE RESULTS: SIGNIFICANT CHANGE UP
SARS-COV-2 RNA SPEC QL NAA+PROBE: SIGNIFICANT CHANGE UP
SPECIMEN SOURCE: SIGNIFICANT CHANGE UP

## 2020-09-17 PROCEDURE — 99232 SBSQ HOSP IP/OBS MODERATE 35: CPT

## 2020-09-17 PROCEDURE — 93010 ELECTROCARDIOGRAM REPORT: CPT

## 2020-09-17 RX ADMIN — QUETIAPINE FUMARATE 25 MILLIGRAM(S): 200 TABLET, FILM COATED ORAL at 21:44

## 2020-09-17 RX ADMIN — POLYETHYLENE GLYCOL 3350 17 GRAM(S): 17 POWDER, FOR SOLUTION ORAL at 11:46

## 2020-09-17 RX ADMIN — CARBIDOPA AND LEVODOPA 1 TABLET(S): 25; 100 TABLET ORAL at 21:44

## 2020-09-17 RX ADMIN — PANTOPRAZOLE SODIUM 40 MILLIGRAM(S): 20 TABLET, DELAYED RELEASE ORAL at 06:25

## 2020-09-17 RX ADMIN — CARBIDOPA AND LEVODOPA 1 TABLET(S): 25; 100 TABLET ORAL at 06:26

## 2020-09-17 RX ADMIN — CARBIDOPA AND LEVODOPA 1 TABLET(S): 25; 100 TABLET ORAL at 13:03

## 2020-09-17 RX ADMIN — Medication 325 MILLIGRAM(S): at 11:46

## 2020-09-17 RX ADMIN — QUETIAPINE FUMARATE 25 MILLIGRAM(S): 200 TABLET, FILM COATED ORAL at 08:00

## 2020-09-17 RX ADMIN — Medication 1 DROP(S): at 13:03

## 2020-09-17 RX ADMIN — AMLODIPINE BESYLATE 10 MILLIGRAM(S): 2.5 TABLET ORAL at 06:26

## 2020-09-17 NOTE — CHART NOTE - NSCHARTNOTEFT_GEN_A_CORE
Episodic Note    F/u on EKG to monitor for QTc interval, since patient on Seroquel. Patient with history of Parkinson's dementia, making it difficult to get an accurate EKG. EKG's done shows a lot artifact, making it a very invalid and inaccurate EKG.     ASSESSMENT/PLAN:   HPI: 75F w/ hx of Parkinson's dementia, HTN, glaucoma p/w RLE edema and difficulty with home care. F/u on EKG.     > Re-ordered for a repeat EKG. Signed out to day Medicine Provider to f/u with EKG, regarding the QTc and the reading.  > Continue to monitor the patient and vitals closely.  > F/u with primary care team in AM.     Sagrario Zimmer PA-C  Department of Medicine   #[id number]

## 2020-09-17 NOTE — PHYSICAL THERAPY INITIAL EVALUATION ADULT - TRANSFER TRAINING, PT EVAL
Swish and spit salt-water gargles several times/day for ulceration on cheek. Use prescription anti-fungal mouthwash 4 times/day as directed x10 days. Good oral hygiene. Replace toothbrush after thrush resolves. Follow up in ~4-5 days if not improving.   GOAL: Pt will perform all transfers  min A x1 with use of appropriate AD in 2 weeks.

## 2020-09-17 NOTE — PHYSICAL THERAPY INITIAL EVALUATION ADULT - PERTINENT HX OF CURRENT PROBLEM, REHAB EVAL
Pt is a 76 y/o female admitted to [facility] on 9/16/20 hx of Parkinson's dementia, HTN, glaucoma p/w RLE edema and difficulty with home care. Pt has had multiple mechanical falls related to Parkinson's this year. Had May admission to [place] after mechanical fall while using bathroom which resulted in multiple rib fractures. Pt then went for a prolonged stay at rehab. Later on after going home fell again in August while trying to grab coffee cake resulting in pubic rami fracture.

## 2020-09-17 NOTE — PROGRESS NOTE ADULT - SUBJECTIVE AND OBJECTIVE BOX
Patient is a 75y old  Female who presents with a chief complaint of RLE edema and difficulty getting care at home. (16 Sep 2020 21:19)      SUBJECTIVE / OVERNIGHT EVENTS: no acute     MEDICATIONS  (STANDING):  amLODIPine   Tablet 10 milliGRAM(s) Oral daily  carbidopa/levodopa  25/100 1 Tablet(s) Oral three times a day  ferrous    sulfate 325 milliGRAM(s) Oral daily  pantoprazole    Tablet 40 milliGRAM(s) Oral before breakfast  polyethylene glycol 3350 17 Gram(s) Oral daily  prednisoLONE acetate 1% Suspension 1 Drop(s) Left EYE daily  QUEtiapine 25 milliGRAM(s) Oral two times a day    MEDICATIONS  (PRN):  acetaminophen   Tablet .. 650 milliGRAM(s) Oral every 6 hours PRN Mild Pain (1 - 3)      Vital Signs Last 24 Hrs  T(C): 37.2 (17 Sep 2020 04:30), Max: 37.5 (16 Sep 2020 13:40)  T(F): 98.9 (17 Sep 2020 04:30), Max: 99.5 (16 Sep 2020 13:40)  HR: 90 (17 Sep 2020 04:30) (74 - 96)  BP: 142/76 (17 Sep 2020 04:30) (106/66 - 142/76)  BP(mean): 85 (16 Sep 2020 18:41) (85 - 85)  RR: 18 (17 Sep 2020 04:30) (16 - 18)  SpO2: 98% (17 Sep 2020 04:30) (97% - 100%)  CAPILLARY BLOOD GLUCOSE        I&O's Summary    16 Sep 2020 07:  -  17 Sep 2020 07:00  --------------------------------------------------------  IN: 120 mL / OUT: 0 mL / NET: 120 mL    17 Sep 2020 07:  -  17 Sep 2020 11:37  --------------------------------------------------------  IN: 240 mL / OUT: 0 mL / NET: 240 mL        PHYSICAL EXAM:  GENERAL: NAD, well-developed  HEAD:  Atraumatic, Normocephalic  EYES: EOMI, PERRLA, conjunctiva and sclera clear  NECK: Supple, No JVD  CHEST/LUNG: Clear to auscultation bilaterally; No wheeze  HEART: Regular rate and rhythm; No murmurs, rubs, or gallops  ABDOMEN: Soft, Nontender, Nondistended; Bowel sounds present  EXTREMITIES:  2+ Peripheral Pulses, No clubbing, cyanosis, or edema  PSYCH: AAOx3  NEUROLOGY: non-focal  SKIN: No rashes or lesions    LABS:                        10.7   6.24  )-----------( 480      ( 16 Sep 2020 14:37 )             35.1         137  |  101  |  14  ----------------------------<  116<H>  5.2   |  26  |  0.48<L>    Ca    9.0      16 Sep 2020 14:37    TPro  7.0  /  Alb  3.7  /  TBili  0.2  /  DBili  x   /  AST  48<H>  /  ALT  28  /  AlkPhos  167<H>      PT/INR - ( 16 Sep 2020 14:37 )   PT: 12.6 sec;   INR: 1.06 ratio         PTT - ( 16 Sep 2020 14:37 )  PTT:24.9 sec      Urinalysis Basic - ( 16 Sep 2020 16:51 )    Color: Yellow / Appearance: Clear / S.013 / pH: x  Gluc: x / Ketone: Negative  / Bili: Negative / Urobili: Negative   Blood: x / Protein: Negative / Nitrite: Negative   Leuk Esterase: Negative / RBC: x / WBC x   Sq Epi: x / Non Sq Epi: x / Bacteria: x        RADIOLOGY & ADDITIONAL TESTS:    Imaging Personally Reviewed:    Consultant(s) Notes Reviewed:      Care Discussed with Consultants/Other Providers:   Patient is a 75y old  Female who presents with a chief complaint of RLE edema and difficulty getting care at home. (16 Sep 2020 21:19)      SUBJECTIVE / OVERNIGHT EVENTS: no acute events overnight, pt is confused at times     MEDICATIONS  (STANDING):  amLODIPine   Tablet 10 milliGRAM(s) Oral daily  carbidopa/levodopa  25/100 1 Tablet(s) Oral three times a day  ferrous    sulfate 325 milliGRAM(s) Oral daily  pantoprazole    Tablet 40 milliGRAM(s) Oral before breakfast  polyethylene glycol 3350 17 Gram(s) Oral daily  prednisoLONE acetate 1% Suspension 1 Drop(s) Left EYE daily  QUEtiapine 25 milliGRAM(s) Oral two times a day    MEDICATIONS  (PRN):  acetaminophen   Tablet .. 650 milliGRAM(s) Oral every 6 hours PRN Mild Pain (1 - 3)      Vital Signs Last 24 Hrs  T(C): 37.2 (17 Sep 2020 04:30), Max: 37.5 (16 Sep 2020 13:40)  T(F): 98.9 (17 Sep 2020 04:30), Max: 99.5 (16 Sep 2020 13:40)  HR: 90 (17 Sep 2020 04:30) (74 - 96)  BP: 142/76 (17 Sep 2020 04:30) (106/66 - 142/76)  BP(mean): 85 (16 Sep 2020 18:41) (85 - 85)  RR: 18 (17 Sep 2020 04:30) (16 - 18)  SpO2: 98% (17 Sep 2020 04:30) (97% - 100%)  CAPILLARY BLOOD GLUCOSE        I&O's Summary    16 Sep 2020 07:  -  17 Sep 2020 07:00  --------------------------------------------------------  IN: 120 mL / OUT: 0 mL / NET: 120 mL    17 Sep 2020 07:  -  17 Sep 2020 11:37  --------------------------------------------------------  IN: 240 mL / OUT: 0 mL / NET: 240 mL    PHYSICAL EXAM:  GENERAL: NAD  EYES:  conjunctiva and sclera clear  CHEST/LUNG: Clear to auscultation bilaterally; No wheeze  HEART: +S1/S2, reg   ABDOMEN: Soft, Nontender, Nondistended  EXTREMITIES: RLE edema   PSYCH: AAOx2      LABS:                        10.7   6.24  )-----------( 480      ( 16 Sep 2020 14:37 )             35.1         137  |  101  |  14  ----------------------------<  116<H>  5.2   |  26  |  0.48<L>    Ca    9.0      16 Sep 2020 14:37    TPro  7.0  /  Alb  3.7  /  TBili  0.2  /  DBili  x   /  AST  48<H>  /  ALT  28  /  AlkPhos  167<H>      PT/INR - ( 16 Sep 2020 14:37 )   PT: 12.6 sec;   INR: 1.06 ratio         PTT - ( 16 Sep 2020 14:37 )  PTT:24.9 sec      Urinalysis Basic - ( 16 Sep 2020 16:51 )    Color: Yellow / Appearance: Clear / S.013 / pH: x  Gluc: x / Ketone: Negative  / Bili: Negative / Urobili: Negative   Blood: x / Protein: Negative / Nitrite: Negative   Leuk Esterase: Negative / RBC: x / WBC x   Sq Epi: x / Non Sq Epi: x / Bacteria: x

## 2020-09-17 NOTE — PHYSICAL THERAPY INITIAL EVALUATION ADULT - ADDITIONAL COMMENTS
Pt lives in a private house with son and was dependent for all ADLs. Pt has HHA and son assist with the remaining hours. Pt amb with RW.

## 2020-09-17 NOTE — PHYSICAL THERAPY INITIAL EVALUATION ADULT - PRECAUTIONS/LIMITATIONS, REHAB EVAL
Pt had prolonged admission in [place] for pubic rami fracture where other things such as anemia was worked up. She was also evaluated by cardiology and neurology as well. She was found to have rectosigmoid ulcer thought to be from constipation and gastritis. Had some RLE weakness which neuro felt should not be worked up further. After going home son had difficulty arranging appropriate care but has had Home PT visiting. Recently got new HHA who son feels is good. Friend who is nurse visited today and felt she noticed RLE edema. Sent pt to hospital to eval for VTE. Son wasn't sure if HHA would be available tomorrow, also wanted to see if placement would be possible given difficulty caring for patient at home.

## 2020-09-17 NOTE — PHYSICAL THERAPY INITIAL EVALUATION ADULT - GAIT DEVIATIONS NOTED, PT EVAL
decreased step length/decreased eriberto/decreased velocity of limb motion/decreased weight-shifting ability

## 2020-09-18 PROCEDURE — 99232 SBSQ HOSP IP/OBS MODERATE 35: CPT

## 2020-09-18 RX ORDER — POLYETHYLENE GLYCOL 3350 17 G/17G
17 POWDER, FOR SOLUTION ORAL
Qty: 0 | Refills: 0 | DISCHARGE
Start: 2020-09-18

## 2020-09-18 RX ADMIN — Medication 325 MILLIGRAM(S): at 11:39

## 2020-09-18 RX ADMIN — QUETIAPINE FUMARATE 25 MILLIGRAM(S): 200 TABLET, FILM COATED ORAL at 17:09

## 2020-09-18 RX ADMIN — PANTOPRAZOLE SODIUM 40 MILLIGRAM(S): 20 TABLET, DELAYED RELEASE ORAL at 05:33

## 2020-09-18 RX ADMIN — CARBIDOPA AND LEVODOPA 1 TABLET(S): 25; 100 TABLET ORAL at 13:02

## 2020-09-18 RX ADMIN — Medication 1 DROP(S): at 06:04

## 2020-09-18 RX ADMIN — AMLODIPINE BESYLATE 10 MILLIGRAM(S): 2.5 TABLET ORAL at 05:33

## 2020-09-18 RX ADMIN — QUETIAPINE FUMARATE 25 MILLIGRAM(S): 200 TABLET, FILM COATED ORAL at 05:33

## 2020-09-18 RX ADMIN — CARBIDOPA AND LEVODOPA 1 TABLET(S): 25; 100 TABLET ORAL at 05:33

## 2020-09-18 RX ADMIN — CARBIDOPA AND LEVODOPA 1 TABLET(S): 25; 100 TABLET ORAL at 21:57

## 2020-09-18 NOTE — PROGRESS NOTE ADULT - PROBLEM SELECTOR PLAN 1
-Pt seems to have gradually worsening ability to ambulate. Son having difficulty maintaining care at home. See SW note for greater detail, he has used up 100 medicare days and not able to get insurance overage over further rehab stay. Was initially usure if HHA would be available for tomorrow but has since been able to contact A and she is available. He wants to see options regarding LT care or rehab facilities and is willing to pay out of pocket but wants to discuss any insurance options. Is willing to take mother home tomorrow but would like some time to call facilities to see his options as he is having difficulty providing sufficient care at home.   -Cont. sinemet TID  -Can consider outpt follow up with neurology regarding titrating Parkinson's meds
-Pt seems to have gradually worsening ability to ambulate. Son having difficulty maintaining care at home. See SW note for greater detail, he has used up 100 medicare days and not able to get insurance overage over further rehab stay. Was initially usure if HHA would be available for tomorrow but has since been able to contact A and she is available. He wants to see options regarding LT care or rehab facilities and is willing to pay out of pocket but wants to discuss any insurance options. Is willing to take mother home tomorrow but would like some time to call facilities to see his options as he is having difficulty providing sufficient care at home.   -Cont. sinemet TID  -SW consult P  -PT consult P  -Can consider outpt follow up with neurology regarding titrating Parkinson's meds

## 2020-09-18 NOTE — DISCHARGE NOTE PROVIDER - HOSPITAL COURSE
75F w/ hx of Parkinson's dementia, HTN, glaucoma p/w RLE edema and difficulty with home care    Parkinson's disease.  Plan: -Pt seems to have gradually worsening ability to ambulate. Son having difficulty maintaining care at home. See SW note for greater detail, he has used up 100 medicare days and not able to get insurance overage over further rehab stay. Was initially usure if HHA would be available for tomorrow but has since been able to contact A and she is available. He wants to see options regarding LT care or rehab facilities and is willing to pay out of pocket but wants to discuss any insurance options. Is willing to take mother home tomorrow but would like some time to call facilities to see his options as he is having difficulty providing sufficient care at home.   -Cont. sinemet TID  -Can consider outpt follow up with neurology regarding titrating Parkinson's meds.     Leg swelling.  Plan: S/p recent pubic rami fracture. RLE doppler negative for VTE  -Tylenol PRN mild pain  -See above.     Dementia with behavioral disturbance, unspecified dementia type.  Plan: -Cont. seroquel BID  -QTC within range.     Iron deficiency anemia, unspecified iron deficiency anemia type.  Plan: Hgb stable compared to prior admission. See GI recs from August admission regarding EGD and colonoscopy.  -Pt does not want more blood draws  -pantoprazole  -Cont. ferrous sulfate.     Essential hypertension.  Plan: -Trend BP  -cont. amlodipine.     Prophylactic measure. Plan: DVT PPX  -SCDs.     75F w/ hx of Parkinson's dementia, HTN, glaucoma presents on 9/16 RLE edema and difficulty with ambulation - needs home care    Parkinson's disease. Seen by PT  - Family planning for home d/c.   Cont. sinemet TID / outpt. follow up with neurology regarding titrating Parkinson's meds.     Leg swelling.  S/p recent pubic rami fracture. RLE doppler negative for VTE  -Tylenol PRN mild pain    Dementia  -  Cont. seroquel BID (QTC monitored and within range).     Iron deficiency anemia - Hgb stable compared to prior admission. GI recs from August admission regarding EGD and colonoscopy.  -Pt does not want more blood draws / c/w-pantoprazole / c/w ferrous sulfate.     Essential hypertension. Trend BP - c/w amlodipine.        75F w/ hx of Parkinson's dementia, HTN, glaucoma presents on 9/16 RLE edema and difficulty with ambulation - needs home care vs rehab    Parkinson's disease. Seen by PT  - Family planning for home d/c.   Cont. sinemet TID / outpt. follow up with neurology regarding titrating Parkinson's meds.     Leg swelling.  S/p recent pubic rami fracture. RLE doppler negative for VTE  -Tylenol PRN mild pain    Dementia  -  Cont. seroquel BID (QTC monitored and within range).     Iron deficiency anemia - Hgb stable compared to prior admission. GI recs followed from August admission regarding EGD and colonoscopy.  -Pt / family does not want more blood draws / c/w-pantoprazole / c/w ferrous sulfate.     Essential hypertension. Trend BP - c/w amlodipine.     On 9/19 pt. discharged to home

## 2020-09-18 NOTE — DISCHARGE NOTE PROVIDER - CARE PROVIDER_API CALL
Perlman, Craig D  [address], [address]  [place] [address]  Phone: [phone]  Fax: [phone]  Follow Up Time:     Kellie Atkinson  FAMILY MEDICINE  [address] [place] [address]  Phone: [phone]  Fax: [phone]  Follow Up Time:

## 2020-09-18 NOTE — DISCHARGE NOTE PROVIDER - NSDCFUADDINST_GEN_ALL_CORE_FT
Follow-up with your primary care physician within 1 week. Call for appointment.  Please bring all discharge paperwork and list of medications to all follow up appointments  Please call for follow up appoints one day after discharge

## 2020-09-18 NOTE — PROGRESS NOTE ADULT - PROBLEM SELECTOR PLAN 2
S/p recent pubic rami fracture. RLE doppler negative for VTE  -Tylenol PRN mild pain  -See above
S/p recent pubic rami fracture. RLE doppler negative for VTE  -Tylenol PRN mild pain  -See above

## 2020-09-18 NOTE — PROGRESS NOTE ADULT - ASSESSMENT
75F w/ hx of Parkinson's dementia, HTN, glaucoma p/w RLE edema and difficulty with home care
75F w/ hx of Parkinson's dementia, HTN, glaucoma p/w RLE edema and difficulty with home care

## 2020-09-18 NOTE — PROGRESS NOTE ADULT - SUBJECTIVE AND OBJECTIVE BOX
Patient is a 75y old  Female who presents with a chief complaint of RLE edema and difficulty getting care at home. (17 Sep 2020 11:34)      SUBJECTIVE / OVERNIGHT EVENTS: no acute events overnight     MEDICATIONS  (STANDING):  amLODIPine   Tablet 10 milliGRAM(s) Oral daily  carbidopa/levodopa  25/100 1 Tablet(s) Oral three times a day  ferrous    sulfate 325 milliGRAM(s) Oral daily  pantoprazole    Tablet 40 milliGRAM(s) Oral before breakfast  polyethylene glycol 3350 17 Gram(s) Oral daily  prednisoLONE acetate 1% Suspension 1 Drop(s) Left EYE daily  QUEtiapine 25 milliGRAM(s) Oral two times a day    MEDICATIONS  (PRN):  acetaminophen   Tablet .. 650 milliGRAM(s) Oral every 6 hours PRN Mild Pain (1 - 3)      Vital Signs Last 24 Hrs  T(C): 36.8 (18 Sep 2020 05:00), Max: 36.8 (17 Sep 2020 12:52)  T(F): 98.2 (18 Sep 2020 05:00), Max: 98.2 (17 Sep 2020 12:52)  HR: 73 (18 Sep 2020 05:00) (73 - 85)  BP: 120/61 (18 Sep 2020 05:00) (101/56 - 120/61)  BP(mean): --  RR: 18 (18 Sep 2020 05:00) (15 - 18)  SpO2: 96% (18 Sep 2020 05:00) (96% - 98%)  CAPILLARY BLOOD GLUCOSE        I&O's Summary    17 Sep 2020 07:  -  18 Sep 2020 07:00  --------------------------------------------------------  IN: 1020 mL / OUT: 0 mL / NET: 1020 mL    18 Sep 2020 07:  -  18 Sep 2020 11:26  --------------------------------------------------------  IN: 240 mL / OUT: 0 mL / NET: 240 mL        PHYSICAL EXAM:  GENERAL: NAD  EYES: conjunctiva and sclera clear  NECK: Supple, No JVD  CHEST/LUNG: Clear to auscultation bilaterally; No wheeze  HEART: +s1/S2, reg   ABDOMEN: Soft, Nontender, Nondistended  EXTREMITIES: RLE edema   PSYCH: AAOx2    LABS:                        10.7   6.24  )-----------( 480      ( 16 Sep 2020 14:37 )             35.1         137  |  101  |  14  ----------------------------<  116<H>  5.2   |  26  |  0.48<L>    Ca    9.0      16 Sep 2020 14:37    TPro  7.0  /  Alb  3.7  /  TBili  0.2  /  DBili  x   /  AST  48<H>  /  ALT  28  /  AlkPhos  167<H>      PT/INR - ( 16 Sep 2020 14:37 )   PT: 12.6 sec;   INR: 1.06 ratio         PTT - ( 16 Sep 2020 14:37 )  PTT:24.9 sec      Urinalysis Basic - ( 16 Sep 2020 16:51 )    Color: Yellow / Appearance: Clear / S.013 / pH: x  Gluc: x / Ketone: Negative  / Bili: Negative / Urobili: Negative   Blood: x / Protein: Negative / Nitrite: Negative   Leuk Esterase: Negative / RBC: x / WBC x   Sq Epi: x / Non Sq Epi: x / Bacteria: x

## 2020-09-18 NOTE — DISCHARGE NOTE PROVIDER - NSDCCPCAREPLAN_GEN_ALL_CORE_FT
PRINCIPAL DISCHARGE DIAGNOSIS  Diagnosis: Parkinson's disease  Assessment and Plan of Treatment: Parkinson's disease      SECONDARY DISCHARGE DIAGNOSES  Diagnosis: Leg swelling  Assessment and Plan of Treatment: Leg swelling    Diagnosis: Dementia with behavioral disturbance, unspecified dementia type  Assessment and Plan of Treatment: Dementia with behavioral disturbance, unspecified dementia type    Diagnosis: Iron deficiency anemia, unspecified iron deficiency anemia type  Assessment and Plan of Treatment: Iron deficiency anemia, unspecified iron deficiency anemia type     PRINCIPAL DISCHARGE DIAGNOSIS  Diagnosis: Parkinson's disease  Assessment and Plan of Treatment: Parkinson's disease  c/w home meds   f/u as an out-pt      SECONDARY DISCHARGE DIAGNOSES  Diagnosis: Iron deficiency anemia, unspecified iron deficiency anemia type  Assessment and Plan of Treatment: Iron deficiency anemia, unspecified iron deficiency anemia type    Diagnosis: Dementia with behavioral disturbance, unspecified dementia type  Assessment and Plan of Treatment: Dementia with behavioral disturbance, unspecified dementia type  c/w seroquel    Diagnosis: Leg swelling  Assessment and Plan of Treatment: Leg swelling / Doppler neg.

## 2020-09-18 NOTE — DISCHARGE NOTE PROVIDER - NSDCMRMEDTOKEN_GEN_ALL_CORE_FT
acetaminophen 325 mg oral tablet: 2 tab(s) orally every 6 hours, As needed, Mild Pain (1 - 3)  amLODIPine 10 mg oral tablet: 1 tab(s) orally once a day  B-Complex 50 oral tablet: 1 tab(s) orally once a day  Caltrate 600 mg oral tablet: 1 tab(s) orally once a day  carbidopa-levodopa 25 mg-100 mg oral tablet: 1 tab(s) orally 3 times a day  Combigan 0.2%-0.5% ophthalmic solution: 1 drop(s) to each affected eye every 12 hours  ferrous sulfate 325 mg (65 mg elemental iron) oral delayed release tablet: 1 tab(s) orally once a day  hydrocortisone 25 mg rectal suppository: 1 suppository(ies) rectal 2 times a day  mesalamine 1000 mg rectal suppository: 1 suppository(ies) rectal once a day (at bedtime)  Ocuvite oral tablet: 1 tab(s) orally once a day  pantoprazole 40 mg oral delayed release tablet: 1 tab(s) orally once a day (before a meal)  polyethylene glycol 3350 oral powder for reconstitution: 17 gram(s) orally once a day  prednisoLONE acetate 1% ophthalmic suspension: 1 drop(s) to each affected eye once a day  QUEtiapine 25 mg oral tablet: 1 tab(s) orally 2 times a day  Vitamin C 500 mg oral tablet: 1 tab(s) orally once a day   acetaminophen 325 mg oral tablet: 2 tab(s) orally every 6 hours, As needed, Mild Pain (1 - 3)  amLODIPine 10 mg oral tablet: 1 tab(s) orally once a day  B-Complex 50 oral tablet: 1 tab(s) orally once a day  Caltrate 600 mg oral tablet: 1 tab(s) orally once a day  carbidopa-levodopa 25 mg-100 mg oral tablet: 1 tab(s) orally 3 times a day  Combigan 0.2%-0.5% ophthalmic solution: 1 drop(s) to each affected eye every 12 hours  ferrous sulfate 325 mg (65 mg elemental iron) oral delayed release tablet: 1 tab(s) orally once a day  Ocuvite oral tablet: 1 tab(s) orally once a day  pantoprazole 40 mg oral delayed release tablet: 1 tab(s) orally once a day (before a meal)  polyethylene glycol 3350 oral powder for reconstitution: 17 gram(s) orally once a day  prednisoLONE acetate 1% ophthalmic suspension: 1 drop(s) to each affected eye once a day  QUEtiapine 25 mg oral tablet: 1 tab(s) orally 2 times a day  Vitamin C 500 mg oral tablet: 1 tab(s) orally once a day

## 2020-09-18 NOTE — PROGRESS NOTE ADULT - PROBLEM SELECTOR PLAN 4
Hgb stable compared to prior admission. See GI recs from August admission regarding EGD and colonoscopy.  -Pt does not want more blood draws  -pantoprazole  -Cont. ferrous sulfate
Hgb stable compared to prior admission. See GI recs from August admission regarding EGD and colonoscopy.  -Pt does not want more blood draws  -pantoprazole  -Cont. ferrous sulfate

## 2020-09-19 VITALS
OXYGEN SATURATION: 98 % | DIASTOLIC BLOOD PRESSURE: 75 MMHG | HEART RATE: 90 BPM | SYSTOLIC BLOOD PRESSURE: 154 MMHG | TEMPERATURE: 98 F | RESPIRATION RATE: 18 BRPM

## 2020-09-19 PROCEDURE — 93971 EXTREMITY STUDY: CPT

## 2020-09-19 PROCEDURE — 97161 PT EVAL LOW COMPLEX 20 MIN: CPT

## 2020-09-19 PROCEDURE — 73562 X-RAY EXAM OF KNEE 3: CPT

## 2020-09-19 PROCEDURE — 87086 URINE CULTURE/COLONY COUNT: CPT

## 2020-09-19 PROCEDURE — 82947 ASSAY GLUCOSE BLOOD QUANT: CPT

## 2020-09-19 PROCEDURE — 85014 HEMATOCRIT: CPT

## 2020-09-19 PROCEDURE — 82803 BLOOD GASES ANY COMBINATION: CPT

## 2020-09-19 PROCEDURE — 84132 ASSAY OF SERUM POTASSIUM: CPT

## 2020-09-19 PROCEDURE — 72170 X-RAY EXAM OF PELVIS: CPT

## 2020-09-19 PROCEDURE — 80053 COMPREHEN METABOLIC PANEL: CPT

## 2020-09-19 PROCEDURE — U0003: CPT

## 2020-09-19 PROCEDURE — 99285 EMERGENCY DEPT VISIT HI MDM: CPT | Mod: 25

## 2020-09-19 PROCEDURE — 85730 THROMBOPLASTIN TIME PARTIAL: CPT

## 2020-09-19 PROCEDURE — 84295 ASSAY OF SERUM SODIUM: CPT

## 2020-09-19 PROCEDURE — 85610 PROTHROMBIN TIME: CPT

## 2020-09-19 PROCEDURE — 81003 URINALYSIS AUTO W/O SCOPE: CPT

## 2020-09-19 PROCEDURE — 82330 ASSAY OF CALCIUM: CPT

## 2020-09-19 PROCEDURE — 99232 SBSQ HOSP IP/OBS MODERATE 35: CPT

## 2020-09-19 PROCEDURE — 85018 HEMOGLOBIN: CPT

## 2020-09-19 PROCEDURE — 85025 COMPLETE CBC W/AUTO DIFF WBC: CPT

## 2020-09-19 PROCEDURE — 83605 ASSAY OF LACTIC ACID: CPT

## 2020-09-19 PROCEDURE — 73552 X-RAY EXAM OF FEMUR 2/>: CPT

## 2020-09-19 PROCEDURE — 93005 ELECTROCARDIOGRAM TRACING: CPT

## 2020-09-19 PROCEDURE — 82435 ASSAY OF BLOOD CHLORIDE: CPT

## 2020-09-19 RX ADMIN — PANTOPRAZOLE SODIUM 40 MILLIGRAM(S): 20 TABLET, DELAYED RELEASE ORAL at 06:07

## 2020-09-19 RX ADMIN — CARBIDOPA AND LEVODOPA 1 TABLET(S): 25; 100 TABLET ORAL at 06:08

## 2020-09-19 RX ADMIN — AMLODIPINE BESYLATE 10 MILLIGRAM(S): 2.5 TABLET ORAL at 06:08

## 2020-09-19 RX ADMIN — QUETIAPINE FUMARATE 25 MILLIGRAM(S): 200 TABLET, FILM COATED ORAL at 06:08

## 2020-09-19 NOTE — DISCHARGE NOTE NURSING/CASE MANAGEMENT/SOCIAL WORK - PATIENT PORTAL LINK FT
You can access the FollowMyHealth Patient Portal offered by [facility] by registering at the following website: [email or web address]. By joining [employer]’s FollowMyHealth portal, you will also be able to view your health information using other applications (apps) compatible with our system.

## 2021-02-19 NOTE — BEHAVIORAL HEALTH ASSESSMENT NOTE - REMOTE MEMORY
Mode of arrival (squad #, walk in, police, etc) : walk in        Chief complaint(s): c/o back pain        Arrival Note (brief scenario, treatment PTA, etc). : Pt presents to ED from home c/o back pain. Pt stated he was shoveling yesterday and 2 hours after he felt pain. Pt stated the pain is 10/10 on the rt lower back radiating down into his rt leg and describes the pain as sharp and shooting and he took 1 percocet and 1 naproxen earlier today. Pt stated he tried a lidocaine patch w/ no relief as well. Pt stated he also tried ice and heat w/ no relief as well. Pt ambulated to the room w/ steady gait. Pt is A&Ox4, eupneic, PWD. GCS=15. Call light in reach. C= \"Have you ever felt that you should Cut down on your drinking? \"  No  A= \"Have people Annoyed you by criticizing your drinking? \"  No  G= \"Have you ever felt bad or Guilty about your drinking? \"  No  E= \"Have you ever had a drink as an Eye-opener first thing in the morning to steady your nerves or to help a hangover? \"  No      Deferred []      Reason for deferring: N/A    *If yes to two or more: probable alcohol abuse. Say Gray RN  02/19/21 [address], RN  02/19/21 [address]

## 2021-10-08 ENCOUNTER — INPATIENT (INPATIENT)
Facility: HOSPITAL | Age: 76
LOS: 4 days | Discharge: NURSING, REHAB OR LONG-TERM CARE | DRG: 57 | End: 2021-10-13
Attending: FAMILY MEDICINE | Admitting: INTERNAL MEDICINE
Payer: MEDICARE

## 2021-10-08 VITALS
TEMPERATURE: 99 F | HEIGHT: 66 IN | DIASTOLIC BLOOD PRESSURE: 74 MMHG | HEART RATE: 88 BPM | RESPIRATION RATE: 16 BRPM | SYSTOLIC BLOOD PRESSURE: 123 MMHG | OXYGEN SATURATION: 96 %

## 2021-10-08 DIAGNOSIS — K52.9 NONINFECTIVE GASTROENTERITIS AND COLITIS, UNSPECIFIED: ICD-10-CM

## 2021-10-08 DIAGNOSIS — Z96.642 PRESENCE OF LEFT ARTIFICIAL HIP JOINT: Chronic | ICD-10-CM

## 2021-10-08 LAB
ALBUMIN SERPL ELPH-MCNC: 3.3 G/DL — SIGNIFICANT CHANGE UP (ref 3.3–5)
ALP SERPL-CCNC: 102 U/L — SIGNIFICANT CHANGE UP (ref 40–120)
ALT FLD-CCNC: 23 U/L — SIGNIFICANT CHANGE UP (ref 12–78)
ANION GAP SERPL CALC-SCNC: 5 MMOL/L — SIGNIFICANT CHANGE UP (ref 5–17)
APPEARANCE UR: ABNORMAL
APTT BLD: 29.2 SEC — SIGNIFICANT CHANGE UP (ref 27.5–35.5)
AST SERPL-CCNC: 22 U/L — SIGNIFICANT CHANGE UP (ref 15–37)
BACTERIA # UR AUTO: ABNORMAL
BASOPHILS # BLD AUTO: 0.03 K/UL — SIGNIFICANT CHANGE UP (ref 0–0.2)
BASOPHILS NFR BLD AUTO: 0.2 % — SIGNIFICANT CHANGE UP (ref 0–2)
BILIRUB SERPL-MCNC: 0.4 MG/DL — SIGNIFICANT CHANGE UP (ref 0.2–1.2)
BILIRUB UR-MCNC: NEGATIVE — SIGNIFICANT CHANGE UP
BUN SERPL-MCNC: 18 MG/DL — SIGNIFICANT CHANGE UP (ref 7–23)
CALCIUM SERPL-MCNC: 8.5 MG/DL — SIGNIFICANT CHANGE UP (ref 8.5–10.1)
CHLORIDE SERPL-SCNC: 103 MMOL/L — SIGNIFICANT CHANGE UP (ref 96–108)
CO2 SERPL-SCNC: 26 MMOL/L — SIGNIFICANT CHANGE UP (ref 22–31)
COLOR SPEC: YELLOW — SIGNIFICANT CHANGE UP
CREAT SERPL-MCNC: 0.65 MG/DL — SIGNIFICANT CHANGE UP (ref 0.5–1.3)
DIFF PNL FLD: ABNORMAL
EOSINOPHIL # BLD AUTO: 0.03 K/UL — SIGNIFICANT CHANGE UP (ref 0–0.5)
EOSINOPHIL NFR BLD AUTO: 0.2 % — SIGNIFICANT CHANGE UP (ref 0–6)
EPI CELLS # UR: SIGNIFICANT CHANGE UP
GLUCOSE SERPL-MCNC: 125 MG/DL — HIGH (ref 70–99)
GLUCOSE UR QL: NEGATIVE — SIGNIFICANT CHANGE UP
HCT VFR BLD CALC: 32.9 % — LOW (ref 34.5–45)
HGB BLD-MCNC: 10.8 G/DL — LOW (ref 11.5–15.5)
IMM GRANULOCYTES NFR BLD AUTO: 0.3 % — SIGNIFICANT CHANGE UP (ref 0–1.5)
INR BLD: 1.12 RATIO — SIGNIFICANT CHANGE UP (ref 0.88–1.16)
KETONES UR-MCNC: NEGATIVE — SIGNIFICANT CHANGE UP
LACTATE SERPL-SCNC: 0.9 MMOL/L — SIGNIFICANT CHANGE UP (ref 0.7–2)
LEUKOCYTE ESTERASE UR-ACNC: ABNORMAL
LYMPHOCYTES # BLD AUTO: 18.3 % — SIGNIFICANT CHANGE UP (ref 13–44)
LYMPHOCYTES # BLD AUTO: 2.71 K/UL — SIGNIFICANT CHANGE UP (ref 1–3.3)
MCHC RBC-ENTMCNC: 27.9 PG — SIGNIFICANT CHANGE UP (ref 27–34)
MCHC RBC-ENTMCNC: 32.8 GM/DL — SIGNIFICANT CHANGE UP (ref 32–36)
MCV RBC AUTO: 85 FL — SIGNIFICANT CHANGE UP (ref 80–100)
MONOCYTES # BLD AUTO: 1.23 K/UL — HIGH (ref 0–0.9)
MONOCYTES NFR BLD AUTO: 8.3 % — SIGNIFICANT CHANGE UP (ref 2–14)
NEUTROPHILS # BLD AUTO: 10.78 K/UL — HIGH (ref 1.8–7.4)
NEUTROPHILS NFR BLD AUTO: 72.7 % — SIGNIFICANT CHANGE UP (ref 43–77)
NITRITE UR-MCNC: NEGATIVE — SIGNIFICANT CHANGE UP
NRBC # BLD: 0 /100 WBCS — SIGNIFICANT CHANGE UP (ref 0–0)
PH UR: 6 — SIGNIFICANT CHANGE UP (ref 5–8)
PLATELET # BLD AUTO: 354 K/UL — SIGNIFICANT CHANGE UP (ref 150–400)
POTASSIUM SERPL-MCNC: 4.4 MMOL/L — SIGNIFICANT CHANGE UP (ref 3.5–5.3)
POTASSIUM SERPL-SCNC: 4.4 MMOL/L — SIGNIFICANT CHANGE UP (ref 3.5–5.3)
PROT SERPL-MCNC: 7.5 G/DL — SIGNIFICANT CHANGE UP (ref 6–8.3)
PROT UR-MCNC: NEGATIVE — SIGNIFICANT CHANGE UP
PROTHROM AB SERPL-ACNC: 13 SEC — SIGNIFICANT CHANGE UP (ref 10.6–13.6)
RBC # BLD: 3.87 M/UL — SIGNIFICANT CHANGE UP (ref 3.8–5.2)
RBC # FLD: 13 % — SIGNIFICANT CHANGE UP (ref 10.3–14.5)
RBC CASTS # UR COMP ASSIST: ABNORMAL /HPF (ref 0–4)
SARS-COV-2 RNA SPEC QL NAA+PROBE: SIGNIFICANT CHANGE UP
SODIUM SERPL-SCNC: 134 MMOL/L — LOW (ref 135–145)
SP GR SPEC: 1 — LOW (ref 1.01–1.02)
UROBILINOGEN FLD QL: NEGATIVE — SIGNIFICANT CHANGE UP
WBC # BLD: 14.82 K/UL — HIGH (ref 3.8–10.5)
WBC # FLD AUTO: 14.82 K/UL — HIGH (ref 3.8–10.5)
WBC UR QL: SIGNIFICANT CHANGE UP

## 2021-10-08 PROCEDURE — 99223 1ST HOSP IP/OBS HIGH 75: CPT | Mod: GC

## 2021-10-08 PROCEDURE — 71045 X-RAY EXAM CHEST 1 VIEW: CPT | Mod: 26

## 2021-10-08 PROCEDURE — 72131 CT LUMBAR SPINE W/O DYE: CPT | Mod: 26

## 2021-10-08 PROCEDURE — 70450 CT HEAD/BRAIN W/O DYE: CPT | Mod: 26

## 2021-10-08 PROCEDURE — 72125 CT NECK SPINE W/O DYE: CPT | Mod: 26

## 2021-10-08 PROCEDURE — 72128 CT CHEST SPINE W/O DYE: CPT | Mod: 26

## 2021-10-08 PROCEDURE — 73502 X-RAY EXAM HIP UNI 2-3 VIEWS: CPT | Mod: 26,LT

## 2021-10-08 PROCEDURE — 99285 EMERGENCY DEPT VISIT HI MDM: CPT

## 2021-10-08 RX ORDER — QUETIAPINE FUMARATE 200 MG/1
1 TABLET, FILM COATED ORAL
Qty: 0 | Refills: 0 | DISCHARGE

## 2021-10-08 RX ORDER — SODIUM CHLORIDE 9 MG/ML
2000 INJECTION INTRAMUSCULAR; INTRAVENOUS; SUBCUTANEOUS ONCE
Refills: 0 | Status: COMPLETED | OUTPATIENT
Start: 2021-10-08 | End: 2021-10-08

## 2021-10-08 RX ORDER — QUETIAPINE FUMARATE 200 MG/1
25 TABLET, FILM COATED ORAL DAILY
Refills: 0 | Status: DISCONTINUED | OUTPATIENT
Start: 2021-10-08 | End: 2021-10-13

## 2021-10-08 RX ORDER — BRIMONIDINE TARTRATE, TIMOLOL MALEATE 2; 5 MG/ML; MG/ML
1 SOLUTION/ DROPS OPHTHALMIC
Qty: 0 | Refills: 0 | DISCHARGE

## 2021-10-08 RX ORDER — ASCORBIC ACID 60 MG
1 TABLET,CHEWABLE ORAL
Qty: 0 | Refills: 0 | DISCHARGE

## 2021-10-08 RX ORDER — AMLODIPINE BESYLATE 2.5 MG/1
10 TABLET ORAL DAILY
Refills: 0 | Status: DISCONTINUED | OUTPATIENT
Start: 2021-10-08 | End: 2021-10-13

## 2021-10-08 RX ORDER — CARBIDOPA AND LEVODOPA 25; 100 MG/1; MG/1
1 TABLET ORAL THREE TIMES A DAY
Refills: 0 | Status: DISCONTINUED | OUTPATIENT
Start: 2021-10-08 | End: 2021-10-13

## 2021-10-08 RX ORDER — FERROUS SULFATE 325(65) MG
1 TABLET ORAL
Qty: 0 | Refills: 0 | DISCHARGE

## 2021-10-08 RX ORDER — ONDANSETRON 8 MG/1
4 TABLET, FILM COATED ORAL EVERY 8 HOURS
Refills: 0 | Status: DISCONTINUED | OUTPATIENT
Start: 2021-10-08 | End: 2021-10-13

## 2021-10-08 RX ORDER — MULTIVIT-MIN/FERROUS GLUCONATE 9 MG/15 ML
1 LIQUID (ML) ORAL
Qty: 0 | Refills: 0 | DISCHARGE

## 2021-10-08 RX ORDER — PANTOPRAZOLE SODIUM 20 MG/1
40 TABLET, DELAYED RELEASE ORAL
Refills: 0 | Status: DISCONTINUED | OUTPATIENT
Start: 2021-10-08 | End: 2021-10-13

## 2021-10-08 RX ORDER — CALCIUM CARBONATE 500(1250)
1 TABLET ORAL
Qty: 0 | Refills: 0 | DISCHARGE

## 2021-10-08 RX ORDER — ACETAMINOPHEN 500 MG
650 TABLET ORAL EVERY 6 HOURS
Refills: 0 | Status: DISCONTINUED | OUTPATIENT
Start: 2021-10-08 | End: 2021-10-13

## 2021-10-08 RX ORDER — VANCOMYCIN HCL 1 G
125 VIAL (EA) INTRAVENOUS EVERY 6 HOURS
Refills: 0 | Status: DISCONTINUED | OUTPATIENT
Start: 2021-10-08 | End: 2021-10-09

## 2021-10-08 RX ORDER — PREDNISOLONE SODIUM PHOSPHATE 1 %
1 DROPS OPHTHALMIC (EYE) DAILY
Refills: 0 | Status: DISCONTINUED | OUTPATIENT
Start: 2021-10-08 | End: 2021-10-13

## 2021-10-08 RX ORDER — HEPARIN SODIUM 5000 [USP'U]/ML
5000 INJECTION INTRAVENOUS; SUBCUTANEOUS EVERY 8 HOURS
Refills: 0 | Status: DISCONTINUED | OUTPATIENT
Start: 2021-10-08 | End: 2021-10-13

## 2021-10-08 RX ADMIN — SODIUM CHLORIDE 2000 MILLILITER(S): 9 INJECTION INTRAMUSCULAR; INTRAVENOUS; SUBCUTANEOUS at 17:19

## 2021-10-08 RX ADMIN — SODIUM CHLORIDE 2000 MILLILITER(S): 9 INJECTION INTRAMUSCULAR; INTRAVENOUS; SUBCUTANEOUS at 23:25

## 2021-10-08 NOTE — ED PROVIDER NOTE - PROGRESS NOTE DETAILS
son called: son sophia he called pmd and provided pt's sx to the doctor [facility] pt [facility] well.  the other antwon pt fell, pt has diarrhea for past 3 days now uncontrollable, and more disoriented not remembering things, cant walk with walker or with assistance.  no pt in over a month. not eating or drinking much either now. and pt would now scream if she was helped up.  son has been primary care provider to pt for past 3 years.   son is also seeking respit care placement for himself, he needs a break. renato hospitalist

## 2021-10-08 NOTE — ED PROVIDER NOTE - MUSCULOSKELETAL, MLM
Spine appears kyphotic no pain to percussion or palpation, the left  hip range of motion is limited there is pain to rom and there is a left foot brace with foot drop pt states her feet are heavy

## 2021-10-08 NOTE — ED PROVIDER NOTE - EYES, MLM
Clear bilaterally, pupils equal, round and reactive to light. Clear bilaterally, pupils equal, left eye cataracts and opacification on anterior chamber

## 2021-10-08 NOTE — H&P ADULT - NSHPPHYSICALEXAM_GEN_ALL_CORE
T(C): 38.2 (10-08-21 @ 17:21), Max: 38.2 (10-08-21 @ 17:21)  HR: 89 (10-08-21 @ 17:21) (88 - 89)  BP: 133/63 (10-08-21 @ 17:21) (123/74 - 133/63)  RR: 17 (10-08-21 @ 17:21) (16 - 17)  SpO2: 100% (10-08-21 @ 17:21) (96% - 100%)    GENERAL: patient appears well, no acute distress, appropriate, pleasant  EYES: sclera clear, no exudates  ENMT: oropharynx clear without erythema, no exudates, moist mucous membranes  NECK: supple, soft, no thyromegaly noted  LUNGS: good air entry bilaterally, clear to auscultation, symmetric breath sounds, no wheezing or rhonchi appreciated  HEART: soft S1/S2, regular rate and rhythm, no murmurs noted, no lower extremity edema  GASTROINTESTINAL: abdomen is soft, nontender, nondistended, normoactive bowel sounds, no palpable masses  INTEGUMENT: good skin turgor, no lesions noted  MUSCULOSKELETAL: no clubbing or cyanosis, no obvious deformity  NEUROLOGIC: awake, alert, oriented x3, good muscle tone in 4 extremities, no obvious sensory deficits  PSYCHIATRIC: mood is good, affect is congruent, linear and logical thought process  HEME/LYMPH: no palpable supraclavicular nodules, no obvious ecchymosis or petechiae T(C): 38.2 (10-08-21 @ 17:21), Max: 38.2 (10-08-21 @ 17:21)  HR: 89 (10-08-21 @ 17:21) (88 - 89)  BP: 133/63 (10-08-21 @ 17:21) (123/74 - 133/63)  RR: 17 (10-08-21 @ 17:21) (16 - 17)  SpO2: 100% (10-08-21 @ 17:21) (96% - 100%)    GENERAL: patient appears unwell, frail  EYES: sclera clear, no exudates  ENMT: oropharynx clear without erythema, no exudates, moist mucous membranes  NECK: supple, soft, no thyromegaly noted  LUNGS: good air entry bilaterally, clear to auscultation, symmetric breath sounds, no wheezing or rhonchi appreciated  HEART: soft S1/S2, regular rate and rhythm, no murmurs noted, no lower extremity edema  GASTROINTESTINAL: abdomen is soft, nontender, nondistended, normoactive bowel sounds, no palpable masses  INTEGUMENT: good skin turgor, no lesions noted  MUSCULOSKELETAL: no clubbing or cyanosis, L hip limited ROM  NEUROLOGIC: awake, alert, oriented x3  PSYCHIATRIC: tearful, easily confused  HEME/LYMPH: no palpable supraclavicular nodules, no obvious ecchymosis or petechiae T(C): 38.2 (10-08-21 @ 17:21), Max: 38.2 (10-08-21 @ 17:21)  HR: 89 (10-08-21 @ 17:21) (88 - 89)  BP: 133/63 (10-08-21 @ 17:21) (123/74 - 133/63)  RR: 17 (10-08-21 @ 17:21) (16 - 17)  SpO2: 100% (10-08-21 @ 17:21) (96% - 100%)    GENERAL: patient appears unwell, frail  EYES: sclera clear, no exudates  ENMT: oropharynx clear without erythema, no exudates, moist mucous membranes  NECK: supple, soft, no thyromegaly noted  LUNGS: good air entry bilaterally, clear to auscultation, symmetric breath sounds, no wheezing or rhonchi appreciated  HEART: soft S1/S2, regular rate and rhythm, no murmurs noted, no lower extremity edema  GASTROINTESTINAL: abdomen is soft, nontender, nondistended, normoactive bowel sounds, no palpable masses  INTEGUMENT: good skin turgor, no lesions noted  MUSCULOSKELETAL: no clubbing or cyanosis, L hip limited ROM  NEUROLOGIC: awake, alert, oriented x3, muscle strength 3/5 in all 4 extremities  PSYCHIATRIC: tearful, easily confused  HEME/LYMPH: no palpable supraclavicular nodules, no obvious ecchymosis or petechiae

## 2021-10-08 NOTE — ED PROVIDER NOTE - OBJECTIVE STATEMENT
Pt is a 76F w/ hx of Parkinson's dementia, HTN, glaucoma who has prior hosp for hip fx and falls secondary to parkinson's. she was recently put on abx for uti and completed them (bactrim) she fell a couple of days ago and co pain in hip difficulty walking is unsteady pt reports a foot drop of left foot sp fx wears a brace and usually walks with walker  pt is complaining of frequent diarrheal stools  pmd is dr agee per pt

## 2021-10-08 NOTE — H&P ADULT - ASSESSMENT
75F w/ hx of Parkinson's dementia, HTN, pubic rami fracture, dementia, anemia,  glaucoma presents today for       # Diarrhea  - Patient was given Bactrim for UTI, last dose----  - Admit to [facility]  - IV NS 1 L bolus in ED  -Continue IV Cipro Flagyl  - Montor WBC and fever curve  - Tylenol 650 mg PO q6h prn for fever or mild pain  - NPO, advance diet as tolerated  - IV NS @ 75 cc/hr for 12 hours  - Zofran prn for nausea  - F/u blood and urine culture, lactate  - ID (Dr. Mcguire) consulted, f/u recs  - GI (Dr. Arthur) consulted, f/u recs    # Failure to thrive   -Likely multifactorial secondary to advance parkinson's disease's and age   -Patient with progressive decreased PO intake, limited mobility due to RLE pain w/ multiple falls in the past  - Recently discharged from [facility] for RLE pain, ruled out DVT   - Fall risk protocol, OOB with assistance  - PT consulted, f/u recs  - Nutrition consulted, f/u recs    # Parkinson's disease    - On sinemet TID, will continue   - Fall and aspiration precautions     # Anemia   - History of  Iron deficiency anemia, on   - H/H 10.8/ 32.9 on admission, baseline hemoglobin 8.5-9.0  - Currently hemodynamically stable, monitor for signs and symptoms of active bleeding  - Consider transfusion for hemoglobin <8  - F/u iron panel: iron, ferritin, TIBC, transferrin  - F/u folate, vitamin B12, TSH  - F/u AM CBC 75F w/ hx of Parkinson's dementia, HTN, pubic rami fracture, anemia,  glaucoma presents today for generalized weakness and diarrhea Patient admitted for failure to thrive.       # Failure to thrive   -Likely multifactorial secondary to advance parkinson's disease's and age   -Patient with progressive decreased PO intake, limited mobility due to RLE pain w/ multiple falls in the past  - Recently admitted  from [facility] for RLE pain   - Fall risk protocol, OOB with assistance  - PT consulted, f/u recs  - Nutrition consulted, f/u recs    # Diarrhea  - Started on 08/06/21  - Patient was given Bactrim for UTI, last dose 10/04/21   - Admit to F with isolation precautions   - s/p IV NS 1 L bolus in ED  - Montor WBC and fever curve  - Tylenol 650 mg PO q6h prn for fever or mild pain  - IV NS @ 75 cc/hr for 12 hours  - Zofran prn for nausea  - F/up C. Diff PCR   - Stool count and record     # Parkinson's disease    - On sinemet TID, will continue   - Fall and aspiration precautions     # Dementia   - Seroquel 25 mg BID, however pt's son has been given qd   - Fall and aspiration precautions     # Anemia   - Labs on 08/21 demonstrated iron deficiency anemia   - H/H 10.8/ 32.9 on admission, baseline hemoglobin 8.5-9.0  - Currently hemodynamically stable, monitor for signs and symptoms of active bleeding  - F/u AM CBC    # HTN   - /63 on admission   - On amlodipine,   - c/w home meds w/ holding parameters  - monitor BP & titrate BP meds PRN  - DASH/TLC    # Glaucoma   -Continue Combigan 0.2%-0.5% ophthalmic solution    # DVT ppx: Improve score 2. Heparin 5000 q8h     IMPROVE VTE Individual Risk Assessment  RISK                                                                Points  [  ] Previous VTE                                                  3  [  ] Thrombophilia                                               2  [  ] Lower limb paralysis                                      2        (unable to hold up >15 seconds)    [  ] Current Cancer                                              2         (within 6 months)  [ X ] Immobilization > 24 hrs                                1  [  ] ICU/CCU stay > 24 hours                              1  [  X] Age > 60                                                      1  IMPROVE VTE Score ___2______       75F w/ hx of Parkinson's dementia, HTN, pubic rami fracture, anemia,  glaucoma presents today for generalized weakness and diarrhea Patient admitted for failure to thrive.       # Failure to thrive   -Likely multifactorial secondary to advance parkinson's disease's and age   - Fall risk protocol, OOB with assistance  - PT consulted, f/u recs  - Nutrition consulted, f/u recs    # Diarrhea  - Patient was given Bactrim for UTI, last dose 10/04/21   - Admit to F with isolation precautions   - s/p IV NS 1 L bolus in ED  - Montor WBC and fever curve  - Tylenol 650 mg PO q6h prn for fever or mild pain  - IV NS @ 75 cc/hr for 12 hours  - Zofran prn for nausea  - F/up C. Diff PCR   - Stool count and record     # Parkinson's disease    - On sinemet TID, will continue   - Fall and aspiration precautions     # Dementia   - Seroquel 25 mg BID, however pt's son has been given qd   - Fall and aspiration precautions     # Anemia   - Labs on 08/21 demonstrated iron deficiency anemia   - H/H 10.8/ 32.9 on admission, baseline hemoglobin 8.5-9.0  - Currently hemodynamically stable, monitor for signs and symptoms of active bleeding  - F/u AM CBC    # HTN   - /63 on admission   - On amlodipine,   - c/w home meds w/ holding parameters  - monitor BP & titrate BP meds PRN  - DASH/TLC    # Glaucoma   -Continue Combigan 0.2%-0.5% ophthalmic solution    # DVT ppx: Improve score 2. Heparin 5000 q8h     IMPROVE VTE Individual Risk Assessment  RISK                                                                Points  [  ] Previous VTE                                                  3  [  ] Thrombophilia                                               2  [  ] Lower limb paralysis                                      2        (unable to hold up >15 seconds)    [  ] Current Cancer                                              2         (within 6 months)  [ X ] Immobilization > 24 hrs                                1  [  ] ICU/CCU stay > 24 hours                              1  [  X] Age > 60                                                      1  IMPROVE VTE Score ___2______       75F w/ hx of Parkinson's dementia, HTN, pubic rami fracture, anemia,  glaucoma presents today for generalized weakness and diarrhea Patient admitted for failure to thrive.       # Failure to thrive   -Likely multifactorial secondary to advance parkinson's disease's and age   - Fall risk protocol, OOB with assistance  - PT consulted, f/u recs  - Nutrition consulted, f/u recs    # Diarrhea  - Patient was given Bactrim for UTI, last dose 10/04/21   - Admit to F with isolation precautions   - Patient has fever, leukocytosis, and diarrhea, starting PO vancomycin now  - s/p IV NS 1 L bolus in ED  - Montor WBC and fever curve  - Tylenol 650 mg PO q6h prn for fever or mild pain  - IV NS @ 75 cc/hr for 12 hours  - Zofran prn for nausea  - F/up C. Diff PCR   - Stool count and record   - Consult ID, Dr. Qureshi; f/u recs    # Parkinson's disease    - On sinemet TID, will continue   - Fall and aspiration precautions   - Family states they would like rehab placement, SW consulted  - may be progressing, consult Neuro, Dr. Woodruff; f/u recs    # Dementia   - Seroquel 25 mg BID, however pt's son has been given qd   - Fall and aspiration precautions     # Anemia   - Labs on 08/21 demonstrated iron deficiency anemia   - H/H 10.8/ 32.9 on admission, baseline hemoglobin 8.5-9.0  - Currently hemodynamically stable, monitor for signs and symptoms of active bleeding  - F/u AM CBC    # HTN   - /63 on admission   - On amlodipine,   - c/w home meds w/ holding parameters  - monitor BP & titrate BP meds PRN  - DASH/TLC    # Glaucoma   -Continue Combigan 0.2%-0.5% ophthalmic solution    # DVT ppx: Improve score 2. Heparin 5000 q8h     IMPROVE VTE Individual Risk Assessment  RISK                                                                Points  [  ] Previous VTE                                                  3  [  ] Thrombophilia                                               2  [  ] Lower limb paralysis                                      2        (unable to hold up >15 seconds)    [  ] Current Cancer                                              2         (within 6 months)  [ X ] Immobilization > 24 hrs                                1  [  ] ICU/CCU stay > 24 hours                              1  [  X] Age > 60                                                      1  IMPROVE VTE Score ___2______       76F w/ hx of Parkinson's dementia, HTN, pubic rami fracture, anemia,  glaucoma presents today for generalized weakness and diarrhea Patient admitted for failure to thrive.     # Failure to thrive and Falls  -Likely multifactorial secondary to advance parkinson's disease's and age   - Fall risk protocol, OOB with assistance  - PT consulted, f/u recs  - Nutrition consulted, f/u recs  -f/u CT scans and pelvic XR    # Diarrhea  - Patient was given Bactrim for UTI, last dose 10/04/21   - Admit to F with isolation precautions   - Patient has fever, leukocytosis, and diarrhea, starting PO vancomycin now  - s/p IV NS 1 L bolus in ED  - Montor WBC and fever curve  - Tylenol 650 mg PO q6h prn for fever or mild pain  - IV NS @ 75 cc/hr for 12 hours  - Zofran prn for nausea  - F/up C. Diff PCR   - Stool count and record   - Consult ID, Dr. Qureshi; f/u recs    # Parkinson's disease    - On sinemet TID, will continue   - Fall and aspiration precautions   - Family states they would like rehab placement, SW consulted  - may be progressive dementia consult Neuro, Dr. Woodruff; f/u recs    # Dementia   - Seroquel 25 mg BID, however pt's son has been given qd   - Fall and aspiration precautions     # Anemia   - Labs on 08/21 demonstrated iron deficiency anemia   - H/H 10.8/ 32.9 on admission, baseline hemoglobin 8.5-9.0  - Currently hemodynamically stable, monitor for signs and symptoms of active bleeding  - F/u AM CBC    # HTN   - /63 on admission   - On amlodipine,   - c/w home meds w/ holding parameters  - monitor BP & titrate BP meds PRN  - DASH/TLC    # Glaucoma   -Continue Combigan 0.2%-0.5% ophthalmic solution    # DVT ppx: Improve score 2. Heparin 5000 q8h     IMPROVE VTE Individual Risk Assessment  RISK                                                                Points  [  ] Previous VTE                                                  3  [  ] Thrombophilia                                               2  [  ] Lower limb paralysis                                      2        (unable to hold up >15 seconds)    [  ] Current Cancer                                              2         (within 6 months)  [ X ] Immobilization > 24 hrs                                1  [  ] ICU/CCU stay > 24 hours                              1  [  X] Age > 60                                                      1  IMPROVE VTE Score ___2______

## 2021-10-08 NOTE — ED ADULT NURSE REASSESSMENT NOTE - NS ED NURSE REASSESS COMMENT FT1
received pt stable ct scan done vital signs stable ivf infusing well admitted awaiting bed assignment

## 2021-10-08 NOTE — H&P ADULT - NSHPREVIEWOFSYSTEMS_GEN_ALL_CORE
CONSTITUTIONAL: denies fever, chills, fatigue, weakness  HEENT: denies blurred vision, sore throat  SKIN: denies new lesions, rash  CARDIOVASCULAR: denies chest pain, chest pressure, palpitations  RESPIRATORY: denies shortness of breath, sputum production  GASTROINTESTINAL: denies nausea, vomiting, diarrhea, abdominal pain  GENITOURINARY: denies dysuria, discharge  NEUROLOGICAL: denies numbness, headache, focal weakness  MUSCULOSKELETAL: denies new joint pain, muscle aches  HEMATOLOGIC: denies gross bleeding, bruising  LYMPHATICS: denies enlarged lymph nodes, extremity swelling  PSYCHIATRIC: denies recent changes in anxiety, depression  ENDOCRINOLOGIC: denies sweating, cold or heat intolerance Unable to obtain meaningful information given patient medical condition (dementia), but his son endorses:   CONSTITUTIONAL: denies fever, chills, admits fatigue and weakness  SKIN: denies new lesions, rash  CARDIOVASCULAR: denies chest pain, chest pressure, palpitations  RESPIRATORY: denies shortness of breath, sputum production  GASTROINTESTINAL: denies nausea, vomiting, admits diarrhea, abdominal pain  GENITOURINARY: denies dysuria, discharge  NEUROLOGICAL: denies numbness, headache, focal weakness  MUSCULOSKELETAL: admits RLE pain   HEMATOLOGIC: denies gross bleeding, bruising  LYMPHATICS: denies enlarged lymph nodes, extremity swelling  PSYCHIATRIC: admits dementia   ENDOCRINOLOGIC: denies sweating, cold or heat intolerance Unable to obtain meaningful information given patient medical condition (dementia), but his son endorses:   CONSTITUTIONAL: denies fever, chills, admits fatigue and weakness  SKIN: denies new lesions, rash  CARDIOVASCULAR: denies chest pain, chest pressure, palpitations  RESPIRATORY: denies shortness of breath, sputum production  GASTROINTESTINAL: denies nausea, vomiting, admits diarrhea, abdominal pain  GENITOURINARY: denies dysuria, discharge  NEUROLOGICAL: denies numbness, headache, focal weakness  MUSCULOSKELETAL: admits LE pain and feeling like feet are very heavy  PSYCHIATRIC: admits dementia   ENDOCRINOLOGIC: denies sweating, cold or heat intolerance Unable to obtain meaningful information given patient medical condition (dementia), but son endorses:   CONSTITUTIONAL: denies fever, chills, admits fatigue and weakness  SKIN: denies new lesions, rash  CARDIOVASCULAR: denies chest pain, chest pressure, palpitations  RESPIRATORY: denies shortness of breath, sputum production  GASTROINTESTINAL: denies nausea, vomiting, admits diarrhea, abdominal pain, melena, hematochezia  GENITOURINARY: denies dysuria, discharge  NEUROLOGICAL: denies numbness, headache, focal weakness  MUSCULOSKELETAL: admits LE pain and feeling like feet are very heavy  PSYCHIATRIC: admits dementia   ENDOCRINOLOGIC: denies sweating, cold or heat intolerance

## 2021-10-08 NOTE — H&P ADULT - ATTENDING COMMENTS
76F w/ hx of Parkinson's dementia, HTN, pubic rami fracture, anemia,  glaucoma presents today for generalized weakness and diarrhea Patient admitted for failure to thrive.     HPI as above.     Plan:  F/u CT scan results and pelvic XR.   -will likely need MARLEN placement  -neuro consulted  diarrhea: concern for underlying C Diff. Start PO vanco and f/u ID rec's. trend WBC count.   -anemia: no signs or symptoms of active bleeding. Continue to monitor hgb.   -remainder as above.

## 2021-10-08 NOTE — ED PROVIDER NOTE - CARE PLAN
1 Principal Discharge DX:	Non-infective diarrhea  Secondary Diagnosis:	Weakness  Secondary Diagnosis:	Hip pain, left  Secondary Diagnosis:	Inability to ambulate due to hip

## 2021-10-08 NOTE — ED PROVIDER NOTE - ENMT, MLM
Airway patent, Nasal mucosa clear. Mouth with poor dentition and cleaning "I brush my own teeth" no g f r no other

## 2021-10-08 NOTE — H&P ADULT - NSHPSOCIALHISTORY_GEN_ALL_CORE
Patient lives at home with son  Patient has aides for ADLs  Walks with walker  no significant smoking or etoh hx

## 2021-10-08 NOTE — ED ADULT NURSE NOTE - NSIMPLEMENTINTERV_GEN_ALL_ED
Implemented All Fall with Harm Risk Interventions:  [place] to call system. Call bell, personal items and telephone within reach. Instruct patient to call for assistance. Room bathroom lighting operational. Non-slip footwear when patient is off stretcher. Physically safe environment: no spills, clutter or unnecessary equipment. Stretcher in lowest position, wheels locked, appropriate side rails in place. Provide visual cue, wrist band, yellow gown, etc. Monitor gait and stability. Monitor for mental status changes and reorient to person, place, and time. Review medications for side effects contributing to fall risk. Reinforce activity limits and safety measures with patient and family. Provide visual clues: red socks.

## 2021-10-08 NOTE — ED PROVIDER NOTE - CLINICAL SUMMARY MEDICAL DECISION MAKING FREE TEXT BOX
75 yo f who has hx of pd htn glaucoma/cataracts confused fell recent abx for uti co left hip pain bib ems  ro fx ro infection ro obstipation (seen on prev chart)  ro metabolic or electrolyte abnormality pt unable to ambulate has no home care per pt admit   consult with son jaiden portillo [phone]

## 2021-10-08 NOTE — ED ADULT NURSE NOTE - OBJECTIVE STATEMENT
Patient is a 75yo female complaining of hip pain following a fall Thursday. Patient is Patient is a 77yo female complaining of hip pain following a fall Thursday. Patient is complaining of right hip and is currently being treated for uti. Patient is confused and tearful. Patient has brace on left leg and states that she has been having difficulty walking and that her legs feel heavy

## 2021-10-08 NOTE — ED PROVIDER NOTE - NEUROLOGICAL, MLM
Alert and oriented to self location but occasionally demonstrates confusion in speech, with perseverance and illogical  statements does not move legs well

## 2021-10-08 NOTE — H&P ADULT - HISTORY OF PRESENT ILLNESS
In progress   75F w/ hx of Parkinson's dementia, HTN, pubic rami fracture, dementia, anemia,  glaucoma presents today for     Of note patient recently admitted in [facility] from 09/28 to 10/08/21 for RLE edema and difficulty getting care at home.    VS:  Febrile with T-max: 100.8, HR: 89, BP: 133/63, SpO2: 100% RA.   Labs Leukocytosis 14.82K  H/H: 10.8/32.9. Na 125   EKG  Patient was given in the ED IVF 2l bolus  In progress   75F w/ hx of Parkinson's dementia, HTN, pubic rami fracture, dementia, anemia,  glaucoma presents today for generalized weakness and diarrhea. Information obtained from patient's son Roman. Patient's son reports that she was diagnosed with UTI, prescribed bactrim on last dose was on --, developed diarrhea on --   Patient' son endorses patient is unable to get out  from bed or chair due to severe weakness with recurrent falls    Of note patient recently admitted in [facility] from 09/28 to 10/08/21 for RLE edema and difficulty getting care at home.    VS:  Febrile with T-max: 100.8, HR: 89, BP: 133/63, SpO2: 100% RA.   Labs Leukocytosis 14.82K  H/H: 10.8/32.9. Na 125   EKG  Patient was given in the ED IVF 2l bolus  In progress   75F w/ hx of Parkinson's dementia, HTN, pubic rami fracture, dementia, anemia,  glaucoma presents today for generalized weakness and diarrhea. Information obtained from patient's son Roman. Patient's son reports that she was diagnosed with UTI, prescribed bactrim on last dose was on --, developed diarrhea on --   Patient' son endorses patient is unable to get out  from bed or chair due to severe weakness with recurrent falls      VS:  Febrile with T-max: 100.8, HR: 89, BP: 133/63, SpO2: 100% RA.   Labs Leukocytosis 14.82K  H/H: 10.8/32.9. Na 125   EKG  Patient was given in the ED IVF 2l bolus  In progress   75F w/ hx of Parkinson's dementia, HTN, pubic rami fracture, dementia, anemia,  glaucoma presents today for generalized weakness and diarrhea. Information obtained from patient's son Roman. Patient's son reports that she was diagnosed with UTI, prescribed bactrim on last dose was on 10/4, developed diarrhea on 10/6. Patient has had multiple episodes of watery and smelly diarrhea. Patient states that she had at least 3 episodes today prior to coming to ED, but has not had any in ED. Patient was feeling weak once the diarrhea developed and ended up falling on 10/7, according to son she was up at night, and had fallen he woke up because of the noise. Initially patient seemed well, but next day ha significant pain in L hip so brought to ED.  Patient' son endorses patient is unable to get out  from bed or chair due to severe weakness with recurrent falls    VS:  Febrile with T-max: 100.8, HR: 89, BP: 133/63, SpO2: 100% RA.   Labs Leukocytosis 14.82K  H/H: 10.8/32.9. Na 125   EKG  Patient was given in the ED IVF 2l bolus  75F w/ hx of Parkinson's dementia, HTN, pubic rami fracture, dementia, anemia,  glaucoma presents today for generalized weakness and diarrhea. Information obtained from patient's son Roman. Patient's son reports that she was diagnosed with UTI, prescribed bactrim on last dose was on 10/4, developed diarrhea on 10/6. Patient has had multiple episodes of watery and smelly diarrhea. Patient states that she had at least 3 episodes today prior to coming to ED, but has not had any in ED. Patient was feeling weak once the diarrhea developed and ended up falling on 10/7, according to son she was up at night, and had fallen he woke up because of the noise. Initially patient seemed well, but next day ha significant pain in L hip so brought to ED.  Patient' son endorses patient is unable to get out  from bed or chair due to severe weakness with recurrent falls    VS:  Febrile with T-max: 100.8, HR: 89, BP: 133/63, SpO2: 100% RA.   Labs Leukocytosis 14.82K  H/H: 10.8/32.9. Na 125   EKG  Patient was given in the ED IVF 2l bolus  76F w/ hx of Parkinson's dementia, HTN, pubic rami fracture, dementia, anemia,  glaucoma presents today for generalized weakness and diarrhea. Information obtained from patient's son Roman. Patient's son reports that she was diagnosed with UTI, prescribed bactrim on last dose was on 10/4, developed diarrhea on 10/6. Patient has had multiple episodes of watery and smelly diarrhea. Patient states that she had at least 3 episodes today prior to coming to ED, but has not had any in ED. Patient was feeling weak once the diarrhea developed and ended up falling on 10/7, according to son she was up at night, and had fallen he woke up because of the noise. Initially patient seemed well, but next day ha significant pain in L hip so brought to ED.  Patient' son endorses patient is unable to get out  from bed or chair due to severe weakness with recurrent falls    VS:  Febrile with T-max: 100.8, HR: 89, BP: 133/63, SpO2: 100% RA.   Labs Leukocytosis 14.82K  H/H: 10.8/32.9. Na 125   Patient was given in the ED IVF 2l bolus

## 2021-10-09 PROBLEM — G20 PARKINSON'S DISEASE: Chronic | Status: ACTIVE | Noted: 2020-09-16

## 2021-10-09 LAB
ANION GAP SERPL CALC-SCNC: 7 MMOL/L — SIGNIFICANT CHANGE UP (ref 5–17)
BASOPHILS # BLD AUTO: 0.02 K/UL — SIGNIFICANT CHANGE UP (ref 0–0.2)
BASOPHILS NFR BLD AUTO: 0.2 % — SIGNIFICANT CHANGE UP (ref 0–2)
BUN SERPL-MCNC: 11 MG/DL — SIGNIFICANT CHANGE UP (ref 7–23)
C DIFF BY PCR RESULT: SIGNIFICANT CHANGE UP
C DIFF TOX GENS STL QL NAA+PROBE: SIGNIFICANT CHANGE UP
CALCIUM SERPL-MCNC: 8.2 MG/DL — LOW (ref 8.5–10.1)
CHLORIDE SERPL-SCNC: 110 MMOL/L — HIGH (ref 96–108)
CO2 SERPL-SCNC: 22 MMOL/L — SIGNIFICANT CHANGE UP (ref 22–31)
COVID-19 SPIKE DOMAIN AB INTERP: NEGATIVE — SIGNIFICANT CHANGE UP
COVID-19 SPIKE DOMAIN ANTIBODY RESULT: 0.4 U/ML — SIGNIFICANT CHANGE UP
CREAT SERPL-MCNC: 0.4 MG/DL — LOW (ref 0.5–1.3)
CULTURE RESULTS: SIGNIFICANT CHANGE UP
EOSINOPHIL # BLD AUTO: 0.01 K/UL — SIGNIFICANT CHANGE UP (ref 0–0.5)
EOSINOPHIL NFR BLD AUTO: 0.1 % — SIGNIFICANT CHANGE UP (ref 0–6)
GLUCOSE SERPL-MCNC: 108 MG/DL — HIGH (ref 70–99)
HCT VFR BLD CALC: 33.2 % — LOW (ref 34.5–45)
HGB BLD-MCNC: 10.8 G/DL — LOW (ref 11.5–15.5)
IMM GRANULOCYTES NFR BLD AUTO: 0.3 % — SIGNIFICANT CHANGE UP (ref 0–1.5)
LYMPHOCYTES # BLD AUTO: 1.33 K/UL — SIGNIFICANT CHANGE UP (ref 1–3.3)
LYMPHOCYTES # BLD AUTO: 12.3 % — LOW (ref 13–44)
MCHC RBC-ENTMCNC: 27.6 PG — SIGNIFICANT CHANGE UP (ref 27–34)
MCHC RBC-ENTMCNC: 32.5 GM/DL — SIGNIFICANT CHANGE UP (ref 32–36)
MCV RBC AUTO: 84.7 FL — SIGNIFICANT CHANGE UP (ref 80–100)
MONOCYTES # BLD AUTO: 1.2 K/UL — HIGH (ref 0–0.9)
MONOCYTES NFR BLD AUTO: 11.1 % — SIGNIFICANT CHANGE UP (ref 2–14)
NEUTROPHILS # BLD AUTO: 8.2 K/UL — HIGH (ref 1.8–7.4)
NEUTROPHILS NFR BLD AUTO: 76 % — SIGNIFICANT CHANGE UP (ref 43–77)
NRBC # BLD: 0 /100 WBCS — SIGNIFICANT CHANGE UP (ref 0–0)
PLATELET # BLD AUTO: 334 K/UL — SIGNIFICANT CHANGE UP (ref 150–400)
POTASSIUM SERPL-MCNC: 3.9 MMOL/L — SIGNIFICANT CHANGE UP (ref 3.5–5.3)
POTASSIUM SERPL-SCNC: 3.9 MMOL/L — SIGNIFICANT CHANGE UP (ref 3.5–5.3)
RBC # BLD: 3.92 M/UL — SIGNIFICANT CHANGE UP (ref 3.8–5.2)
RBC # FLD: 12.9 % — SIGNIFICANT CHANGE UP (ref 10.3–14.5)
SARS-COV-2 IGG+IGM SERPL QL IA: 0.4 U/ML — SIGNIFICANT CHANGE UP
SARS-COV-2 IGG+IGM SERPL QL IA: NEGATIVE — SIGNIFICANT CHANGE UP
SODIUM SERPL-SCNC: 139 MMOL/L — SIGNIFICANT CHANGE UP (ref 135–145)
SPECIMEN SOURCE: SIGNIFICANT CHANGE UP
WBC # BLD: 10.79 K/UL — HIGH (ref 3.8–10.5)
WBC # FLD AUTO: 10.79 K/UL — HIGH (ref 3.8–10.5)

## 2021-10-09 PROCEDURE — 93010 ELECTROCARDIOGRAM REPORT: CPT

## 2021-10-09 PROCEDURE — 99232 SBSQ HOSP IP/OBS MODERATE 35: CPT | Mod: GC

## 2021-10-09 RX ORDER — LACTULOSE 10 G/15ML
20 SOLUTION ORAL ONCE
Refills: 0 | Status: COMPLETED | OUTPATIENT
Start: 2021-10-09 | End: 2021-10-09

## 2021-10-09 RX ADMIN — QUETIAPINE FUMARATE 25 MILLIGRAM(S): 200 TABLET, FILM COATED ORAL at 11:31

## 2021-10-09 RX ADMIN — HEPARIN SODIUM 5000 UNIT(S): 5000 INJECTION INTRAVENOUS; SUBCUTANEOUS at 00:54

## 2021-10-09 RX ADMIN — Medication 1 DROP(S): at 11:31

## 2021-10-09 RX ADMIN — Medication 1 ENEMA: at 10:31

## 2021-10-09 RX ADMIN — Medication 650 MILLIGRAM(S): at 01:29

## 2021-10-09 RX ADMIN — HEPARIN SODIUM 5000 UNIT(S): 5000 INJECTION INTRAVENOUS; SUBCUTANEOUS at 21:45

## 2021-10-09 RX ADMIN — CARBIDOPA AND LEVODOPA 1 TABLET(S): 25; 100 TABLET ORAL at 13:07

## 2021-10-09 RX ADMIN — LACTULOSE 20 GRAM(S): 10 SOLUTION ORAL at 11:30

## 2021-10-09 RX ADMIN — Medication 125 MILLIGRAM(S): at 00:57

## 2021-10-09 RX ADMIN — CARBIDOPA AND LEVODOPA 1 TABLET(S): 25; 100 TABLET ORAL at 09:17

## 2021-10-09 RX ADMIN — Medication 125 MILLIGRAM(S): at 05:33

## 2021-10-09 RX ADMIN — CARBIDOPA AND LEVODOPA 1 TABLET(S): 25; 100 TABLET ORAL at 21:45

## 2021-10-09 RX ADMIN — PANTOPRAZOLE SODIUM 40 MILLIGRAM(S): 20 TABLET, DELAYED RELEASE ORAL at 05:33

## 2021-10-09 RX ADMIN — CARBIDOPA AND LEVODOPA 1 TABLET(S): 25; 100 TABLET ORAL at 00:54

## 2021-10-09 RX ADMIN — AMLODIPINE BESYLATE 10 MILLIGRAM(S): 2.5 TABLET ORAL at 05:33

## 2021-10-09 RX ADMIN — HEPARIN SODIUM 5000 UNIT(S): 5000 INJECTION INTRAVENOUS; SUBCUTANEOUS at 05:32

## 2021-10-09 RX ADMIN — HEPARIN SODIUM 5000 UNIT(S): 5000 INJECTION INTRAVENOUS; SUBCUTANEOUS at 13:07

## 2021-10-09 NOTE — DIETITIAN INITIAL EVALUATION ADULT. - ORAL INTAKE PTA/DIET HISTORY
PTA per Son  -Intake: poor-moderate intake in setting of diarrhea  -Chewing/Swallowing: consumes softer foods in setting of difficulty with teeth implants   -Allergies/Intolerances: NKFA

## 2021-10-09 NOTE — PATIENT PROFILE ADULT - VISION (WITH CORRECTIVE LENSES IF THE PATIENT USUALLY WEARS THEM):
[id number] Comprehensive Partially impaired: cannot see medication labels or newsprint, but can see obstacles in path, and the surrounding layout; can count fingers at arm's length

## 2021-10-09 NOTE — CONSULT NOTE ADULT - SUBJECTIVE AND OBJECTIVE BOX
[facility] DIVISION of INFECTIOUS DISEASE  Stephon Waldron MD PhD, Monica Murphy MD, Pooja Price MD, Clari Salcedo MD, Rosalio Short MD  and providing coverage with Didi Smith MD and Danile Navarro MD  Providing Infectious Disease Consultations at [facility], [facility]'s    Office# [phone] to schedule follow up appointments  Answering Service for urgent calls or New Consults [phone]  Cell# to text for urgent issues Stephon Waldron [phone]     HPI:  76F w/ hx of Parkinson's dementia, HTN, pubic rami fracture, dementia, anemia,  glaucoma presents today for generalized weakness and diarrhea.. Patient was diagnosed with UTI, prescribed bactrim on last dose was on 10/4, developed diarrhea on 10/6. Patient has had multiple episodes of watery and smelly diarrhea. Patient states that she had at least 3 episodes today prior to coming to ED, but has not had any in ED. Patient was feeling weak once the diarrhea developed and ended up falling on 10/7, according to son she was up at night, and had fallen he woke up because of the noise. Initially patient seemed well, but next day had significant pain in L hip so brought to ED.    VS:  Febrile with T-max: 100.8, HR: 89, BP: 133/63, SpO2: 100% RA.   Labs Leukocytosis 14.82K  H/H: 10.8/32.9. Na 125   Patient was given in the ED IVF 2l bolus  (08 Oct 2021 18:29)      PAST MEDICAL & SURGICAL HISTORY:  Parkinsons  Cataract  Anemia  Dementia  Parkinson disease    History of left hip replacement      Antimicrobials      Immunological      Other  acetaminophen   Tablet .. 650 milliGRAM(s) Oral every 6 hours PRN  amLODIPine   Tablet 10 milliGRAM(s) Oral daily  carbidopa/levodopa  25/100 1 Tablet(s) Oral three times a day  heparin   Injectable 5000 Unit(s) SubCutaneous every 8 hours  ondansetron Injectable 4 milliGRAM(s) IV Push every 8 hours PRN  pantoprazole    Tablet 40 milliGRAM(s) Oral before breakfast  prednisoLONE acetate 1% Suspension 1 Drop(s) Both EYES daily  QUEtiapine 25 milliGRAM(s) Oral daily      Allergies    tetracycline (Unknown)    Intolerances        SOCIAL HISTORY:  Patient lives at home with son  Patient has aides for ADLs  Walks with walker  no significant smoking or etoh hx (08 Oct 2021 18:29)      FAMILY HISTORY:  FH: type 2 diabetes        ROS:    EYES:  Negative  blurry vision or double vision  GASTROINTESTINAL:  Negative for nausea, vomiting, no cough  -otherwise negative except for subjective    Vital Signs Last 24 Hrs  T(C): 37.1 (09 Oct 2021 05:14), Max: 38.2 (08 Oct 2021 17:21)  T(F): 98.8 (09 Oct 2021 05:14), Max: 100.8 (08 Oct 2021 17:21)  HR: 76 (09 Oct 2021 05:14) (76 - 98)  BP: 111/67 (09 Oct 2021 05:14) (109/62 - 136/68)  BP(mean): --  RR: 17 (09 Oct 2021 05:14) (16 - 17)  SpO2: 99% (09 Oct 2021 05:14) (96% - 100%)    PE:  WDWN in no distress  HEENT:  NC, PERRL, sclerae anicteric, conjunctivae clear, EOMI.  Sinuses nontender, no nasal exudate.  No buccal or pharyngeal lesions, erythema or exudate  Neck:  Supple, no adenopathy  Lungs:  No accessory muscle use, breathing comfortably  Cor:  distant  Abd:  Symmetric,  firm, diffusely tender, no masses, guarding or rebound.   Extrem:  No cyanosis or edema  Skin:  No rashes.  Neuro: alert, conversant        LABS:                        10.8   14.82 )-----------( 354      ( 08 Oct 2021 17:18 )             32.9       WBC Count: 14.82 K/uL (10-08-21 @ 17:18)      10-09    139  |  110<H>  |  11  ----------------------------<  108<H>  3.9   |  22  |  0.40<L>    Ca    8.2<L>      09 Oct 2021 09:41    TPro  7.5  /  Alb  3.3  /  TBili  0.4  /  DBili  x   /  AST  22  /  ALT  23  /  AlkPhos  102  10-08      Creatinine, Serum: 0.40 mg/dL (10-09-21 @ 09:41)  Creatinine, Serum: 0.65 mg/dL (10-08-21 @ 17:18)      Urinalysis Basic - ( 08 Oct 2021 18:20 )    Color: Yellow / Appearance: Slightly Turbid / S.005 / pH: x  Gluc: x / Ketone: Negative  / Bili: Negative / Urobili: Negative   Blood: x / Protein: Negative / Nitrite: Negative   Leuk Esterase: Trace / RBC: 6-10 /HPF / WBC 3-5   Sq Epi: x / Non Sq Epi: Occasional / Bacteria: Few      MICROBIOLOGY:      RADIOLOGY & ADDITIONAL STUDIES:    --< from: Xray Chest 1 View-PORTABLE IMMEDIATE (10.08.21 @ 16:58) >  EXAM:  XR CHEST PORTABLE IMMED 1V                            PROCEDURE DATE:  10/08/2021          INTERPRETATION:  Exam Date: 10/8/2021 4:58 PM    Chest radiograph (one view)    CLINICAL INFORMATION:  Sepsis    TECHNIQUE:  Single frontal view of the chest was obtained.    COMPARISON: None    FINDINGS/  IMPRESSION:        The lungs are clear.  No pleural abnormality is seen.    Cardiomediastinal silhouette is intact.  < from: CT Lumbar Spine No Cont (10.08.21 @ 19:46) >    EXAM:  CT THORACIC SPINE                          EXAM:  CT LUMBAR SPINE                          EXAM:  CT CERVICAL SPINE                            PROCEDURE DATE:  10/08/2021          INTERPRETATION:  CLINICAL INDICATION: Fall.    TECHNIQUE: CT of the cervical, thoracic, and lumbar spine was performed without the administration of intravenous contrast, according to standard protocol.    COMPARISON: CT cervical spine 2020. CT chest 2020. CT abdomen and pelvis 2020.    FINDINGS:  No acute fracture identified. Multiple old left lower rib fractures are noted.  No evidence of large disc protrusion or critical spinal stenosis. MRI may be obtained, if the patient is MRI compatible if further information regarding spinal canal soft tissue contents is required.    There is no prevertebral or paraspinal soft tissue swelling.    Included lungs are clear.    Other:  Heterogeneous appearance of the thyroid. Consider thyroid ultrasound for further evaluation.    Large volume stool within the rectum, correlate for impaction. Surrounding fat stranding, cannot rule out stercoral colitis .    IMPRESSION:  -No acute fracture identified. If clinical concern persists, consider MRI.    -Large volume stool within the rectum, correlate for impaction. Cannot rule out associated stercoral colitis.

## 2021-10-09 NOTE — DISCHARGE NOTE PROVIDER - CARE PROVIDER_API CALL
Ramon Palomares (DO)  Family Medicine  [address], [address] floor  [place], [place] [id number]  Phone: [phone]  Fax: [phone]  Established Patient  Follow Up Time: 1 week

## 2021-10-09 NOTE — DISCHARGE NOTE PROVIDER - HOSPITAL COURSE
FROM ADMISSION H+P:   HPI:  76F w/ hx of Parkinson's dementia, HTN, pubic rami fracture, dementia, anemia,  glaucoma presents today for generalized weakness and diarrhea. Information obtained from patient's son Roman. Patient's son reports that she was diagnosed with UTI, prescribed bactrim on last dose was on 10/4, developed diarrhea on 10/6. Patient has had multiple episodes of watery and smelly diarrhea. Patient states that she had at least 3 episodes today prior to coming to ED, but has not had any in ED. Patient was feeling weak once the diarrhea developed and ended up falling on 10/7, according to son she was up at night, and had fallen he woke up because of the noise. Initially patient seemed well, but next day ha significant pain in L hip so brought to ED.  Patient' son endorses patient is unable to get out  from bed or chair due to severe weakness with recurrent falls    VS:  Febrile with T-max: 100.8, HR: 89, BP: 133/63, SpO2: 100% RA.   Labs Leukocytosis 14.82K  H/H: 10.8/32.9. Na 125   Patient was given in the ED IVF 2l bolus  (08 Oct 2021 18:29)      ---  HOSPITAL COURSE/PERTINENT LABS/PROCEDURES PERFORMED/PENDING TESTS: Patient admitted for failure to thrive. CT cervical/thoracic/lumbar spine done showing no acute fracture. However, severe stool burden was found in the rectum, suspicious for impaction. Patient given lactulose and fleet enema with relief. ID Dr. Waldron consulteed for possible C. diff. C Diff PCR sent showing _____________. GI stool PCR also sent showing __________. S&S eval done recommending __________. blood and urine cx __________. Pt recommending _________.     ---  PATIENT CONDITION:  - stable    ---  PHYSICAL EXAM ON DAY OF DISCHARGE:    ---  CONSULTANTS:   Gregorio Waldron    ---  ADVANCED CARE PLANNING:  - Code status:      - MOLST completed:      [  ] NO     [  ] YES    ---  TIME SPENT:  I, the attending physician, was physically present for the key portions of the evaluation and management (E/M) service provided. The total amount of time spent reviewing the hospital notes, laboratory values, imaging findings, assessing/counseling the patient, discussing with consultant physicians, social work, nursing staff was -- minutes FROM ADMISSION H+P:   HPI:  76F w/ hx of Parkinson's dementia, HTN, pubic rami fracture, dementia, anemia,  glaucoma presents today for generalized weakness and diarrhea. Information obtained from patient's son Roman. Patient's son reports that she was diagnosed with UTI, prescribed bactrim on last dose was on 10/4, developed diarrhea on 10/6. Patient has had multiple episodes of watery and smelly diarrhea. Patient states that she had at least 3 episodes today prior to coming to ED, but has not had any in ED. Patient was feeling weak once the diarrhea developed and ended up falling on 10/7, according to son she was up at night, and had fallen he woke up because of the noise. Initially patient seemed well, but next day ha significant pain in L hip so brought to ED.  Patient' son endorses patient is unable to get out  from bed or chair due to severe weakness with recurrent falls    VS:  Febrile with T-max: 100.8, HR: 89, BP: 133/63, SpO2: 100% RA.   Labs Leukocytosis 14.82K  H/H: 10.8/32.9. Na 125   Patient was given in the ED IVF 2l bolus  (08 Oct 2021 18:29)      ---  HOSPITAL COURSE/PERTINENT LABS/PROCEDURES PERFORMED/PENDING TESTS: Patient admitted for failure to thrive. CT cervical/thoracic/lumbar spine done showing no acute fracture. However, severe stool burden was found in the rectum, suspicious for impaction. Patient given lactulose and fleet enema with relief. ID Dr. Waldron consulteed for possible C. diff. C Diff PCR sent showing no C. Diff. GI stool PCR also sent showing no pathogens that were able to be isolated. S&S eval done recommending a dysphagia 3 diet with thin liquids. blood and urine cx no growth to date. Pt recommending subacute rehab.     ---  PATIENT CONDITION:  - stable    ---  Vital Signs Last 24 Hrs  T(C): 37.2 (11 Oct 2021 12:18), Max: 38.1 (10 Oct 2021 20:11)  T(F): 99 (11 Oct 2021 12:18), Max: 100.5 (10 Oct 2021 20:11)  HR: 80 (11 Oct 2021 12:18) (80 - 91)  BP: 118/67 (11 Oct 2021 12:18) (101/57 - 118/67)  BP(mean): --  RR: 17 (11 Oct 2021 12:18) (16 - 19)  SpO2: 97% (11 Oct 2021 12:18) (92% - 97%)    PHYSICAL EXAM ON DAY OF DISCHARGE:  GENERAL: frail elderly female in NAD  HEENT:  anicteric, moist mucous membranes  CHEST/LUNG:  CTA b/l, no rales, wheezes, or rhonchi  HEART:  RRR, S1, S2  ABDOMEN:  BS+, soft, nontender, nondistended, hypoactive BS  EXTREMITIES: no edema, cyanosis, or calf tenderness, muscle strength 3/5 in all 4 extremities  NERVOUS SYSTEM: answers questions and follows commands appropriately, A&Ox3    ---  CONSULTANTS:   ID-Dr. Waldron    ---  ADVANCED CARE PLANNING:  - Code status:      - MOLST completed:      [  ] NO     [  ] YES    ---  TIME SPENT:  I, the attending physician, was physically present for the key portions of the evaluation and management (E/M) service provided. The total amount of time spent reviewing the hospital notes, laboratory values, imaging findings, assessing/counseling the patient, discussing with consultant physicians, social work, nursing staff was -- minutes FROM ADMISSION H+P:   HPI:  76F w/ hx of Parkinson's dementia, HTN, pubic rami fracture, dementia, anemia,  glaucoma presents today for generalized weakness and diarrhea. Information obtained from patient's son Roman. Patient's son reports that she was diagnosed with UTI, prescribed bactrim on last dose was on 10/4, developed diarrhea on 10/6. Patient has had multiple episodes of watery and smelly diarrhea. Patient states that she had at least 3 episodes today prior to coming to ED, but has not had any in ED. Patient was feeling weak once the diarrhea developed and ended up falling on 10/7, according to son she was up at night, and had fallen he woke up because of the noise. Initially patient seemed well, but next day ha significant pain in L hip so brought to ED.  Patient' son endorses patient is unable to get out  from bed or chair due to severe weakness with recurrent falls    VS:  Febrile with T-max: 100.8, HR: 89, BP: 133/63, SpO2: 100% RA.   Labs Leukocytosis 14.82K  H/H: 10.8/32.9. Na 125   Patient was given in the ED IVF 2l bolus  (08 Oct 2021 18:29)      ---  HOSPITAL COURSE/PERTINENT LABS/PROCEDURES PERFORMED/PENDING TESTS: Patient admitted for failure to thrive. CT cervical/thoracic/lumbar spine done showing no acute fracture. However, severe stool burden was found in the rectum, suspicious for impaction. Patient given lactulose and fleet enema with relief. ID Dr. Waldron consulteed for possible C. diff. C Diff PCR sent showing no C. Diff. GI stool PCR also sent showing no pathogens that were able to be isolated. S&S eval done recommending a dysphagia 3 diet with thin liquids. blood and urine cx no growth to date. Pt recommending subacute rehab.     ---  PATIENT CONDITION:  - stable    ---  Vital Signs Last 24 Hrs  T(C): 36.9 (13 Oct 2021 05:19), Max: 36.9 (12 Oct 2021 11:58)  T(F): 98.4 (13 Oct 2021 05:19), Max: 98.5 (12 Oct 2021 11:58)  HR: 79 (13 Oct 2021 05:19) (79 - 83)  BP: 112/69 (13 Oct 2021 05:19) (112/69 - 115/70)  BP(mean): --  RR: 18 (13 Oct 2021 05:19) (17 - 18)  SpO2: 99% (13 Oct 2021 05:19) (95% - 99%)    PHYSICAL EXAM ON DAY OF DISCHARGE:  GENERAL: frail elderly female in NAD  HEENT:  anicteric, moist mucous membranes  CHEST/LUNG:  CTA b/l, no rales, wheezes, or rhonchi  HEART:  RRR, S1, S2  ABDOMEN:  BS+, soft, nontender, nondistended, hypoactive BS  EXTREMITIES: no edema, cyanosis, or calf tenderness, muscle strength 3/5 in all 4 extremities  NERVOUS SYSTEM: answers questions and follows commands appropriately, A&Ox3    ---  CONSULTANTS:   ID-Dr. Waldron    ---  ADVANCED CARE PLANNING:  - Code status:      - MOLST completed:      [  ] NO     [  ] YES    ---  TIME SPENT:  I, the attending physician, was physically present for the key portions of the evaluation and management (E/M) service provided. The total amount of time spent reviewing the hospital notes, laboratory values, imaging findings, assessing/counseling the patient, discussing with consultant physicians, social work, nursing staff was -- minutes FROM ADMISSION H+P:   HPI:  76F w/ hx of Parkinson's dementia, HTN, pubic rami fracture, dementia, anemia,  glaucoma presents today for generalized weakness and diarrhea. Information obtained from patient's son Roman. Patient's son reports that she was diagnosed with UTI, prescribed bactrim on last dose was on 10/4, developed diarrhea on 10/6. Patient has had multiple episodes of watery and smelly diarrhea. Patient states that she had at least 3 episodes today prior to coming to ED, but has not had any in ED. Patient was feeling weak once the diarrhea developed and ended up falling on 10/7, according to son she was up at night, and had fallen he woke up because of the noise. Initially patient seemed well, but next day ha significant pain in L hip so brought to ED.  Patient' son endorses patient is unable to get out  from bed or chair due to severe weakness with recurrent falls    VS:  Febrile with T-max: 100.8, HR: 89, BP: 133/63, SpO2: 100% RA.   Labs Leukocytosis 14.82K  H/H: 10.8/32.9. Na 125   Patient was given in the ED IVF 2l bolus  (08 Oct 2021 18:29)      ---  HOSPITAL COURSE/PERTINENT LABS/PROCEDURES PERFORMED/PENDING TESTS: Patient admitted for failure to thrive. CT cervical/thoracic/lumbar spine done showing no acute fracture. However, severe stool burden was found in the rectum, suspicious for impaction. Patient given lactulose and fleet enema with relief. ID Dr. Waldron consulteed for possible C. diff. C Diff PCR sent showing no C. Diff. GI stool PCR also sent showing no pathogens that were able to be isolated. S&S eval done recommending a dysphagia 3 diet with thin liquids. blood and urine cx no growth to date. Pt recommending subacute rehab.     ---  PATIENT CONDITION:  - stable    ---  Vital Signs At Discharge  T(C): 36.9 (13 Oct 2021 05:19), Max: 36.9 (12 Oct 2021 11:58)  T(F): 98.4 (13 Oct 2021 05:19), Max: 98.5 (12 Oct 2021 11:58)  HR: 79 (13 Oct 2021 05:19) (79 - 83)  BP: 112/69 (13 Oct 2021 05:19) (112/69 - 115/70)  BP(mean): --  RR: 18 (13 Oct 2021 05:19) (17 - 18)  SpO2: 99% (13 Oct 2021 05:19) (95% - 99%)    PHYSICAL EXAM ON DAY OF DISCHARGE:  GENERAL: frail elderly female in NAD  HEENT:  anicteric, moist mucous membranes  CHEST/LUNG:  CTA b/l, no rales, wheezes, or rhonchi  HEART:  RRR, S1, S2  ABDOMEN:  BS+, soft, nontender, nondistended, hypoactive BS  EXTREMITIES: no edema, cyanosis, or calf tenderness, muscle strength 3/5 in all 4 extremities  NERVOUS SYSTEM: does not participate with history or exam, A&Ox3    ---  CONSULTANTS:   ID-Dr. Waldron    ---  ADVANCED CARE PLANNING:  - Code status:      - MOLST completed:      [  ] NO     [  ] YES    ---  TIME SPENT:  I, the attending physician, was physically present for the key portions of the evaluation and management (E/M) service provided. The total amount of time spent reviewing the hospital notes, laboratory values, imaging findings, assessing/counseling the patient, discussing with consultant physicians, social work, nursing staff was -- minutes FROM ADMISSION H+P:   HPI:  76F w/ hx of Parkinson's dementia, HTN, pubic rami fracture, dementia, anemia,  glaucoma presents today for generalized weakness and diarrhea. Information obtained from patient's son Roman. Patient's son reports that she was diagnosed with UTI, prescribed bactrim on last dose was on 10/4, developed diarrhea on 10/6. Patient has had multiple episodes of watery and smelly diarrhea. Patient states that she had at least 3 episodes today prior to coming to ED, but has not had any in ED. Patient was feeling weak once the diarrhea developed and ended up falling on 10/7, according to son she was up at night, and had fallen he woke up because of the noise. Initially patient seemed well, but next day ha significant pain in L hip so brought to ED.  Patient' son endorses patient is unable to get out  from bed or chair due to severe weakness with recurrent falls    VS:  Febrile with T-max: 100.8, HR: 89, BP: 133/63, SpO2: 100% RA.   Labs Leukocytosis 14.82K  H/H: 10.8/32.9. Na 125   Patient was given in the ED IVF 2l bolus  (08 Oct 2021 18:29)      ---  HOSPITAL COURSE/PERTINENT LABS/PROCEDURES PERFORMED/PENDING TESTS: Patient admitted for failure to thrive. CT cervical/thoracic/lumbar spine done showing no acute fracture. However, severe stool burden was found in the rectum, suspicious for impaction. Patient given lactulose and fleet enema with relief. ID Dr. Waldron consulteed for possible C. diff. C Diff PCR sent showing no C. Diff. GI stool PCR also sent showing no pathogens that were able to be isolated. S&S eval done recommending a dysphagia 3 diet with thin liquids. blood and urine cx no growth to date. Pt recommending subacute rehab.     ---  PATIENT CONDITION:  - stable    ---  Vital Signs At Discharge  T(C): 36.9 (13 Oct 2021 05:19), Max: 36.9 (12 Oct 2021 11:58)  T(F): 98.4 (13 Oct 2021 05:19), Max: 98.5 (12 Oct 2021 11:58)  HR: 79 (13 Oct 2021 05:19) (79 - 83)  BP: 112/69 (13 Oct 2021 05:19) (112/69 - 115/70)  RR: 18 (13 Oct 2021 05:19) (17 - 18)  SpO2: 99% (13 Oct 2021 05:19) (95% - 99%)    PHYSICAL EXAM ON DAY OF DISCHARGE:  GENERAL: frail elderly female in NAD  HEENT:  anicteric, moist mucous membranes  CHEST/LUNG:  CTA b/l, no rales, wheezes, or rhonchi  HEART:  RRR, S1, S2  ABDOMEN:  BS+, soft, nontender, nondistended, hypoactive BS  EXTREMITIES: no edema, cyanosis, or calf tenderness, muscle strength 3/5 in all 4 extremities  NERVOUS SYSTEM: does not participate with history or exam, A&Ox3    ---  CONSULTANTS:   ID-Dr. Waldron    ---  ADVANCED CARE PLANNING:  - Code status:      - MOLST completed:      [  ] NO     [  ] YES    ---  TIME SPENT:  I, the attending physician, was physically present for the key portions of the evaluation and management (E/M) service provided. The total amount of time spent reviewing the hospital notes, laboratory values, imaging findings, assessing/counseling the patient, discussing with consultant physicians, social work, nursing staff was -- minutes FROM ADMISSION H+P:   HPI:  76F w/ hx of Parkinson's dementia, HTN, pubic rami fracture, dementia, anemia,  glaucoma presents today for generalized weakness and diarrhea. Information obtained from patient's son Roman. Patient's son reports that she was diagnosed with UTI, prescribed bactrim on last dose was on 10/4, developed diarrhea on 10/6. Patient has had multiple episodes of watery and smelly diarrhea. Patient states that she had at least 3 episodes today prior to coming to ED, but has not had any in ED. Patient was feeling weak once the diarrhea developed and ended up falling on 10/7, according to son she was up at night, and had fallen he woke up because of the noise. Initially patient seemed well, but next day ha significant pain in L hip so brought to ED.  Patient' son endorses patient is unable to get out  from bed or chair due to severe weakness with recurrent falls    VS:  Febrile with T-max: 100.8, HR: 89, BP: 133/63, SpO2: 100% RA.   Labs Leukocytosis 14.82K  H/H: 10.8/32.9. Na 125   Patient was given in the ED IVF 2l bolus  (08 Oct 2021 18:29)      ---  HOSPITAL COURSE/PERTINENT LABS/PROCEDURES PERFORMED/PENDING TESTS: Patient admitted for failure to thrive. CT cervical/thoracic/lumbar spine done showing no acute fracture. However, severe stool burden was found in the rectum, suspicious for impaction. Patient given lactulose and fleet enema with relief. ID Dr. Waldron consulteed for possible C. diff. C Diff PCR sent showing no C. Diff. GI stool PCR also sent showing no pathogens that were able to be isolated. S&S eval done recommending a dysphagia 3 diet with thin liquids. blood and urine cx no growth to date. Pt recommending subacute rehab.     ---  PATIENT CONDITION:  - stable    ---  Vital Signs At Discharge  T(C): 36.9 (13 Oct 2021 05:19), Max: 36.9 (12 Oct 2021 11:58)  T(F): 98.4 (13 Oct 2021 05:19), Max: 98.5 (12 Oct 2021 11:58)  HR: 79 (13 Oct 2021 05:19) (79 - 83)  BP: 112/69 (13 Oct 2021 05:19) (112/69 - 115/70)  RR: 18 (13 Oct 2021 05:19) (17 - 18)  SpO2: 99% (13 Oct 2021 05:19) (95% - 99%)    PHYSICAL EXAM ON DAY OF DISCHARGE:  GENERAL: frail elderly female in NAD  HEENT:  anicteric, moist mucous membranes  CHEST/LUNG:  CTA b/l, no rales, wheezes, or rhonchi  HEART:  RRR, S1, S2  ABDOMEN:  BS+, soft, nontender, nondistended, hypoactive BS  EXTREMITIES: no edema, cyanosis, or calf tenderness  NERVOUS SYSTEM: awake and alert, does not participate with history or exam    ---  CONSULTANTS:   ID-Dr. Waldron    ---  TIME SPENT:  I, the attending physician, was physically present for the key portions of the evaluation and management (E/M) service provided. The total amount of time spent reviewing the hospital notes, laboratory values, imaging findings, assessing/counseling the patient, discussing with consultant physicians, social work, nursing staff was 32 minutes

## 2021-10-09 NOTE — CONSULT NOTE ADULT - ASSESSMENT
76F w/ hx of Parkinson's dementia, HTN, pubic rami fracture, dementia, anemia,  glaucoma presents today for generalized weakness and diarrhea. Pt was diagnosed with UTI, prescribed bactrim last dose was on 10/4, developed diarrhea on 10/6. Patient has had multiple episodes of watery and smelly diarrhea. Pt reports continue diarrhea that looks like pea soup but reports also having 3 large fecal balls in rectum that are painful.    RECOMMENDATIONS  from history and imaging confirmation it appears that pt has fecal impaction with hard stools and loose diarrhea around these. Unclear if pt actually has an acute infectious process but do agree with stool C diff, culture and will add GI-PCR. Would observe off abx at this point and coordinate with GI regarding enemas and addressing impaction.    Thank you for consulting us and involving us in the management of this most interesting and challenging case.  We will follow along in the care of this patient. Please call us at [phone] or text me directly on my cell# at [phone] with any concerns.

## 2021-10-09 NOTE — DISCHARGE NOTE PROVIDER - NSDCCPCAREPLAN_GEN_ALL_CORE_FT
PRINCIPAL DISCHARGE DIAGNOSIS  Diagnosis: Non-infective diarrhea  Assessment and Plan of Treatment: Diarrhea is a condition in which the stool becomes watery and the amount that you need to use the bathroom increases. In certain cases, it can be a serious condition because it can lead to severe dehydration and malnutrition. It is important that you continue to consume a full and normal diet, and that you continue to drink water when you feel thirsty. If you were prescribed medications for controlling your diarrhea, it is important that you take them as prescribed and that you follow up with your primary care provider within a week of discharge to discuss this hospitalization.      SECONDARY DISCHARGE DIAGNOSES  Diagnosis: Weakness  Assessment and Plan of Treatment:     Diagnosis: Hip pain, left  Assessment and Plan of Treatment:     Diagnosis: Inability to ambulate due to hip  Assessment and Plan of Treatment:      PRINCIPAL DISCHARGE DIAGNOSIS  Diagnosis: Non-infective diarrhea  Assessment and Plan of Treatment: Diarrhea is a condition in which the stool becomes watery and the amount that you need to use the bathroom increases. In certain cases, it can be a serious condition because it can lead to severe dehydration and malnutrition. It is important that you continue to consume a full and normal diet, and that you continue to drink water when you feel thirsty. If you were prescribed medications for controlling your diarrhea, it is important that you take them as prescribed and that you follow up with your primary care provider within a week of discharge to discuss this hospitalization.      SECONDARY DISCHARGE DIAGNOSES  Diagnosis: Falls  Assessment and Plan of Treatment: You were admitted with frequent falls, likely secondary to your advanced parkinsons disease. PT evalauted you and recommended discharge to rehab. There were no fractures noted on imaging.    Diagnosis: History of Parkinson's disease  Assessment and Plan of Treatment: Continue your current dose of sinemet.    Diagnosis: Dementia  Assessment and Plan of Treatment: Continue your current dose of Sinemet    Diagnosis: Hypertension  Assessment and Plan of Treatment: Continue amlodipine

## 2021-10-09 NOTE — DISCHARGE NOTE PROVIDER - NSDCMRMEDTOKEN_GEN_ALL_CORE_FT
amLODIPine 10 mg oral tablet: 1 tab(s) orally once a day  B-Complex 50 oral tablet: 1 tab(s) orally once a day  carbidopa-levodopa 25 mg-100 mg oral tablet: 1 tab(s) orally 3 times a day  Combigan 0.2%-0.5% ophthalmic solution: 1 drop(s) to each affected eye every 12 hours  pantoprazole 40 mg oral delayed release tablet: 1 tab(s) orally once a day (before a meal)  prednisoLONE acetate 1% ophthalmic suspension: 1 drop(s) to each affected eye once a day  QUEtiapine 25 mg oral tablet: 1 tab(s) orally once a day

## 2021-10-09 NOTE — DIETITIAN INITIAL EVALUATION ADULT. - ADD RECOMMEND
1) Recommend regular diet to promote adequate intake; defer consistency to SLP and team. Monitor need for low residue diet. 2) Add Ensure x2/day to aid in meeting needs. 3) Monitor PO intake, labs, GI function.

## 2021-10-09 NOTE — DIETITIAN INITIAL EVALUATION ADULT. - OTHER INFO
Intake/GI:  -Per RN, poor PO intake in-house   -Noted on Dysphagia 3 Soft-Nectar thickened liquids; no SLP note noted - to recommend consult   -Per flowsheet, diarrhea noted 10/8 - lactulose syrup ordered  -R/o C. Diff     Weight Hx:   -Current: 153 pounds (dosing)   -Per Son, no recent weight loss noted; states most recent known weight: 146 pounds at latest  visit x few weeks ago

## 2021-10-09 NOTE — ED ADULT NURSE REASSESSMENT NOTE - NS ED NURSE REASSESS COMMENT FT1
pt reavaluated and medicated with tylenol for temp 100 pt evaluated and report given and transfer to floor

## 2021-10-09 NOTE — DIETITIAN INITIAL EVALUATION ADULT. - CHIEF COMPLAINT
76y Female with PMH of Parkinson Disease, Dementia, Anemia. Pt admitted on 10/8 complaining of hip pain/injury. Pt presented with noninfectious gastritis infection - r/o C. Diff.

## 2021-10-09 NOTE — DISCHARGE NOTE PROVIDER - NSDCFUADDINST_GEN_ALL_CORE_FT
- Please make an appointment for follow up with your primary doctor in 1-3 days  - Patient or caretaker is responsible for making all appointments  - Please return to the ED if you develop worsening symptoms or fever

## 2021-10-10 LAB
ALBUMIN SERPL ELPH-MCNC: 2.3 G/DL — LOW (ref 3.3–5)
ALP SERPL-CCNC: 91 U/L — SIGNIFICANT CHANGE UP (ref 40–120)
ALT FLD-CCNC: 9 U/L — LOW (ref 12–78)
ANION GAP SERPL CALC-SCNC: 9 MMOL/L — SIGNIFICANT CHANGE UP (ref 5–17)
AST SERPL-CCNC: 17 U/L — SIGNIFICANT CHANGE UP (ref 15–37)
BASOPHILS # BLD AUTO: 0.04 K/UL — SIGNIFICANT CHANGE UP (ref 0–0.2)
BASOPHILS NFR BLD AUTO: 0.4 % — SIGNIFICANT CHANGE UP (ref 0–2)
BILIRUB SERPL-MCNC: 0.4 MG/DL — SIGNIFICANT CHANGE UP (ref 0.2–1.2)
BUN SERPL-MCNC: 19 MG/DL — SIGNIFICANT CHANGE UP (ref 7–23)
CALCIUM SERPL-MCNC: 7.6 MG/DL — LOW (ref 8.5–10.1)
CHLORIDE SERPL-SCNC: 110 MMOL/L — HIGH (ref 96–108)
CO2 SERPL-SCNC: 23 MMOL/L — SIGNIFICANT CHANGE UP (ref 22–31)
CREAT SERPL-MCNC: 0.51 MG/DL — SIGNIFICANT CHANGE UP (ref 0.5–1.3)
EOSINOPHIL # BLD AUTO: 0.04 K/UL — SIGNIFICANT CHANGE UP (ref 0–0.5)
EOSINOPHIL NFR BLD AUTO: 0.4 % — SIGNIFICANT CHANGE UP (ref 0–6)
GLUCOSE SERPL-MCNC: 89 MG/DL — SIGNIFICANT CHANGE UP (ref 70–99)
HCT VFR BLD CALC: 28.4 % — LOW (ref 34.5–45)
HGB BLD-MCNC: 9 G/DL — LOW (ref 11.5–15.5)
IMM GRANULOCYTES NFR BLD AUTO: 0.4 % — SIGNIFICANT CHANGE UP (ref 0–1.5)
LYMPHOCYTES # BLD AUTO: 2.11 K/UL — SIGNIFICANT CHANGE UP (ref 1–3.3)
LYMPHOCYTES # BLD AUTO: 20.8 % — SIGNIFICANT CHANGE UP (ref 13–44)
MCHC RBC-ENTMCNC: 26.9 PG — LOW (ref 27–34)
MCHC RBC-ENTMCNC: 31.7 GM/DL — LOW (ref 32–36)
MCV RBC AUTO: 84.8 FL — SIGNIFICANT CHANGE UP (ref 80–100)
MONOCYTES # BLD AUTO: 1.42 K/UL — HIGH (ref 0–0.9)
MONOCYTES NFR BLD AUTO: 14 % — SIGNIFICANT CHANGE UP (ref 2–14)
NEUTROPHILS # BLD AUTO: 6.5 K/UL — SIGNIFICANT CHANGE UP (ref 1.8–7.4)
NEUTROPHILS NFR BLD AUTO: 64 % — SIGNIFICANT CHANGE UP (ref 43–77)
NRBC # BLD: 0 /100 WBCS — SIGNIFICANT CHANGE UP (ref 0–0)
PLATELET # BLD AUTO: 343 K/UL — SIGNIFICANT CHANGE UP (ref 150–400)
POTASSIUM SERPL-MCNC: 3.3 MMOL/L — LOW (ref 3.5–5.3)
POTASSIUM SERPL-SCNC: 3.3 MMOL/L — LOW (ref 3.5–5.3)
PROT SERPL-MCNC: 6 G/DL — SIGNIFICANT CHANGE UP (ref 6–8.3)
RBC # BLD: 3.35 M/UL — LOW (ref 3.8–5.2)
RBC # FLD: 12.9 % — SIGNIFICANT CHANGE UP (ref 10.3–14.5)
SODIUM SERPL-SCNC: 142 MMOL/L — SIGNIFICANT CHANGE UP (ref 135–145)
WBC # BLD: 10.15 K/UL — SIGNIFICANT CHANGE UP (ref 3.8–10.5)
WBC # FLD AUTO: 10.15 K/UL — SIGNIFICANT CHANGE UP (ref 3.8–10.5)

## 2021-10-10 PROCEDURE — 99232 SBSQ HOSP IP/OBS MODERATE 35: CPT | Mod: GC

## 2021-10-10 RX ORDER — POTASSIUM CHLORIDE 20 MEQ
40 PACKET (EA) ORAL ONCE
Refills: 0 | Status: COMPLETED | OUTPATIENT
Start: 2021-10-10 | End: 2021-10-10

## 2021-10-10 RX ADMIN — HEPARIN SODIUM 5000 UNIT(S): 5000 INJECTION INTRAVENOUS; SUBCUTANEOUS at 21:35

## 2021-10-10 RX ADMIN — CARBIDOPA AND LEVODOPA 1 TABLET(S): 25; 100 TABLET ORAL at 14:10

## 2021-10-10 RX ADMIN — AMLODIPINE BESYLATE 10 MILLIGRAM(S): 2.5 TABLET ORAL at 05:03

## 2021-10-10 RX ADMIN — Medication 1 DROP(S): at 11:25

## 2021-10-10 RX ADMIN — HEPARIN SODIUM 5000 UNIT(S): 5000 INJECTION INTRAVENOUS; SUBCUTANEOUS at 14:10

## 2021-10-10 RX ADMIN — CARBIDOPA AND LEVODOPA 1 TABLET(S): 25; 100 TABLET ORAL at 21:31

## 2021-10-10 RX ADMIN — Medication 40 MILLIEQUIVALENT(S): at 15:29

## 2021-10-10 RX ADMIN — PANTOPRAZOLE SODIUM 40 MILLIGRAM(S): 20 TABLET, DELAYED RELEASE ORAL at 05:04

## 2021-10-10 RX ADMIN — CARBIDOPA AND LEVODOPA 1 TABLET(S): 25; 100 TABLET ORAL at 05:08

## 2021-10-10 RX ADMIN — HEPARIN SODIUM 5000 UNIT(S): 5000 INJECTION INTRAVENOUS; SUBCUTANEOUS at 05:04

## 2021-10-10 RX ADMIN — QUETIAPINE FUMARATE 25 MILLIGRAM(S): 200 TABLET, FILM COATED ORAL at 11:26

## 2021-10-10 NOTE — SWALLOW BEDSIDE ASSESSMENT ADULT - SWALLOW EVAL: RECOMMENDED DIET
Dysphagia 3 (soft solids) and thin liquids, aspiration precautions and full assistance during meals, as tolerated

## 2021-10-10 NOTE — SWALLOW BEDSIDE ASSESSMENT ADULT - COMMENTS
Consult received and chart reviewed. Patient seen at bedside for initial assessment of swallow function. Patient was alert and cooperative and denied pain. Patient with inconsistent ability to follow simple commands, despite max prompting. Vocal quality and speech production WFL.    Per charting, Patient is a "76F w/ hx of Parkinson's dementia, HTN, pubic rami fracture, dementia, anemia,  glaucoma presents today for generalized weakness and diarrhea." WBC WFL. CXR 10/8/21 revealed "The lungs are clear. No pleural abnormality is seen. Cardiomediastinal silhouette is intact." CT Head 10/8/21 revealed "No intracranial hemorrhage or mass effect."    Discussed results with Patient, RN, and call out to MD.

## 2021-10-10 NOTE — SWALLOW BEDSIDE ASSESSMENT ADULT - SWALLOW EVAL: DIAGNOSIS
1. Patient demonstrates a mild oral dysphagia for puree, solids, honey thick, nectar thick, and thin liquids marked by prolonged mastication and manipulation resulting in delayed collection, containment, and transport. Trace lingual stasis observed post swallow with solids, which reduced with liquid wash. 2. Patient demonstrates a mild pharyngeal dysphagia for puree, solids, honey thick, nectar thick, and thin liquids marked by suspected delayed pharyngeal swallow trigger and hyolaryngeal elevation noted by digital palpation without evidence of airway penetration/aspiration. 3. Recommend Dysphagia 3 (soft) and thin liquids, with aspiration precautions and full assistance during meals d/t inconsistent ability to follow commands, as tolerated. Continue to monitor and notify SLP if changes occur.

## 2021-10-11 LAB
ALBUMIN SERPL ELPH-MCNC: 2.2 G/DL — LOW (ref 3.3–5)
ALP SERPL-CCNC: 94 U/L — SIGNIFICANT CHANGE UP (ref 40–120)
ALT FLD-CCNC: 8 U/L — LOW (ref 12–78)
ANION GAP SERPL CALC-SCNC: 6 MMOL/L — SIGNIFICANT CHANGE UP (ref 5–17)
AST SERPL-CCNC: 13 U/L — LOW (ref 15–37)
BASOPHILS # BLD AUTO: 0 K/UL — SIGNIFICANT CHANGE UP (ref 0–0.2)
BASOPHILS NFR BLD AUTO: 0 % — SIGNIFICANT CHANGE UP (ref 0–2)
BILIRUB SERPL-MCNC: 0.4 MG/DL — SIGNIFICANT CHANGE UP (ref 0.2–1.2)
BUN SERPL-MCNC: 22 MG/DL — SIGNIFICANT CHANGE UP (ref 7–23)
CALCIUM SERPL-MCNC: 8 MG/DL — LOW (ref 8.5–10.1)
CHLORIDE SERPL-SCNC: 111 MMOL/L — HIGH (ref 96–108)
CO2 SERPL-SCNC: 24 MMOL/L — SIGNIFICANT CHANGE UP (ref 22–31)
CREAT SERPL-MCNC: 0.45 MG/DL — LOW (ref 0.5–1.3)
CULTURE RESULTS: SIGNIFICANT CHANGE UP
EOSINOPHIL # BLD AUTO: 0 K/UL — SIGNIFICANT CHANGE UP (ref 0–0.5)
EOSINOPHIL NFR BLD AUTO: 0 % — SIGNIFICANT CHANGE UP (ref 0–6)
GLUCOSE SERPL-MCNC: 92 MG/DL — SIGNIFICANT CHANGE UP (ref 70–99)
HCT VFR BLD CALC: 29 % — LOW (ref 34.5–45)
HGB BLD-MCNC: 9.5 G/DL — LOW (ref 11.5–15.5)
LYMPHOCYTES # BLD AUTO: 2.09 K/UL — SIGNIFICANT CHANGE UP (ref 1–3.3)
LYMPHOCYTES # BLD AUTO: 24 % — SIGNIFICANT CHANGE UP (ref 13–44)
MCHC RBC-ENTMCNC: 27.6 PG — SIGNIFICANT CHANGE UP (ref 27–34)
MCHC RBC-ENTMCNC: 32.8 GM/DL — SIGNIFICANT CHANGE UP (ref 32–36)
MCV RBC AUTO: 84.3 FL — SIGNIFICANT CHANGE UP (ref 80–100)
MONOCYTES # BLD AUTO: 1.31 K/UL — HIGH (ref 0–0.9)
MONOCYTES NFR BLD AUTO: 15 % — HIGH (ref 2–14)
NEUTROPHILS # BLD AUTO: 4.88 K/UL — SIGNIFICANT CHANGE UP (ref 1.8–7.4)
NEUTROPHILS NFR BLD AUTO: 55 % — SIGNIFICANT CHANGE UP (ref 43–77)
NRBC # BLD: SIGNIFICANT CHANGE UP /100 WBCS (ref 0–0)
PLATELET # BLD AUTO: 371 K/UL — SIGNIFICANT CHANGE UP (ref 150–400)
POTASSIUM SERPL-MCNC: 3.8 MMOL/L — SIGNIFICANT CHANGE UP (ref 3.5–5.3)
POTASSIUM SERPL-SCNC: 3.8 MMOL/L — SIGNIFICANT CHANGE UP (ref 3.5–5.3)
PROT SERPL-MCNC: 5.9 G/DL — LOW (ref 6–8.3)
RBC # BLD: 3.44 M/UL — LOW (ref 3.8–5.2)
RBC # FLD: 13 % — SIGNIFICANT CHANGE UP (ref 10.3–14.5)
SODIUM SERPL-SCNC: 141 MMOL/L — SIGNIFICANT CHANGE UP (ref 135–145)
SPECIMEN SOURCE: SIGNIFICANT CHANGE UP
WBC # BLD: 8.71 K/UL — SIGNIFICANT CHANGE UP (ref 3.8–10.5)
WBC # FLD AUTO: 8.71 K/UL — SIGNIFICANT CHANGE UP (ref 3.8–10.5)

## 2021-10-11 PROCEDURE — 99239 HOSP IP/OBS DSCHRG MGMT >30: CPT

## 2021-10-11 RX ORDER — ZINC OXIDE 200 MG/G
1 OINTMENT TOPICAL ONCE
Refills: 0 | Status: COMPLETED | OUTPATIENT
Start: 2021-10-11 | End: 2021-10-11

## 2021-10-11 RX ORDER — PHENYLEPHRINE-SHARK LIVER OIL-MINERAL OIL-PETROLATUM RECTAL OINTMENT
1 OINTMENT (GRAM) RECTAL ONCE
Refills: 0 | Status: DISCONTINUED | OUTPATIENT
Start: 2021-10-11 | End: 2021-10-11

## 2021-10-11 RX ADMIN — CARBIDOPA AND LEVODOPA 1 TABLET(S): 25; 100 TABLET ORAL at 13:15

## 2021-10-11 RX ADMIN — Medication 1 DROP(S): at 12:27

## 2021-10-11 RX ADMIN — ZINC OXIDE 1 APPLICATION(S): 200 OINTMENT TOPICAL at 21:50

## 2021-10-11 RX ADMIN — CARBIDOPA AND LEVODOPA 1 TABLET(S): 25; 100 TABLET ORAL at 05:20

## 2021-10-11 RX ADMIN — PANTOPRAZOLE SODIUM 40 MILLIGRAM(S): 20 TABLET, DELAYED RELEASE ORAL at 05:20

## 2021-10-11 RX ADMIN — HEPARIN SODIUM 5000 UNIT(S): 5000 INJECTION INTRAVENOUS; SUBCUTANEOUS at 05:20

## 2021-10-11 RX ADMIN — HEPARIN SODIUM 5000 UNIT(S): 5000 INJECTION INTRAVENOUS; SUBCUTANEOUS at 13:16

## 2021-10-11 RX ADMIN — QUETIAPINE FUMARATE 25 MILLIGRAM(S): 200 TABLET, FILM COATED ORAL at 12:27

## 2021-10-11 RX ADMIN — HEPARIN SODIUM 5000 UNIT(S): 5000 INJECTION INTRAVENOUS; SUBCUTANEOUS at 21:49

## 2021-10-11 RX ADMIN — CARBIDOPA AND LEVODOPA 1 TABLET(S): 25; 100 TABLET ORAL at 21:49

## 2021-10-11 NOTE — PROGRESS NOTE ADULT - ATTENDING COMMENTS
76F w/ hx of Parkinson's dementia, HTN, pubic rami fracture, anemia,  glaucoma presents today for generalized weakness and diarrhea Patient admitted for failure to thrive.     # Failure to thrive and Falls  -Likely multifactorial secondary to advance parkinson's disease's and age   - Fall risk protocol, OOB with assistance  - PT consulted, f/u recs for discharge  - Nutrition consulted, f/u recs  - CT with no acute fractures    # Diarrhea  - 2/2 to overflow fecal impaction  s/p enema/laxatives  large bowel movement  d/c planning to [facility] as family cant care of her, pt refusing, and not allowing covid swabs  will continue provide verbal encouragment, and advised family to assist us in this matter
# Diarrhea   CT abd/pelvis showing large volume stool within rectum suspicious for impaction. Diarrhea likely overflow incontinence  - PO vancomycin discontinued  will give enema/and lactulose  repeat kub, might need manual disempeccation
fecal impaction  with obstructive outflow diarrhea  resolved after laxatives and enema  tolerating diet  son cant care of her, beryl,   pt blossom , [facility]

## 2021-10-11 NOTE — OCCUPATIONAL THERAPY INITIAL EVALUATION ADULT - ADL RETRAINING, OT EVAL
Pt will feed self with supervision and setup within 1 week. Pt will complete grooming task seated in chair with supervision and setup within 1 week.

## 2021-10-11 NOTE — OCCUPATIONAL THERAPY INITIAL EVALUATION ADULT - GENERAL OBSERVATIONS, REHAB EVAL
Pt received supine in bed (+) external catheter; NAD. Pt agrees to participate with OT for eval with encouragement.

## 2021-10-11 NOTE — CHART NOTE - NSCHARTNOTEFT_GEN_A_CORE
Patient's son was contacted via phone to provide an update regarding hospital course and discharge planning. Patient's son was also informed of the patient's negative COVID titers and was offered a vaccination. The risks and benefits of the vaccination were discussed with the patient's family. The patient expressed understanding of the risks and did not wish to proceed. All of the patient's son's questions and concerns were answered and addressed.

## 2021-10-11 NOTE — PHYSICAL THERAPY INITIAL EVALUATION ADULT - PERTINENT HX OF CURRENT PROBLEM, REHAB EVAL
76F w/ hx of Parkinson's dementia, HTN, pubic rami fracture, dementia, anemia,  glaucoma presents today for generalized weakness and diarrhea.Patient was feeling weak once the diarrhea developed and ended up falling on 10/7.Patient' son endorses patient is unable to get out  from bed or chair due to severe weakness with recurrent falls

## 2021-10-11 NOTE — OCCUPATIONAL THERAPY INITIAL EVALUATION ADULT - PERTINENT HX OF CURRENT PROBLEM, REHAB EVAL
76F w/ hx of Parkinson's dementia, HTN, pubic rami fracture, dementia, anemia, glaucoma presents 10/8 for generalized weakness and diarrhea. Information obtained from patient's son Roman. Patient's son reports that she was diagnosed with UTI, prescribed bactrim on last dose was on 10/4, developed diarrhea on 10/6.

## 2021-10-11 NOTE — PHYSICAL THERAPY INITIAL EVALUATION ADULT - IMPAIRED TRANSFERS: SIT/STAND, REHAB EVAL
impaired balance/impaired coordination/decreased flexibility/impaired motor control/abnormal muscle tone/impaired postural control/decreased strength

## 2021-10-11 NOTE — OCCUPATIONAL THERAPY INITIAL EVALUATION ADULT - ADDITIONAL COMMENTS
Information obtained from pt and son per pt request. Pt lives with her son in a private home, few steps to enter then stays on main level. She has a private aide who assists PRN with ADLs. Pt was able to amb short distances with RW and assist but has been declining per son. Pt has commode, w/c, [facility] AFO.

## 2021-10-11 NOTE — OCCUPATIONAL THERAPY INITIAL EVALUATION ADULT - RANGE OF MOTION EXAMINATION, UPPER EXTREMITY
Pt is right hand dominant. FMC limited BUE due to PD (+) tremors./bilateral UE Active ROM was WFL  (within functional limits)

## 2021-10-11 NOTE — PHYSICAL THERAPY INITIAL EVALUATION ADULT - MANUAL MUSCLE TESTING RESULTS, REHAB EVAL
bilateral upper extremities at least 3/5 t/o, bilateral lower extremity at least 3/5 t/o except left ankle 1/5, bilateral hip flex 3-/5.

## 2021-10-11 NOTE — PHYSICAL THERAPY INITIAL EVALUATION ADULT - ADDITIONAL COMMENTS
Patient lives at home with his son and private hire aide.  Stairs to enter the home.  Patient with steady decline in functional mobility with frequent falls.  Patient has left AFO.

## 2021-10-12 LAB — SARS-COV-2 RNA SPEC QL NAA+PROBE: SIGNIFICANT CHANGE UP

## 2021-10-12 RX ADMIN — AMLODIPINE BESYLATE 10 MILLIGRAM(S): 2.5 TABLET ORAL at 05:14

## 2021-10-12 RX ADMIN — CARBIDOPA AND LEVODOPA 1 TABLET(S): 25; 100 TABLET ORAL at 05:14

## 2021-10-12 RX ADMIN — HEPARIN SODIUM 5000 UNIT(S): 5000 INJECTION INTRAVENOUS; SUBCUTANEOUS at 14:08

## 2021-10-12 RX ADMIN — CARBIDOPA AND LEVODOPA 1 TABLET(S): 25; 100 TABLET ORAL at 21:34

## 2021-10-12 RX ADMIN — HEPARIN SODIUM 5000 UNIT(S): 5000 INJECTION INTRAVENOUS; SUBCUTANEOUS at 05:14

## 2021-10-12 RX ADMIN — CARBIDOPA AND LEVODOPA 1 TABLET(S): 25; 100 TABLET ORAL at 14:08

## 2021-10-12 RX ADMIN — QUETIAPINE FUMARATE 25 MILLIGRAM(S): 200 TABLET, FILM COATED ORAL at 11:40

## 2021-10-12 RX ADMIN — Medication 1 DROP(S): at 11:40

## 2021-10-12 RX ADMIN — HEPARIN SODIUM 5000 UNIT(S): 5000 INJECTION INTRAVENOUS; SUBCUTANEOUS at 21:34

## 2021-10-12 RX ADMIN — PANTOPRAZOLE SODIUM 40 MILLIGRAM(S): 20 TABLET, DELAYED RELEASE ORAL at 05:14

## 2021-10-12 NOTE — PROGRESS NOTE ADULT - SUBJECTIVE AND OBJECTIVE BOX
Patient is a 76y old  Female who presents with a chief complaint of Weakness (09 Oct 2021 15:46)      Subjective:  INTERVAL HPI/OVERNIGHT EVENTS: Patient seen and examined at bedside. No overnight events occurred. Has been eating breakfast and feels well and would like to leave. Endorses her bowel movements improving and feels as though her diarrhea is resolved. Denies fevers, chills, headache, lightheadedness, chest pain, dyspnea, abdominal pain, n/v/d/c.    MEDICATIONS  (STANDING):  amLODIPine   Tablet 10 milliGRAM(s) Oral daily  carbidopa/levodopa  25/100 1 Tablet(s) Oral three times a day  heparin   Injectable 5000 Unit(s) SubCutaneous every 8 hours  pantoprazole    Tablet 40 milliGRAM(s) Oral before breakfast  prednisoLONE acetate 1% Suspension 1 Drop(s) Both EYES daily  QUEtiapine 25 milliGRAM(s) Oral daily    MEDICATIONS  (PRN):  acetaminophen   Tablet .. 650 milliGRAM(s) Oral every 6 hours PRN Temp greater or equal to 38C (100.4F), Mild Pain (1 - 3)  ondansetron Injectable 4 milliGRAM(s) IV Push every 8 hours PRN Nausea and/or Vomiting      Allergies    tetracycline (Unknown)    Intolerances        REVIEW OF SYSTEMS:  CONSTITUTIONAL: No fever or chills  HEENT:  No headache, no sore throat  RESPIRATORY: No cough, wheezing, or shortness of breath  CARDIOVASCULAR: No chest pain, palpitations  GASTROINTESTINAL: No abd pain, nausea, vomiting, or diarrhea  GENITOURINARY: No dysuria, frequency, or hematuria  NEUROLOGICAL: no focal weakness or dizziness  MUSCULOSKELETAL: no myalgias     Objective:  Vital Signs Last 24 Hrs  T(C): 37.6 (10 Oct 2021 05:10), Max: 37.7 (09 Oct 2021 20:14)  T(F): 99.7 (10 Oct 2021 05:10), Max: 99.9 (09 Oct 2021 20:14)  HR: 86 (10 Oct 2021 05:10) (78 - 86)  BP: 122/69 (10 Oct 2021 05:10) (100/62 - 122/69)  BP(mean): --  RR: 17 (10 Oct 2021 05:10) (17 - 17)  SpO2: 96% (10 Oct 2021 05:10) (96% - 97%)      GENERAL: frail elderly female in NAD  HEENT:  anicteric, moist mucous membranes  CHEST/LUNG:  CTA b/l, no rales, wheezes, or rhonchi  HEART:  RRR, S1, S2  ABDOMEN:  BS+, soft, nontender, nondistended, hypoactive BS  EXTREMITIES: no edema, cyanosis, or calf tenderness, muscle strength 3/5 in all 4 extremities  NERVOUS SYSTEM: answers questions and follows commands appropriately, A&Ox3    LABS:                        9.0    10.15 )-----------( 343      ( 10 Oct 2021 08:38 )             28.4     CBC Full  -  ( 10 Oct 2021 08:38 )  WBC Count : 10.15 K/uL  Hemoglobin : 9.0 g/dL  Hematocrit : 28.4 %  Platelet Count - Automated : 343 K/uL  Mean Cell Volume : 84.8 fl  Mean Cell Hemoglobin : 26.9 pg  Mean Cell Hemoglobin Concentration : 31.7 gm/dL  Auto Neutrophil # : 6.50 K/uL  Auto Lymphocyte # : 2.11 K/uL  Auto Monocyte # : 1.42 K/uL  Auto Eosinophil # : 0.04 K/uL  Auto Basophil # : 0.04 K/uL  Auto Neutrophil % : 64.0 %  Auto Lymphocyte % : 20.8 %  Auto Monocyte % : 14.0 %  Auto Eosinophil % : 0.4 %  Auto Basophil % : 0.4 %    10 Oct 2021 08:38    142    |  110    |  19     ----------------------------<  89     3.3     |  23     |  0.51     Ca    7.6        10 Oct 2021 08:38    TPro  6.0    /  Alb  2.3    /  TBili  0.4    /  DBili  x      /  AST  17     /  ALT  9      /  AlkPhos  91     10 Oct 2021 08:38    PT/INR - ( 08 Oct 2021 17:18 )   PT: 13.0 sec;   INR: 1.12 ratio         PTT - ( 08 Oct 2021 17:18 )  PTT:29.2 sec  Urinalysis Basic - ( 08 Oct 2021 18:20 )    Color: Yellow / Appearance: Slightly Turbid / S.005 / pH: x  Gluc: x / Ketone: Negative  / Bili: Negative / Urobili: Negative   Blood: x / Protein: Negative / Nitrite: Negative   Leuk Esterase: Trace / RBC: 6-10 /HPF / WBC 3-5   Sq Epi: x / Non Sq Epi: Occasional / Bacteria: Few      CAPILLARY BLOOD GLUCOSE            Culture - Stool (collected 10-09-21 @ 12:56)  Source: .Stool Feces  Preliminary Report (10-10-21 @ 08:41):    No enteric pathogens to date: Final culture pending    Culture - Urine (collected 10-09-21 @ 00:45)  Source: Clean Catch Clean Catch (Midstream)  Final Report (10-09-21 @ 20:06):    <10,000 CFU/mL Normal Urogenital Fabiola    Culture - Blood (collected 10-08-21 @ 22:33)  Source: .Blood Blood-Peripheral  Preliminary Report (10-09-21 @ 23:01):    No growth to date.    Culture - Blood (collected 10-08-21 @ 22:32)  Source: .Blood Blood-Peripheral  Preliminary Report (10-09-21 @ 23:01):    No growth to date.        RADIOLOGY & ADDITIONAL TESTS:    Personally reviewed.     Consultant(s) Notes Reviewed:  [x] YES  [ ] NO    
Patient is a 76y old  Female who presents with a chief complaint of Weakness (10 Oct 2021 11:39)      Subjective:  INTERVAL HPI/OVERNIGHT EVENTS: Patient seen and examined at bedside. No overnight events occurred. Patient feels like her diarrhea has resolved. Denies fevers, chills, headache, lightheadedness, chest pain, dyspnea, abdominal pain, n/v/d/c.    MEDICATIONS  (STANDING):  amLODIPine   Tablet 10 milliGRAM(s) Oral daily  carbidopa/levodopa  25/100 1 Tablet(s) Oral three times a day  heparin   Injectable 5000 Unit(s) SubCutaneous every 8 hours  pantoprazole    Tablet 40 milliGRAM(s) Oral before breakfast  prednisoLONE acetate 1% Suspension 1 Drop(s) Both EYES daily  QUEtiapine 25 milliGRAM(s) Oral daily  zinc oxide 40% Ointment 1 Application(s) Topical once    MEDICATIONS  (PRN):  acetaminophen   Tablet .. 650 milliGRAM(s) Oral every 6 hours PRN Temp greater or equal to 38C (100.4F), Mild Pain (1 - 3)  ondansetron Injectable 4 milliGRAM(s) IV Push every 8 hours PRN Nausea and/or Vomiting      Allergies    tetracycline (Unknown)    Intolerances        REVIEW OF SYSTEMS:  CONSTITUTIONAL: No fever or chills  HEENT:  No headache, no sore throat  RESPIRATORY: No cough, wheezing, or shortness of breath  CARDIOVASCULAR: No chest pain, palpitations  GASTROINTESTINAL: No abd pain, nausea, vomiting, or diarrhea  GENITOURINARY: No dysuria, frequency, or hematuria  NEUROLOGICAL: no focal weakness or dizziness  MUSCULOSKELETAL: no myalgias     Objective:  Vital Signs Last 24 Hrs  T(C): 37.2 (11 Oct 2021 12:18), Max: 38.1 (10 Oct 2021 20:11)  T(F): 99 (11 Oct 2021 12:18), Max: 100.5 (10 Oct 2021 20:11)  HR: 80 (11 Oct 2021 12:18) (80 - 91)  BP: 118/67 (11 Oct 2021 12:18) (101/57 - 118/67)  BP(mean): --  RR: 17 (11 Oct 2021 12:18) (16 - 19)  SpO2: 97% (11 Oct 2021 12:18) (92% - 97%)    GENERAL: frail elderly female in NAD  HEENT:  anicteric, moist mucous membranes  CHEST/LUNG:  CTA b/l, no rales, wheezes, or rhonchi  HEART:  RRR, S1, S2  ABDOMEN:  BS+, soft, nontender, nondistended, hypoactive BS  EXTREMITIES: no edema, cyanosis, or calf tenderness, muscle strength 3/5 in all 4 extremities  NERVOUS SYSTEM: answers questions and follows commands appropriately, A&Ox3    LABS:                        9.5    8.71  )-----------( 371      ( 11 Oct 2021 08:33 )             29.0     CBC Full  -  ( 11 Oct 2021 08:33 )  WBC Count : 8.71 K/uL  Hemoglobin : 9.5 g/dL  Hematocrit : 29.0 %  Platelet Count - Automated : 371 K/uL  Mean Cell Volume : 84.3 fl  Mean Cell Hemoglobin : 27.6 pg  Mean Cell Hemoglobin Concentration : 32.8 gm/dL  Auto Neutrophil # : 4.88 K/uL  Auto Lymphocyte # : 2.09 K/uL  Auto Monocyte # : 1.31 K/uL  Auto Eosinophil # : 0.00 K/uL  Auto Basophil # : 0.00 K/uL  Auto Neutrophil % : 55.0 %  Auto Lymphocyte % : 24.0 %  Auto Monocyte % : 15.0 %  Auto Eosinophil % : 0.0 %  Auto Basophil % : 0.0 %    11 Oct 2021 08:33    141    |  111    |  22     ----------------------------<  92     3.8     |  24     |  0.45     Ca    8.0        11 Oct 2021 08:33    TPro  5.9    /  Alb  2.2    /  TBili  0.4    /  DBili  x      /  AST  13     /  ALT  8      /  AlkPhos  94     11 Oct 2021 08:33        CAPILLARY BLOOD GLUCOSE            Culture - Stool (collected 10-09-21 @ 12:56)  Source: .Stool Feces  Final Report (10-11-21 @ 10:40):    No enteric pathogens isolated.    (Stool culture examined for Salmonella,    Shigella, Campylobacter, Aeromonas, Plesiomonas,    Vibrio, E.coli O157 and Yersinia)    Culture - Urine (collected 10-09-21 @ 00:45)  Source: Clean Catch Clean Catch (Midstream)  Final Report (10-09-21 @ 20:06):    <10,000 CFU/mL Normal Urogenital Fabiola    Culture - Blood (collected 10-08-21 @ 22:33)  Source: .Blood Blood-Peripheral  Preliminary Report (10-09-21 @ 23:01):    No growth to date.    Culture - Blood (collected 10-08-21 @ 22:32)  Source: .Blood Blood-Peripheral  Preliminary Report (10-09-21 @ 23:01):    No growth to date.        RADIOLOGY & ADDITIONAL TESTS:    Personally reviewed.     Consultant(s) Notes Reviewed:  [x] YES  [ ] NO    
[facility] DIVISION of INFECTIOUS DISEASE  Stephon Waldron MD PhD, Monica Murphy MD, Pooja Price MD, Clari Salcedo MD, Rosalio Short MD  and providing coverage with Didi Smith MD and Daniel Navarro MD  Providing Infectious Disease Consultations at [facility], [facility], [facility]    Office# [phone] to schedule follow up appointments  Answering Service for urgent calls or New Consults [phone]  Cell# to text for urgent issues Stephon Waldorn [phone]     infectious diseases progress note:    JAYJAY NAJERA is a 76y y. o. Female patient    No concerning overnight events, pt reports passing large fecal balls    Allergies    tetracycline (Unknown)    Intolerances        ANTIBIOTICS/RELEVANT:  antimicrobials    immunologic:    OTHER:  acetaminophen   Tablet .. 650 milliGRAM(s) Oral every 6 hours PRN  amLODIPine   Tablet 10 milliGRAM(s) Oral daily  carbidopa/levodopa  25/100 1 Tablet(s) Oral three times a day  heparin   Injectable 5000 Unit(s) SubCutaneous every 8 hours  ondansetron Injectable 4 milliGRAM(s) IV Push every 8 hours PRN  pantoprazole    Tablet 40 milliGRAM(s) Oral before breakfast  prednisoLONE acetate 1% Suspension 1 Drop(s) Both EYES daily  QUEtiapine 25 milliGRAM(s) Oral daily      Objective:  Vital Signs Last 24 Hrs  T(C): 37.6 (10 Oct 2021 05:10), Max: 37.7 (09 Oct 2021 20:14)  T(F): 99.7 (10 Oct 2021 05:10), Max: 99.9 (09 Oct 2021 20:14)  HR: 86 (10 Oct 2021 05:10) (78 - 86)  BP: 122/69 (10 Oct 2021 05:10) (100/62 - 122/69)  BP(mean): --  RR: 17 (10 Oct 2021 05:10) (17 - 17)  SpO2: 96% (10 Oct 2021 05:10) (96% - 97%)    T(C): 37.6 (10-10-21 @ 05:10), Max: 38.2 (10-08-21 @ 17:21)  T(C): 37.6 (10-10-21 @ 05:10), Max: 38.2 (10-08-21 @ 17:21)  T(C): 37.6 (10-10-21 @ 05:10), Max: 38.2 (10-08-21 @ 17:21)    PHYSICAL EXAM:  HEENT: NC atraumatic  Neck: supple  Respiratory: no accessory muscle use, breathing comfortably  Cardiovascular: distant  Gastrointestinal: normal appearing, nondistended  Extremities: no clubbing, no cyanosis,        LABS:                          9.0    10.15 )-----------( 343      ( 10 Oct 2021 08:38 )             28.4       10.15 10-10 @ 08:38  10.79 10-09 @ 09:41  14.82 10-08 @ 17:18      10-10    142  |  110<H>  |  19  ----------------------------<  89  3.3<L>   |  23  |  0.51    Ca    7.6<L>      10 Oct 2021 08:38    TPro  6.0  /  Alb  2.3<L>  /  TBili  0.4  /  DBili  x   /  AST  17  /  ALT  9<L>  /  AlkPhos  91  1010      Creatinine, Serum: 0.51 mg/dL (10-10-21 @ 08:38)  Creatinine, Serum: 0.40 mg/dL (10-09-21 @ 09:41)  Creatinine, Serum: 0.65 mg/dL (10-08-21 @ 17:18)      PT/INR - ( 08 Oct 2021 17:18 )   PT: 13.0 sec;   INR: 1.12 ratio         PTT - ( 08 Oct 2021 17:18 )  PTT:29.2 sec  Urinalysis Basic - ( 08 Oct 2021 18:20 )    Color: Yellow / Appearance: Slightly Turbid / S.005 / pH: x  Gluc: x / Ketone: Negative  / Bili: Negative / Urobili: Negative   Blood: x / Protein: Negative / Nitrite: Negative   Leuk Esterase: Trace / RBC: 6-10 /HPF / WBC 3-5   Sq Epi: x / Non Sq Epi: Occasional / Bacteria: Few            INFLAMMATORY MARKERS  Auto Neutrophil #: 6.50 K/uL (10-10-21 @ 08:38)  Auto Lymphocyte #: 2.11 K/uL (10-10-21 @ 08:38)  Auto Neutrophil #: 8.20 K/uL (10-09-21 @ 09:41)  Auto Lymphocyte #: 1.33 K/uL (10-09-21 @ 09:41)  Auto Neutrophil #: 10.78 K/uL (10-08-21 @ 17:18)  Auto Lymphocyte #: 2.71 K/uL (10-08-21 @ 17:18)    Lactate, Blood: 0.9 mmol/L (10-08-21 @ 17:18)    Auto Eosinophil #: 0.04 K/uL (10-10-21 @ 08:38)  Auto Eosinophil #: 0.01 K/uL (10-09-21 @ 09:41)  Auto Eosinophil #: 0.03 K/uL (10-08-21 @ 17:18)                        Activated Partial Thromboplastin Time: 29.2 sec (10-08-21 @ 17:18)  INR: 1.12 ratio (10-08-21 @ 17:18)          MICROBIOLOGY:  Culture Results:   No enteric pathogens to date: Final culture pending (10-09 @ 12:56)    C Diff by PCR Result: NotDetec (10-09 @ 12:53)            RADIOLOGY & ADDITIONAL STUDIES:  
Patient is a 76y old  Female who presents with a chief complaint of Weakness (08 Oct 2021 18:29)      INTERVAL HPI/OVERNIGHT EVENTS: Patient seen and examined at bedside. Admitted overnight. States that she had diarrhea this morning. Lives at home with her son who is her primary caretaker. Complaining of thirst, but otherwise no acute complaints.     MEDICATIONS  (STANDING):  amLODIPine   Tablet 10 milliGRAM(s) Oral daily  carbidopa/levodopa  25/100 1 Tablet(s) Oral three times a day  heparin   Injectable 5000 Unit(s) SubCutaneous every 8 hours  lactulose Syrup 20 Gram(s) Oral once  pantoprazole    Tablet 40 milliGRAM(s) Oral before breakfast  prednisoLONE acetate 1% Suspension 1 Drop(s) Both EYES daily  QUEtiapine 25 milliGRAM(s) Oral daily    MEDICATIONS  (PRN):  acetaminophen   Tablet .. 650 milliGRAM(s) Oral every 6 hours PRN Temp greater or equal to 38C (100.4F), Mild Pain (1 - 3)  ondansetron Injectable 4 milliGRAM(s) IV Push every 8 hours PRN Nausea and/or Vomiting      Allergies    tetracycline (Unknown)    Intolerances        REVIEW OF SYSTEMS:  CONSTITUTIONAL: No fever or chills, admits fatigue and weakness  HEENT:  No headache, no sore throat  RESPIRATORY: No cough, wheezing, or shortness of breath  CARDIOVASCULAR: No chest pain, palpitations  GASTROINTESTINAL: Admits diarrhea, No abd pain, nausea, vomiting  GENITOURINARY: No dysuria, frequency, or hematuria  NEUROLOGICAL: no focal weakness or dizziness  MUSCULOSKELETAL: admits LE pain and feeling like feet are very heavy    Vital Signs Last 24 Hrs  T(C): 37.1 (09 Oct 2021 05:14), Max: 38.2 (08 Oct 2021 17:21)  T(F): 98.8 (09 Oct 2021 05:14), Max: 100.8 (08 Oct 2021 17:21)  HR: 76 (09 Oct 2021 05:14) (76 - 98)  BP: 111/67 (09 Oct 2021 05:14) (109/62 - 136/68)  BP(mean): --  RR: 17 (09 Oct 2021 05:14) (16 - 17)  SpO2: 99% (09 Oct 2021 05:14) (96% - 100%)    PHYSICAL EXAM:  GENERAL: frail elderly female in NAD  HEENT:  anicteric, moist mucous membranes  CHEST/LUNG:  CTA b/l, no rales, wheezes, or rhonchi  HEART:  RRR, S1, S2  ABDOMEN:  BS+, soft, nontender, nondistended, hypoactive BS  EXTREMITIES: no edema, cyanosis, or calf tenderness, muscle strength 3/5 in all 4 extremities  NERVOUS SYSTEM: answers questions and follows commands appropriately, A&Ox3      LABS:                        10.8   14.82 )-----------( 354      ( 08 Oct 2021 17:18 )             32.9     CBC Full  -  ( 08 Oct 2021 17:18 )  WBC Count : 14.82 K/uL  Hemoglobin : 10.8 g/dL  Hematocrit : 32.9 %  Platelet Count - Automated : 354 K/uL  Mean Cell Volume : 85.0 fl  Mean Cell Hemoglobin : 27.9 pg  Mean Cell Hemoglobin Concentration : 32.8 gm/dL  Auto Neutrophil # : 10.78 K/uL  Auto Lymphocyte # : 2.71 K/uL  Auto Monocyte # : 1.23 K/uL  Auto Eosinophil # : 0.03 K/uL  Auto Basophil # : 0.03 K/uL  Auto Neutrophil % : 72.7 %  Auto Lymphocyte % : 18.3 %  Auto Monocyte % : 8.3 %  Auto Eosinophil % : 0.2 %  Auto Basophil % : 0.2 %    09 Oct 2021 09:41    139    |  110    |  11     ----------------------------<  108    3.9     |  22     |  0.40     Ca    8.2        09 Oct 2021 09:41    TPro  7.5    /  Alb  3.3    /  TBili  0.4    /  DBili  x      /  AST  22     /  ALT  23     /  AlkPhos  102    08 Oct 2021 17:18    PT/INR - ( 08 Oct 2021 17:18 )   PT: 13.0 sec;   INR: 1.12 ratio         PTT - ( 08 Oct 2021 17:18 )  PTT:29.2 sec  Urinalysis Basic - ( 08 Oct 2021 18:20 )    Color: Yellow / Appearance: Slightly Turbid / S.005 / pH: x  Gluc: x / Ketone: Negative  / Bili: Negative / Urobili: Negative   Blood: x / Protein: Negative / Nitrite: Negative   Leuk Esterase: Trace / RBC: 6-10 /HPF / WBC 3-5   Sq Epi: x / Non Sq Epi: Occasional / Bacteria: Few      CAPILLARY BLOOD GLUCOSE              RADIOLOGY & ADDITIONAL TESTS:  < from: CT Lumbar Spine No Cont (10.08.21 @ 19:46) >  IMPRESSION:  -No acute fracture identified. If clinical concern persists, consider MRI.    -Large volume stool within the rectum, correlate for impaction. Cannot rule out associated stercoral colitis.    --- End of Report ---            KATHLEEN MOLINA MD; Attending Radiologist  This document has been electronically signed. Oct  8 2021  8:52PM    < end of copied text >    Personally reviewed.     Consultant(s) Notes Reviewed:  [x] YES  [ ] NO    
Patient is a 76y old  Female who presents with a chief complaint of Weakness (11 Oct 2021 15:11)      Subjective:  INTERVAL HPI/OVERNIGHT EVENTS: Patient seen and examined at bedside. No overnight events occurred. Patient is combative and uncooperative with staff. Patient refuses to answer questions or commands. Continually expresses interest in leaving the hospital but unwilling to cooperate with nasal swab. When son was contacted, patient hung up phone.     MEDICATIONS  (STANDING):  amLODIPine   Tablet 10 milliGRAM(s) Oral daily  carbidopa/levodopa  25/100 1 Tablet(s) Oral three times a day  heparin   Injectable 5000 Unit(s) SubCutaneous every 8 hours  pantoprazole    Tablet 40 milliGRAM(s) Oral before breakfast  prednisoLONE acetate 1% Suspension 1 Drop(s) Both EYES daily  QUEtiapine 25 milliGRAM(s) Oral daily    MEDICATIONS  (PRN):  acetaminophen   Tablet .. 650 milliGRAM(s) Oral every 6 hours PRN Temp greater or equal to 38C (100.4F), Mild Pain (1 - 3)  ondansetron Injectable 4 milliGRAM(s) IV Push every 8 hours PRN Nausea and/or Vomiting      Allergies    tetracycline (Unknown)    Intolerances        REVIEW OF SYSTEMS:  Unable to obtain 2/2 patient agitation.     Objective:  Vital Signs Last 24 Hrs  T(C): 37.4 (12 Oct 2021 04:46), Max: 37.4 (11 Oct 2021 21:15)  T(F): 99.3 (12 Oct 2021 04:46), Max: 99.4 (11 Oct 2021 21:15)  HR: 77 (12 Oct 2021 04:46) (77 - 80)  BP: 110/69 (12 Oct 2021 04:46) (101/53 - 118/67)  BP(mean): --  RR: 17 (12 Oct 2021 04:46) (17 - 17)  SpO2: 95% (12 Oct 2021 04:46) (94% - 97%)    GENERAL: frail elderly female in NAD  HEENT:  anicteric, moist mucous membranes  CHEST/LUNG:  CTA b/l, no rales, wheezes, or rhonchi  HEART:  RRR, S1, S2  ABDOMEN:  BS+, soft, nontender, nondistended, hypoactive BS  EXTREMITIES: no edema, cyanosis, or calf tenderness, muscle strength 3/5 in all 4 extremities  NERVOUS SYSTEM: does not cooperate with exam or answers questions    LABS:    CBC Full  -  ( 11 Oct 2021 08:33 )  WBC Count : 8.71 K/uL  Hemoglobin : 9.5 g/dL  Hematocrit : 29.0 %  Platelet Count - Automated : 371 K/uL  Mean Cell Volume : 84.3 fl  Mean Cell Hemoglobin : 27.6 pg  Mean Cell Hemoglobin Concentration : 32.8 gm/dL  Auto Neutrophil # : 4.88 K/uL  Auto Lymphocyte # : 2.09 K/uL  Auto Monocyte # : 1.31 K/uL  Auto Eosinophil # : 0.00 K/uL  Auto Basophil # : 0.00 K/uL  Auto Neutrophil % : 55.0 %  Auto Lymphocyte % : 24.0 %  Auto Monocyte % : 15.0 %  Auto Eosinophil % : 0.0 %  Auto Basophil % : 0.0 %      Ca    8.0        11 Oct 2021 08:33          CAPILLARY BLOOD GLUCOSE            Culture - Stool (collected 10-09-21 @ 12:56)  Source: .Stool Feces  Final Report (10-11-21 @ 10:40):    No enteric pathogens isolated.    (Stool culture examined for Salmonella,    Shigella, Campylobacter, Aeromonas, Plesiomonas,    Vibrio, E.coli O157 and Yersinia)    Culture - Urine (collected 10-09-21 @ 00:45)  Source: Clean Catch Clean Catch (Midstream)  Final Report (10-09-21 @ 20:06):    <10,000 CFU/mL Normal Urogenital Fabiola    Culture - Blood (collected 10-08-21 @ 22:33)  Source: .Blood Blood-Peripheral  Preliminary Report (10-09-21 @ 23:01):    No growth to date.    Culture - Blood (collected 10-08-21 @ 22:32)  Source: .Blood Blood-Peripheral  Preliminary Report (10-09-21 @ 23:01):    No growth to date.        RADIOLOGY & ADDITIONAL TESTS:    Personally reviewed.     Consultant(s) Notes Reviewed:  [x] YES  [ ] NO

## 2021-10-12 NOTE — PROGRESS NOTE ADULT - ASSESSMENT
76F w/ hx of Parkinson's dementia, HTN, pubic rami fracture, anemia,  glaucoma presents today for generalized weakness and diarrhea Patient admitted for failure to thrive.     # Failure to thrive and Falls  -Likely multifactorial secondary to advance parkinson's disease's and age   - Fall risk protocol, OOB with assistance  - PT consulted, f/u recs  - Nutrition consulted, f/u recs  - CT with no acute fractures     # Diarrhea  - Patient was given Bactrim for UTI, last dose 10/04/21   - Admit to F with isolation precautions   - Patient has fever, leukocytosis, and diarrhea, initially started on PO vancomycin for suspicion for C Diff; however, CT abd/pelvis showing large volume stool within rectum suspicious for impaction. Diarrhea likely overflow incontinence  - PO vancomycin discontinued  - s/p IV NS 1 L bolus in ED  - Montor WBC and fever curve  - Tylenol 650 mg PO q6h prn for fever or mild pain  - IV NS @ 75 cc/hr for 12 hours  - Zofran prn for nausea  - F/up C. Diff PCR   - Stool count and record   - Consult ID, Dr. Qureshi; f/u recs    # Parkinson's disease    - On sinemet TID, will continue   - Fall and aspiration precautions   - Son states they would like rehab placement, SW consulted  - may be progressive dementia consult Neuro, Dr. Woodruff; f/u recs    # Dementia   - Seroquel 25 mg BID, however pt's son has been given qd   - Fall and aspiration precautions     # Anemia   - Labs on 08/21 demonstrated iron deficiency anemia   - H/H 10.8/ 32.9 on admission, baseline hemoglobin 8.5-9.0  - Currently hemodynamically stable, monitor for signs and symptoms of active bleeding  - F/u AM CBC    # HTN   - /63 on admission   - On amlodipine,   - c/w home meds w/ holding parameters  - monitor BP & titrate BP meds PRN  - DASH/TLC    # Glaucoma   -Continue Combigan 0.2%-0.5% ophthalmic solution    # DVT ppx: Improve score 2. Heparin 5000 q8h     IMPROVE VTE Individual Risk Assessment  RISK                                                                Points  [  ] Previous VTE                                                  3  [  ] Thrombophilia                                               2  [  ] Lower limb paralysis                                      2        (unable to hold up >15 seconds)    [  ] Current Cancer                                              2         (within 6 months)  [ X ] Immobilization > 24 hrs                                1  [  ] ICU/CCU stay > 24 hours                              1  [  X] Age > 60                                                      1  IMPROVE VTE Score ___2______      
76F w/ hx of Parkinson's dementia, HTN, pubic rami fracture, dementia, anemia,  glaucoma presents today for generalized weakness and diarrhea. Pt was diagnosed with UTI, prescribed bactrim last dose was on 10/4, developed diarrhea on 10/6. Patient has had multiple episodes of watery and smelly diarrhea. Pt reports continue diarrhea that looks like pea soup but reports also having 3 large fecal balls in rectum that are painful.    RECOMMENDATIONS  from history and imaging confirmation it appears that pt has fecal impaction with hard stools and loose diarrhea around these.   All appears secondary to fecal impaction and now improved   Recommend no abx and concur with dc from inpatient status    Thank you for consulting us and involving us in the management of this most interesting and challenging case.  Please call us at [phone] or text me directly on my cell#[phone] with any concerns or further questions.    
76F w/ hx of Parkinson's dementia, HTN, pubic rami fracture, anemia,  glaucoma presents today for generalized weakness and diarrhea Patient admitted for failure to thrive.     # Failure to thrive and Falls  -Likely multifactorial secondary to advance parkinson's disease's and age   - Fall risk protocol, OOB with assistance  - PT consulted, f/u recs for discharge  - Nutrition consulted, f/u recs  - CT with no acute fractures     # Diarrhea  - Patient was given Bactrim for UTI, last dose 10/04/21   - Admit to F with isolation precautions   - Patient has fever, leukocytosis, and diarrhea, initially started on PO vancomycin for suspicion for C Diff; however, CT abd/pelvis showing large volume stool within rectum suspicious for impaction. Diarrhea likely overflow incontinence  - PO vancomycin discontinued  - s/p IV NS 1 L bolus in ED  - Montor WBC and fever curve  - Tylenol 650 mg PO q6h prn for fever or mild pain  - IV NS @ 75 cc/hr for 12 hours  - Zofran prn for nausea  - F/up C. Diff PCR   - Stool count and record   - Consult ID, Dr. Qureshi; f/u recs    # Parkinson's disease    - On sinemet TID, will continue   - Fall and aspiration precautions   - Son states they would like rehab placement, SW consulted  - may be progressive dementia consult Neuro, Dr. Woodruff; f/u recs    # Dementia   - Seroquel 25 mg BID, however pt's son has been given qd   - Fall and aspiration precautions     # Anemia   - Labs on 08/21 demonstrated iron deficiency anemia   - H/H 10.8/ 32.9 on admission, baseline hemoglobin 8.5-9.0  - Currently hemodynamically stable, monitor for signs and symptoms of active bleeding  - F/u AM CBC    # HTN   - /63 on admission   - On amlodipine,   - c/w home meds w/ holding parameters  - monitor BP & titrate BP meds PRN  - DASH/TLC    # Glaucoma   -Continue Combigan 0.2%-0.5% ophthalmic solution    # DVT ppx: Improve score 2. Heparin 5000 q8h     IMPROVE VTE Individual Risk Assessment  RISK                                                                Points  [  ] Previous VTE                                                  3  [  ] Thrombophilia                                               2  [  ] Lower limb paralysis                                      2        (unable to hold up >15 seconds)    [  ] Current Cancer                                              2         (within 6 months)  [ X ] Immobilization > 24 hrs                                1  [  ] ICU/CCU stay > 24 hours                              1  [  X] Age > 60                                                      1  IMPROVE VTE Score ___2______      
76F w/ hx of Parkinson's dementia, HTN, pubic rami fracture, anemia,  glaucoma presents today for generalized weakness and diarrhea Patient admitted for failure to thrive.     # Failure to thrive and Falls  -Likely multifactorial secondary to advance parkinson's disease's and age   - Fall risk protocol, OOB with assistance  - PT consulted, f/u recs for discharge  - Nutrition consulted, f/u recs  - CT with no acute fractures     # Diarrhea  - Patient was given Bactrim for UTI, last dose 10/04/21   - Admit to F with isolation precautions   - Patient has fever, leukocytosis, and diarrhea, initially started on PO vancomycin for suspicion for C Diff; however, CT abd/pelvis showing large volume stool within rectum suspicious for impaction. Diarrhea likely overflow incontinence  - PO vancomycin discontinued  - s/p IV NS 1 L bolus in ED  - Montor WBC and fever curve  - Tylenol 650 mg PO q6h prn for fever or mild pain  - IV NS @ 75 cc/hr for 12 hours  - Zofran prn for nausea  - Stool count and record   - Consult ID, Dr. Qureshi; f/u recs  - Plan for d/c to [facility] tomorrow    # Parkinson's disease    - On sinemet TID, will continue   - Fall and aspiration precautions   - Son states they would like rehab placement, SW consulted  - may be progressive dementia consult Neuro, Dr. Woodruff; f/u recs    # Dementia   - Seroquel 25 mg BID, however pt's son has been given qd   - Fall and aspiration precautions     # Anemia   - Labs on 08/21 demonstrated iron deficiency anemia   - H/H 10.8/ 32.9 on admission, baseline hemoglobin 8.5-9.0  - Currently hemodynamically stable, monitor for signs and symptoms of active bleeding  - F/u AM CBC    # HTN   - /63 on admission   - On amlodipine,   - c/w home meds w/ holding parameters  - monitor BP & titrate BP meds PRN  - DASH/TLC    # Glaucoma   -Continue Combigan 0.2%-0.5% ophthalmic solution    #Dispo  - DVT ppx: Heparin 5000 q8h   - D/c planning to [facility], coordinate with son    
76F w/ hx of Parkinson's dementia, HTN, pubic rami fracture, anemia,  glaucoma presents today for generalized weakness and diarrhea Patient admitted for failure to thrive.     # Failure to thrive and Falls  -Likely multifactorial secondary to advance parkinson's disease's and age   - Fall risk protocol, OOB with assistance  - PT consulted, f/u recs for discharge  - Nutrition consulted, f/u recs  - CT with no acute fractures     # Diarrhea  - Patient was given Bactrim for UTI, last dose 10/04/21   - Admit to F with isolation precautions   - Patient has fever, leukocytosis, and diarrhea, initially started on PO vancomycin for suspicion for C Diff; however, CT abd/pelvis showing large volume stool within rectum suspicious for impaction. Diarrhea likely overflow incontinence  - PO vancomycin discontinued  - s/p IV NS 1 L bolus in ED  - Montor WBC and fever curve  - Tylenol 650 mg PO q6h prn for fever or mild pain  - IV NS @ 75 cc/hr for 12 hours  - Zofran prn for nausea  - Stool count and record   - Consult ID, Dr. Qureshi; f/u recs  - Plan for d/c to [facility] tomorrow    # Parkinson's disease    - On sinemet TID, will continue   - Fall and aspiration precautions   - Son states they would like rehab placement, SW consulted  - may be progressive dementia consult Neuro, Dr. Woodruff; f/u recs    # Dementia   - Seroquel 25 mg BID, however pt's son has been given qd   - Fall and aspiration precautions     # Anemia   - Labs on 08/21 demonstrated iron deficiency anemia   - H/H 10.8/ 32.9 on admission, baseline hemoglobin 8.5-9.0  - Currently hemodynamically stable, monitor for signs and symptoms of active bleeding  - F/u AM CBC    # HTN   - /63 on admission   - On amlodipine,   - c/w home meds w/ holding parameters  - monitor BP & titrate BP meds PRN  - DASH/TLC    # Glaucoma   -Continue Combigan 0.2%-0.5% ophthalmic solution    # DVT ppx: Improve score 2. Heparin 5000 q8h     IMPROVE VTE Individual Risk Assessment  RISK                                                                Points  [  ] Previous VTE                                                  3  [  ] Thrombophilia                                               2  [  ] Lower limb paralysis                                      2        (unable to hold up >15 seconds)    [  ] Current Cancer                                              2         (within 6 months)  [ X ] Immobilization > 24 hrs                                1  [  ] ICU/CCU stay > 24 hours                              1  [  X] Age > 60                                                      1  IMPROVE VTE Score ___2______

## 2021-10-13 VITALS
TEMPERATURE: 98 F | OXYGEN SATURATION: 99 % | HEART RATE: 79 BPM | RESPIRATION RATE: 18 BRPM | SYSTOLIC BLOOD PRESSURE: 112 MMHG | WEIGHT: 144.62 LBS | DIASTOLIC BLOOD PRESSURE: 69 MMHG

## 2021-10-13 LAB
ALBUMIN SERPL ELPH-MCNC: 2.5 G/DL — LOW (ref 3.3–5)
ALP SERPL-CCNC: 85 U/L — SIGNIFICANT CHANGE UP (ref 40–120)
ALT FLD-CCNC: 10 U/L — LOW (ref 12–78)
ANION GAP SERPL CALC-SCNC: 8 MMOL/L — SIGNIFICANT CHANGE UP (ref 5–17)
AST SERPL-CCNC: 16 U/L — SIGNIFICANT CHANGE UP (ref 15–37)
BASOPHILS # BLD AUTO: 0.05 K/UL — SIGNIFICANT CHANGE UP (ref 0–0.2)
BASOPHILS NFR BLD AUTO: 0.6 % — SIGNIFICANT CHANGE UP (ref 0–2)
BILIRUB SERPL-MCNC: 0.3 MG/DL — SIGNIFICANT CHANGE UP (ref 0.2–1.2)
BUN SERPL-MCNC: 18 MG/DL — SIGNIFICANT CHANGE UP (ref 7–23)
CALCIUM SERPL-MCNC: 8.5 MG/DL — SIGNIFICANT CHANGE UP (ref 8.5–10.1)
CHLORIDE SERPL-SCNC: 109 MMOL/L — HIGH (ref 96–108)
CO2 SERPL-SCNC: 23 MMOL/L — SIGNIFICANT CHANGE UP (ref 22–31)
CREAT SERPL-MCNC: 0.51 MG/DL — SIGNIFICANT CHANGE UP (ref 0.5–1.3)
CULTURE RESULTS: SIGNIFICANT CHANGE UP
CULTURE RESULTS: SIGNIFICANT CHANGE UP
EOSINOPHIL # BLD AUTO: 0.1 K/UL — SIGNIFICANT CHANGE UP (ref 0–0.5)
EOSINOPHIL NFR BLD AUTO: 1.3 % — SIGNIFICANT CHANGE UP (ref 0–6)
GLUCOSE SERPL-MCNC: 99 MG/DL — SIGNIFICANT CHANGE UP (ref 70–99)
HCT VFR BLD CALC: 31.2 % — LOW (ref 34.5–45)
HGB BLD-MCNC: 10 G/DL — LOW (ref 11.5–15.5)
IMM GRANULOCYTES NFR BLD AUTO: 1.8 % — HIGH (ref 0–1.5)
LYMPHOCYTES # BLD AUTO: 3.03 K/UL — SIGNIFICANT CHANGE UP (ref 1–3.3)
LYMPHOCYTES # BLD AUTO: 38.8 % — SIGNIFICANT CHANGE UP (ref 13–44)
MCHC RBC-ENTMCNC: 27.4 PG — SIGNIFICANT CHANGE UP (ref 27–34)
MCHC RBC-ENTMCNC: 32.1 GM/DL — SIGNIFICANT CHANGE UP (ref 32–36)
MCV RBC AUTO: 85.5 FL — SIGNIFICANT CHANGE UP (ref 80–100)
MONOCYTES # BLD AUTO: 0.63 K/UL — SIGNIFICANT CHANGE UP (ref 0–0.9)
MONOCYTES NFR BLD AUTO: 8.1 % — SIGNIFICANT CHANGE UP (ref 2–14)
NEUTROPHILS # BLD AUTO: 3.86 K/UL — SIGNIFICANT CHANGE UP (ref 1.8–7.4)
NEUTROPHILS NFR BLD AUTO: 49.4 % — SIGNIFICANT CHANGE UP (ref 43–77)
NRBC # BLD: 0 /100 WBCS — SIGNIFICANT CHANGE UP (ref 0–0)
PLATELET # BLD AUTO: 483 K/UL — HIGH (ref 150–400)
POTASSIUM SERPL-MCNC: 3.9 MMOL/L — SIGNIFICANT CHANGE UP (ref 3.5–5.3)
POTASSIUM SERPL-SCNC: 3.9 MMOL/L — SIGNIFICANT CHANGE UP (ref 3.5–5.3)
PROT SERPL-MCNC: 6.6 G/DL — SIGNIFICANT CHANGE UP (ref 6–8.3)
RBC # BLD: 3.65 M/UL — LOW (ref 3.8–5.2)
RBC # FLD: 13.1 % — SIGNIFICANT CHANGE UP (ref 10.3–14.5)
SODIUM SERPL-SCNC: 140 MMOL/L — SIGNIFICANT CHANGE UP (ref 135–145)
SPECIMEN SOURCE: SIGNIFICANT CHANGE UP
SPECIMEN SOURCE: SIGNIFICANT CHANGE UP
WBC # BLD: 7.81 K/UL — SIGNIFICANT CHANGE UP (ref 3.8–10.5)
WBC # FLD AUTO: 7.81 K/UL — SIGNIFICANT CHANGE UP (ref 3.8–10.5)

## 2021-10-13 PROCEDURE — 99239 HOSP IP/OBS DSCHRG MGMT >30: CPT

## 2021-10-13 RX ADMIN — HEPARIN SODIUM 5000 UNIT(S): 5000 INJECTION INTRAVENOUS; SUBCUTANEOUS at 05:53

## 2021-10-13 RX ADMIN — AMLODIPINE BESYLATE 10 MILLIGRAM(S): 2.5 TABLET ORAL at 05:53

## 2021-10-13 RX ADMIN — Medication 1 DROP(S): at 11:04

## 2021-10-13 RX ADMIN — QUETIAPINE FUMARATE 25 MILLIGRAM(S): 200 TABLET, FILM COATED ORAL at 11:04

## 2021-10-13 RX ADMIN — CARBIDOPA AND LEVODOPA 1 TABLET(S): 25; 100 TABLET ORAL at 05:53

## 2021-10-13 RX ADMIN — PANTOPRAZOLE SODIUM 40 MILLIGRAM(S): 20 TABLET, DELAYED RELEASE ORAL at 05:53

## 2021-10-13 NOTE — DISCHARGE NOTE NURSING/CASE MANAGEMENT/SOCIAL WORK - PATIENT PORTAL LINK FT
You can access the FollowMyHealth Patient Portal offered by [facility] by registering at the following website: [email or web address]. By joining [email or web address]’s FollowMyHealth portal, you will also be able to view your health information using other applications (apps) compatible with our system.

## 2021-10-26 PROCEDURE — 85730 THROMBOPLASTIN TIME PARTIAL: CPT

## 2021-10-26 PROCEDURE — 72125 CT NECK SPINE W/O DYE: CPT

## 2021-10-26 PROCEDURE — 92610 EVALUATE SWALLOWING FUNCTION: CPT

## 2021-10-26 PROCEDURE — 87077 CULTURE AEROBIC IDENTIFY: CPT

## 2021-10-26 PROCEDURE — 87086 URINE CULTURE/COLONY COUNT: CPT

## 2021-10-26 PROCEDURE — 87493 C DIFF AMPLIFIED PROBE: CPT

## 2021-10-26 PROCEDURE — 85610 PROTHROMBIN TIME: CPT

## 2021-10-26 PROCEDURE — 87040 BLOOD CULTURE FOR BACTERIA: CPT

## 2021-10-26 PROCEDURE — U0005: CPT

## 2021-10-26 PROCEDURE — U0003: CPT

## 2021-10-26 PROCEDURE — 87046 STOOL CULTR AEROBIC BACT EA: CPT

## 2021-10-26 PROCEDURE — 97161 PT EVAL LOW COMPLEX 20 MIN: CPT

## 2021-10-26 PROCEDURE — 87045 FECES CULTURE AEROBIC BACT: CPT

## 2021-10-26 PROCEDURE — 71045 X-RAY EXAM CHEST 1 VIEW: CPT

## 2021-10-26 PROCEDURE — 93005 ELECTROCARDIOGRAM TRACING: CPT

## 2021-10-26 PROCEDURE — 81001 URINALYSIS AUTO W/SCOPE: CPT

## 2021-10-26 PROCEDURE — 72128 CT CHEST SPINE W/O DYE: CPT

## 2021-10-26 PROCEDURE — 99285 EMERGENCY DEPT VISIT HI MDM: CPT

## 2021-10-26 PROCEDURE — 80048 BASIC METABOLIC PNL TOTAL CA: CPT

## 2021-10-26 PROCEDURE — 36415 COLL VENOUS BLD VENIPUNCTURE: CPT

## 2021-10-26 PROCEDURE — 86769 SARS-COV-2 COVID-19 ANTIBODY: CPT

## 2021-10-26 PROCEDURE — 92526 ORAL FUNCTION THERAPY: CPT

## 2021-10-26 PROCEDURE — 80053 COMPREHEN METABOLIC PANEL: CPT

## 2021-10-26 PROCEDURE — 73502 X-RAY EXAM HIP UNI 2-3 VIEWS: CPT

## 2021-10-26 PROCEDURE — 85025 COMPLETE CBC W/AUTO DIFF WBC: CPT

## 2021-10-26 PROCEDURE — 72131 CT LUMBAR SPINE W/O DYE: CPT

## 2021-10-26 PROCEDURE — 70450 CT HEAD/BRAIN W/O DYE: CPT | Mod: MA

## 2021-10-26 PROCEDURE — 83605 ASSAY OF LACTIC ACID: CPT

## 2021-10-26 PROCEDURE — 87635 SARS-COV-2 COVID-19 AMP PRB: CPT

## 2021-10-26 PROCEDURE — 97166 OT EVAL MOD COMPLEX 45 MIN: CPT

## 2021-11-01 NOTE — PATIENT PROFILE ADULT - DOES PATIENT HAVE ADVANCE DIRECTIVE
Patient Education        Well Care - Tips for Teens: Care Instructions  Your Care Instructions     Being a teen can be exciting and tough. You are finding your place in the world. And you may have a lot on your mind these days too--school, friends, sports, parents, and maybe even how you look. Some teens begin to feel the effects of stress, such as headaches, neck or back pain, or an upset stomach. To feel your best, it is important to start good health habits now. Follow-up care is a key part of your treatment and safety. Be sure to make and go to all appointments, and call your doctor if you are having problems. It's also a good idea to know your test results and keep a list of the medicines you take. How can you care for yourself at home? Staying healthy can help you cope with stress or depression. Here are some tips to keep you healthy. · Get at least 30 minutes of exercise on most days of the week. Walking is a good choice. You also may want to do other activities, such as running, swimming, cycling, or playing tennis or team sports. · Try cutting back on time spent on TV or video games each day. · Munch at least 5 helpings of fruits and veggies. A helping is a piece of fruit or ½ cup of vegetables. · Cut back to 1 can or small cup of soda or juice drink a day. Try water and milk instead. · Cheese, yogurt, milk--have at least 3 cups a day to get the calcium you need. · The decision to have sex is a serious one that only you can make. Not having sex is the best way to prevent HIV, STIs (sexually transmitted infections), and pregnancy. · If you do choose to have sex, condoms and birth control can increase your chances of protection against STIs and pregnancy. · Talk to an adult you feel comfortable with. Confide in this person and ask for his or her advice.  This can be a parent, a teacher, a , or someone else you trust.  Healthy ways to deal with stress   · Get 9 to 10 hours of sleep every night.  · Eat healthy meals. · Go for a long walk. · Dance. Shoot hoops. Go for a bike ride. Get some exercise. · Talk with someone you trust.  · Laugh, cry, sing, or write in a journal.  When should you call for help? Call 911 anytime you think you may need emergency care. For example, call if:    · You feel life is meaningless or think about killing yourself. Talk to a counselor or doctor if any of the following problems lasts for 2 or more weeks.    · You feel sad a lot or cry all the time.     · You have trouble sleeping or sleep too much.     · You find it hard to concentrate, make decisions, or remember things.     · You change how you normally eat.     · You feel guilty for no reason. Where can you learn more? Go to https://Salezeomatteb.Lanzaloya.com. org and sign in to your [employer] account. Enter B080 in the Vi[employer]ogo box to learn more about \"Well Care - Tips for Teens: Care Instructions. \"     If you do not have an account, please click on the \"Sign Up Now\" link. Current as of: February 10, 2021               Content Version: 13.0  © 4972-3647 [employer]. Care instructions adapted under license by [facility] ([facility]). If you have questions about a medical condition or this instruction, always ask your healthcare professional. [place] [address] any warranty or liability for your use of this information. Patient Education        Acne in Teens: Care Instructions  Overview  Acne is a skin problem. It shows up as blackheads, whiteheads, and pimples. Acne most often affects the face, neck, and upper body. It occurs when oil and dead skin cells clog the skin's pores. Acne usually starts during the teen years and often lasts into adulthood. Gentle cleansing every day controls most mild acne. If home treatment doesn't work, your doctor may prescribe a cream, an antibiotic, or a stronger medicine called isotretinoin.  Sometimes birth control pills help women who have monthly acne flare-ups. Follow-up care is a key part of your treatment and safety. Be sure to make and go to all appointments, and call your doctor if you are having problems. It's also a good idea to know your test results and keep a list of the medicines you take. How can you care for yourself at home? · Gently wash your face 1 or 2 times a day with warm (not hot) water and a mild soap or cleanser. Always rinse well. · Use an over-the-counter lotion or gel that contains benzoyl peroxide. Start with a small amount of 2.5% benzoyl peroxide and increase the strength as needed. Benzoyl peroxide works well for acne, but you may need to use it for up to 2 months before your acne starts to improve. · Apply acne cream, lotion, or gel to all the places you get pimples, blackheads, or whiteheads, not just where you have them now. Follow the instructions carefully. If your skin gets too dry and scaly or red and sore, reduce the amount. For the best results, apply medicines as directed. Try not to miss doses. · Do not squeeze or pick pimples and blackheads. This can cause infection and scarring. · Use only oil-free makeup, sunscreen, and other skin care products that will not clog your pores. · Wash your hair every day, and try to keep it off your face and shoulders. Consider pinning it back or cutting it short. When should you call for help? Watch closely for changes in your health, and be sure to contact your doctor if:    · You have tried home treatment for 6 to 8 weeks and your acne is not better or gets worse. Your doctor may need to add to or change your treatment.     · Your pimples become large and hard or filled with fluid.     · Scars form after pimples heal.     · You feel sad or hopeless, lack energy, or have other signs of depression while you are taking the prescription medicine isotretinoin.     · You start to have other symptoms, such as facial hair growth in women or bone and muscle pain. Where can you learn more? Go to https://chpepiceweb.healthAmideBio. org and sign in to your [employer] account. Enter V104 in the Ante Up box to learn more about \"Acne in Teens: Care Instructions. \"     If you do not have an account, please click on the \"Sign Up Now\" link. Current as of: March 3, 2021               Content Version: 13.0  © 7744-5655 [employer]. Care instructions adapted under license by [facility] ([facility]). If you have questions about a medical condition or this instruction, always ask your healthcare professional. [address] any warranty or liability for your use of this information. No

## 2022-01-31 NOTE — OCCUPATIONAL THERAPY INITIAL EVALUATION ADULT - BED MOBILITY/TRANSFERS, PREVIOUS LEVEL OF FUNCTION, OT EVAL
Hypertension is improving with treatment.  Dietary sodium restriction.  Regular aerobic exercise.  Medication changes per orders.  Blood pressure will be reassessed at the next regular appointment   On Metoprolol, Lisinopril, Lasix., Aldactone   needed assist

## 2022-04-04 NOTE — ED PROVIDER NOTE - GASTROINTESTINAL, MLM
"Cardiology Initial Consultation    Date of Service  4/4/2022    Referring Physician  ELISE Huang M.D.    Reason for Consultation  Elevated BNP    History of Presenting Illness  Linda Casas is a 86 y.o. female who is a poor historian and no family at beside with a past medical history of (per chart review) hypertension, hyperlipidemia, and recent left wrist fracture in June and L1 fracture in November (2021) who presented 4/4/2022 with increased shortness of breath. Patient reports that this has been ongoing for \"month\" to maybe days, patient is disoriented to time.     Patient previously followed by Dr. Hernandez and on amlodipine PRN for hypertension. Echo reviewed per below.    Tele personally reviewed by me shows SR with conversion to afib at 1300. Afib in 80's currently. Patient slightly overloaded on exam with diminished lung sounds. No JVD, BLE edema, no ascites. BP remains elevated    Review of Systems  Review of Systems   Unable to perform ROS: Dementia       Past Medical History  Per HPI    Surgical History   has a past surgical history that includes hysterectomy, total abdominal (2000); bladder suspension (2001); knee arthroscopy (3/11/2014); regla by laparoscopy (5/23/2016); trigger finger release (Left, 5/29/2018); colon resection; appendectomy (1969); and kyphoplasty (N/A, 11/6/2021).    Family History  family history includes No Known Problems in her father and mother.    Social History   reports that she has never smoked. She has never used smokeless tobacco. She reports previous alcohol use. She reports that she does not use drugs.    Medications  Prior to Admission Medications   Prescriptions Last Dose Informant Patient Reported? Taking?   Cholecalciferol (VITAMIN D-3 PO) 4/2/2022 at Unknown time  Yes No   Sig: Take 1 Capsule by mouth every day.   VITAMIN E PO 4/2/2022 at Unknown time  Yes No   Sig: Take 1 Capsule by mouth every day.   escitalopram (LEXAPRO) 20 MG tablet " "4/2/2022 at Unknown time  No No   Sig: Take 1 Tablet by mouth every day.   multivitamin (THERAGRAN) Tab 4/2/2022 at Unknown time  Yes Yes   Sig: Take 1 Tablet by mouth every day.      Facility-Administered Medications: None       Allergies  Allergies   Allergen Reactions   • Ampicillin Rash     Feels \"rotten\"        Vital signs in last 24 hours  Temp:  [36.3 °C (97.4 °F)-36.9 °C (98.5 °F)] 36.9 °C (98.5 °F)  Pulse:  [78-87] 84  Resp:  [15-16] 15  BP: (145-191)/() 145/90  SpO2:  [88 %-92 %] 88 %    Physical Exam  Physical Exam  Constitutional:       General: She is not in acute distress.     Appearance: Normal appearance.      Comments: Tolerates supine position for 3 minutes   HENT:      Head: Normocephalic and atraumatic.   Eyes:      Extraocular Movements: Extraocular movements intact.      Conjunctiva/sclera: Conjunctivae normal.   Neck:      Vascular: No JVD.   Cardiovascular:      Rate and Rhythm: Normal rate. Rhythm irregular.      Pulses: Normal pulses.      Heart sounds: Murmur heard.    Diastolic murmur is present with a grade of 1/4.  Pulmonary:      Effort: Pulmonary effort is normal. No respiratory distress.      Breath sounds: Examination of the right-lower field reveals decreased breath sounds. Examination of the left-lower field reveals decreased breath sounds. Decreased breath sounds present.   Abdominal:      General: There is distension.   Musculoskeletal:      Right lower leg: No edema.      Left lower leg: No edema.   Skin:     General: Skin is warm and dry.      Findings: No rash.   Neurological:      General: No focal deficit present.      Mental Status: She is alert. She is disoriented.      Comments: Disoriented to time         Lab Review  Lab Results   Component Value Date/Time    WBC 11.7 (H) 04/04/2022 07:25 AM    RBC 4.67 04/04/2022 07:25 AM    HEMOGLOBIN 14.4 04/04/2022 07:25 AM    HEMATOCRIT 44.7 04/04/2022 07:25 AM    MCV 95.7 04/04/2022 07:25 AM    MCH 30.8 04/04/2022 07:25 AM "    MCHC 32.2 (L) 04/04/2022 07:25 AM    MPV 11.2 04/04/2022 07:25 AM      Lab Results   Component Value Date/Time    SODIUM 139 04/04/2022 07:25 AM    POTASSIUM 3.7 04/04/2022 07:25 AM    CHLORIDE 104 04/04/2022 07:25 AM    CO2 22 04/04/2022 07:25 AM    GLUCOSE 99 04/04/2022 07:25 AM    BUN 13 04/04/2022 07:25 AM    CREATININE 0.79 04/04/2022 07:25 AM    CREATININE 0.9 08/28/2006 11:45 AM      Lab Results   Component Value Date/Time    ASTSGOT 14 04/04/2022 07:25 AM    ALTSGPT 7 04/04/2022 07:25 AM     Lab Results   Component Value Date/Time    CHOLSTRLTOT 198 10/28/2019 08:28 AM     (H) 10/28/2019 08:28 AM    HDL 65 10/28/2019 08:28 AM    TRIGLYCERIDE 105 10/28/2019 08:28 AM    TROPONINT 15 04/04/2022 07:25 AM       Recent Labs     04/04/22  0725   NTPROBNP 5372*      Ref. Range 4/4/2022 07:25   TSH Latest Ref Range: 0.380 - 5.330 uIU/mL 3.280     Cardiac Imaging and Procedures Review  EKG:  My personal interpretation of the EKG dated 4/4/2022 is sinus tachycardia    TTE (5/13/2020): No prior study is available for comparison.   Normal left ventricular systolic function.   Mild mitral regurgitation.  Mild aortic insufficiency.     Echocardiogram:  pending    Cardiac Catheterization:  NA    Imaging  Chest X-Ray (4/4/2022):  Interstitial edema. No focal consolidation or pleural effusions.     Stress Test:  NA    Assessment/Plan  Elevated BNP; Fluid overload; HTN; Hx grade 2 diastolic dysfunction  -Uptrending from 2020  -Echo pending  -Continue gentle IV diureses    Paroxymal Afib; PACs  -Unclear when patient developed  -Add lopressor 12.5 mg BID  -Continue to monitor on tele    HTN  -improved, remains elevated  -lisinopril, lopressor, spironolactone  -PRN hydralazine     DLD  -elevated 2019  -recheck    Falls; Hx wrist and L1 fracture; Dementia  -Head CT pending  -per primary    Thank you for allowing me to participate in the care of this patient.    I will continue to follow this patient    Please contact me  with any questions.    Please see Dr. Lewis's attestation for further details and MDM.     I personally spent a total of 15 minutes which includes face-to-face time and non-face-to-face time spent on preparing to see the patient, reviewing hospital notes and tests, obtaining history from the patient, performing a medically appropriate exam, counseling and educating the patient, ordering medications/tests/procedures/referrals as clinically indicated, and documenting information in the electronic medical record.    MITZI Avila.   [facility]  [phone]             Abdomen soft, but lower abd distended ?bladder ? stool no cvat but pt co pain to back on left and guards left hip

## 2022-05-27 NOTE — ED PROVIDER NOTE - EAR
The original prescription was discontinued on 2/17/2022 by Ozzy Charles MD. Renewing this prescription may not be appropriate. Rx was changed to metformin 500mg daily.   Please see encounter 2/16/22 Medication Question bilateral TM's clear

## 2022-05-28 ENCOUNTER — INPATIENT (INPATIENT)
Facility: HOSPITAL | Age: 77
LOS: 2 days | Discharge: NURSING, REHAB OR LONG-TERM CARE | DRG: 689 | End: 2022-05-31
Attending: FAMILY MEDICINE | Admitting: STUDENT IN AN ORGANIZED HEALTH CARE EDUCATION/TRAINING PROGRAM
Payer: MEDICARE

## 2022-05-28 VITALS
HEIGHT: 66 IN | TEMPERATURE: 99 F | HEART RATE: 86 BPM | SYSTOLIC BLOOD PRESSURE: 119 MMHG | OXYGEN SATURATION: 98 % | DIASTOLIC BLOOD PRESSURE: 68 MMHG | RESPIRATION RATE: 18 BRPM

## 2022-05-28 DIAGNOSIS — Z96.642 PRESENCE OF LEFT ARTIFICIAL HIP JOINT: Chronic | ICD-10-CM

## 2022-05-28 LAB
ALBUMIN SERPL ELPH-MCNC: 3.7 G/DL — SIGNIFICANT CHANGE UP (ref 3.3–5)
ALP SERPL-CCNC: 84 U/L — SIGNIFICANT CHANGE UP (ref 40–120)
ALT FLD-CCNC: 22 U/L — SIGNIFICANT CHANGE UP (ref 12–78)
ANION GAP SERPL CALC-SCNC: 9 MMOL/L — SIGNIFICANT CHANGE UP (ref 5–17)
AST SERPL-CCNC: 28 U/L — SIGNIFICANT CHANGE UP (ref 15–37)
BASOPHILS # BLD AUTO: 0.04 K/UL — SIGNIFICANT CHANGE UP (ref 0–0.2)
BASOPHILS NFR BLD AUTO: 0.3 % — SIGNIFICANT CHANGE UP (ref 0–2)
BILIRUB SERPL-MCNC: 0.4 MG/DL — SIGNIFICANT CHANGE UP (ref 0.2–1.2)
BUN SERPL-MCNC: 23 MG/DL — SIGNIFICANT CHANGE UP (ref 7–23)
CALCIUM SERPL-MCNC: 8.8 MG/DL — SIGNIFICANT CHANGE UP (ref 8.5–10.1)
CHLORIDE SERPL-SCNC: 107 MMOL/L — SIGNIFICANT CHANGE UP (ref 96–108)
CO2 SERPL-SCNC: 24 MMOL/L — SIGNIFICANT CHANGE UP (ref 22–31)
CREAT SERPL-MCNC: 0.55 MG/DL — SIGNIFICANT CHANGE UP (ref 0.5–1.3)
EGFR: 94 ML/MIN/1.73M2 — SIGNIFICANT CHANGE UP
EOSINOPHIL # BLD AUTO: 0.22 K/UL — SIGNIFICANT CHANGE UP (ref 0–0.5)
EOSINOPHIL NFR BLD AUTO: 1.8 % — SIGNIFICANT CHANGE UP (ref 0–6)
FLUAV AG NPH QL: SIGNIFICANT CHANGE UP
FLUBV AG NPH QL: SIGNIFICANT CHANGE UP
GLUCOSE SERPL-MCNC: 106 MG/DL — HIGH (ref 70–99)
HCT VFR BLD CALC: 33.5 % — LOW (ref 34.5–45)
HGB BLD-MCNC: 11 G/DL — LOW (ref 11.5–15.5)
IMM GRANULOCYTES NFR BLD AUTO: 0.3 % — SIGNIFICANT CHANGE UP (ref 0–1.5)
LIDOCAIN IGE QN: 129 U/L — SIGNIFICANT CHANGE UP (ref 73–393)
LYMPHOCYTES # BLD AUTO: 2.72 K/UL — SIGNIFICANT CHANGE UP (ref 1–3.3)
LYMPHOCYTES # BLD AUTO: 22.7 % — SIGNIFICANT CHANGE UP (ref 13–44)
MCHC RBC-ENTMCNC: 27.8 PG — SIGNIFICANT CHANGE UP (ref 27–34)
MCHC RBC-ENTMCNC: 32.8 GM/DL — SIGNIFICANT CHANGE UP (ref 32–36)
MCV RBC AUTO: 84.6 FL — SIGNIFICANT CHANGE UP (ref 80–100)
MONOCYTES # BLD AUTO: 1.01 K/UL — HIGH (ref 0–0.9)
MONOCYTES NFR BLD AUTO: 8.4 % — SIGNIFICANT CHANGE UP (ref 2–14)
NEUTROPHILS # BLD AUTO: 7.97 K/UL — HIGH (ref 1.8–7.4)
NEUTROPHILS NFR BLD AUTO: 66.5 % — SIGNIFICANT CHANGE UP (ref 43–77)
NRBC # BLD: 0 /100 WBCS — SIGNIFICANT CHANGE UP (ref 0–0)
PLATELET # BLD AUTO: 321 K/UL — SIGNIFICANT CHANGE UP (ref 150–400)
POTASSIUM SERPL-MCNC: 4.4 MMOL/L — SIGNIFICANT CHANGE UP (ref 3.5–5.3)
POTASSIUM SERPL-SCNC: 4.4 MMOL/L — SIGNIFICANT CHANGE UP (ref 3.5–5.3)
PROT SERPL-MCNC: 7.1 G/DL — SIGNIFICANT CHANGE UP (ref 6–8.3)
RBC # BLD: 3.96 M/UL — SIGNIFICANT CHANGE UP (ref 3.8–5.2)
RBC # FLD: 13.9 % — SIGNIFICANT CHANGE UP (ref 10.3–14.5)
RSV RNA NPH QL NAA+NON-PROBE: SIGNIFICANT CHANGE UP
SARS-COV-2 RNA SPEC QL NAA+PROBE: SIGNIFICANT CHANGE UP
SODIUM SERPL-SCNC: 140 MMOL/L — SIGNIFICANT CHANGE UP (ref 135–145)
WBC # BLD: 12 K/UL — HIGH (ref 3.8–10.5)
WBC # FLD AUTO: 12 K/UL — HIGH (ref 3.8–10.5)

## 2022-05-28 PROCEDURE — 74176 CT ABD & PELVIS W/O CONTRAST: CPT | Mod: 26,MA

## 2022-05-28 PROCEDURE — 99285 EMERGENCY DEPT VISIT HI MDM: CPT

## 2022-05-28 PROCEDURE — 71045 X-RAY EXAM CHEST 1 VIEW: CPT | Mod: 26

## 2022-05-28 PROCEDURE — 70450 CT HEAD/BRAIN W/O DYE: CPT | Mod: 26,MA

## 2022-05-28 RX ORDER — SODIUM CHLORIDE 9 MG/ML
1000 INJECTION INTRAMUSCULAR; INTRAVENOUS; SUBCUTANEOUS ONCE
Refills: 0 | Status: COMPLETED | OUTPATIENT
Start: 2022-05-28 | End: 2022-05-28

## 2022-05-28 RX ADMIN — SODIUM CHLORIDE 1000 MILLILITER(S): 9 INJECTION INTRAMUSCULAR; INTRAVENOUS; SUBCUTANEOUS at 23:15

## 2022-05-28 RX ADMIN — SODIUM CHLORIDE 1000 MILLILITER(S): 9 INJECTION INTRAMUSCULAR; INTRAVENOUS; SUBCUTANEOUS at 22:13

## 2022-05-28 NOTE — ED ADULT TRIAGE NOTE - CHIEF COMPLAINT QUOTE
Patient via EMS with c/o diarrhea today, son at bedside, reports patient has been increasingly confused x1 week. Son reports patient has parkinson's dementia, has neurology follow up next week.

## 2022-05-28 NOTE — ED ADULT NURSE NOTE - OBJECTIVE STATEMENT
pt Aox2(person and place) forgetful of time. as per son pt has had increased weakness and hallucinations. pt has also been complaining of abdominal pain. pt denies pain at this time. pt follows commands, moving all extremities. skin warm, dry and intact. son states pt experiences frequent constipation. Dr. Rausch disimpacted pt at bedside. safety measures initiated. call bell within reach. will continue to monitor. DAQUAN, RN

## 2022-05-28 NOTE — ED ADULT NURSE NOTE - NSIMPLEMENTINTERV_GEN_ALL_ED
Implemented All Fall Risk Interventions:  [place] to call system. Call bell, personal items and telephone within reach. Instruct patient to call for assistance. Room bathroom lighting operational. Non-slip footwear when patient is off stretcher. Physically safe environment: no spills, clutter or unnecessary equipment. Stretcher in lowest position, wheels locked, appropriate side rails in place. Provide visual cue, wrist band, yellow gown, etc. Monitor gait and stability. Monitor for mental status changes and reorient to person, place, and time. Review medications for side effects contributing to fall risk. Reinforce activity limits and safety measures with patient and family.

## 2022-05-29 DIAGNOSIS — G20 PARKINSON'S DISEASE: ICD-10-CM

## 2022-05-29 DIAGNOSIS — Z86.69 PERSONAL HISTORY OF OTHER DISEASES OF THE NERVOUS SYSTEM AND SENSE ORGANS: ICD-10-CM

## 2022-05-29 DIAGNOSIS — D64.9 ANEMIA, UNSPECIFIED: ICD-10-CM

## 2022-05-29 DIAGNOSIS — I10 ESSENTIAL (PRIMARY) HYPERTENSION: ICD-10-CM

## 2022-05-29 DIAGNOSIS — K52.89 OTHER SPECIFIED NONINFECTIVE GASTROENTERITIS AND COLITIS: ICD-10-CM

## 2022-05-29 DIAGNOSIS — R44.3 HALLUCINATIONS, UNSPECIFIED: ICD-10-CM

## 2022-05-29 DIAGNOSIS — N39.0 URINARY TRACT INFECTION, SITE NOT SPECIFIED: ICD-10-CM

## 2022-05-29 DIAGNOSIS — Z29.9 ENCOUNTER FOR PROPHYLACTIC MEASURES, UNSPECIFIED: ICD-10-CM

## 2022-05-29 DIAGNOSIS — G93.41 METABOLIC ENCEPHALOPATHY: ICD-10-CM

## 2022-05-29 DIAGNOSIS — M21.372 FOOT DROP, LEFT FOOT: ICD-10-CM

## 2022-05-29 LAB
ALBUMIN SERPL ELPH-MCNC: 3 G/DL — LOW (ref 3.3–5)
ALP SERPL-CCNC: 72 U/L — SIGNIFICANT CHANGE UP (ref 40–120)
ALT FLD-CCNC: 9 U/L — LOW (ref 12–78)
ANION GAP SERPL CALC-SCNC: 7 MMOL/L — SIGNIFICANT CHANGE UP (ref 5–17)
APPEARANCE UR: ABNORMAL
AST SERPL-CCNC: 21 U/L — SIGNIFICANT CHANGE UP (ref 15–37)
BASOPHILS # BLD AUTO: 0.04 K/UL — SIGNIFICANT CHANGE UP (ref 0–0.2)
BASOPHILS NFR BLD AUTO: 0.4 % — SIGNIFICANT CHANGE UP (ref 0–2)
BILIRUB SERPL-MCNC: 0.5 MG/DL — SIGNIFICANT CHANGE UP (ref 0.2–1.2)
BILIRUB UR-MCNC: NEGATIVE — SIGNIFICANT CHANGE UP
BUN SERPL-MCNC: 17 MG/DL — SIGNIFICANT CHANGE UP (ref 7–23)
CALCIUM SERPL-MCNC: 8 MG/DL — LOW (ref 8.5–10.1)
CHLORIDE SERPL-SCNC: 111 MMOL/L — HIGH (ref 96–108)
CO2 SERPL-SCNC: 27 MMOL/L — SIGNIFICANT CHANGE UP (ref 22–31)
COLOR SPEC: YELLOW — SIGNIFICANT CHANGE UP
CREAT SERPL-MCNC: 0.5 MG/DL — SIGNIFICANT CHANGE UP (ref 0.5–1.3)
DIFF PNL FLD: ABNORMAL
EGFR: 96 ML/MIN/1.73M2 — SIGNIFICANT CHANGE UP
EOSINOPHIL # BLD AUTO: 0.14 K/UL — SIGNIFICANT CHANGE UP (ref 0–0.5)
EOSINOPHIL NFR BLD AUTO: 1.4 % — SIGNIFICANT CHANGE UP (ref 0–6)
GLUCOSE SERPL-MCNC: 92 MG/DL — SIGNIFICANT CHANGE UP (ref 70–99)
GLUCOSE UR QL: NEGATIVE — SIGNIFICANT CHANGE UP
HCT VFR BLD CALC: 29.7 % — LOW (ref 34.5–45)
HGB BLD-MCNC: 9.8 G/DL — LOW (ref 11.5–15.5)
IMM GRANULOCYTES NFR BLD AUTO: 0.3 % — SIGNIFICANT CHANGE UP (ref 0–1.5)
KETONES UR-MCNC: NEGATIVE — SIGNIFICANT CHANGE UP
LEUKOCYTE ESTERASE UR-ACNC: ABNORMAL
LYMPHOCYTES # BLD AUTO: 2.38 K/UL — SIGNIFICANT CHANGE UP (ref 1–3.3)
LYMPHOCYTES # BLD AUTO: 24.4 % — SIGNIFICANT CHANGE UP (ref 13–44)
MAGNESIUM SERPL-MCNC: 2.1 MG/DL — SIGNIFICANT CHANGE UP (ref 1.6–2.6)
MCHC RBC-ENTMCNC: 28 PG — SIGNIFICANT CHANGE UP (ref 27–34)
MCHC RBC-ENTMCNC: 33 GM/DL — SIGNIFICANT CHANGE UP (ref 32–36)
MCV RBC AUTO: 84.9 FL — SIGNIFICANT CHANGE UP (ref 80–100)
MONOCYTES # BLD AUTO: 0.71 K/UL — SIGNIFICANT CHANGE UP (ref 0–0.9)
MONOCYTES NFR BLD AUTO: 7.3 % — SIGNIFICANT CHANGE UP (ref 2–14)
NEUTROPHILS # BLD AUTO: 6.46 K/UL — SIGNIFICANT CHANGE UP (ref 1.8–7.4)
NEUTROPHILS NFR BLD AUTO: 66.2 % — SIGNIFICANT CHANGE UP (ref 43–77)
NITRITE UR-MCNC: POSITIVE
NRBC # BLD: 0 /100 WBCS — SIGNIFICANT CHANGE UP (ref 0–0)
PH UR: 5 — SIGNIFICANT CHANGE UP (ref 5–8)
PHOSPHATE SERPL-MCNC: 3.2 MG/DL — SIGNIFICANT CHANGE UP (ref 2.5–4.5)
PLATELET # BLD AUTO: 300 K/UL — SIGNIFICANT CHANGE UP (ref 150–400)
POTASSIUM SERPL-MCNC: 3.7 MMOL/L — SIGNIFICANT CHANGE UP (ref 3.5–5.3)
POTASSIUM SERPL-SCNC: 3.7 MMOL/L — SIGNIFICANT CHANGE UP (ref 3.5–5.3)
PROT SERPL-MCNC: 6 G/DL — SIGNIFICANT CHANGE UP (ref 6–8.3)
PROT UR-MCNC: 15
RBC # BLD: 3.5 M/UL — LOW (ref 3.8–5.2)
RBC # FLD: 14 % — SIGNIFICANT CHANGE UP (ref 10.3–14.5)
SODIUM SERPL-SCNC: 145 MMOL/L — SIGNIFICANT CHANGE UP (ref 135–145)
SP GR SPEC: 1.01 — SIGNIFICANT CHANGE UP (ref 1.01–1.02)
UROBILINOGEN FLD QL: NEGATIVE — SIGNIFICANT CHANGE UP
WBC # BLD: 9.76 K/UL — SIGNIFICANT CHANGE UP (ref 3.8–10.5)
WBC # FLD AUTO: 9.76 K/UL — SIGNIFICANT CHANGE UP (ref 3.8–10.5)

## 2022-05-29 PROCEDURE — 99223 1ST HOSP IP/OBS HIGH 75: CPT | Mod: GC

## 2022-05-29 PROCEDURE — 93010 ELECTROCARDIOGRAM REPORT: CPT

## 2022-05-29 RX ORDER — BRIMONIDINE TARTRATE 2 MG/MG
1 SOLUTION/ DROPS OPHTHALMIC
Refills: 0 | Status: DISCONTINUED | OUTPATIENT
Start: 2022-05-29 | End: 2022-05-31

## 2022-05-29 RX ORDER — QUETIAPINE FUMARATE 200 MG/1
25 TABLET, FILM COATED ORAL AT BEDTIME
Refills: 0 | Status: DISCONTINUED | OUTPATIENT
Start: 2022-05-29 | End: 2022-05-31

## 2022-05-29 RX ORDER — ENOXAPARIN SODIUM 100 MG/ML
40 INJECTION SUBCUTANEOUS EVERY 24 HOURS
Refills: 0 | Status: DISCONTINUED | OUTPATIENT
Start: 2022-05-29 | End: 2022-05-31

## 2022-05-29 RX ORDER — PREDNISOLONE SODIUM PHOSPHATE 1 %
1 DROPS OPHTHALMIC (EYE)
Qty: 0 | Refills: 0 | DISCHARGE

## 2022-05-29 RX ORDER — LANOLIN ALCOHOL/MO/W.PET/CERES
3 CREAM (GRAM) TOPICAL AT BEDTIME
Refills: 0 | Status: DISCONTINUED | OUTPATIENT
Start: 2022-05-29 | End: 2022-05-31

## 2022-05-29 RX ORDER — INFLUENZA VIRUS VACCINE 15; 15; 15; 15 UG/.5ML; UG/.5ML; UG/.5ML; UG/.5ML
0.7 SUSPENSION INTRAMUSCULAR ONCE
Refills: 0 | Status: DISCONTINUED | OUTPATIENT
Start: 2022-05-29 | End: 2022-05-31

## 2022-05-29 RX ORDER — TIMOLOL 0.5 %
1 DROPS OPHTHALMIC (EYE)
Refills: 0 | Status: DISCONTINUED | OUTPATIENT
Start: 2022-05-29 | End: 2022-05-31

## 2022-05-29 RX ORDER — CEFTRIAXONE 500 MG/1
1000 INJECTION, POWDER, FOR SOLUTION INTRAMUSCULAR; INTRAVENOUS EVERY 24 HOURS
Refills: 0 | Status: DISCONTINUED | OUTPATIENT
Start: 2022-05-30 | End: 2022-05-31

## 2022-05-29 RX ORDER — SENNA PLUS 8.6 MG/1
2 TABLET ORAL AT BEDTIME
Refills: 0 | Status: DISCONTINUED | OUTPATIENT
Start: 2022-05-29 | End: 2022-05-31

## 2022-05-29 RX ORDER — FERROUS SULFATE 325(65) MG
325 TABLET ORAL DAILY
Refills: 0 | Status: DISCONTINUED | OUTPATIENT
Start: 2022-05-29 | End: 2022-05-31

## 2022-05-29 RX ORDER — AMLODIPINE BESYLATE 2.5 MG/1
10 TABLET ORAL DAILY
Refills: 0 | Status: DISCONTINUED | OUTPATIENT
Start: 2022-05-29 | End: 2022-05-31

## 2022-05-29 RX ORDER — LACTULOSE 10 G/15ML
10 SOLUTION ORAL THREE TIMES A DAY
Refills: 0 | Status: DISCONTINUED | OUTPATIENT
Start: 2022-05-29 | End: 2022-05-31

## 2022-05-29 RX ORDER — PREDNISOLONE SODIUM PHOSPHATE 1 %
1 DROPS OPHTHALMIC (EYE) DAILY
Refills: 0 | Status: DISCONTINUED | OUTPATIENT
Start: 2022-05-29 | End: 2022-05-31

## 2022-05-29 RX ORDER — QUETIAPINE FUMARATE 200 MG/1
1 TABLET, FILM COATED ORAL
Qty: 0 | Refills: 0 | DISCHARGE

## 2022-05-29 RX ORDER — CARBIDOPA AND LEVODOPA 25; 100 MG/1; MG/1
1 TABLET ORAL
Refills: 0 | Status: DISCONTINUED | OUTPATIENT
Start: 2022-05-29 | End: 2022-05-31

## 2022-05-29 RX ORDER — CEFTRIAXONE 500 MG/1
1000 INJECTION, POWDER, FOR SOLUTION INTRAMUSCULAR; INTRAVENOUS ONCE
Refills: 0 | Status: COMPLETED | OUTPATIENT
Start: 2022-05-29 | End: 2022-05-29

## 2022-05-29 RX ORDER — LACTOBACILLUS ACIDOPHILUS 100MM CELL
1 CAPSULE ORAL DAILY
Refills: 0 | Status: DISCONTINUED | OUTPATIENT
Start: 2022-05-29 | End: 2022-05-31

## 2022-05-29 RX ORDER — POLYETHYLENE GLYCOL 3350 17 G/17G
17 POWDER, FOR SOLUTION ORAL DAILY
Refills: 0 | Status: DISCONTINUED | OUTPATIENT
Start: 2022-05-29 | End: 2022-05-31

## 2022-05-29 RX ADMIN — Medication 1 TABLET(S): at 11:23

## 2022-05-29 RX ADMIN — QUETIAPINE FUMARATE 25 MILLIGRAM(S): 200 TABLET, FILM COATED ORAL at 02:36

## 2022-05-29 RX ADMIN — QUETIAPINE FUMARATE 25 MILLIGRAM(S): 200 TABLET, FILM COATED ORAL at 21:34

## 2022-05-29 RX ADMIN — CARBIDOPA AND LEVODOPA 1 TABLET(S): 25; 100 TABLET ORAL at 11:23

## 2022-05-29 RX ADMIN — Medication 1 DROP(S): at 07:38

## 2022-05-29 RX ADMIN — CARBIDOPA AND LEVODOPA 1 TABLET(S): 25; 100 TABLET ORAL at 02:36

## 2022-05-29 RX ADMIN — Medication 1 DROP(S): at 11:24

## 2022-05-29 RX ADMIN — CARBIDOPA AND LEVODOPA 1 TABLET(S): 25; 100 TABLET ORAL at 07:42

## 2022-05-29 RX ADMIN — Medication 1 ENEMA: at 11:24

## 2022-05-29 RX ADMIN — ENOXAPARIN SODIUM 40 MILLIGRAM(S): 100 INJECTION SUBCUTANEOUS at 11:24

## 2022-05-29 RX ADMIN — Medication 325 MILLIGRAM(S): at 11:24

## 2022-05-29 RX ADMIN — CEFTRIAXONE 1000 MILLIGRAM(S): 500 INJECTION, POWDER, FOR SOLUTION INTRAMUSCULAR; INTRAVENOUS at 01:38

## 2022-05-29 RX ADMIN — BRIMONIDINE TARTRATE 1 DROP(S): 2 SOLUTION/ DROPS OPHTHALMIC at 17:17

## 2022-05-29 RX ADMIN — Medication 1 DROP(S): at 17:21

## 2022-05-29 RX ADMIN — CEFTRIAXONE 100 MILLIGRAM(S): 500 INJECTION, POWDER, FOR SOLUTION INTRAMUSCULAR; INTRAVENOUS at 01:08

## 2022-05-29 RX ADMIN — CARBIDOPA AND LEVODOPA 1 TABLET(S): 25; 100 TABLET ORAL at 17:17

## 2022-05-29 RX ADMIN — SENNA PLUS 2 TABLET(S): 8.6 TABLET ORAL at 21:34

## 2022-05-29 RX ADMIN — BRIMONIDINE TARTRATE 1 DROP(S): 2 SOLUTION/ DROPS OPHTHALMIC at 07:42

## 2022-05-29 RX ADMIN — AMLODIPINE BESYLATE 10 MILLIGRAM(S): 2.5 TABLET ORAL at 07:42

## 2022-05-29 NOTE — ED ADULT NURSE REASSESSMENT NOTE - NS ED NURSE REASSESS COMMENT FT1
0045- Pt repositioned and cleaned. urine specimen collected via urethral catheterization using aseptic technique. safety measures maintained. call bell within reach. will continue to monitor. DAQUAN, RN

## 2022-05-29 NOTE — PROGRESS NOTE ADULT - PROBLEM SELECTOR PLAN 5
-Continue Sinemet  4x per day, can consider adding Nuplazid outpatient for hallucinations (not available on formulary in house)  -Patient prescribed Seroquel BID although son states she only takes qhs, continue  -Patient has HHA 4 days per week 9a-5p and son states it has become more difficult to care for her at home  -PT/SW/CM consult -Continue Sinemet  4x per day, can consider adding Nuplazid outpatient for hallucinations (not available on formulary in house)  -Patient prescribed Seroquel BID although son states she only takes qhs, continue qhs

## 2022-05-29 NOTE — H&P ADULT - ASSESSMENT
78 yo female PMH HTN, Parkinson's, GIACOMO, L eye cataract presents to ED with worsening hallucinations and difficulty ambulating, admitted for UTI and metabolic encephalopathy. 78 yo female PMH HTN, Parkinson's, GIACOMO, L eye cataract, known L foot drop presents to ED with worsening hallucinations and difficulty ambulating, admitted for UTI and metabolic encephalopathy. 78 yo female PMH HTN, Parkinson's, GIACOMO, L eye cataract, known L foot drop presents to ED with worsening hallucinations and difficulty ambulating, admitted for UTI and metabolic encephalopathy with exacerbation of hallucinations/increased paranoia.

## 2022-05-29 NOTE — H&P ADULT - PROBLEM SELECTOR PLAN 7
Patient with known L foot drop, supposed to be wearing [place] Brace but has not followed with Podiatry in a while  -Consult podiatry

## 2022-05-29 NOTE — H&P ADULT - HISTORY OF PRESENT ILLNESS
78 yo female PMH HTN, Parkinson's, GIACOMO, L eye cataract presents to ED with worsening hallucinations. History obtained from son Roman via phone ([phone]), states for the past few weeks patient has had increasing paranoia and hallucinations. Patient often sees people/things not there. Per patient, she called the police today because she saw people arguing on her lawn, although no one was present. Patient states she called the  because she could not walk. Son states patient has had increased difficulty ambulating. Patient walks with a walker at baseline and is supposed to be working with home PT, however home PT stopped about 1 month ago and since then patient has had increasing difficulty walking. Patient denies any other complaints. Denies any CP, SOB, abd pain, N/V/D, urinary complaints.     In ED:  Vitals: T 99, HR 78, /87, RR 15, SpO2 100%  Labs significant for: WBC 12.00, H/H 11.0/33.5  UA: 15 protein, +nitrite, mod LEC, large blood, >50 WBC, >50 RBC, TNTC bacteria  CT head: no acute intracranial bleeding, central volume loss and chronic microvascular ischemic change  CT abd/p: stercoral colitis with stool distended rectum and perirectal inflammatory fat stranding  EKG:  Given: 1g IV Rocephin, 1L NS bolus x1   78 yo female PMH HTN, Parkinson's, GIACOMO, L eye cataract, known L foot drop presents to ED with worsening hallucinations. History obtained from son Roman via phone ([phone]), states for the past few weeks patient has had increasing paranoia and hallucinations. Patient often sees people/things not there. Per patient, she called the police today because she saw people arguing on her lawn, although no one was present. Patient states she called the  because she could not walk to her lawn to see what was happening. Son states patient has had increased difficulty ambulating. Patient walks with a walker at baseline and is supposed to be working with home PT, however home PT stopped about 1 month ago and since then patient has had increasing difficulty walking. Patient denies any other complaints. Denies any CP, SOB, abd pain, N/V/D, urinary complaints.     In ED:  Vitals: T 99, HR 78, /87, RR 15, SpO2 100%  Labs significant for: WBC 12.00, H/H 11.0/33.5  UA: 15 protein, +nitrite, mod LEC, large blood, >50 WBC, >50 RBC, TNTC bacteria  CT head: no acute intracranial bleeding, central volume loss and chronic microvascular ischemic change  CT abd/p: stercoral colitis with stool distended rectum and perirectal inflammatory fat stranding  EKG: NSR rate 79  Given: 1g IV Rocephin, 1L NS bolus x1   76 yo female PMH HTN, Parkinson's, GIACOMO, L eye cataract, known L foot drop presents to ED with worsening hallucinations. History obtained from son Roman via phone ([phone]), states for the past few weeks patient has had increasing paranoia and hallucinations. Patient often sees people/things not there. Per patient, she called the police because she saw people arguing on her lawn. Patient states she called the  because she could not walk to her lawn to see what was happening. Son states patient has had increased difficulty ambulating. Patient walks with a walker at baseline and is supposed to be working with home PT, however home PT stopped about 1 month ago and since then patient has had increasing difficulty walking. She reports left drop foot and using pepe brace at home. Son has noted that she had multiple near-falls. Patient denies any other complaints. Denies any CP, SOB, abd pain, N/V/D, urinary complaints.     In ED:  Vitals: T 99, HR 78, /87, RR 15, SpO2 100%  Labs significant for: WBC 12.00, H/H 11.0/33.5  UA: 15 protein, +nitrite, mod LEC, large blood, >50 WBC, >50 RBC, TNTC bacteria  CT head: no acute intracranial bleeding, central volume loss and chronic microvascular ischemic change  CT abd/p: stercoral colitis with stool distended rectum and perirectal inflammatory fat stranding  EKG: NSR rate 79  Given: 1g IV Rocephin, 1L NS bolus x1

## 2022-05-29 NOTE — H&P ADULT - PROBLEM SELECTOR PLAN 2
Patient with worsening hallucinations and difficulty ambulating past 1 month, A&Ox3 at baseline with increased paranoia  -CT head: no acute intracranial bleeding, central volume loss and chronic microvascular ischemic change  -Likely exacerbated in setting of acute infection  -Continue UTI treatment as above  -PT/SW/CM consult Patient with worsening hallucinations and difficulty ambulating past 1 month, A&Ox3 at baseline with increased paranoia. Visual hallucinations can be due to progressive disease (parkinson's) vs sinemet. She is already on quetiapine for this. Son states that her paranoia due to her hallucinations have gotten much worse.  -CT head: no acute intracranial bleeding, central volume loss and chronic microvascular ischemic change  -Likely exacerbated in setting of acute infection  -Continue UTI treatment as above  -PT/SW/CM consult - may require placement to MARLEN

## 2022-05-29 NOTE — PHYSICAL THERAPY INITIAL EVALUATION ADULT - ADDITIONAL COMMENTS
Pt lives in a private home with son. Pt has 3 steps to enter. Pt has a wheelchair, rolling walker, shower chair and grab bars. Pt has a [place] brace for left foot drop which is not here with pt. Pt states she was receiving home care PT and had a HHA to assist with ADL's a few days a week

## 2022-05-29 NOTE — H&P ADULT - PROBLEM SELECTOR PLAN 1
Patient with worsening metabolic encephalopathy found to have UTI  -UA: 15 protein, +nitrite, mod LEC, large blood, >50 WBC, >50 RBC, TNTC bacteria on admission  -Admit to general medical floor  -Patient does not meet sepsis criteria on admission  -Continue IV Rocephin  -F/u urine culture

## 2022-05-29 NOTE — H&P ADULT - NSHPREVIEWOFSYSTEMS_GEN_ALL_CORE
CONSTITUTIONAL: No weakness, fevers or chills  HEENT:  No headache, +visual hallucinations, no sore throat  RESPIRATORY: No cough, wheezing, hemoptysis; No shortness of breath  CARDIOVASCULAR: No chest pain or palpitations  GASTROINTESTINAL: No abdominal pain, no nausea, vomiting, or hematemesis; No diarrhea or constipation. No melena or hematochezia.  GENITOURINARY: No dysuria, frequency or hematuria  NEUROLOGICAL: No focal weakness or dizziness +hallucinations  MSK: No myalgias  SKIN: No itching, burning, rashes, or lesions CONSTITUTIONAL: No weakness, fevers or chills  HEENT:  No headache, +visual hallucinations, no sore throat  RESPIRATORY: No cough, wheezing, hemoptysis; No shortness of breath  CARDIOVASCULAR: No chest pain or palpitations  GASTROINTESTINAL: No abdominal pain, no nausea, vomiting, or hematemesis; No diarrhea or constipation. No melena or hematochezia.  GENITOURINARY: No dysuria, frequency or hematuria  NEUROLOGICAL: No focal weakness or dizziness +hallucinations  MSK: No myalgias  SKIN: No itching, burning, rashes, or lesions  PSYCH: Denies any recent changes in mood; son reports increased paranoia

## 2022-05-29 NOTE — H&P ADULT - ATTENDING COMMENTS
78 yo female PMH HTN, Parkinson's, GIACOMO, L eye cataract, known L foot drop presents to ED with worsening hallucinations and difficulty ambulating, admitted for UTI and metabolic encephalopathy with exacerbation of hallucinations/increased paranoia. Admit to medicine. Continue rocephin. Follow up cultures. Visual hallucinations likely due to parkinson's disease +/- sinemet. On seroquel. Consider Nuplazid as outpatient. Paranoia and worsened hallucinations possibly due to metabolic encephalopathy from UTI. Patient with unsteady gait and many near-falls. Son cannot care for her at home in her current state. PT/SW consult for possible MARLEN. Discussed with patient at bedside - she wants to go home.    Agree with H&P as outlined above, edited where appropriate.

## 2022-05-29 NOTE — H&P ADULT - PROBLEM SELECTOR PLAN 9
H/H 11/33.5 on admission, baseline hgb 9-10 per chart review  -PMH of GIACOMO on iron supplements  -No current signs of active bleeding  -Monitor daily CBC

## 2022-05-29 NOTE — H&P ADULT - NSHPPHYSICALEXAM_GEN_ALL_CORE
T(C): 37.2 (05-29-22 @ 00:46), Max: 37.2 (05-29-22 @ 00:46)  HR: 78 (05-29-22 @ 00:46) (78 - 86)  BP: 133/87 (05-29-22 @ 00:46) (119/68 - 133/87)  RR: 15 (05-29-22 @ 00:46) (15 - 18)  SpO2: 100% (05-29-22 @ 00:46) (98% - 100%)  Wt(kg): --    Physical Exam:   GENERAL: well-groomed, well-developed, NAD  HEENT: head NC/AT; EOM intact, L cataract, hearing grossly intact, moist mucous membranes  NECK: supple, no JVD  RESPIRATORY: CTA B/L, no wheezing, rales, rhonchi or rubs  CARDIOVASCULAR: S1&S2, RRR, no murmurs or gallops  ABDOMEN: soft, non-tender, non-distended, + Bowel sounds x4 quadrants, no guarding, rebound or rigidity  MUSCULOSKELETAL: +L drop foot, no clubbing, cyanosis or edema of all 4 extremities  VASCULAR: Dorsalis pedis pulses 2+ bilaterally  SKIN: warm and dry, color normal  NEUROLOGIC: AA&O X3, +L drop foot  Psych: paranoid T(C): 37.2 (05-29-22 @ 00:46), Max: 37.2 (05-29-22 @ 00:46)  HR: 78 (05-29-22 @ 00:46) (78 - 86)  BP: 133/87 (05-29-22 @ 00:46) (119/68 - 133/87)  RR: 15 (05-29-22 @ 00:46) (15 - 18)  SpO2: 100% (05-29-22 @ 00:46) (98% - 100%)    GENERAL: well-groomed, well-developed, NAD  HEENT: head NC/AT; EOM intact, L cataract, hearing grossly intact, moist mucous membranes  NECK: supple, no JVD  RESPIRATORY: CTA B/L, no wheezing, rales, rhonchi or rubs  CARDIOVASCULAR: S1&S2, RRR, no murmurs or gallops  ABDOMEN: soft, non-tender, non-distended, + Bowel sounds x4 quadrants, no guarding, rebound or rigidity  MUSCULOSKELETAL: +L drop foot, no clubbing, cyanosis or edema of all 4 extremities  VASCULAR: Dorsalis pedis pulses 2+ bilaterally  SKIN: warm and dry, color normal  NEUROLOGIC: AA&O X3, +L drop foot, sensation to light touch intact  Psych: paranoid

## 2022-05-29 NOTE — H&P ADULT - PROBLEM SELECTOR PLAN 5
-Continue Sinemet  4x per day, can consider adding Nuplazid outpatient for hallucinations (not available on formulary)  -Patient prescribed Seroquel BID although son states she only takes qhs, continue  -Patient has HHA 4 days per week 9a-5p  -Son states it has become more difficult to care for her at home  -PT/SW/CM consult -Continue Sinemet  4x per day, can consider adding Nuplazid outpatient for hallucinations (not available on formulary in house)  -Patient prescribed Seroquel BID although son states she only takes qhs, continue  -Patient has HHA 4 days per week 9a-5p and son states it has become more difficult to care for her at home  -PT/SW/CM consult

## 2022-05-29 NOTE — H&P ADULT - NSHPSOCIALHISTORY_GEN_ALL_CORE
Tobacco: denies  EtOH: denies  Recreational drug use: denies  Lives with: son  Ambulates: walker  ADLs: has HHA 4 days per week 9a-5p  Vaccinations: No covid vaccine

## 2022-05-29 NOTE — ED PROVIDER NOTE - GASTROINTESTINAL, MLM
Abdomen soft, non-tender, no guarding. On rectal exam pt with fecal impaction, disimpacted at bedside

## 2022-05-29 NOTE — H&P ADULT - PROBLEM SELECTOR PLAN 4
CT abd/p on admission showing stercoral colitis with stool distended rectum and perirectal inflammatory fat stranding  -Likely mechanical in setting of Parkinson's disease  -Patient underwent manual disimpaction in ED with subsequent bowel movements  -Continue IV Rocephin, add probiotic  -Start bowel regimen with senna, miralax CT abd/p on admission showing stercoral colitis with stool distended rectum and perirectal inflammatory fat stranding  -Likely mechanical in setting of Parkinson's disease  -Patient underwent manual disimpaction in ED with subsequent bowel movements  -Continue IV Rocephin, add probiotic  -Start bowel regimen with senna, miralax  -Monitor stool output

## 2022-05-29 NOTE — ED PROVIDER NOTE - OBJECTIVE STATEMENT
Pt is a 76 yo female who presents to the ED with a cc of worsening hallucinations. PMHx of HTN, Parkinson's, GIACOMO, L eye cataract, known L foot drop.   History obtained from son Roman, states for the past few weeks patient has had increasing paranoia and hallucinations. Patient often sees people/things not there. Per patient, she called the police because she saw people arguing on her lawn. Patient states she called the  because she could not walk to her lawn to see what was happening. Son states patient has had increased difficulty ambulating. Patient walks with a walker at baseline and is supposed to be working with home PT, however home PT stopped about 1 month ago and since then patient has had increasing difficulty walking. She reports left drop foot and using pepe brace at home. Son has noted that she had multiple near-falls. Patient denies any other complaints. Denies any CP, SOB, abd pain, N/V/D, urinary complaints.

## 2022-05-29 NOTE — PROGRESS NOTE ADULT - PROBLEM SELECTOR PLAN 3
Patient with worsening hallucinations past 1 month, hx of Parkinson's disease   -Worsening hallucinations likely multifactorial in setting of Parkinson's disease vs. Sinemet, exacerbated in setting of metabolic encephalopathy 2/2 UTI  -Continue Sinemet  4x per day, can consider adding Nuplazid outpatient for hallucinations (not available on formulary)  -Patient prescribed Seroquel BID although son states she only takes qhs, continue Patient with worsening hallucinations past 1 month, hx of Parkinson's disease   -Worsening hallucinations likely multifactorial in setting of Parkinson's disease vs. Sinemet, exacerbated in setting of metabolic encephalopathy 2/2 UTI  -Continue home Sinemet  4x per day, can consider adding Nuplazid outpatient for hallucinations (not available on formulary)  -Patient prescribed Seroquel BID although son states she only takes qhs, continue qhs Patient with worsening hallucinations past 1 month, hx of Parkinson's disease   -Worsening hallucinations likely multifactorial in setting of Parkinson's disease  exacerbated in setting of acute  metabolic encephalopathy 2/2 UTI  -Continue home Sinemet  4x per day, can consider adding Nuplazid outpatient for hallucinations (not available on formulary)  -Patient prescribed Seroquel BID although son states she only takes qhs, continue qhs

## 2022-05-29 NOTE — PROGRESS NOTE ADULT - ASSESSMENT
76 yo female PMH HTN, Parkinson's, GIACOMO, L eye cataract, known L foot drop presents to ED with worsening hallucinations and difficulty ambulating, admitted for UTI and metabolic encephalopathy with exacerbation of hallucinations/increased paranoia. 78 yo female PMH HTN, Parkinson's, GIACOMO, L eye cataract, known L foot drop presents to ED with worsening hallucinations and difficulty ambulating, admitted for UTI and metabolic encephalopathy with exacerbation of hallucinations/increased paranoia, in the setting of acute infection and Parkinsons Disease psychosis. 78 yo female PMH HTN, Parkinson's, GIACOMO, L eye cataract, known L foot drop admitted for UTI and metabolic encephalopathy with exacerbation of hallucinations/increased paranoia, in the setting of acute infection and Parkinsons Disease psychosis.

## 2022-05-29 NOTE — PROGRESS NOTE ADULT - SUBJECTIVE AND OBJECTIVE BOX
Patient is a 77y old  Female who presents with a chief complaint of UTI, metabolic encephalopathy (29 May 2022 01:49)      INTERVAL HPI/OVERNIGHT EVENTS: Patient seen and examined at bedside. No acute overnight events occurred. Patient has no complaints at this time. Denies fevers, chills, headache, lightheadedness, chest pain, dyspnea, abdominal pain, n/v/d/c.    MEDICATIONS  (STANDING):  amLODIPine   Tablet 10 milliGRAM(s) Oral daily  brimonidine 0.2% Ophthalmic Solution 1 Drop(s) Left EYE two times a day  carbidopa/levodopa  25/100 1 Tablet(s) Oral four times a day  cefTRIAXone   IVPB 1000 milliGRAM(s) IV Intermittent every 24 hours  enoxaparin Injectable 40 milliGRAM(s) SubCutaneous every 24 hours  ferrous    sulfate 325 milliGRAM(s) Oral daily  lactobacillus acidophilus 1 Tablet(s) Oral daily  polyethylene glycol 3350 17 Gram(s) Oral daily  prednisoLONE acetate 1% Suspension 1 Drop(s) Left EYE daily  QUEtiapine 25 milliGRAM(s) Oral at bedtime  senna 2 Tablet(s) Oral at bedtime  timolol 0.5% Solution 1 Drop(s) Left EYE two times a day    MEDICATIONS  (PRN):      Allergies    tetracycline (Unknown)    Intolerances        REVIEW OF SYSTEMS:  CONSTITUTIONAL: No fever or chills  HEENT:  No headache, no sore throat  RESPIRATORY: No cough, wheezing, or shortness of breath  CARDIOVASCULAR: No chest pain, palpitations  GASTROINTESTINAL: No abd pain, nausea, vomiting, or diarrhea  GENITOURINARY: No dysuria, frequency, or hematuria  NEUROLOGICAL: no focal weakness or dizziness  MUSCULOSKELETAL: no myalgias     Vital Signs Last 24 Hrs  T(C): 37.4 (29 May 2022 05:35), Max: 37.9 (29 May 2022 02:40)  T(F): 99.3 (29 May 2022 05:35), Max: 100.2 (29 May 2022 02:40)  HR: 69 (29 May 2022 05:35) (69 - 86)  BP: 126/75 (29 May 2022 05:35) (115/55 - 133/87)  BP(mean): --  RR: 17 (29 May 2022 05:35) (15 - 18)  SpO2: 95% (29 May 2022 05:35) (95% - 100%)    PHYSICAL EXAM:  GENERAL: NAD  HEENT:  anicteric, moist mucous membranes  CHEST/LUNG:  CTA b/l, no rales, wheezes, or rhonchi  HEART:  RRR, S1, S2  ABDOMEN:  BS+, soft, nontender, nondistended  EXTREMITIES: no edema, cyanosis, or calf tenderness  NERVOUS SYSTEM: answers questions and follows commands appropriately    LABS:                        9.8    9.76  )-----------( 300      ( 29 May 2022 05:30 )             29.7     CBC Full  -  ( 29 May 2022 05:30 )  WBC Count : 9.76 K/uL  Hemoglobin : 9.8 g/dL  Hematocrit : 29.7 %  Platelet Count - Automated : 300 K/uL  Mean Cell Volume : 84.9 fl  Mean Cell Hemoglobin : 28.0 pg  Mean Cell Hemoglobin Concentration : 33.0 gm/dL  Auto Neutrophil # : 6.46 K/uL  Auto Lymphocyte # : 2.38 K/uL  Auto Monocyte # : 0.71 K/uL  Auto Eosinophil # : 0.14 K/uL  Auto Basophil # : 0.04 K/uL  Auto Neutrophil % : 66.2 %  Auto Lymphocyte % : 24.4 %  Auto Monocyte % : 7.3 %  Auto Eosinophil % : 1.4 %  Auto Basophil % : 0.4 %    29 May 2022 05:30    145    |  111    |  17     ----------------------------<  92     3.7     |  27     |  0.50     Ca    8.0        29 May 2022 05:30  Phos  3.2       29 May 2022 05:30  Mg     2.1       29 May 2022 05:30    TPro  6.0    /  Alb  3.0    /  TBili  0.5    /  DBili  x      /  AST  21     /  ALT  9      /  AlkPhos  72     29 May 2022 05:30      Urinalysis Basic - ( 29 May 2022 00:45 )    Color: Yellow / Appearance: Slightly Turbid / S.010 / pH: x  Gluc: x / Ketone: Negative  / Bili: Negative / Urobili: Negative   Blood: x / Protein: 15 / Nitrite: Positive   Leuk Esterase: Moderate / RBC: >50 /HPF / WBC >50   Sq Epi: x / Non Sq Epi: Few / Bacteria: TNTC      CAPILLARY BLOOD GLUCOSE              RADIOLOGY & ADDITIONAL TESTS:    Personally reviewed.     Consultant(s) Notes Reviewed:  [x] YES  [ ] NO     Patient is a 77y old  Female who presents with a chief complaint of UTI, metabolic encephalopathy (29 May 2022 01:49)      INTERVAL HPI/OVERNIGHT EVENTS: Patient seen and examined at bedside. Patient has no complaints at this time. Denies auditory or visual hallucinations at this time. Denies fevers, chills, headache, lightheadedness, chest pain, dyspnea, abdominal pain, n/v/d/c.    MEDICATIONS  (STANDING):  amLODIPine   Tablet 10 milliGRAM(s) Oral daily  brimonidine 0.2% Ophthalmic Solution 1 Drop(s) Left EYE two times a day  carbidopa/levodopa  25/100 1 Tablet(s) Oral four times a day  cefTRIAXone   IVPB 1000 milliGRAM(s) IV Intermittent every 24 hours  enoxaparin Injectable 40 milliGRAM(s) SubCutaneous every 24 hours  ferrous    sulfate 325 milliGRAM(s) Oral daily  lactobacillus acidophilus 1 Tablet(s) Oral daily  polyethylene glycol 3350 17 Gram(s) Oral daily  prednisoLONE acetate 1% Suspension 1 Drop(s) Left EYE daily  QUEtiapine 25 milliGRAM(s) Oral at bedtime  senna 2 Tablet(s) Oral at bedtime  timolol 0.5% Solution 1 Drop(s) Left EYE two times a day    MEDICATIONS  (PRN):      Allergies    tetracycline (Unknown)    Intolerances        REVIEW OF SYSTEMS:  CONSTITUTIONAL: No fever or chills  HEENT:  No headache, no sore throat  RESPIRATORY: No cough, wheezing, or shortness of breath  CARDIOVASCULAR: No chest pain, palpitations  GASTROINTESTINAL: No abd pain, nausea, vomiting, or diarrhea  GENITOURINARY: No dysuria, frequency, or hematuria  NEUROLOGICAL: no focal weakness or dizziness  MUSCULOSKELETAL: no myalgias     Vital Signs Last 24 Hrs  T(C): 37.4 (29 May 2022 05:35), Max: 37.9 (29 May 2022 02:40)  T(F): 99.3 (29 May 2022 05:35), Max: 100.2 (29 May 2022 02:40)  HR: 69 (29 May 2022 05:35) (69 - 86)  BP: 126/75 (29 May 2022 05:35) (115/55 - 133/87)  BP(mean): --  RR: 17 (29 May 2022 05:35) (15 - 18)  SpO2: 95% (29 May 2022 05:35) (95% - 100%)    PHYSICAL EXAM:  GENERAL: NAD  HEENT:  anicteric, moist mucous membranes  CHEST/LUNG:  CTA b/l, no rales, wheezes, or rhonchi  HEART:  RRR, S1, S2  ABDOMEN:  BS+, soft, nontender, nondistended  EXTREMITIES: no edema, cyanosis, or calf tenderness  NERVOUS SYSTEM: answers questions and follows commands appropriately    LABS:                        9.8    9.76  )-----------( 300      ( 29 May 2022 05:30 )             29.7     CBC Full  -  ( 29 May 2022 05:30 )  WBC Count : 9.76 K/uL  Hemoglobin : 9.8 g/dL  Hematocrit : 29.7 %  Platelet Count - Automated : 300 K/uL  Mean Cell Volume : 84.9 fl  Mean Cell Hemoglobin : 28.0 pg  Mean Cell Hemoglobin Concentration : 33.0 gm/dL  Auto Neutrophil # : 6.46 K/uL  Auto Lymphocyte # : 2.38 K/uL  Auto Monocyte # : 0.71 K/uL  Auto Eosinophil # : 0.14 K/uL  Auto Basophil # : 0.04 K/uL  Auto Neutrophil % : 66.2 %  Auto Lymphocyte % : 24.4 %  Auto Monocyte % : 7.3 %  Auto Eosinophil % : 1.4 %  Auto Basophil % : 0.4 %    29 May 2022 05:30    145    |  111    |  17     ----------------------------<  92     3.7     |  27     |  0.50     Ca    8.0        29 May 2022 05:30  Phos  3.2       29 May 2022 05:30  Mg     2.1       29 May 2022 05:30    TPro  6.0    /  Alb  3.0    /  TBili  0.5    /  DBili  x      /  AST  21     /  ALT  9      /  AlkPhos  72     29 May 2022 05:30      Urinalysis Basic - ( 29 May 2022 00:45 )    Color: Yellow / Appearance: Slightly Turbid / S.010 / pH: x  Gluc: x / Ketone: Negative  / Bili: Negative / Urobili: Negative   Blood: x / Protein: 15 / Nitrite: Positive   Leuk Esterase: Moderate / RBC: >50 /HPF / WBC >50   Sq Epi: x / Non Sq Epi: Few / Bacteria: TNTC      CAPILLARY BLOOD GLUCOSE              RADIOLOGY & ADDITIONAL TESTS:    Personally reviewed.     Consultant(s) Notes Reviewed:  [x] YES  [ ] NO     Patient is a 77y old  Female who presents with a chief complaint of UTI, metabolic encephalopathy (29 May 2022 01:49)      INTERVAL HPI/OVERNIGHT EVENTS: Patient seen and examined at bedside. Patient has no complaints at this time. AAOx2, denies auditory or visual hallucinations at this time. Denies fevers, chills, headache, lightheadedness, chest pain, dyspnea, abdominal pain, n/v/d.    MEDICATIONS  (STANDING):  amLODIPine   Tablet 10 milliGRAM(s) Oral daily  brimonidine 0.2% Ophthalmic Solution 1 Drop(s) Left EYE two times a day  carbidopa/levodopa  25/100 1 Tablet(s) Oral four times a day  cefTRIAXone   IVPB 1000 milliGRAM(s) IV Intermittent every 24 hours  enoxaparin Injectable 40 milliGRAM(s) SubCutaneous every 24 hours  ferrous    sulfate 325 milliGRAM(s) Oral daily  lactobacillus acidophilus 1 Tablet(s) Oral daily  polyethylene glycol 3350 17 Gram(s) Oral daily  prednisoLONE acetate 1% Suspension 1 Drop(s) Left EYE daily  QUEtiapine 25 milliGRAM(s) Oral at bedtime  senna 2 Tablet(s) Oral at bedtime  timolol 0.5% Solution 1 Drop(s) Left EYE two times a day    MEDICATIONS  (PRN):      Allergies    tetracycline (Unknown)    Intolerances      Unable to obtain meaningful ROS due to mental status     Vital Signs Last 24 Hrs  T(C): 37.4 (29 May 2022 05:35), Max: 37.9 (29 May 2022 02:40)  T(F): 99.3 (29 May 2022 05:35), Max: 100.2 (29 May 2022 02:40)  HR: 69 (29 May 2022 05:35) (69 - 86)  BP: 126/75 (29 May 2022 05:35) (115/55 - 133/87)  BP(mean): --  RR: 17 (29 May 2022 05:35) (15 - 18)  SpO2: 95% (29 May 2022 05:35) (95% - 100%)    PHYSICAL EXAM:  GENERAL: NAD  HEENT:  anicteric, moist mucous membranes  CHEST/LUNG:  CTA b/l, no rales, wheezes, or rhonchi  HEART:  RRR, S1, S2  ABDOMEN:  BS+, soft, nontender, nondistended  EXTREMITIES: no edema, cyanosis, or calf tenderness  NERVOUS SYSTEM: answers questions and follows commands appropriately    LABS:                        9.8    9.76  )-----------( 300      ( 29 May 2022 05:30 )             29.7     CBC Full  -  ( 29 May 2022 05:30 )  WBC Count : 9.76 K/uL  Hemoglobin : 9.8 g/dL  Hematocrit : 29.7 %  Platelet Count - Automated : 300 K/uL  Mean Cell Volume : 84.9 fl  Mean Cell Hemoglobin : 28.0 pg  Mean Cell Hemoglobin Concentration : 33.0 gm/dL  Auto Neutrophil # : 6.46 K/uL  Auto Lymphocyte # : 2.38 K/uL  Auto Monocyte # : 0.71 K/uL  Auto Eosinophil # : 0.14 K/uL  Auto Basophil # : 0.04 K/uL  Auto Neutrophil % : 66.2 %  Auto Lymphocyte % : 24.4 %  Auto Monocyte % : 7.3 %  Auto Eosinophil % : 1.4 %  Auto Basophil % : 0.4 %    29 May 2022 05:30    145    |  111    |  17     ----------------------------<  92     3.7     |  27     |  0.50     Ca    8.0        29 May 2022 05:30  Phos  3.2       29 May 2022 05:30  Mg     2.1       29 May 2022 05:30    TPro  6.0    /  Alb  3.0    /  TBili  0.5    /  DBili  x      /  AST  21     /  ALT  9      /  AlkPhos  72     29 May 2022 05:30      Urinalysis Basic - ( 29 May 2022 00:45 )    Color: Yellow / Appearance: Slightly Turbid / S.010 / pH: x  Gluc: x / Ketone: Negative  / Bili: Negative / Urobili: Negative   Blood: x / Protein: 15 / Nitrite: Positive   Leuk Esterase: Moderate / RBC: >50 /HPF / WBC >50   Sq Epi: x / Non Sq Epi: Few / Bacteria: TNTC      CAPILLARY BLOOD GLUCOSE              RADIOLOGY & ADDITIONAL TESTS:    Personally reviewed.     Consultant(s) Notes Reviewed:  [x] YES  [ ] NO     Patient is a 77y old  Female who presents with a chief complaint of UTI, metabolic encephalopathy (29 May 2022 01:49)      INTERVAL HPI/OVERNIGHT EVENTS: Patient seen and examined at bedside. Patient has no complaints at this time. AAOx2, denies auditory or visual hallucinations at this time. Denies fevers, chills, headache, lightheadedness, chest pain, dyspnea, abdominal pain, n/v/d.    MEDICATIONS  (STANDING):  amLODIPine   Tablet 10 milliGRAM(s) Oral daily  brimonidine 0.2% Ophthalmic Solution 1 Drop(s) Left EYE two times a day  carbidopa/levodopa  25/100 1 Tablet(s) Oral four times a day  cefTRIAXone   IVPB 1000 milliGRAM(s) IV Intermittent every 24 hours  enoxaparin Injectable 40 milliGRAM(s) SubCutaneous every 24 hours  ferrous    sulfate 325 milliGRAM(s) Oral daily  lactobacillus acidophilus 1 Tablet(s) Oral daily  polyethylene glycol 3350 17 Gram(s) Oral daily  prednisoLONE acetate 1% Suspension 1 Drop(s) Left EYE daily  QUEtiapine 25 milliGRAM(s) Oral at bedtime  senna 2 Tablet(s) Oral at bedtime  timolol 0.5% Solution 1 Drop(s) Left EYE two times a day    MEDICATIONS  (PRN):      Allergies    tetracycline (Unknown)    Intolerances      Unable to obtain meaningful ROS due to mental status     Vital Signs Last 24 Hrs  T(C): 37.4 (29 May 2022 05:35), Max: 37.9 (29 May 2022 02:40)  T(F): 99.3 (29 May 2022 05:35), Max: 100.2 (29 May 2022 02:40)  HR: 69 (29 May 2022 05:35) (69 - 86)  BP: 126/75 (29 May 2022 05:35) (115/55 - 133/87)  BP(mean): --  RR: 17 (29 May 2022 05:35) (15 - 18)  SpO2: 95% (29 May 2022 05:35) (95% - 100%)    PHYSICAL EXAM:  GENERAL: NAD  HEENT:  anicteric, moist mucous membranes  CHEST/LUNG:  CTA b/l, no rales, wheezes, or rhonchi  HEART:  RRR, S1, S2  ABDOMEN:  BS+, soft, nontender, nondistended  EXTREMITIES: no edema, cyanosis, or calf tenderness  NERVOUS SYSTEM: AAOX2, denies auditory/visual hallucinations, answers questions and follows commands appropriately    LABS:                        9.8    9.76  )-----------( 300      ( 29 May 2022 05:30 )             29.7     CBC Full  -  ( 29 May 2022 05:30 )  WBC Count : 9.76 K/uL  Hemoglobin : 9.8 g/dL  Hematocrit : 29.7 %  Platelet Count - Automated : 300 K/uL  Mean Cell Volume : 84.9 fl  Mean Cell Hemoglobin : 28.0 pg  Mean Cell Hemoglobin Concentration : 33.0 gm/dL  Auto Neutrophil # : 6.46 K/uL  Auto Lymphocyte # : 2.38 K/uL  Auto Monocyte # : 0.71 K/uL  Auto Eosinophil # : 0.14 K/uL  Auto Basophil # : 0.04 K/uL  Auto Neutrophil % : 66.2 %  Auto Lymphocyte % : 24.4 %  Auto Monocyte % : 7.3 %  Auto Eosinophil % : 1.4 %  Auto Basophil % : 0.4 %    29 May 2022 05:30    145    |  111    |  17     ----------------------------<  92     3.7     |  27     |  0.50     Ca    8.0        29 May 2022 05:30  Phos  3.2       29 May 2022 05:30  Mg     2.1       29 May 2022 05:30    TPro  6.0    /  Alb  3.0    /  TBili  0.5    /  DBili  x      /  AST  21     /  ALT  9      /  AlkPhos  72     29 May 2022 05:30      Urinalysis Basic - ( 29 May 2022 00:45 )    Color: Yellow / Appearance: Slightly Turbid / S.010 / pH: x  Gluc: x / Ketone: Negative  / Bili: Negative / Urobili: Negative   Blood: x / Protein: 15 / Nitrite: Positive   Leuk Esterase: Moderate / RBC: >50 /HPF / WBC >50   Sq Epi: x / Non Sq Epi: Few / Bacteria: TNTC      CAPILLARY BLOOD GLUCOSE              RADIOLOGY & ADDITIONAL TESTS:    Personally reviewed.     Consultant(s) Notes Reviewed:  [x] YES  [ ] NO     Patient is a 77y old  Female who presents with a chief complaint of UTI, metabolic encephalopathy (29 May 2022 01:49)      INTERVAL HPI/OVERNIGHT EVENTS: Patient seen and examined at bedside. Patient has no complaints at this time.  AOx2, denies auditory or visual hallucinations at this time. Denies fevers, chills, headache, lightheadedness, chest pain, dyspnea, abdominal pain,  constipation +.        Vital Signs Last 24 Hrs  T(C): 37.4 (29 May 2022 05:35), Max: 37.9 (29 May 2022 02:40)  T(F): 99.3 (29 May 2022 05:35), Max: 100.2 (29 May 2022 02:40)  HR: 69 (29 May 2022 05:35) (69 - 86)  BP: 126/75 (29 May 2022 05:35) (115/55 - 133/87)  BP(mean): --  RR: 17 (29 May 2022 05:35) (15 - 18)  SpO2: 95% (29 May 2022 05:35) (95% - 100%)    PHYSICAL EXAM:  GENERAL: NAD  HEENT:  anicteric, moist mucous membranes  CHEST/LUNG:  CTA b/l, no rales, wheezes, or rhonchi  HEART:  RRR, S1, S2 , no tachy   ABDOMEN:  BS+, soft, nontender, nondistended  EXTREMITIES: no edema, cyanosis, or calf tenderness  NERVOUS SYSTEM: AAOX2, motor/sensory intact   denies auditory/visual hallucinations, answers questions and follows commands appropriately  gu intact    MEDICATIONS  (STANDING):  amLODIPine   Tablet 10 milliGRAM(s) Oral daily  brimonidine 0.2% Ophthalmic Solution 1 Drop(s) Left EYE two times a day  carbidopa/levodopa  25/100 1 Tablet(s) Oral four times a day  cefTRIAXone   IVPB 1000 milliGRAM(s) IV Intermittent every 24 hours  enoxaparin Injectable 40 milliGRAM(s) SubCutaneous every 24 hours  ferrous    sulfate 325 milliGRAM(s) Oral daily  lactobacillus acidophilus 1 Tablet(s) Oral daily  polyethylene glycol 3350 17 Gram(s) Oral daily  prednisoLONE acetate 1% Suspension 1 Drop(s) Left EYE daily  QUEtiapine 25 milliGRAM(s) Oral at bedtime  senna 2 Tablet(s) Oral at bedtime  timolol 0.5% Solution 1 Drop(s) Left EYE two times a day    MEDICATIONS  (PRN):      Allergies    tetracycline (Unknown)    Intolerances      Unable to obtain meaningful ROS due to mental status     LABS:                        9.8    9.76  )-----------( 300      ( 29 May 2022 05:30 )             29.7     CBC Full  -  ( 29 May 2022 05:30 )  WBC Count : 9.76 K/uL  Hemoglobin : 9.8 g/dL  Hematocrit : 29.7 %  Platelet Count - Automated : 300 K/uL  Mean Cell Volume : 84.9 fl  Mean Cell Hemoglobin : 28.0 pg  Mean Cell Hemoglobin Concentration : 33.0 gm/dL  Auto Neutrophil # : 6.46 K/uL  Auto Lymphocyte # : 2.38 K/uL  Auto Monocyte # : 0.71 K/uL  Auto Eosinophil # : 0.14 K/uL  Auto Basophil # : 0.04 K/uL  Auto Neutrophil % : 66.2 %  Auto Lymphocyte % : 24.4 %  Auto Monocyte % : 7.3 %  Auto Eosinophil % : 1.4 %  Auto Basophil % : 0.4 %    29 May 2022 05:30    145    |  111    |  17     ----------------------------<  92     3.7     |  27     |  0.50     Ca    8.0        29 May 2022 05:30  Phos  3.2       29 May 2022 05:30  Mg     2.1       29 May 2022 05:30    TPro  6.0    /  Alb  3.0    /  TBili  0.5    /  DBili  x      /  AST  21     /  ALT  9      /  AlkPhos  72     29 May 2022 05:30      Urinalysis Basic - ( 29 May 2022 00:45 )    Color: Yellow / Appearance: Slightly Turbid / S.010 / pH: x  Gluc: x / Ketone: Negative  / Bili: Negative / Urobili: Negative   Blood: x / Protein: 15 / Nitrite: Positive   Leuk Esterase: Moderate / RBC: >50 /HPF / WBC >50   Sq Epi: x / Non Sq Epi: Few / Bacteria: TNTC                RADIOLOGY & ADDITIONAL TESTS:    Personally reviewed.  ct abd / pelvis  Findings most consistent with stercoral colitis with stool distended   rectum and perirectal inflammatory fat stranding.      Consultant(s) Notes Reviewed:  [x] YES  [ ] NO

## 2022-05-29 NOTE — PHYSICAL THERAPY INITIAL EVALUATION ADULT - PERTINENT HX OF CURRENT PROBLEM, REHAB EVAL
78 yo female PMH HTN, Parkinson's, GIACOMO, L eye cataract, known L foot drop presents to ED with worsening hallucinations and difficulty ambulating, admitted for UTI and metabolic encephalopathy with exacerbation of hallucinations/increased paranoia.

## 2022-05-29 NOTE — PATIENT PROFILE ADULT - FALL HARM RISK - HARM RISK INTERVENTIONS

## 2022-05-29 NOTE — H&P ADULT - PROBLEM SELECTOR PLAN 3
Patient with worsening hallucinations past 1 month, hx of Parkinson's disease   -Worsening hallucinations likely multifactorial in setting of Parkinson's disease vs. Sinemet, exacerbated in setting of metabolic encephalopathy in setting of acute infection from UTI  -Continue Sinemet  4x per day, can consider adding Nuplazid outpatient for hallucinations (not available on formulary)  -Patient prescribed Seroquel BID although son states she only takes qhs, continue Patient with worsening hallucinations past 1 month, hx of Parkinson's disease   -Worsening hallucinations likely multifactorial in setting of Parkinson's disease vs. Sinemet, exacerbated in setting of metabolic encephalopathy 2/2 UTI  -Continue Sinemet  4x per day, can consider adding Nuplazid outpatient for hallucinations (not available on formulary)  -Patient prescribed Seroquel BID although son states she only takes qhs, continue

## 2022-05-29 NOTE — PHYSICAL THERAPY INITIAL EVALUATION ADULT - ACTIVE RANGE OF MOTION EXAMINATION, REHAB EVAL
foot drop/leg lenghr discrepancy on left/bilateral upper extremity Active ROM was WFL (within functional limits)/bilateral  lower extremity Active ROM was WFL (within functional limits)/deficits as listed below

## 2022-05-29 NOTE — ED PROVIDER NOTE - CLINICAL SUMMARY MEDICAL DECISION MAKING FREE TEXT BOX
pt presenting to the ED with worsening hallucinations and paranoia concern for worsening Parkisnons disease vs additional neurological process vs possible infection process. Will obtain screening labs, check UA. Pt with fecal impaction disimpacted at bedside. Will obtain CT head due to worsening visual hallucinations. Will obtain CT abd/pelvis.

## 2022-05-29 NOTE — PROGRESS NOTE ADULT - PROBLEM SELECTOR PLAN 2
Patient with worsening hallucinations and difficulty ambulating past 1 month, A&Ox3 at baseline with increased paranoia. Visual hallucinations can be due to progressive disease (parkinson's) vs sinemet. She is already on quetiapine for this. Son states that her paranoia due to her hallucinations have gotten much worse.  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 Visual hallucinations can be due to progressive disease (parkinson's) vs sinemet.   -On quetiapine for this. Son states that her paranoia due to her hallucinations have gotten much worse.  -CT head: no acute intracranial bleeding, central volume loss and chronic microvascular ischemic change  -Continue UTI treatment as above  -PT/SW/CM consult - may require placement to [facility] acute   likely chronic Parkinson Disease psychosis exacerbated by acute infection  Patient with worsening hallucinations and difficulty ambulating past 1 month  -A&Ox3 at baseline with increased paranoia. Visual hallucinations can be due to progressive disease (parkinson's) vs sinemet.   -On quetiapine for this. Son states that her paranoia due to her hallucinations have gotten much worse.  -CT head: no acute intracranial bleeding, central volume loss and chronic microvascular ischemic change  -Continue UTI treatment as above  -PT/SW/CM consult - may require placement to [facility]

## 2022-05-29 NOTE — PROGRESS NOTE ADULT - PROBLEM SELECTOR PLAN 1
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

## 2022-05-30 LAB
ALBUMIN SERPL ELPH-MCNC: 3.7 G/DL — SIGNIFICANT CHANGE UP (ref 3.3–5)
ALP SERPL-CCNC: 90 U/L — SIGNIFICANT CHANGE UP (ref 40–120)
ALT FLD-CCNC: 13 U/L — SIGNIFICANT CHANGE UP (ref 12–78)
ANION GAP SERPL CALC-SCNC: 10 MMOL/L — SIGNIFICANT CHANGE UP (ref 5–17)
AST SERPL-CCNC: 24 U/L — SIGNIFICANT CHANGE UP (ref 15–37)
BILIRUB SERPL-MCNC: 0.6 MG/DL — SIGNIFICANT CHANGE UP (ref 0.2–1.2)
BUN SERPL-MCNC: 18 MG/DL — SIGNIFICANT CHANGE UP (ref 7–23)
CALCIUM SERPL-MCNC: 9.1 MG/DL — SIGNIFICANT CHANGE UP (ref 8.5–10.1)
CHLORIDE SERPL-SCNC: 108 MMOL/L — SIGNIFICANT CHANGE UP (ref 96–108)
CO2 SERPL-SCNC: 25 MMOL/L — SIGNIFICANT CHANGE UP (ref 22–31)
CREAT SERPL-MCNC: 0.76 MG/DL — SIGNIFICANT CHANGE UP (ref 0.5–1.3)
EGFR: 80 ML/MIN/1.73M2 — SIGNIFICANT CHANGE UP
GLUCOSE SERPL-MCNC: 96 MG/DL — SIGNIFICANT CHANGE UP (ref 70–99)
HCT VFR BLD CALC: 36.6 % — SIGNIFICANT CHANGE UP (ref 34.5–45)
HGB BLD-MCNC: 11.6 G/DL — SIGNIFICANT CHANGE UP (ref 11.5–15.5)
MCHC RBC-ENTMCNC: 27.7 PG — SIGNIFICANT CHANGE UP (ref 27–34)
MCHC RBC-ENTMCNC: 31.7 GM/DL — LOW (ref 32–36)
MCV RBC AUTO: 87.4 FL — SIGNIFICANT CHANGE UP (ref 80–100)
NRBC # BLD: 0 /100 WBCS — SIGNIFICANT CHANGE UP (ref 0–0)
PLATELET # BLD AUTO: 364 K/UL — SIGNIFICANT CHANGE UP (ref 150–400)
POTASSIUM SERPL-MCNC: 4.2 MMOL/L — SIGNIFICANT CHANGE UP (ref 3.5–5.3)
POTASSIUM SERPL-SCNC: 4.2 MMOL/L — SIGNIFICANT CHANGE UP (ref 3.5–5.3)
PROT SERPL-MCNC: 7.5 G/DL — SIGNIFICANT CHANGE UP (ref 6–8.3)
RBC # BLD: 4.19 M/UL — SIGNIFICANT CHANGE UP (ref 3.8–5.2)
RBC # FLD: 14.2 % — SIGNIFICANT CHANGE UP (ref 10.3–14.5)
SODIUM SERPL-SCNC: 143 MMOL/L — SIGNIFICANT CHANGE UP (ref 135–145)
WBC # BLD: 12.16 K/UL — HIGH (ref 3.8–10.5)
WBC # FLD AUTO: 12.16 K/UL — HIGH (ref 3.8–10.5)

## 2022-05-30 PROCEDURE — 99233 SBSQ HOSP IP/OBS HIGH 50: CPT | Mod: GC

## 2022-05-30 RX ADMIN — Medication 3 MILLIGRAM(S): at 21:15

## 2022-05-30 RX ADMIN — BRIMONIDINE TARTRATE 1 DROP(S): 2 SOLUTION/ DROPS OPHTHALMIC at 17:26

## 2022-05-30 RX ADMIN — Medication 1 DROP(S): at 05:37

## 2022-05-30 RX ADMIN — POLYETHYLENE GLYCOL 3350 17 GRAM(S): 17 POWDER, FOR SOLUTION ORAL at 11:52

## 2022-05-30 RX ADMIN — Medication 1 DROP(S): at 11:52

## 2022-05-30 RX ADMIN — AMLODIPINE BESYLATE 10 MILLIGRAM(S): 2.5 TABLET ORAL at 05:39

## 2022-05-30 RX ADMIN — CEFTRIAXONE 100 MILLIGRAM(S): 500 INJECTION, POWDER, FOR SOLUTION INTRAMUSCULAR; INTRAVENOUS at 00:24

## 2022-05-30 RX ADMIN — QUETIAPINE FUMARATE 25 MILLIGRAM(S): 200 TABLET, FILM COATED ORAL at 21:14

## 2022-05-30 RX ADMIN — CARBIDOPA AND LEVODOPA 1 TABLET(S): 25; 100 TABLET ORAL at 23:25

## 2022-05-30 RX ADMIN — CARBIDOPA AND LEVODOPA 1 TABLET(S): 25; 100 TABLET ORAL at 00:24

## 2022-05-30 RX ADMIN — ENOXAPARIN SODIUM 40 MILLIGRAM(S): 100 INJECTION SUBCUTANEOUS at 11:51

## 2022-05-30 RX ADMIN — CARBIDOPA AND LEVODOPA 1 TABLET(S): 25; 100 TABLET ORAL at 05:38

## 2022-05-30 RX ADMIN — Medication 1 DROP(S): at 17:27

## 2022-05-30 RX ADMIN — CARBIDOPA AND LEVODOPA 1 TABLET(S): 25; 100 TABLET ORAL at 17:26

## 2022-05-30 RX ADMIN — Medication 325 MILLIGRAM(S): at 11:52

## 2022-05-30 RX ADMIN — CEFTRIAXONE 100 MILLIGRAM(S): 500 INJECTION, POWDER, FOR SOLUTION INTRAMUSCULAR; INTRAVENOUS at 23:25

## 2022-05-30 RX ADMIN — Medication 1 TABLET(S): at 11:50

## 2022-05-30 RX ADMIN — CARBIDOPA AND LEVODOPA 1 TABLET(S): 25; 100 TABLET ORAL at 11:51

## 2022-05-30 RX ADMIN — Medication 3 MILLIGRAM(S): at 00:02

## 2022-05-30 RX ADMIN — BRIMONIDINE TARTRATE 1 DROP(S): 2 SOLUTION/ DROPS OPHTHALMIC at 05:37

## 2022-05-30 NOTE — PROGRESS NOTE ADULT - PROBLEM SELECTOR PLAN 5
-Continue Sinemet  4x per day, can consider adding Nuplazid outpatient for hallucinations (not available on formulary in house)  -Patient prescribed Seroquel BID although son states she only takes qhs, continue qhs

## 2022-05-30 NOTE — PROGRESS NOTE ADULT - PROBLEM SELECTOR PLAN 4
CT abd/p on admission showing stercoral colitis with stool distended rectum and perirectal inflammatory fat stranding  -Likely mechanical in setting of Parkinson's disease  -s/p manual disimpaction in ED with subsequent bowel movements  -Fleet enema   bm +  -Start bowel regimen with senna, miralax  -Monitor stool output

## 2022-05-30 NOTE — PROGRESS NOTE ADULT - PROBLEM SELECTOR PLAN 1
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

## 2022-05-30 NOTE — PROGRESS NOTE ADULT - PROBLEM SELECTOR PLAN 2
acute   likely chronic Parkinson Disease psychosis exacerbated by acute infection  Patient with worsening hallucinations and difficulty ambulating past 1 month  -A&Ox3 at baseline with increased paranoia. Visual hallucinations can be due to progressive disease (parkinson's) vs sinemet.   -On quetiapine for this. Son states that her paranoia due to her hallucinations have gotten much worse.  - no hallucination 48 hr in hospital   -CT head: no acute intracranial bleeding, central volume loss and chronic microvascular ischemic change  -Continue UTI treatment as above  -PT/SW/CM consult - may require placement to [facility]/ son aware

## 2022-05-30 NOTE — PROGRESS NOTE ADULT - PROBLEM SELECTOR PLAN 3
Patient with worsening hallucinations past 1 month, hx of Parkinson's disease   -Worsening hallucinations likely multifactorial in setting of Parkinson's disease  exacerbated in setting of acute  metabolic encephalopathy 2/2 UTI  -Continue home Sinemet  4x per day, can consider adding Nuplazid outpatient for hallucinations (not available on formulary)  -Patient prescribed Seroquel BID although son states she only takes qhs, continue qhs

## 2022-05-30 NOTE — PROGRESS NOTE ADULT - SUBJECTIVE AND OBJECTIVE BOX
Patient is a 77y old  Female who presents with a chief complaint of UTI, metabolic encephalopathy (29 May 2022 07:33)   pt seen and examine  today  alert  ,  constipation bm  + , no fever   , no hallucination + today .    INTERVAL HPI/OVERNIGHT EVENTS:     T(C): 37.2 (22 @ 05:30), Max: 37.3 (22 @ 19:47)  HR: 103 (22 @ 05:30) (79 - 103)  BP: 152/65 (22 @ 05:30) (124/71 - 152/65)  RR: 18 (22 @ 05:30) (18 - 18)  SpO2: 95% (22 @ 05:30) (95% - 98%)  Wt(kg): --  I&O's Summary      REVIEW OF SYSTEMS:  CONSTITUTIONAL: No fever, weight loss, + fatigue  EYES: No eye pain, visual disturbances, or discharge  ENMT:  No sinus or throat pain  NECK: No pain or stiffness  BREASTS: No pain, no masses  RESPIRATORY: No cough, wheezing, chills   No shortness of breath  CARDIOVASCULAR: No chest pain, palpitations, dizziness, or leg swelling  GASTROINTESTINAL: No abdominal or epigastric pain. No nausea, vomiting,   ; No diarrhea or constipation.   GENITOURINARY: No dysuria, frequency, hematuria, or incontinence  NEUROLOGICAL: No headaches, memory loss, loss of strength, numbness, or tremors  SKIN: No itching, burning, rashes, or lesions   MUSCULOSKELETAL: No joint pain or swelling; No muscle, back, or extremity pain    PHYSICAL EXAM:  GENERAL: NAD,  weak   HEAD:  Atraumatic, Normocephalic  EYES: EOMI, PERRLA, conjunctiva and sclera clear  ENMT:   Moist mucous membranes  NECK: Supple, No JVD  NERVOUS SYSTEM:  Alert & Oriented X2; Motor Strength 5/5 B/L upper and lower extremities; DTRs 2+ intact and symmetric  CHEST/LUNG: percussion bilaterally; No rales, rhonchi, wheezing,   HEART: Regular rate and rhythm; No murmurs, no tachy   ABDOMEN: Soft, Nontender, Nondistended; Bowel sounds present  EXTREMITIES:  2+ Peripheral Pulses, No clubbing, cyanosis, or edema  SKIN: No rashes or lesions    MEDICATIONS  (STANDING):  amLODIPine   Tablet 10 milliGRAM(s) Oral daily  brimonidine 0.2% Ophthalmic Solution 1 Drop(s) Left EYE two times a day  carbidopa/levodopa  25/100 1 Tablet(s) Oral four times a day  cefTRIAXone   IVPB 1000 milliGRAM(s) IV Intermittent every 24 hours  enoxaparin Injectable 40 milliGRAM(s) SubCutaneous every 24 hours  ferrous    sulfate 325 milliGRAM(s) Oral daily  influenza  Vaccine (HIGH DOSE) 0.7 milliLiter(s) IntraMuscular once  lactobacillus acidophilus 1 Tablet(s) Oral daily  melatonin 3 milliGRAM(s) Oral at bedtime  polyethylene glycol 3350 17 Gram(s) Oral daily  prednisoLONE acetate 1% Suspension 1 Drop(s) Left EYE daily  QUEtiapine 25 milliGRAM(s) Oral at bedtime  senna 2 Tablet(s) Oral at bedtime  timolol 0.5% Solution 1 Drop(s) Left EYE two times a day    MEDICATIONS  (PRN):  lactulose Syrup 10 Gram(s) Oral three times a day PRN Constipation      LABS:                        11.6   12.16 )-----------( 364      ( 30 May 2022 07:45 )             36.6     05-30    143  |  108  |  18  ----------------------------<  96  4.2   |  25  |  0.76    Ca    9.1      30 May 2022 07:45  Phos  3.2     -  Mg     2.1         TPro  7.5  /  Alb  3.7  /  TBili  0.6  /  DBili  x   /  AST  24  /  ALT  13  /  AlkPhos  90        Urinalysis Basic - ( 29 May 2022 00:45 )    Color: Yellow / Appearance: Slightly Turbid / S.010 / pH: x  Gluc: x / Ketone: Negative  / Bili: Negative / Urobili: Negative   Blood: x / Protein: 15 / Nitrite: Positive   Leuk Esterase: Moderate / RBC: >50 /HPF / WBC >50   Sq Epi: x / Non Sq Epi: Few / Bacteria: TNTC                        RADIOLOGY & ADDITIONAL TESTS:    Imaging Personally Reviewed:     no new test   Advance Directives: full code       Palliative Care:  Appropriate

## 2022-05-30 NOTE — PROGRESS NOTE ADULT - ASSESSMENT
78 yo female PMH HTN, Parkinson's, GIACOMO, L eye cataract, known L foot drop admitted for UTI and metabolic encephalopathy with exacerbation of hallucinations/increased paranoia, in the setting of acute infection and Parkinsons Disease psychosis.

## 2022-05-31 VITALS
HEART RATE: 72 BPM | OXYGEN SATURATION: 97 % | TEMPERATURE: 98 F | DIASTOLIC BLOOD PRESSURE: 64 MMHG | RESPIRATION RATE: 18 BRPM | SYSTOLIC BLOOD PRESSURE: 119 MMHG

## 2022-05-31 LAB
-  AMIKACIN: SIGNIFICANT CHANGE UP
-  AMOXICILLIN/CLAVULANIC ACID: SIGNIFICANT CHANGE UP
-  AMPICILLIN/SULBACTAM: SIGNIFICANT CHANGE UP
-  AMPICILLIN: SIGNIFICANT CHANGE UP
-  AZTREONAM: SIGNIFICANT CHANGE UP
-  CEFAZOLIN: SIGNIFICANT CHANGE UP
-  CEFEPIME: SIGNIFICANT CHANGE UP
-  CEFOXITIN: SIGNIFICANT CHANGE UP
-  CEFTRIAXONE: SIGNIFICANT CHANGE UP
-  CIPROFLOXACIN: SIGNIFICANT CHANGE UP
-  ERTAPENEM: SIGNIFICANT CHANGE UP
-  GENTAMICIN: SIGNIFICANT CHANGE UP
-  IMIPENEM: SIGNIFICANT CHANGE UP
-  LEVOFLOXACIN: SIGNIFICANT CHANGE UP
-  MEROPENEM: SIGNIFICANT CHANGE UP
-  NITROFURANTOIN: SIGNIFICANT CHANGE UP
-  PIPERACILLIN/TAZOBACTAM: SIGNIFICANT CHANGE UP
-  TIGECYCLINE: SIGNIFICANT CHANGE UP
-  TOBRAMYCIN: SIGNIFICANT CHANGE UP
-  TRIMETHOPRIM/SULFAMETHOXAZOLE: SIGNIFICANT CHANGE UP
BASOPHILS # BLD AUTO: 0.04 K/UL — SIGNIFICANT CHANGE UP (ref 0–0.2)
BASOPHILS NFR BLD AUTO: 0.4 % — SIGNIFICANT CHANGE UP (ref 0–2)
CULTURE RESULTS: SIGNIFICANT CHANGE UP
EOSINOPHIL # BLD AUTO: 0.15 K/UL — SIGNIFICANT CHANGE UP (ref 0–0.5)
EOSINOPHIL NFR BLD AUTO: 1.7 % — SIGNIFICANT CHANGE UP (ref 0–6)
HCT VFR BLD CALC: 32.2 % — LOW (ref 34.5–45)
HGB BLD-MCNC: 10.6 G/DL — LOW (ref 11.5–15.5)
IMM GRANULOCYTES NFR BLD AUTO: 0.3 % — SIGNIFICANT CHANGE UP (ref 0–1.5)
LYMPHOCYTES # BLD AUTO: 2.3 K/UL — SIGNIFICANT CHANGE UP (ref 1–3.3)
LYMPHOCYTES # BLD AUTO: 25.6 % — SIGNIFICANT CHANGE UP (ref 13–44)
MCHC RBC-ENTMCNC: 28 PG — SIGNIFICANT CHANGE UP (ref 27–34)
MCHC RBC-ENTMCNC: 32.9 GM/DL — SIGNIFICANT CHANGE UP (ref 32–36)
MCV RBC AUTO: 85 FL — SIGNIFICANT CHANGE UP (ref 80–100)
METHOD TYPE: SIGNIFICANT CHANGE UP
MONOCYTES # BLD AUTO: 0.87 K/UL — SIGNIFICANT CHANGE UP (ref 0–0.9)
MONOCYTES NFR BLD AUTO: 9.7 % — SIGNIFICANT CHANGE UP (ref 2–14)
NEUTROPHILS # BLD AUTO: 5.6 K/UL — SIGNIFICANT CHANGE UP (ref 1.8–7.4)
NEUTROPHILS NFR BLD AUTO: 62.3 % — SIGNIFICANT CHANGE UP (ref 43–77)
NRBC # BLD: 0 /100 WBCS — SIGNIFICANT CHANGE UP (ref 0–0)
ORGANISM # SPEC MICROSCOPIC CNT: SIGNIFICANT CHANGE UP
ORGANISM # SPEC MICROSCOPIC CNT: SIGNIFICANT CHANGE UP
PLATELET # BLD AUTO: 327 K/UL — SIGNIFICANT CHANGE UP (ref 150–400)
RBC # BLD: 3.79 M/UL — LOW (ref 3.8–5.2)
RBC # FLD: 13.8 % — SIGNIFICANT CHANGE UP (ref 10.3–14.5)
SARS-COV-2 RNA SPEC QL NAA+PROBE: SIGNIFICANT CHANGE UP
SPECIMEN SOURCE: SIGNIFICANT CHANGE UP
WBC # BLD: 8.99 K/UL — SIGNIFICANT CHANGE UP (ref 3.8–10.5)
WBC # FLD AUTO: 8.99 K/UL — SIGNIFICANT CHANGE UP (ref 3.8–10.5)

## 2022-05-31 PROCEDURE — 87086 URINE CULTURE/COLONY COUNT: CPT

## 2022-05-31 PROCEDURE — 84100 ASSAY OF PHOSPHORUS: CPT

## 2022-05-31 PROCEDURE — 85025 COMPLETE CBC W/AUTO DIFF WBC: CPT

## 2022-05-31 PROCEDURE — 81001 URINALYSIS AUTO W/SCOPE: CPT

## 2022-05-31 PROCEDURE — 70450 CT HEAD/BRAIN W/O DYE: CPT | Mod: MA

## 2022-05-31 PROCEDURE — 74176 CT ABD & PELVIS W/O CONTRAST: CPT | Mod: MA

## 2022-05-31 PROCEDURE — 85027 COMPLETE CBC AUTOMATED: CPT

## 2022-05-31 PROCEDURE — 71045 X-RAY EXAM CHEST 1 VIEW: CPT

## 2022-05-31 PROCEDURE — 87186 SC STD MICRODIL/AGAR DIL: CPT

## 2022-05-31 PROCEDURE — 97162 PT EVAL MOD COMPLEX 30 MIN: CPT

## 2022-05-31 PROCEDURE — 87637 SARSCOV2&INF A&B&RSV AMP PRB: CPT

## 2022-05-31 PROCEDURE — 99239 HOSP IP/OBS DSCHRG MGMT >30: CPT

## 2022-05-31 PROCEDURE — 99285 EMERGENCY DEPT VISIT HI MDM: CPT

## 2022-05-31 PROCEDURE — 36415 COLL VENOUS BLD VENIPUNCTURE: CPT

## 2022-05-31 PROCEDURE — 83735 ASSAY OF MAGNESIUM: CPT

## 2022-05-31 PROCEDURE — 87635 SARS-COV-2 COVID-19 AMP PRB: CPT

## 2022-05-31 PROCEDURE — 93005 ELECTROCARDIOGRAM TRACING: CPT

## 2022-05-31 PROCEDURE — 87077 CULTURE AEROBIC IDENTIFY: CPT

## 2022-05-31 PROCEDURE — 80053 COMPREHEN METABOLIC PANEL: CPT

## 2022-05-31 PROCEDURE — 96360 HYDRATION IV INFUSION INIT: CPT

## 2022-05-31 PROCEDURE — 83690 ASSAY OF LIPASE: CPT

## 2022-05-31 RX ORDER — QUETIAPINE FUMARATE 200 MG/1
1 TABLET, FILM COATED ORAL
Qty: 0 | Refills: 0 | DISCHARGE

## 2022-05-31 RX ORDER — LANOLIN ALCOHOL/MO/W.PET/CERES
1 CREAM (GRAM) TOPICAL
Qty: 0 | Refills: 0 | DISCHARGE
Start: 2022-05-31

## 2022-05-31 RX ORDER — QUETIAPINE FUMARATE 200 MG/1
1 TABLET, FILM COATED ORAL
Qty: 0 | Refills: 0 | DISCHARGE
Start: 2022-05-31

## 2022-05-31 RX ORDER — LACTOBACILLUS ACIDOPHILUS 100MM CELL
1 CAPSULE ORAL
Qty: 0 | Refills: 0 | DISCHARGE
Start: 2022-05-31

## 2022-05-31 RX ORDER — LACTULOSE 10 G/15ML
15 SOLUTION ORAL
Qty: 0 | Refills: 0 | DISCHARGE
Start: 2022-05-31

## 2022-05-31 RX ORDER — SENNA PLUS 8.6 MG/1
2 TABLET ORAL
Qty: 0 | Refills: 0 | DISCHARGE
Start: 2022-05-31

## 2022-05-31 RX ORDER — ENOXAPARIN SODIUM 100 MG/ML
40 INJECTION SUBCUTANEOUS
Qty: 0 | Refills: 0 | DISCHARGE
Start: 2022-05-31

## 2022-05-31 RX ADMIN — Medication 1 TABLET(S): at 12:16

## 2022-05-31 RX ADMIN — BRIMONIDINE TARTRATE 1 DROP(S): 2 SOLUTION/ DROPS OPHTHALMIC at 05:52

## 2022-05-31 RX ADMIN — Medication 325 MILLIGRAM(S): at 12:16

## 2022-05-31 RX ADMIN — ENOXAPARIN SODIUM 40 MILLIGRAM(S): 100 INJECTION SUBCUTANEOUS at 12:16

## 2022-05-31 RX ADMIN — CARBIDOPA AND LEVODOPA 1 TABLET(S): 25; 100 TABLET ORAL at 05:52

## 2022-05-31 RX ADMIN — Medication 1 DROP(S): at 12:16

## 2022-05-31 RX ADMIN — Medication 1 DROP(S): at 05:52

## 2022-05-31 RX ADMIN — CARBIDOPA AND LEVODOPA 1 TABLET(S): 25; 100 TABLET ORAL at 12:18

## 2022-05-31 RX ADMIN — AMLODIPINE BESYLATE 10 MILLIGRAM(S): 2.5 TABLET ORAL at 05:53

## 2022-05-31 NOTE — PROGRESS NOTE ADULT - SUBJECTIVE AND OBJECTIVE BOX
Patient is a 77y old  Female who presents with a chief complaint of UTI, metabolic encephalopathy (31 May 2022 10:33)      INTERVAL HPI/OVERNIGHT EVENTS: Patient seen and examined at bedside. No acute overnight events occurred. Patient has no complaints at this time. Seen eating breakfast in bed. Denies visual/auditory hallucinations. Denies fevers, chills, headache, lightheadedness, chest pain, dyspnea, abdominal pain, n/v/d/c.    MEDICATIONS  (STANDING):  amLODIPine   Tablet 10 milliGRAM(s) Oral daily  brimonidine 0.2% Ophthalmic Solution 1 Drop(s) Left EYE two times a day  carbidopa/levodopa  25/100 1 Tablet(s) Oral four times a day  cefTRIAXone   IVPB 1000 milliGRAM(s) IV Intermittent every 24 hours  enoxaparin Injectable 40 milliGRAM(s) SubCutaneous every 24 hours  ferrous    sulfate 325 milliGRAM(s) Oral daily  influenza  Vaccine (HIGH DOSE) 0.7 milliLiter(s) IntraMuscular once  lactobacillus acidophilus 1 Tablet(s) Oral daily  melatonin 3 milliGRAM(s) Oral at bedtime  polyethylene glycol 3350 17 Gram(s) Oral daily  prednisoLONE acetate 1% Suspension 1 Drop(s) Left EYE daily  QUEtiapine 25 milliGRAM(s) Oral at bedtime  senna 2 Tablet(s) Oral at bedtime  timolol 0.5% Solution 1 Drop(s) Left EYE two times a day    MEDICATIONS  (PRN):  lactulose Syrup 10 Gram(s) Oral three times a day PRN Constipation      Allergies    tetracycline (Unknown)    Intolerances        REVIEW OF SYSTEMS:  CONSTITUTIONAL: No fever or chills  HEENT:  No headache, no sore throat  RESPIRATORY: No cough, wheezing, or shortness of breath  CARDIOVASCULAR: No chest pain, palpitations  GASTROINTESTINAL: No abd pain, nausea, vomiting, or diarrhea  GENITOURINARY: No dysuria, frequency, or hematuria  NEUROLOGICAL: no focal weakness or dizziness  MUSCULOSKELETAL: no myalgias     Vital Signs Last 24 Hrs  T(C): 37.3 (31 May 2022 05:24), Max: 37.3 (30 May 2022 20:09)  T(F): 99.2 (31 May 2022 05:24), Max: 99.2 (31 May 2022 05:24)  HR: 84 (31 May 2022 05:24) (80 - 84)  BP: 134/73 (31 May 2022 05:24) (128/72 - 136/73)  BP(mean): --  RR: 18 (31 May 2022 05:24) (16 - 18)  SpO2: 97% (31 May 2022 05:24) (96% - 98%)    PHYSICAL EXAM:  GENERAL: NAD,  weak   HEAD:  Atraumatic, Normocephalic  EYES: EOMI, PERRLA, conjunctiva and sclera clear  ENMT:   Moist mucous membranes  NECK: Supple, No JVD  NERVOUS SYSTEM:  Alert & Oriented X2; Motor Strength 5/5 B/L upper and lower extremities; DTRs 2+ intact and symmetric  CHEST/LUNG: percussion bilaterally; No rales, rhonchi, wheezing,   HEART: Regular rate and rhythm; No murmurs, no tachy   ABDOMEN: Soft, Nontender, Nondistended; Bowel sounds present  EXTREMITIES:  2+ Peripheral Pulses, No clubbing, cyanosis, or edema  SKIN: No rashes or lesions    LABS:                        10.6   8.99  )-----------( 327      ( 31 May 2022 07:39 )             32.2     CBC Full  -  ( 31 May 2022 07:39 )  WBC Count : 8.99 K/uL  Hemoglobin : 10.6 g/dL  Hematocrit : 32.2 %  Platelet Count - Automated : 327 K/uL  Mean Cell Volume : 85.0 fl  Mean Cell Hemoglobin : 28.0 pg  Mean Cell Hemoglobin Concentration : 32.9 gm/dL  Auto Neutrophil # : 5.60 K/uL  Auto Lymphocyte # : 2.30 K/uL  Auto Monocyte # : 0.87 K/uL  Auto Eosinophil # : 0.15 K/uL  Auto Basophil # : 0.04 K/uL  Auto Neutrophil % : 62.3 %  Auto Lymphocyte % : 25.6 %  Auto Monocyte % : 9.7 %  Auto Eosinophil % : 1.7 %  Auto Basophil % : 0.4 %      Ca    9.1        30 May 2022 07:45          CAPILLARY BLOOD GLUCOSE            Culture - Urine (collected 05-29-22 @ 06:29)  Source: Clean Catch Clean Catch (Midstream)  Preliminary Report (05-30-22 @ 16:07):    >100,000 CFU/ml Gram Negative Rods        RADIOLOGY & ADDITIONAL TESTS:    Personally reviewed.     Consultant(s) Notes Reviewed:  [x] YES  [ ] NO     Patient is a 77y old  Female who presents with a chief complaint of UTI, metabolic encephalopathy (31 May 2022 10:33)      INTERVAL HPI/OVERNIGHT EVENTS: Patient seen and examined at bedside. No acute overnight events occurred. Patient has no complaints at this time.  seen eating breakfast in bed. Denies visual/auditory hallucinations. Denies fevers,  , chest pain, dyspnea, abdominal pain.    constipation resolved BM + .            REVIEW OF SYSTEMS:  CONSTITUTIONAL: No fever or chills  HEENT:  No headache, no sore throat  RESPIRATORY: No cough, wheezing, or shortness of breath  CARDIOVASCULAR: No chest pain, palpitations  GASTROINTESTINAL: No abd pain, nausea, vomiting, or diarrhea  GENITOURINARY: No dysuria, frequency, or hematuria  NEUROLOGICAL: no focal weakness or dizziness  MUSCULOSKELETAL: no myalgias     Vital Signs Last 24 Hrs  T(C): 37.3 (31 May 2022 05:24), Max: 37.3 (30 May 2022 20:09)  T(F): 99.2 (31 May 2022 05:24), Max: 99.2 (31 May 2022 05:24)  HR: 84 (31 May 2022 05:24) (80 - 84)  BP: 134/73 (31 May 2022 05:24) (128/72 - 136/73)  BP(mean): --  RR: 18 (31 May 2022 05:24) (16 - 18)  SpO2: 97% (31 May 2022 05:24) (96% - 98%)    PHYSICAL EXAM:  GENERAL: NAD,  weak   HEAD:  Atraumatic, Normocephalic  EYES: EOMI, PERRLA, conjunctiva and sclera clear  ENMT:   Moist mucous membranes  NECK: Supple, No JVD  NERVOUS SYSTEM:  Alert & Oriented X2; Motor Strength 5/5 B/L upper and lower extremities; DTRs 2+ intact and symmetric  CHEST/LUNG: percussion bilaterally; No rales, rhonchi, wheezing,   HEART: Regular rate and rhythm; No murmurs, no tachy   ABDOMEN: Soft, Nontender, Nondistended; Bowel sounds present  EXTREMITIES:  2+ Peripheral Pulses, No clubbing, cyanosis, or edema  SKIN: No rashes or lesions    MEDICATIONS  (STANDING):  amLODIPine   Tablet 10 milliGRAM(s) Oral daily  brimonidine 0.2% Ophthalmic Solution 1 Drop(s) Left EYE two times a day  carbidopa/levodopa  25/100 1 Tablet(s) Oral four times a day  cefTRIAXone   IVPB 1000 milliGRAM(s) IV Intermittent every 24 hours  enoxaparin Injectable 40 milliGRAM(s) SubCutaneous every 24 hours  ferrous    sulfate 325 milliGRAM(s) Oral daily  influenza  Vaccine (HIGH DOSE) 0.7 milliLiter(s) IntraMuscular once  lactobacillus acidophilus 1 Tablet(s) Oral daily  melatonin 3 milliGRAM(s) Oral at bedtime  polyethylene glycol 3350 17 Gram(s) Oral daily  prednisoLONE acetate 1% Suspension 1 Drop(s) Left EYE daily  QUEtiapine 25 milliGRAM(s) Oral at bedtime  senna 2 Tablet(s) Oral at bedtime  timolol 0.5% Solution 1 Drop(s) Left EYE two times a day    MEDICATIONS  (PRN):  lactulose Syrup 10 Gram(s) Oral three times a day PRN Constipation      Allergies    tetracycline (Unknown)    LABS:                        10.6   8.99  )-----------( 327      ( 31 May 2022 07:39 )             32.2     CBC Full  -  ( 31 May 2022 07:39 )  WBC Count : 8.99 K/uL  Hemoglobin : 10.6 g/dL  Hematocrit : 32.2 %  Platelet Count - Automated : 327 K/uL  Mean Cell Volume : 85.0 fl  Mean Cell Hemoglobin : 28.0 pg  Mean Cell Hemoglobin Concentration : 32.9 gm/dL  Auto Neutrophil # : 5.60 K/uL  Auto Lymphocyte # : 2.30 K/uL  Auto Monocyte # : 0.87 K/uL  Auto Eosinophil # : 0.15 K/uL  Auto Basophil # : 0.04 K/uL  Auto Neutrophil % : 62.3 %  Auto Lymphocyte % : 25.6 %  Auto Monocyte % : 9.7 %  Auto Eosinophil % : 1.7 %  Auto Basophil % : 0.4 %      Ca    9.1        30 May 2022 07:45                Culture - Urine (collected 05-29-22 @ 06:29)  Source: Clean Catch Clean Catch (Midstream)  Preliminary Report (05-30-22 @ 16:07):    >100,000 CFU/ml Gram Negative Rods        RADIOLOGY & ADDITIONAL TESTS:    Personally reviewed.     Consultant(s) Notes Reviewed:  [x] YES  [ ] NO

## 2022-05-31 NOTE — DISCHARGE NOTE NURSING/CASE MANAGEMENT/SOCIAL WORK - NSDCPEFALRISK_GEN_ALL_CORE
For information on Fall & Injury Prevention, visit: [email or web address] OR  [email or web address] OR  https://www.cdc.gov/steadi/patient.html

## 2022-05-31 NOTE — DISCHARGE NOTE PROVIDER - NSDCMRMEDTOKEN_GEN_ALL_CORE_FT
amLODIPine 10 mg oral tablet: 1 tab(s) orally once a day  B-Complex 50 oral tablet: 1 tab(s) orally once a day  Caltrate 600 mg oral tablet: 2 tab(s) orally once a day  carbidopa-levodopa 25 mg-100 mg oral tablet: 1 tab(s) orally 4 times a day  Combigan 0.2%-0.5% ophthalmic solution: 1 drop(s) in the left eye every 12 hours  ferrous sulfate 324 mg (65 mg elemental iron) oral tablet: 1 tab(s) orally once a day  prednisoLONE acetate 1% ophthalmic suspension: 1 drop(s) in the left eye once a day  QUEtiapine 25 mg oral tablet: 1 tab(s) orally 2 times a day   amLODIPine 10 mg oral tablet: 1 tab(s) orally once a day  B-Complex 50 oral tablet: 1 tab(s) orally once a day  Caltrate 600 mg oral tablet: 2 tab(s) orally once a day  carbidopa-levodopa 25 mg-100 mg oral tablet: 1 tab(s) orally 4 times a day  Combigan 0.2%-0.5% ophthalmic solution: 1 drop(s) in the left eye every 12 hours  enoxaparin: 40 milligram(s) subcutaneous once a day  ferrous sulfate 324 mg (65 mg elemental iron) oral tablet: 1 tab(s) orally once a day  lactobacillus acidophilus oral capsule: 1 tab(s) orally once a day  lactulose 10 g/15 mL oral syrup: 15 milliliter(s) orally 3 times a day, As needed, Constipation  melatonin 3 mg oral tablet: 1 tab(s) orally once a day (at bedtime)  prednisoLONE acetate 1% ophthalmic suspension: 1 drop(s) in the left eye once a day  QUEtiapine 25 mg oral tablet: 1 tab(s) orally once a day (at bedtime)  senna oral tablet: 2 tab(s) orally once a day (at bedtime)

## 2022-05-31 NOTE — DISCHARGE NOTE PROVIDER - NSDCCPCAREPLAN_GEN_ALL_CORE_FT
PRINCIPAL DISCHARGE DIAGNOSIS  Diagnosis: Acute UTI  Assessment and Plan of Treatment: You were diagnosed with a urinary tract infection on admission. You were given antibiotics and you got better. Please continue to take all of your medicaitons as prescribed and follow up with your primary care doctor within 1 week of discharge.      SECONDARY DISCHARGE DIAGNOSES  Diagnosis: Fecal impaction in rectum  Assessment and Plan of Treatment: You were found to have constipation while in the hospital. You were treated with laxatives and an enema.  -Continue your prescribed laxatives at home.  -Please follow up with your PCP after discharge to further manage your condition.     PRINCIPAL DISCHARGE DIAGNOSIS  Diagnosis: Acute UTI  Assessment and Plan of Treatment: You were diagnosed with a urinary tract infection on admission. You were given antibiotics and you got better. Please continue to take all of your medicaitons as prescribed and follow up with your primary care doctor within 1 week of discharge.      SECONDARY DISCHARGE DIAGNOSES  Diagnosis: Fecal impaction in rectum  Assessment and Plan of Treatment: You were found to have constipation while in the hospital. You were treated with laxatives and an enema.  -Continue your prescribed laxatives at home.  -Please follow up with your PCP after discharge to further manage your condition.    Diagnosis: Metabolic encephalopathy  Assessment and Plan of Treatment: secondary parkinson disease - continue home po meds

## 2022-05-31 NOTE — PROGRESS NOTE ADULT - PROBLEM SELECTOR PLAN 7
Patient with known L foot drop, supposed to be wearing Rickey Brace . Patient with known Lt  foot drop, supposed to be wearing Rickey Brace .

## 2022-05-31 NOTE — PROGRESS NOTE ADULT - PROBLEM SELECTOR PLAN 1
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

## 2022-05-31 NOTE — PROGRESS NOTE ADULT - PROBLEM SELECTOR PLAN 4
CT abd/p on admission showing stercoral colitis with stool distended rectum and perirectal inflammatory fat stranding  -Likely mechanical in setting of Parkinson's disease  -s/p manual disimpaction in ED with subsequent bowel movements  -Fleet enema   bm +  -Start bowel regimen with senna, miralax  -Monitor stool output CT abd/p on admission showing stercoral colitis with stool distended rectum and perirectal inflammatory fat stranding  -Likely mechanical in setting of Parkinson's disease  -s/p manual disimpaction in ED with subsequent bowel movements  -Fleet enema   bm +  -Start bowel regimen with senna, miralax  -Monitor stool output - today 2 soft stool

## 2022-05-31 NOTE — PROGRESS NOTE ADULT - PROBLEM SELECTOR PLAN 8
Continue Combigan BID and Prednisolone qd in L eye

## 2022-05-31 NOTE — DISCHARGE NOTE NURSING/CASE MANAGEMENT/SOCIAL WORK - PATIENT PORTAL LINK FT
You can access the FollowMyHealth Patient Portal offered by [facility] by registering at the following website: [email or web address] By joining [facility]’s FollowMyHealth portal, you will also be able to view your health information using other applications (apps) compatible with our system.

## 2022-05-31 NOTE — PROGRESS NOTE ADULT - ASSESSMENT
76 yo female PMH HTN, Parkinson's, GIACOMO, L eye cataract, known L foot drop admitted for UTI and metabolic encephalopathy with exacerbation of hallucinations/increased paranoia, in the setting of acute infection and Parkinsons Disease psychosis.

## 2022-05-31 NOTE — DISCHARGE NOTE NURSING/CASE MANAGEMENT/SOCIAL WORK - NSDCPELOVENOXCOMP_GEN_ALL_CORE
Enoxaparin/Lovenox is used to treat and prevent blood clots. Use a different body area each time you give yourself a shot. Keep track of where you give each shot to make sure you rotate body areas. Use a new needle and syringe each time you inject your medicine. Never skip a dose of Enoxaparin/Lovenox. You must use this medicine on a fixed schedule.  NEVER TAKE A DOUBLE DOSE. Call your doctor or pharmacist if you miss a dose. Take Enoxaparin/Lovenox at the same time each morning and/or evening. MI (myocardial infarction)

## 2022-05-31 NOTE — DISCHARGE NOTE PROVIDER - HOSPITAL COURSE
HPI:  78 yo female PMH HTN, Parkinson's, GIACOMO, L eye cataract, known L foot drop presents to ED with worsening hallucinations. History obtained from son Roman via phone ([phone]), states for the past few weeks patient has had increasing paranoia and hallucinations. Patient often sees people/things not there. Per patient, she called the police because she saw people arguing on her lawn. Patient states she called the  because she could not walk to her lawn to see what was happening. Son states patient has had increased difficulty ambulating. Patient walks with a walker at baseline and is supposed to be working with home PT, however home PT stopped about 1 month ago and since then patient has had increasing difficulty walking. She reports left drop foot and using pepe brace at home. Son has noted that she had multiple near-falls. Patient denies any other complaints. Denies any CP, SOB, abd pain, N/V/D, urinary complaints.     In ED:  Vitals: T 99, HR 78, /87, RR 15, SpO2 100%  Labs significant for: WBC 12.00, H/H 11.0/33.5  UA: 15 protein, +nitrite, mod LEC, large blood, >50 WBC, >50 RBC, TNTC bacteria  CT head: no acute intracranial bleeding, central volume loss and chronic microvascular ischemic change  CT abd/p: stercoral colitis with stool distended rectum and perirectal inflammatory fat stranding  EKG: NSR rate 79  Given: 1g IV Rocephin, 1L NS bolus x1 (29 May 2022 01:49)      ---  HOSPITAL COURSE:   Patient was subsequently admitted for UTI management. Patient completed 4 day course of IV ceftriaxone. Patient's mental status improved, no hallucination episodes. PT recommended MARLEN. Patient was medically optimized and seen and examined on the day of discharge.    T(C): 37.3 (05-31-22 @ 05:24), Max: 37.3 (05-30-22 @ 20:09)  HR: 84 (05-31-22 @ 05:24) (80 - 84)  BP: 134/73 (05-31-22 @ 05:24) (128/72 - 136/73)  RR: 18 (05-31-22 @ 05:24) (16 - 18)  SpO2: 97% (05-31-22 @ 05:24) (96% - 98%)  ---  TIME SPENT:  I, the attending physician, was physically present for the key portions of the evaluation and management (E/M) service provided. The total amount of time spent reviewing the hospital notes, laboratory values, imaging findings, assessing/counseling the patient, discussing with consultant physicians, social work, nursing staff was -- minutes    ---  Primary care provider was made aware of plan for discharge:      [  ] NO     [  ] YES   HPI:  78 yo female PMH HTN, Parkinson's, GIACOMO, L eye cataract, known L foot drop presents to ED with worsening hallucinations. History obtained from son Roman via phone ([phone]), states for the past few weeks patient has had increasing paranoia and hallucinations. Patient often sees people/things not there. Per patient, she called the police because she saw people arguing on her lawn. Patient states she called the  because she could not walk to her lawn to see what was happening. Son states patient has had increased difficulty ambulating. Patient walks with a walker at baseline and is supposed to be working with home PT, however home PT stopped about 1 month ago and since then patient has had increasing difficulty walking. She reports left drop foot and using pepe brace at home. Son has noted that she had multiple near-falls. Patient denies any other complaints. Denies any CP, SOB, abd pain, N/V/D, urinary complaints.     In ED:  Vitals: T 99, HR 78, /87, RR 15, SpO2 100%  Labs significant for: WBC 12.00, H/H 11.0/33.5  UA: 15 protein, +nitrite, mod LEC, large blood, >50 WBC, >50 RBC, TNTC bacteria  CT head: no acute intracranial bleeding, central volume loss and chronic microvascular ischemic change  CT abd/p: stercoral colitis with stool distended rectum and perirectal inflammatory fat stranding  EKG: NSR rate 79  Given: 1g IV Rocephin, 1L NS bolus x1 (29 May 2022 01:49)      ---  HOSPITAL COURSE:   Patient was subsequently admitted for UTI management ,   admitted for UTI and acute metabolic encephalopathy with exacerbation of hallucinations/increased paranoia, in the setting of acute infection and Parkinson Disease psychosis -  pt hospita course    mood stable  no further hallucination  iv Rocephin 1 gm q daily  day 3  finished  tt  ,  urine culture  gram neg lester / pending id/sens , pt evaluation / rehab .  rosario hwang aware dc lucas / plan .   PT recommended LUCAS. Patient was medically optimized and seen and examined on the day of discharge.    T(C): 37.3 (05-31-22 @ 05:24), Max: 37.3 (05-30-22 @ 20:09)  HR: 84 (05-31-22 @ 05:24) (80 - 84)  BP: 134/73 (05-31-22 @ 05:24) (128/72 - 136/73)  RR: 18 (05-31-22 @ 05:24) (16 - 18)  SpO2: 97% (05-31-22 @ 05:24) (96% - 98%)  ---  TIME SPENT:  I, the attending physician, was physically present for the key portions of the evaluation and management (E/M) service provided. The total amount of time spent reviewing the hospital notes, laboratory values, imaging findings, assessing/counseling the patient, discussing with consultant physicians, social work, nursing staff was 45-- minutes     HPI:  76 yo female PMH HTN, Parkinson's, GIACOMO, L eye cataract, known L foot drop presents to ED with worsening hallucinations. History obtained from son Roman via phone ([phone]), states for the past few weeks patient has had increasing paranoia and hallucinations. Patient often sees people/things not there. Per patient, she called the police because she saw people arguing on her lawn. Patient states she called the  because she could not walk to her lawn to see what was happening. Son states patient has had increased difficulty ambulating. Patient walks with a walker at baseline and is supposed to be working with home PT, however home PT stopped about 1 month ago and since then patient has had increasing difficulty walking. She reports left drop foot and using pepe brace at home. Son has noted that she had multiple near-falls. Patient denies any other complaints. Denies any CP, SOB, abd pain, N/V/D, urinary complaints.     In ED:  Vitals: T 99, HR 78, /87, RR 15, SpO2 100%  Labs significant for: WBC 12.00, H/H 11.0/33.5  UA: 15 protein, +nitrite, mod LEC, large blood, >50 WBC, >50 RBC, TNTC bacteria  CT head: no acute intracranial bleeding, central volume loss and chronic microvascular ischemic change  CT abd/p: stercoral colitis with stool distended rectum and perirectal inflammatory fat stranding  EKG: NSR rate 79  Given: 1g IV Rocephin, 1L NS bolus x1 (29 May 2022 01:49)      ---  HOSPITAL COURSE:   Patient was subsequently admitted for UTI management , admitted for UTI and acute metabolic encephalopathy with exacerbation of hallucinations/increased paranoia, in the setting of acute infection and Parkinson Disease psychosis -  pt hospita course    mood stable  no further hallucination  iv Rocephin 1 gm q daily  day 3  finished  tt  ,  urine culture  gram neg lester / pending id/sens , pt evaluation / rehab .  rosario hwang aware dc lucas / plan .   PT recommended LUCAS. Patient was medically optimized and seen and examined on the day of discharge.    T(C): 37.3 (05-31-22 @ 05:24), Max: 37.3 (05-30-22 @ 20:09)  HR: 84 (05-31-22 @ 05:24) (80 - 84)  BP: 134/73 (05-31-22 @ 05:24) (128/72 - 136/73)  RR: 18 (05-31-22 @ 05:24) (16 - 18)  SpO2: 97% (05-31-22 @ 05:24) (96% - 98%)  ---  TIME SPENT:  I, the attending physician, was physically present for the key portions of the evaluation and management (E/M) service provided. The total amount of time spent reviewing the hospital notes, laboratory values, imaging findings, assessing/counseling the patient, discussing with consultant physicians, social work, nursing staff was 45-- minutes     HPI:  76 yo female PMH HTN, Parkinson's, GIACOMO, L eye cataract, known L foot drop presents to ED with worsening hallucinations. History obtained from son Roman via phone ([phone]), states for the past few weeks patient has had increasing paranoia and hallucinations. Patient often sees people/things not there. Per patient, she called the police because she saw people arguing on her lawn. Patient states she called the  because she could not walk to her lawn to see what was happening. Son states patient has had increased difficulty ambulating. Patient walks with a walker at baseline and is supposed to be working with home PT, however home PT stopped about 1 month ago and since then patient has had increasing difficulty walking. She reports left drop foot and using pepe brace at home. Son has noted that she had multiple near-falls. Patient denies any other complaints. Denies any CP, SOB, abd pain, N/V/D, urinary complaints.     In ED:  Vitals: T 99, HR 78, /87, RR 15, SpO2 100%  Labs significant for: WBC 12.00, H/H 11.0/33.5  UA: 15 protein, +nitrite, mod LEC, large blood, >50 WBC, >50 RBC, TNTC bacteria  CT head: no acute intracranial bleeding, central volume loss and chronic microvascular ischemic change  CT abd/p: stercoral colitis with stool distended rectum and perirectal inflammatory fat stranding  EKG: NSR rate 79  Given: 1g IV Rocephin, 1L NS bolus x1 (29 May 2022 01:49)      ---  HOSPITAL COURSE:   Patient was subsequently admitted for UTI management , admitted for UTI and acute metabolic encephalopathy with exacerbation of hallucinations/increased paranoia, in the setting of acute infection and Parkinson Disease psychosis -  pt hospita course    mood stable  no further hallucination  iv Rocephin 1 gm q daily  day 3  finished  tt  ,  urine culture  gram neg lester / pending id/sens , pt evaluation / rehab .  rosario hwang aware dc lucas / plan .   PT recommended LUCAS. Patient was medically optimized and seen and examined on the day of discharge.    T(C): 37.3 (05-31-22 @ 05:24), Max: 37.3 (05-30-22 @ 20:09)  HR: 84 (05-31-22 @ 05:24) (80 - 84)  BP: 134/73 (05-31-22 @ 05:24) (128/72 - 136/73)  RR: 18 (05-31-22 @ 05:24) (16 - 18)  SpO2: 97% (05-31-22 @ 05:24) (96% - 98%)    PHYSICAL EXAM:  GENERAL: NAD,  weak   HEAD:  Atraumatic, Normocephalic  EYES: EOMI, PERRLA, conjunctiva and sclera clear  ENMT:   Moist mucous membranes  NECK: Supple, No JVD  NERVOUS SYSTEM:  Alert & Oriented X2; Motor Strength 5/5 B/L upper and lower extremities; DTRs 2+ intact and symmetric  CHEST/LUNG: percussion bilaterally; No rales, rhonchi, wheezing,   HEART: Regular rate and rhythm; No murmurs, no tachy   ABDOMEN: Soft, Nontender, Nondistended; Bowel sounds present  EXTREMITIES:  2+ Peripheral Pulses, No clubbing, cyanosis, or edema  SKIN: No rashes or lesions    ---  TIME SPENT:  I, the attending physician, was physically present for the key portions of the evaluation and management (E/M) service provided. The total amount of time spent reviewing the hospital notes, laboratory values, imaging findings, assessing/counseling the patient, discussing with consultant physicians, social work, nursing staff was 45-- minutes       HPI:  78 yo female PMH HTN, Parkinson's, GIACOMO, L eye cataract, known L foot drop presents to ED with worsening hallucinations. History obtained from son Roman via phone ([phone]), states for the past few weeks patient has had increasing paranoia and hallucinations. Patient often sees people/things not there. Per patient, she called the police because she saw people arguing on her lawn. Patient states she called the  because she could not walk to her lawn to see what was happening. Son states patient has had increased difficulty ambulating. Patient walks with a walker at baseline and is supposed to be working with home PT, however home PT stopped about 1 month ago and since then patient has had increasing difficulty walking. She reports left drop foot and using pepe brace at home. Son has noted that she had multiple near-falls. Patient denies any other complaints. Denies any CP, SOB, abd pain, N/V/D, urinary complaints.     In ED:  Vitals: T 99, HR 78, /87, RR 15, SpO2 100%  Labs significant for: WBC 12.00, H/H 11.0/33.5  UA: 15 protein, +nitrite, mod LEC, large blood, >50 WBC, >50 RBC, TNTC bacteria  CT head: no acute intracranial bleeding, central volume loss and chronic microvascular ischemic change  CT abd/p: stercoral colitis with stool distended rectum and perirectal inflammatory fat stranding  EKG: NSR rate 79  Given: 1g IV Rocephin, 1L NS bolus x1 (29 May 2022 01:49)      ---  HOSPITAL COURSE:   Patient was subsequently admitted for UTI management , admitted for UTI and acute metabolic encephalopathy with exacerbation of hallucinations/increased paranoia, in the setting of acute infection and Parkinson Disease psychosis -  pt hospita course    mood stable  no further hallucination  iv Rocephin 1 gm q daily  day 3  finished  tt  ,  urine culture  gram neg lester / pending id/sens , pt evaluation / rehab .  rosario hwang aware dc lucas / plan .   PT recommended LUCAS. Patient was medically optimized and seen and examined on the day of discharge.      ---  TIME SPENT:  I, the attending physician, was physically present for the key portions of the evaluation and management (E/M) service provided. The total amount of time spent reviewing the hospital notes, laboratory values, imaging findings, assessing/counseling the patient, discussing with consultant physicians, social work, nursing staff was 45-- minutes

## 2022-05-31 NOTE — DISCHARGE NOTE PROVIDER - CARE PROVIDER_API CALL
Ramon Palomares (DO)  Family Medicine  [address], [address]nd floor  [place], [place] [id number]  Phone: [phone]  Fax: [phone]  Follow Up Time: 2 weeks

## 2022-05-31 NOTE — PROGRESS NOTE ADULT - ATTENDING COMMENTS
pt seen and examine today see above plan . admitted for UTI and acute metabolic encephalopathy with exacerbation of hallucinations/increased paranoia, in the setting of acute infection and Parkinson Disease psychosis -    mood stable  no further hallucination  iv Rocephin 1 gm q daily ,  follow urine culture report  , pt evaluation / rehab .son jaiden shaun dc lucas / plan .
pt seen and examine today see above plan . admitted for UTI and acute metabolic encephalopathy with exacerbation of hallucinations/increased paranoia, in the setting of acute infection and Parkinson Disease psychosis -    mood stable  no further hallucination  iv Rocephin 1 gm q daily  day 3 ,  urine culture  gram neg lester / pending id/sens , pt evaluation / rehab .  son jaidne aware dc lucas / plan .
pt seen and examine today see above plan . admitted for UTI and metabolic encephalopathy with exacerbation of hallucinations/increased paranoia, in the setting of acute infection and Parkinson Disease psychosis - iv Rocephin 1 gm q daily ,  follow urine culture report  , pt evaluation / rehab .

## 2022-05-31 NOTE — PROGRESS NOTE ADULT - PROBLEM SELECTOR PLAN 9
H/H 11/33.5 on admission, baseline hgb 9-10 per chart review  -PMH of Iron deficiency anemia  on iron supplements  -No current signs of active bleeding  -Monitor daily CBC
H/H 11/33.5 on admission, baseline hgb 9-10 per chart review  -PMH of GIACOMO on iron supplements  -No current signs of active bleeding  -Monitor daily CBC
H/H 11/33.5 on admission, baseline hgb 9-10 per chart review  -PMH of Iron deficiency anemia  on iron supplements  -No current signs of active bleeding  -Monitor daily CBC

## 2022-05-31 NOTE — PROGRESS NOTE ADULT - PROBLEM SELECTOR PROBLEM 10
Kevin Jaramillo is a 86 year old male presenting with c/o pain on the left side of his abdomen and his lower back.    Medications reviewed and updated.  Denies known Latex allergy or symptoms of Latex sensitivity.  History   Smoking Status   • Former Smoker   • Years: 20.00   • Types: Pipe   • Quit date: 1/1/1976   Smokeless Tobacco   • Never Used     Comment: Former pipe smoker     Health Maintenance Summary     Topic Due On Due Status Completed On    Immunization-Zoster Jan 26, 1990 Overdue     Immunization - Pneumococcal  Completed Sep 3, 2016    Medicare Wellness Visit Jan 26, 1995 Overdue     IMMUNIZATION - DTaP/Tdap/Td Aug 15, 2009 Overdue Aug 14, 2009    Immunization-Influenza  Completed Sep 3, 2016          Patient is due for topics as listed above, he wishes to discuss with provider .        
Need for prophylactic measure

## 2022-08-11 NOTE — H&P ADULT - PSYCHIATRIC
Goal Outcome Evaluation:  Plan of Care Reviewed With: patient        Progress: improving  Outcome Evaluation: ST: pt tolerates puree and NTL via cup rim.  He will benefit fromcontinued NTL with no straw to decrease risk of aspiration.  pt will begin TF at noc per PLOF. d/c skilled ST with goals met.   detailed exam

## 2022-09-24 NOTE — DIETITIAN INITIAL EVALUATION ADULT. - CONTINUE CURRENT NUTRITION CARE PLAN
There are no Wet Read(s) to document.
continue full fluids advance to soft nectar thick diet as medically appropriate/yes

## 2022-11-17 ENCOUNTER — EMERGENCY (EMERGENCY)
Facility: HOSPITAL | Age: 77
LOS: 1 days | Discharge: HOME | End: 2022-11-17
Attending: EMERGENCY MEDICINE | Admitting: EMERGENCY MEDICINE
Payer: MEDICARE

## 2022-11-17 VITALS
OXYGEN SATURATION: 97 % | RESPIRATION RATE: 18 BRPM | HEART RATE: 80 BPM | DIASTOLIC BLOOD PRESSURE: 100 MMHG | SYSTOLIC BLOOD PRESSURE: 179 MMHG

## 2022-11-17 DIAGNOSIS — Z96.642 PRESENCE OF LEFT ARTIFICIAL HIP JOINT: Chronic | ICD-10-CM

## 2022-11-17 LAB
ALBUMIN SERPL ELPH-MCNC: 3.3 G/DL — SIGNIFICANT CHANGE UP (ref 3.3–5)
ALP SERPL-CCNC: 121 U/L — HIGH (ref 40–120)
ALT FLD-CCNC: 24 U/L — SIGNIFICANT CHANGE UP (ref 12–78)
ANION GAP SERPL CALC-SCNC: 6 MMOL/L — SIGNIFICANT CHANGE UP (ref 5–17)
AST SERPL-CCNC: 34 U/L — SIGNIFICANT CHANGE UP (ref 15–37)
BASOPHILS # BLD AUTO: 0.05 K/UL — SIGNIFICANT CHANGE UP (ref 0–0.2)
BASOPHILS NFR BLD AUTO: 0.4 % — SIGNIFICANT CHANGE UP (ref 0–2)
BILIRUB SERPL-MCNC: 0.3 MG/DL — SIGNIFICANT CHANGE UP (ref 0.2–1.2)
BUN SERPL-MCNC: 20 MG/DL — SIGNIFICANT CHANGE UP (ref 7–23)
CALCIUM SERPL-MCNC: 8.4 MG/DL — LOW (ref 8.5–10.1)
CHLORIDE SERPL-SCNC: 110 MMOL/L — HIGH (ref 96–108)
CO2 SERPL-SCNC: 24 MMOL/L — SIGNIFICANT CHANGE UP (ref 22–31)
CREAT SERPL-MCNC: 0.56 MG/DL — SIGNIFICANT CHANGE UP (ref 0.5–1.3)
EGFR: 93 ML/MIN/1.73M2 — SIGNIFICANT CHANGE UP
EOSINOPHIL # BLD AUTO: 0.13 K/UL — SIGNIFICANT CHANGE UP (ref 0–0.5)
EOSINOPHIL NFR BLD AUTO: 1.1 % — SIGNIFICANT CHANGE UP (ref 0–6)
GLUCOSE SERPL-MCNC: 106 MG/DL — HIGH (ref 70–99)
HCT VFR BLD CALC: 35.7 % — SIGNIFICANT CHANGE UP (ref 34.5–45)
HGB BLD-MCNC: 11.6 G/DL — SIGNIFICANT CHANGE UP (ref 11.5–15.5)
IMM GRANULOCYTES NFR BLD AUTO: 0.3 % — SIGNIFICANT CHANGE UP (ref 0–0.9)
LIDOCAIN IGE QN: 171 U/L — SIGNIFICANT CHANGE UP (ref 73–393)
LYMPHOCYTES # BLD AUTO: 2.62 K/UL — SIGNIFICANT CHANGE UP (ref 1–3.3)
LYMPHOCYTES # BLD AUTO: 22.8 % — SIGNIFICANT CHANGE UP (ref 13–44)
MCHC RBC-ENTMCNC: 27.9 PG — SIGNIFICANT CHANGE UP (ref 27–34)
MCHC RBC-ENTMCNC: 32.5 GM/DL — SIGNIFICANT CHANGE UP (ref 32–36)
MCV RBC AUTO: 85.8 FL — SIGNIFICANT CHANGE UP (ref 80–100)
MONOCYTES # BLD AUTO: 0.73 K/UL — SIGNIFICANT CHANGE UP (ref 0–0.9)
MONOCYTES NFR BLD AUTO: 6.4 % — SIGNIFICANT CHANGE UP (ref 2–14)
NEUTROPHILS # BLD AUTO: 7.9 K/UL — HIGH (ref 1.8–7.4)
NEUTROPHILS NFR BLD AUTO: 69 % — SIGNIFICANT CHANGE UP (ref 43–77)
NRBC # BLD: 0 /100 WBCS — SIGNIFICANT CHANGE UP (ref 0–0)
PLATELET # BLD AUTO: 391 K/UL — SIGNIFICANT CHANGE UP (ref 150–400)
POTASSIUM SERPL-MCNC: 4.3 MMOL/L — SIGNIFICANT CHANGE UP (ref 3.5–5.3)
POTASSIUM SERPL-SCNC: 4.3 MMOL/L — SIGNIFICANT CHANGE UP (ref 3.5–5.3)
PROT SERPL-MCNC: 7.4 G/DL — SIGNIFICANT CHANGE UP (ref 6–8.3)
RBC # BLD: 4.16 M/UL — SIGNIFICANT CHANGE UP (ref 3.8–5.2)
RBC # FLD: 14.5 % — SIGNIFICANT CHANGE UP (ref 10.3–14.5)
SODIUM SERPL-SCNC: 140 MMOL/L — SIGNIFICANT CHANGE UP (ref 135–145)
WBC # BLD: 11.47 K/UL — HIGH (ref 3.8–10.5)
WBC # FLD AUTO: 11.47 K/UL — HIGH (ref 3.8–10.5)

## 2022-11-17 PROCEDURE — 74177 CT ABD & PELVIS W/CONTRAST: CPT | Mod: 26,MA

## 2022-11-17 PROCEDURE — 99284 EMERGENCY DEPT VISIT MOD MDM: CPT

## 2022-11-17 PROCEDURE — 74018 RADEX ABDOMEN 1 VIEW: CPT | Mod: 26

## 2022-11-17 NOTE — ED ADULT NURSE NOTE - CAS TRG GENERAL NORM CIRC DET
· Rate controlled    Continue metoprolol  · Patient declined anticoagulation due to prior bleeding while on Eliquis Strong peripheral pulses

## 2022-11-17 NOTE — ED PROVIDER NOTE - PATIENT PORTAL LINK FT
You can access the FollowMyHealth Patient Portal offered by [facility] by registering at the following website: [email or web address]. By joining [employer]’s FollowMyHealth portal, you will also be able to view your health information using other applications (apps) compatible with our system.

## 2022-11-17 NOTE — ED PROVIDER NOTE - CLINICAL SUMMARY MEDICAL DECISION MAKING FREE TEXT BOX
Patient is an elderly female with abdominal discomfort due to constipation for 3+ days denies rectal pain or pressure.  Labs essentially normal chest x-ray with moderate to large amount of stool and abnormal bowel gas pattern.  CT scan of the abdomen performed and reveals stercoral colitis and a large fecal impaction with a large amount of stool.  Patient disimpacted and enema given we will monitor patient and if patient has adequate bowel movement we will discharge on stool softener for GI follow-up.  If patient is unable to have bowel movement and continues discomfort will consider admission

## 2022-11-17 NOTE — ED PROVIDER NOTE - CONSTITUTIONAL, MLM
normal... elderly, non toxic,  awake, alert, oriented to person, place, time/situation and in no apparent distress.

## 2022-11-17 NOTE — ED ADULT NURSE NOTE - OBJECTIVE STATEMENT
Patient received BIBA from home complaining of constipation x5 days, as per family, patient has been only having small BMs and with OTC medications not improving the constipation. Patient is alert to name, place and situation, resting on stretcher, safety precautions in place, awaiting evaluation.

## 2022-11-17 NOTE — ED PROVIDER NOTE - PROGRESS NOTE DETAILS
Patient manually disimpacted of hard stool and then a mineral oil enema applied and patient placed on bedpan.  We will also give the patient milk of magnesia return and await bowel movement Pt signed out to my by Dr. Palmer to reassess for bowel movement after manual disimpaction. Pt had additional voluntary bowel movements. Pt lives at home with son. Pt's son was called and informed pt will be discharged home via transport. He will be home to receive the pt.

## 2022-11-17 NOTE — ED ADULT NURSE NOTE - CHIEF COMPLAINT QUOTE
per ems from home, hx of dementia, constipation for the past 5 days, over the counter medications attempted at home

## 2022-11-17 NOTE — ED PROVIDER NOTE - OBJECTIVE STATEMENT
This patient is a 77-year-old female with a history of Parkinson's disease, dementia, anemia who presents with a chief complaint of several days of constipation approximately 3 patient denies feeling as if she has a large mass in the rectum or rectal pain and denies nausea or vomiting patient says that she has taken enema at home with minimal relief.  Patient denies fever chills nausea vomiting

## 2022-11-17 NOTE — ED ADULT TRIAGE NOTE - CHIEF COMPLAINT QUOTE
Detail Level: Zone Quality 226: Preventive Care And Screening: Tobacco Use: Screening And Cessation Intervention: Patient screened for tobacco and never smoked Quality 130: Documentation Of Current Medications In The Medical Record: Current Medications Documented Quality 431: Preventive Care And Screening: Unhealthy Alcohol Use - Screening: Patient screened for unhealthy alcohol use using a single question and scores less than 2 times per year Quality 110: Preventive Care And Screening: Influenza Immunization: Influenza Immunization not Administered for Documented Reasons. per ems from home, hx of dementia, constipation for the past 5 days, over the counter medications attempted at home

## 2022-11-18 ENCOUNTER — EMERGENCY (EMERGENCY)
Facility: HOSPITAL | Age: 77
LOS: 1 days | Discharge: HOME | End: 2022-11-18
Attending: EMERGENCY MEDICINE | Admitting: EMERGENCY MEDICINE
Payer: MEDICARE

## 2022-11-18 VITALS
OXYGEN SATURATION: 99 % | DIASTOLIC BLOOD PRESSURE: 72 MMHG | TEMPERATURE: 98 F | RESPIRATION RATE: 17 BRPM | SYSTOLIC BLOOD PRESSURE: 131 MMHG | HEART RATE: 71 BPM

## 2022-11-18 VITALS
TEMPERATURE: 99 F | WEIGHT: 145.06 LBS | DIASTOLIC BLOOD PRESSURE: 68 MMHG | RESPIRATION RATE: 18 BRPM | HEART RATE: 100 BPM | SYSTOLIC BLOOD PRESSURE: 137 MMHG | OXYGEN SATURATION: 97 %

## 2022-11-18 VITALS
HEART RATE: 70 BPM | OXYGEN SATURATION: 97 % | DIASTOLIC BLOOD PRESSURE: 56 MMHG | RESPIRATION RATE: 18 BRPM | SYSTOLIC BLOOD PRESSURE: 113 MMHG | TEMPERATURE: 98 F

## 2022-11-18 DIAGNOSIS — Z96.642 PRESENCE OF LEFT ARTIFICIAL HIP JOINT: Chronic | ICD-10-CM

## 2022-11-18 LAB — SARS-COV-2 RNA SPEC QL NAA+PROBE: SIGNIFICANT CHANGE UP

## 2022-11-18 PROCEDURE — 99284 EMERGENCY DEPT VISIT MOD MDM: CPT

## 2022-11-18 PROCEDURE — 85025 COMPLETE CBC W/AUTO DIFF WBC: CPT

## 2022-11-18 PROCEDURE — 97162 PT EVAL MOD COMPLEX 30 MIN: CPT

## 2022-11-18 PROCEDURE — 36415 COLL VENOUS BLD VENIPUNCTURE: CPT

## 2022-11-18 PROCEDURE — 99284 EMERGENCY DEPT VISIT MOD MDM: CPT | Mod: 25

## 2022-11-18 PROCEDURE — U0005: CPT

## 2022-11-18 PROCEDURE — 74018 RADEX ABDOMEN 1 VIEW: CPT

## 2022-11-18 PROCEDURE — U0003: CPT

## 2022-11-18 PROCEDURE — 83690 ASSAY OF LIPASE: CPT

## 2022-11-18 PROCEDURE — 74177 CT ABD & PELVIS W/CONTRAST: CPT | Mod: MA

## 2022-11-18 PROCEDURE — 80053 COMPREHEN METABOLIC PANEL: CPT

## 2022-11-18 RX ORDER — MECLIZINE HCL 12.5 MG
25 TABLET ORAL ONCE
Refills: 0 | Status: DISCONTINUED | OUTPATIENT
Start: 2022-11-18 | End: 2022-11-21

## 2022-11-18 RX ORDER — MAGNESIUM HYDROXIDE 400 MG/1
30 TABLET, CHEWABLE ORAL ONCE
Refills: 0 | Status: COMPLETED | OUTPATIENT
Start: 2022-11-18 | End: 2022-11-18

## 2022-11-18 RX ORDER — MINERAL OIL
133 OIL (ML) MISCELLANEOUS ONCE
Refills: 0 | Status: COMPLETED | OUTPATIENT
Start: 2022-11-18 | End: 2022-11-18

## 2022-11-18 RX ADMIN — Medication 133 MILLILITER(S): at 02:25

## 2022-11-18 RX ADMIN — MAGNESIUM HYDROXIDE 30 MILLILITER(S): 400 TABLET, CHEWABLE ORAL at 02:32

## 2022-11-18 NOTE — PHYSICAL THERAPY INITIAL EVALUATION ADULT - ADDITIONAL COMMENTS
Patient is a poor historian. Per chart, pt lives with son. Pt reports she lives in a private house without steps. Patient reports she utilized a rolling walker and has an aide for assistance in the home. pt ambulates with left AFO "for as long as I can remember."

## 2022-11-18 NOTE — ED PROVIDER NOTE - PROGRESS NOTE DETAILS
Pt accepted to [facility] - can be dc to that facility Received sign out - pt awaiting rehab placement by JESSIE

## 2022-11-18 NOTE — PHYSICAL THERAPY INITIAL EVALUATION ADULT - RANGE OF MOTION EXAMINATION, REHAB EVAL
noted bilateral knee flexion contractures ~20 degrees from full extension, left ankle ROM limited secondary to +AFO/bilateral upper extremity ROM was WFL (within functional limits)

## 2022-11-18 NOTE — ED PROVIDER NOTE - NSFOLLOWUPINSTRUCTIONS_ED_ALL_ED_FT
1) Follow-up with your Primary Medical Doctor at the facility for prompt follow-up.  2) Return to Emergency room for any worsening or persistent pain, increased weakness, fever, vomiting, or any other concerning symptoms.

## 2022-11-18 NOTE — PHYSICAL THERAPY INITIAL EVALUATION ADULT - BED MOBILITY TRAINING, PT EVAL
Patient will perform supine<->sit with contact guard assistance by 2 weeks to allow patient to get in/out of bed safely.

## 2022-11-18 NOTE — PHYSICAL THERAPY INITIAL EVALUATION ADULT - TRANSFER TRAINING, PT EVAL
Patient will perform sit<->stand transfer with minimum assistance x1 with rolling walker by 2 weeks to allow patient to get on/off toilet safely.

## 2022-11-18 NOTE — ED PROVIDER NOTE - PATIENT PORTAL LINK FT
You can access the FollowMyHealth Patient Portal offered by [facility] by registering at the following website: [email or web address]. By joining [employer]’s FollowMyHealth portal, you will also be able to view your health information using other applications (apps) compatible with our system.

## 2022-11-18 NOTE — PHYSICAL THERAPY INITIAL EVALUATION ADULT - PERTINENT HX OF CURRENT PROBLEM, REHAB EVAL
Patient is a 77-year-old female with a history of Parkinson's disease, dementia, anemia who presents with a chief complaint of several days of constipation. Pt presents for rehab admission.

## 2022-11-18 NOTE — ED ADULT NURSE NOTE - OBJECTIVE STATEMENT
patient comes to ED BIBA from home patient was seen in this ED last night and returned home this morning and immediately returned pts son reports he is unable to care for her at home and is requesting rehab patient is awake alert oriented only to person

## 2022-11-18 NOTE — ED PROVIDER NOTE - PHYSICAL EXAMINATION
Gen: alert, NAD  HEENT:  NC/AT, PERR, no exophthalmos  CV:  well perfused, rrr   Pulm:  normal RR, cta b/l  Abd: s/nt/nd  MSK: moving all extremities  Neuro:  non-focal  Skin:  visualized areas intact

## 2022-11-18 NOTE — PHYSICAL THERAPY INITIAL EVALUATION ADULT - BED MOBILITY LIMITATIONS, REHAB EVAL
Notified of dosage adjustment. Anticoag log upated.     INR (no units)   Date Value   2019 1.2     INR CAPILLARY (no units)   Date Value   2019 2.9       Called patient to discuss and notify of INR results, dosage has Changed.    Anticoagulation Summary  As of 2019    INR goal:   2.0-3.0   TTR:   48.5 % (1.7 y)   INR used for dosin.9 (2019)   Warfarin maintenance plan:   3 mg (3 mg x 1) every Nava, F; 2 mg (2 mg x 1) all other days   Weekly warfarin total:   16 mg   Plan last modified:   Kalpana Dinero RN (3/29/2019)   Next INR check:   2019   Priority:   Follow-Up - 2 Days   Target end date:       Indications    Coumadin AC [Z79.01]  Atrial fibrillation (CMS/HCC) [I48.91]  Long term (current) use of anticoagulants [Z79.01] [Z79.01]             Anticoagulation Episode Summary     INR check location:   [facility]    Preferred lab:       Send INR reminders to:   TIANNA VINES NP NURSE [facility]    Comments:   INR monitored by Home Health/Dr. Friend 17.      Anticoagulation Care Providers     Provider Role Specialty Phone number    CHRIS Juarez Responsible Nurse Practitioner - [facility] [phone]             Onsite Supervising Clinician: Tutu Vines NP       decreased ability to use arms for pushing/pulling/decreased ability to use legs for bridging/pushing

## 2022-11-18 NOTE — ED ADULT NURSE REASSESSMENT NOTE - NS ED NURSE REASSESS COMMENT FT1
1035:  patient d/c back to facility.  the patient left with castano in place and draining.  no complaints.  he is comfortable and complies with plan for d/c.  vitals recorded.  he left in stable condition.  mina cook. 1035:  called son Roman who is waiting at home for patient.  he questioned how he can possibly get her at home PT or rehab.  social work number was provided as I could not answer his questions.  vitals recorded.  patient left in stable condition. josi deutsch rn

## 2022-11-18 NOTE — ED PROVIDER NOTE - OBJECTIVE STATEMENT
pt sent back to ED for rehab placement through ER. pt was just discharged 30 min prior but son called social work to request rehab placement. no change in patients condition.

## 2022-11-18 NOTE — PHYSICAL THERAPY INITIAL EVALUATION ADULT - DIAGNOSIS, PT EVAL
Patient presents with weakness, impaired balance, and decreased endurance resulting in impaired functional mobility leading to increased risk for falls.

## 2023-07-18 NOTE — BEHAVIORAL HEALTH ASSESSMENT NOTE - ORIENTED TO PERSON
[de-identified] : The patient is a well appearing 20 year old male of their stated age.\par Patient ambulates with a normal gait.\par Negative straight leg raise bilateral\par \par Surgical site: left shoulder \par \par Incision sites: Well healed \par \par Range of motion: 170/170/85/5/35\par \par Motor Testin+/5 in all planes \par \par Stability Testing: stable ligamentous examination  \par \par Swelling/Effusion: None \par \par Tenderness to palpation: None \par \par Provocative testing: Negative \par \par Right Calf: soft and nontender \par Left Calf: soft and nontender \par  \par Neurovascular Examination: Grossly intact, 2+ distal pulses \par Contralateral Extremity: Examination grossly benign \par \par \par Assessment & Plan: The patient is approximately 3 months s/p left shoulder EUA arthroscopic anterior Bankart repair, arthroscopic Remplissage infraspinatus rotator cuff repair procedure with dermal allograft augmentation (DOS: 2023). The patient's post-op plan, protocol and activity modifications have been thoroughly discussed and the patient expressed understanding. Continue physical therapy, HEP, and stretching - focusing on strength training. Discussed activity modifications in depth - obey the 90/90 rule. 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 The patient will control pain as discussed & continue ice and elevation as needed. The patient otherwise may advance activity as discussed. Patient has returned to work. Patient reports while he was at work with a leaf blower on his back, he experienced pain with deep inhalation. Recommended that the patient follow up with his primary care physician to evaluate his chest and obtain a chest X-Ray. Follow up 6 weeks. \par  \par The patient's current medication management of their orthopedic diagnosis was reviewed today:\par (1) We discussed a comprehensive treatment plan that included possible pharmaceutical management involving the use of prescription strength medications including but not limited to options such as oral Naprosyn 500mg BID, once daily Meloxicam 15 mg, or 500-650 mg Tylenol versus over the counter oral medications and topical prescription NSAID Pennsaid vs over the counter Voltaren gel.  Based on our extensive discussion, the patient declined prescription medication and will use over the counter Advil, Alleve, Voltaren Gel or Tylenol as directed.\par (2) There is a moderate risk of morbidity with further treatment, especially from use of prescription strength medications and possible side effects of these medications which include upset stomach with oral medications, skin reactions to topical medications and cardiac/renal issues with long term use.\par (3) I recommended that the patient follow-up with their medical physician to discuss any significant specific potential issues with long term medication use such as interactions with current medications or with exacerbation of underlying medical comorbidities.\par (4) The benefits and risks associated with use of injectable, oral or topical, prescription and over the counter anti-inflammatory medications were discussed with the patient. The patient voiced understanding of the risks including but not limited to bleeding, stroke, kidney dysfunction, heart disease, and were referred to the black box warning label for further information. \par \par I, Danica Powell, attest that this documentation has been prepared under the direction and in the presence of Provider Dr. Wong Thomas.\par \par The documentation recorded by the scribe accurately reflects the service SHERWIN Scott PA-C personally performed and the decisions made by me.\par The patient was seen by me under the direct supervision of Dr. Wong Thomas\par 
Yes

## 2023-07-24 ENCOUNTER — EMERGENCY (EMERGENCY)
Facility: HOSPITAL | Age: 78
LOS: 1 days | Discharge: HOME | End: 2023-07-24
Attending: STUDENT IN AN ORGANIZED HEALTH CARE EDUCATION/TRAINING PROGRAM | Admitting: STUDENT IN AN ORGANIZED HEALTH CARE EDUCATION/TRAINING PROGRAM
Payer: MEDICARE

## 2023-07-24 VITALS
DIASTOLIC BLOOD PRESSURE: 68 MMHG | SYSTOLIC BLOOD PRESSURE: 109 MMHG | WEIGHT: 110.01 LBS | HEART RATE: 78 BPM | TEMPERATURE: 98 F | HEIGHT: 63 IN | RESPIRATION RATE: 18 BRPM | OXYGEN SATURATION: 98 %

## 2023-07-24 VITALS
HEART RATE: 90 BPM | RESPIRATION RATE: 18 BRPM | TEMPERATURE: 98 F | SYSTOLIC BLOOD PRESSURE: 128 MMHG | OXYGEN SATURATION: 98 % | DIASTOLIC BLOOD PRESSURE: 66 MMHG

## 2023-07-24 DIAGNOSIS — Z96.642 PRESENCE OF LEFT ARTIFICIAL HIP JOINT: Chronic | ICD-10-CM

## 2023-07-24 PROCEDURE — 99285 EMERGENCY DEPT VISIT HI MDM: CPT | Mod: FS

## 2023-07-24 PROCEDURE — 99284 EMERGENCY DEPT VISIT MOD MDM: CPT | Mod: 25

## 2023-07-24 PROCEDURE — 73502 X-RAY EXAM HIP UNI 2-3 VIEWS: CPT

## 2023-07-24 PROCEDURE — 72125 CT NECK SPINE W/O DYE: CPT | Mod: MA

## 2023-07-24 PROCEDURE — 96372 THER/PROPH/DIAG INJ SC/IM: CPT

## 2023-07-24 PROCEDURE — 70450 CT HEAD/BRAIN W/O DYE: CPT | Mod: MA

## 2023-07-24 PROCEDURE — 70450 CT HEAD/BRAIN W/O DYE: CPT | Mod: 26,MA

## 2023-07-24 PROCEDURE — 73502 X-RAY EXAM HIP UNI 2-3 VIEWS: CPT | Mod: 26,LT

## 2023-07-24 PROCEDURE — 72125 CT NECK SPINE W/O DYE: CPT | Mod: 26,MA

## 2023-07-24 RX ORDER — FERROUS SULFATE 325(65) MG
1 TABLET ORAL
Qty: 0 | Refills: 0 | DISCHARGE

## 2023-07-24 RX ORDER — PREDNISOLONE SODIUM PHOSPHATE 1 %
1 DROPS OPHTHALMIC (EYE)
Qty: 0 | Refills: 0 | DISCHARGE

## 2023-07-24 RX ORDER — QUETIAPINE FUMARATE 200 MG/1
1 TABLET, FILM COATED ORAL
Refills: 0 | DISCHARGE

## 2023-07-24 RX ORDER — TIMOLOL 0.5 %
1 DROPS OPHTHALMIC (EYE)
Refills: 0 | DISCHARGE

## 2023-07-24 RX ORDER — MULTIVIT-MIN/FERROUS GLUCONATE 9 MG/15 ML
1 LIQUID (ML) ORAL
Refills: 0 | DISCHARGE

## 2023-07-24 RX ORDER — AMLODIPINE BESYLATE 2.5 MG/1
1 TABLET ORAL
Refills: 0 | DISCHARGE

## 2023-07-24 RX ORDER — LACTULOSE 10 G/15ML
30 SOLUTION ORAL
Refills: 0 | DISCHARGE

## 2023-07-24 RX ORDER — ESCITALOPRAM OXALATE 10 MG/1
1 TABLET, FILM COATED ORAL
Refills: 0 | DISCHARGE

## 2023-07-24 RX ORDER — BRIMONIDINE TARTRATE 2 MG/MG
1 SOLUTION/ DROPS OPHTHALMIC
Refills: 0 | DISCHARGE

## 2023-07-24 RX ORDER — FERROUS SULFATE 325(65) MG
1 TABLET ORAL
Refills: 0 | DISCHARGE

## 2023-07-24 RX ORDER — CALCIUM CARBONATE 500(1250)
2 TABLET ORAL
Qty: 0 | Refills: 0 | DISCHARGE

## 2023-07-24 RX ORDER — ACETAMINOPHEN 500 MG
2 TABLET ORAL
Refills: 0 | DISCHARGE

## 2023-07-24 RX ORDER — MAGNESIUM HYDROXIDE 400 MG/1
30 TABLET, CHEWABLE ORAL
Refills: 0 | DISCHARGE

## 2023-07-24 RX ADMIN — Medication 1 MILLIGRAM(S): at 15:31

## 2023-07-24 NOTE — ED PROVIDER NOTE - CLINICAL SUMMARY MEDICAL DECISION MAKING FREE TEXT BOX
79yo F from [facility] for fall, pt unabel to provide history 2/2 dementia, per son she fell out of wheelchair, and hit her head. no obv trauma to head, appears pt has pain in left hip., will get CT head and xrays

## 2023-07-24 NOTE — ED PROVIDER NOTE - NSFOLLOWUPINSTRUCTIONS_ED_ALL_ED_FT
1) Follow up with her primary doctor in 1-2 days.  2) Return to the ED immediately for new or worsening symptoms as we discussed.      English    Head Injury, Adult    There are many types of head injuries. Head injuries can be as minor as a small bump, or they can be a serious medical issue. More severe head injuries include:  A jarring injury to the brain (concussion).  A bruise (contusion) of the brain. This means there is bleeding in the brain that can cause swelling.  A cracked skull (skull fracture).  Bleeding in the brain that collects, clots, and forms a bump (hematoma).  After a head injury, most problems occur within the first 24 hours, but side effects may occur up to 7–10 days after the injury. It is important to watch your condition for any changes. You may need to be observed in the emergency department or urgent care, or you may be admitted to the hospital.    What are the causes?  There are many possible causes of a head injury. Serious head injuries may be caused by car accidents, bicycle or motorcycle accidents, sports injuries, falls, or being struck by an object.    What are the symptoms?  Symptoms of a head injury include a contusion, bump, or bleeding at the site of the injury. Other physical symptoms may include:  Headache.  Nausea or vomiting.  Dizziness.  Blurred or double vision.  Being uncomfortable around bright lights or loud noises.  Seizures.  Feeling tired.  Trouble being awakened.  Loss of consciousness.  Mental or emotional symptoms may include:  Irritability.  Confusion and memory problems.  Poor attention and concentration.  Changes in eating or sleeping habits.  Anxiety or depression.  How is this diagnosed?  This condition can usually be diagnosed based on your symptoms, a description of the injury, and a physical exam. You may also have imaging tests done, such as a CT scan or an MRI.    How is this treated?  Treatment for this condition depends on the severity and type of injury you have. The main goal of treatment is to prevent complications and allow the brain time to heal.    Mild head injury    If you have a mild head injury, you may be sent home, and treatment may include:  Observation. A responsible adult should stay with you for 24 hours after your injury and check on you often.  Physical rest.  Brain rest.  Pain medicines.  Severe head injury    If you have a severe head injury, treatment may include:  Close observation. This includes hospitalization with the following care:  Frequent physical exams.  Frequent checks of how your brain and nervous system are working (neurological status).  Checking your blood pressure and oxygen levels.  Medicines to relieve pain, prevent seizures, and decrease brain swelling.  Airway protection and breathing support. This may include using a ventilator.  Treatments that monitor and manage swelling inside the brain.  Brain surgery. This may be needed to:  Remove a collection of blood or blood clots.  Stop the bleeding.  Remove a part of the skull to allow room for the brain to swell.  Follow these instructions at home:  Activity    Rest and avoid activities that are physically hard or tiring.  Make sure you get enough sleep.  Let your brain rest by limiting activities that require a lot of thought or attention, such as:  Watching TV.  Playing memory games and puzzles.  Job-related work or homework.  Working on the computer, using social media, and texting.  Avoid activities that could cause another head injury, such as playing sports, until your health care provider approves. Having another head injury, especially before the first one has healed, can be dangerous.  Ask your health care provider when it is safe for you to return to your regular activities, including work or school. Ask your health care provider for a step-by-step plan for gradually returning to activities.  Ask your health care provider when you can drive, ride a bicycle, or use heavy machinery. Your ability to react may be slower after a brain injury. Do not do these activities if you are dizzy.  Lifestyle      Do not drink alcohol until your health care provider approves. Do not use drugs. Alcohol and certain drugs may slow your recovery and can put you at risk of further injury.  If it is harder than usual to remember things, write them down.  If you are easily distracted, try to do one thing at a time.  Talk with family members or close friends when making important decisions.  Tell your friends, family, a trusted colleague, and  about your injury, symptoms, and restrictions. Have them watch for any new or worsening problems.  General instructions    Take over-the-counter and prescription medicines only as told by your health care provider.  Have someone stay with you for 24 hours after your head injury. This person should watch you for any changes in your symptoms and be ready to seek medical help.  Keep all follow-up visits as told by your health care provider. This is important.  How is this prevented?  Work on improving your balance and strength to avoid falls.  Wear a seat belt when you are in a moving vehicle.  Wear a helmet when riding a bicycle, skiing, or doing any other sport or activity that has a risk of injury.  If you drink alcohol:  Limit how much you use to:  0–1 drink a day for nonpregnant women.  0–2 drinks a day for men.  Be aware of how much alcohol is in your drink. In the [place] one drink equals one 12 oz bottle of beer (355 mL), one 5 oz glass of wine (148 mL), or one 1½ oz glass of hard liquor (44 mL).  Take safety measures in your home, such as:  Removing clutter and tripping hazards from floors and stairways.  Using grab bars in bathrooms and handrails by stairs.  Placing non-slip mats on floors and in bathtubs.  Improving lighting in dim areas.  Where to find more information  Centers for Disease Control and Prevention: www.cdc.gov  Get help right away if:  You have:  A severe headache that is not helped by medicine.  Trouble walking or weakness in your arms and legs.  Clear or bloody fluid coming from your nose or ears.  Changes in your vision.  A seizure.  Increased confusion or irritability.  Your symptoms get worse.  You are sleepier than normal and have trouble staying awake.  You lose your balance.  Your pupils change size.  Your speech is slurred.  Your dizziness gets worse.  You vomit.  These symptoms may represent a serious problem that is an emergency. Do not wait to see if the symptoms will go away. Get medical help right away. Call your local emergency services (911 in the [place] Do not drive yourself to the hospital.    Summary  Head injuries can be minor, or they can be a serious medical issue requiring immediate attention.  Treatment for this condition depends on the severity and type of injury you have.  Have someone stay with you for 24 hours after your injury and check on you often.  Ask your health care provider when it is safe for you to return to your regular activities, including work or school.  Head injury prevention includes wearing a seat belt in a motor vehicle, using a helmet on a bicycle, limiting alcohol use, and taking safety measures in your home.  This information is not intended to replace advice given to you by your health care provider. Make sure you discuss any questions you have with your health care provider.    Document Revised: 10/30/2020 Document Reviewed: 10/30/2020  Elsevier Patient Education © 2023 [employer]vier Inc[employer]

## 2023-07-24 NOTE — ED PROVIDER NOTE - PATIENT PORTAL LINK FT
You can access the FollowMyHealth Patient Portal offered by [facility] by registering at the following website: [email or web address]. By joining [facility] FollowMyHealth portal, you will also be able to view your health information using other applications (apps) compatible with our system.

## 2023-07-24 NOTE — ED ADULT NURSE NOTE - CHIEF COMPLAINT QUOTE
Patient BIBA from [facility] for unwitnessed fall. Patient with baseline dementia, unable to provide any information. No AC therapy.

## 2023-07-24 NOTE — ED PROVIDER NOTE - DISCHARGE REVIEW MATERIAL PRESENTED
patient
I concur with the Admission Order and I certify that services are provided in accordance with Section 42 CFR § 412.3
.

## 2023-07-24 NOTE — ED PROVIDER NOTE - OBJECTIVE STATEMENT
79 yo F dementia BIBEMS from [facility] as per EMS pt had witnessed fall out of wheelchair this AM. No LOC. Pt has no acute complaint.

## 2023-07-24 NOTE — ED ADULT NURSE NOTE - NSFALLRISKINTERV_ED_ALL_ED
Assistance OOB with selected safe patient handling equipment if applicable/Assistance with ambulation/Communicate fall risk and risk factors to all staff, patient, and family/Monitor gait and stability/Monitor for mental status changes and reorient to person, place, and time, as needed/Move patient closer to nursing station/within visual sight of ED staff/Provide visual cue: yellow wristband, yellow gown, etc/Reinforce activity limits and safety measures with patient and family/Toileting schedule using arm’s reach rule for commode and bathroom/Use of alarms - bed, stretcher, chair and/or video monitoring/Call bell, personal items and telephone in reach/Instruct patient to call for assistance before getting out of bed/chair/stretcher/Non-slip footwear applied when patient is off stretcher/[place] to call system/Physically safe environment - no spills, clutter or unnecessary equipment/Purposeful Proactive Rounding/Room/bathroom lighting operational, light cord in reach

## 2023-07-24 NOTE — ED PROVIDER NOTE - NS ED ATTENDING STATEMENT MOD
This was a shared visit with the CYNDEE. I reviewed and verified the documentation and independently performed the documented:

## 2023-07-24 NOTE — ED ADULT TRIAGE NOTE - CHIEF COMPLAINT QUOTE
Patient BIBA from [facility] for unwitnessed fall. Patient with baseline dementia, unable to provide any information. No AC therapy.

## 2024-03-13 NOTE — H&P ADULT - NSHPSOCIALHISTORY_GEN_ALL_CORE
Reportedly no significant smoking or ETOH history. Lives with son. Uses walker at home. Arranging care at home, recently got more reliable HHA Negative

## 2025-01-17 NOTE — ED ADULT NURSE NOTE - NSSEPSISSUSPECTED_ED_A_ED
"Admission Medication History     The home medication history was taken by Toyin Atkinson.    You may go to "Admission" then "Reconcile Home Medications" tabs to review and/or act upon these items.     The home medication list has been updated by the Pharmacy department.   Please read ALL comments highlighted in yellow.   Please address this information as you see fit.    Feel free to contact us if you have any questions or require assistance.      The medications listed below were removed from the home medication list. Please reorder if appropriate:  Patient reports no longer taking the following medication(s):  Ketorolac 0.5% drop  Levalbuterol 0.63 mg/3 ml nebu soln  Omeprazole 40 mg  Metoprolol 25 mg    Medications listed below were obtained from: Nursing home  PTA Medications   Medication Sig    acetaminophen (TYLENOL) 325 MG tablet Take 325 mg by mouth every 6 (six) hours as needed for Pain.    albuterol-ipratropium (DUO-NEB) 2.5 mg-0.5 mg/3 mL nebulizer solution Take 3 mLs by nebulization every 6 (six) hours as needed for Wheezing. Rescue    ANORO ELLIPTA 62.5-25 mcg/actuation DsDv INHALE 1 PUFF INTO THE LUNGS ONCE DAILY.    brimonidine 0.2% (ALPHAGAN) 0.2 % Drop PLACE 1 DROP INTO BOTH EYES 2 TIMES A DAY.    busPIRone (BUSPAR) 7.5 MG tablet Take 7.5 mg by mouth 2 (two) times daily.    calcium carbonate/vitamin D3 (CALCIUM 600 + D,3, ORAL) Take 1 tablet by mouth Daily.    cholestyramine-aspartame (PREVALITE) 4 gram PwPk TAKE 1 PACKET (4 G TOTAL) BY MOUTH 2 (TWO) TIMES DAILY. FOR DIARRHEA    citalopram (CELEXA) 20 MG tablet Take 1 tablet (20 mg total) by mouth once daily.    diclofenac sodium (VOLTAREN) 1 % Gel Apply to hands and ankles topically twice daily as needed for pain    famotidine (PEPCID) 20 MG tablet Take 1 tablet (20 mg total) by mouth once daily.    furosemide (LASIX) 20 MG tablet Take 20 mg by mouth once daily.    gabapentin (NEURONTIN) 300 MG capsule TAKE 1 CAPSULE BY MOUTH THREE TIMES A DAY    " latanoprost 0.005 % ophthalmic solution Place 1 drop into both eyes every evening.    LEVEMIR FLEXPEN 100 unit/mL (3 mL) InPn pen Inject 10 Units into the skin every evening.    NOVOLOG FLEXPEN U-100 INSULIN 100 unit/mL (3 mL) InPn pen Inject 0-8 Units into the skin as needed (sliding scale). B to 149 mg/dL = 0 units  150-199 mg/dL = 2 units  200 to 249 mg/dL = 4 units  250 to 299 mg/dL = 6 units  300 to 999 mg/dL = 8 units  Notify Provider if BG is more than 400 mg/dL.    ondansetron (ZOFRAN) 4 MG tablet Take 4 mg by mouth every 8 (eight) hours as needed for Nausea.    potassium chloride SA (K-DUR,KLOR-CON) 20 MEQ tablet Take 20 mEq by mouth once daily.    senna (SENOKOT) 8.6 mg tablet Take 1 tablet by mouth once daily.    tamsulosin (FLOMAX) 0.4 mg Cap Take 1 capsule (0.4 mg total) by mouth once daily.    timolol maleate 0.5% (TIMOPTIC) 0.5 % Drop Place 1 drop into both eyes once daily.    XARELTO 20 mg Tab TAKE 1 TABLET BY MOUTH EVERY DAY WITH DINNER OR EVENING MEAL       Potential issues to be addressed PRIOR TO DISCHARGE  The listed medications were obtained from another facility ([facility]). The patient may not have been able to fill these prescriptions prior to this admission and may require new scripts upon discharge.     Toyin Atkinson  EXT [phone]                  .           No
